# Patient Record
Sex: FEMALE | Race: OTHER | HISPANIC OR LATINO | URBAN - METROPOLITAN AREA
[De-identification: names, ages, dates, MRNs, and addresses within clinical notes are randomized per-mention and may not be internally consistent; named-entity substitution may affect disease eponyms.]

---

## 2021-03-16 ENCOUNTER — INPATIENT (INPATIENT)
Facility: HOSPITAL | Age: 66
LOS: 8 days | Discharge: NURSING, REHAB OR LONG-TERM CARE | End: 2021-03-25
Attending: HOSPITALIST | Admitting: HOSPITALIST
Payer: MEDICAID

## 2021-03-16 VITALS
OXYGEN SATURATION: 99 % | SYSTOLIC BLOOD PRESSURE: 165 MMHG | DIASTOLIC BLOOD PRESSURE: 72 MMHG | RESPIRATION RATE: 18 BRPM | HEART RATE: 94 BPM | TEMPERATURE: 98 F

## 2021-03-16 PROCEDURE — 99285 EMERGENCY DEPT VISIT HI MDM: CPT

## 2021-03-16 PROCEDURE — 71045 X-RAY EXAM CHEST 1 VIEW: CPT | Mod: 26

## 2021-03-16 NOTE — ED ADULT TRIAGE NOTE - CHIEF COMPLAINT QUOTE
Pt brought in by Son for second opinion for a rash and weakness. Pt was seen at [facility] and discharged. Pt also complaining of difficulty walking Pt denies chest pain, sob, n/v/d, fever or chills.

## 2021-03-17 ENCOUNTER — RESULT REVIEW (OUTPATIENT)
Age: 66
End: 2021-03-17

## 2021-03-17 DIAGNOSIS — Z00.00 ENCOUNTER FOR GENERAL ADULT MEDICAL EXAMINATION WITHOUT ABNORMAL FINDINGS: ICD-10-CM

## 2021-03-17 DIAGNOSIS — Z98.890 OTHER SPECIFIED POSTPROCEDURAL STATES: Chronic | ICD-10-CM

## 2021-03-17 DIAGNOSIS — D47.3 ESSENTIAL (HEMORRHAGIC) THROMBOCYTHEMIA: ICD-10-CM

## 2021-03-17 DIAGNOSIS — R21 RASH AND OTHER NONSPECIFIC SKIN ERUPTION: ICD-10-CM

## 2021-03-17 DIAGNOSIS — E87.1 HYPO-OSMOLALITY AND HYPONATREMIA: ICD-10-CM

## 2021-03-17 DIAGNOSIS — M25.50 PAIN IN UNSPECIFIED JOINT: ICD-10-CM

## 2021-03-17 DIAGNOSIS — R74.01 ELEVATION OF LEVELS OF LIVER TRANSAMINASE LEVELS: ICD-10-CM

## 2021-03-17 DIAGNOSIS — Z98.891 HISTORY OF UTERINE SCAR FROM PREVIOUS SURGERY: Chronic | ICD-10-CM

## 2021-03-17 DIAGNOSIS — R53.1 WEAKNESS: ICD-10-CM

## 2021-03-17 DIAGNOSIS — D64.9 ANEMIA, UNSPECIFIED: ICD-10-CM

## 2021-03-17 DIAGNOSIS — D72.829 ELEVATED WHITE BLOOD CELL COUNT, UNSPECIFIED: ICD-10-CM

## 2021-03-17 DIAGNOSIS — E11.9 TYPE 2 DIABETES MELLITUS WITHOUT COMPLICATIONS: ICD-10-CM

## 2021-03-17 DIAGNOSIS — B37.0 CANDIDAL STOMATITIS: ICD-10-CM

## 2021-03-17 DIAGNOSIS — Z29.9 ENCOUNTER FOR PROPHYLACTIC MEASURES, UNSPECIFIED: ICD-10-CM

## 2021-03-17 LAB
24R-OH-CALCIDIOL SERPL-MCNC: 19.1 NG/ML — LOW (ref 30–80)
A1C WITH ESTIMATED AVERAGE GLUCOSE RESULT: 10.6 % — HIGH (ref 4–5.6)
ALBUMIN SERPL ELPH-MCNC: 2.6 G/DL — LOW (ref 3.3–5)
ALBUMIN SERPL ELPH-MCNC: 2.7 G/DL — LOW (ref 3.3–5)
ALP SERPL-CCNC: 238 U/L — HIGH (ref 40–120)
ALP SERPL-CCNC: 245 U/L — HIGH (ref 40–120)
ALT FLD-CCNC: 47 U/L — HIGH (ref 4–33)
ALT FLD-CCNC: 51 U/L — HIGH (ref 4–33)
ANION GAP SERPL CALC-SCNC: 10 MMOL/L — SIGNIFICANT CHANGE UP (ref 7–14)
ANION GAP SERPL CALC-SCNC: 11 MMOL/L — SIGNIFICANT CHANGE UP (ref 7–14)
APPEARANCE UR: CLEAR — SIGNIFICANT CHANGE UP
APTT BLD: 29.3 SEC — SIGNIFICANT CHANGE UP (ref 27–36.3)
AST SERPL-CCNC: 40 U/L — HIGH (ref 4–32)
AST SERPL-CCNC: 41 U/L — HIGH (ref 4–32)
B BURGDOR C6 AB SER-ACNC: NEGATIVE — SIGNIFICANT CHANGE UP
B BURGDOR IGG+IGM SER-ACNC: 0.13 INDEX — SIGNIFICANT CHANGE UP (ref 0.01–0.89)
BASOPHILS # BLD AUTO: 0.05 K/UL — SIGNIFICANT CHANGE UP (ref 0–0.2)
BASOPHILS NFR BLD AUTO: 0.2 % — SIGNIFICANT CHANGE UP (ref 0–2)
BILIRUB SERPL-MCNC: 0.5 MG/DL — SIGNIFICANT CHANGE UP (ref 0.2–1.2)
BILIRUB SERPL-MCNC: 0.5 MG/DL — SIGNIFICANT CHANGE UP (ref 0.2–1.2)
BILIRUB UR-MCNC: NEGATIVE — SIGNIFICANT CHANGE UP
BLD GP AB SCN SERPL QL: NEGATIVE — SIGNIFICANT CHANGE UP
BUN SERPL-MCNC: 7 MG/DL — SIGNIFICANT CHANGE UP (ref 7–23)
BUN SERPL-MCNC: 8 MG/DL — SIGNIFICANT CHANGE UP (ref 7–23)
C3 SERPL-MCNC: 182 MG/DL — HIGH (ref 90–180)
C4 SERPL-MCNC: 32 MG/DL — SIGNIFICANT CHANGE UP (ref 10–40)
CALCIUM SERPL-MCNC: 8.5 MG/DL — SIGNIFICANT CHANGE UP (ref 8.4–10.5)
CALCIUM SERPL-MCNC: 8.6 MG/DL — SIGNIFICANT CHANGE UP (ref 8.4–10.5)
CHLORIDE SERPL-SCNC: 92 MMOL/L — LOW (ref 98–107)
CHLORIDE SERPL-SCNC: 96 MMOL/L — LOW (ref 98–107)
CHOLEST SERPL-MCNC: 88 MG/DL — SIGNIFICANT CHANGE UP
CK SERPL-CCNC: 19 U/L — LOW (ref 25–170)
CLOSURE TME COLL+EPINEP BLD: 569 K/UL — HIGH (ref 150–400)
CMV IGM FLD-ACNC: <8 AU/ML — SIGNIFICANT CHANGE UP
CMV IGM SERPL QL: NEGATIVE — SIGNIFICANT CHANGE UP
CO2 SERPL-SCNC: 25 MMOL/L — SIGNIFICANT CHANGE UP (ref 22–31)
CO2 SERPL-SCNC: 26 MMOL/L — SIGNIFICANT CHANGE UP (ref 22–31)
COLOR SPEC: YELLOW — SIGNIFICANT CHANGE UP
CREAT ?TM UR-MCNC: 120 MG/DL — SIGNIFICANT CHANGE UP
CREAT SERPL-MCNC: 0.44 MG/DL — LOW (ref 0.5–1.3)
CREAT SERPL-MCNC: 0.5 MG/DL — SIGNIFICANT CHANGE UP (ref 0.5–1.3)
CRP SERPL-MCNC: 197.4 MG/L — HIGH
DIFF PNL FLD: NEGATIVE — SIGNIFICANT CHANGE UP
EBV EA AB SER IA-ACNC: <5 U/ML — SIGNIFICANT CHANGE UP
EBV EA AB TITR SER IF: POSITIVE
EBV EA IGG SER-ACNC: NEGATIVE — SIGNIFICANT CHANGE UP
EBV NA IGG SER IA-ACNC: 45.3 U/ML — HIGH
EBV PATRN SPEC IB-IMP: SIGNIFICANT CHANGE UP
EBV VCA IGG AVIDITY SER QL IA: POSITIVE
EBV VCA IGM SER IA-ACNC: 30.9 U/ML — SIGNIFICANT CHANGE UP
EBV VCA IGM SER IA-ACNC: >750 U/ML — HIGH
EBV VCA IGM TITR FLD: NEGATIVE — SIGNIFICANT CHANGE UP
EOSINOPHIL # BLD AUTO: 0.55 K/UL — HIGH (ref 0–0.5)
EOSINOPHIL NFR BLD AUTO: 2.5 % — SIGNIFICANT CHANGE UP (ref 0–6)
ERYTHROCYTE [SEDIMENTATION RATE] IN BLOOD: 91 MM/HR — HIGH (ref 4–25)
ESTIMATED AVERAGE GLUCOSE: 258 MG/DL — HIGH (ref 68–114)
FERRITIN SERPL-MCNC: 3220 NG/ML — HIGH (ref 15–150)
FOLATE SERPL-MCNC: 13 NG/ML — SIGNIFICANT CHANGE UP (ref 3.1–17.5)
GLUCOSE BLDC GLUCOMTR-MCNC: 164 MG/DL — HIGH (ref 70–99)
GLUCOSE BLDC GLUCOMTR-MCNC: 174 MG/DL — HIGH (ref 70–99)
GLUCOSE BLDC GLUCOMTR-MCNC: 188 MG/DL — HIGH (ref 70–99)
GLUCOSE BLDC GLUCOMTR-MCNC: 237 MG/DL — HIGH (ref 70–99)
GLUCOSE BLDC GLUCOMTR-MCNC: 238 MG/DL — HIGH (ref 70–99)
GLUCOSE BLDC GLUCOMTR-MCNC: 268 MG/DL — HIGH (ref 70–99)
GLUCOSE SERPL-MCNC: 168 MG/DL — HIGH (ref 70–99)
GLUCOSE SERPL-MCNC: 213 MG/DL — HIGH (ref 70–99)
GLUCOSE UR QL: ABNORMAL
HAPTOGLOB SERPL-MCNC: 565 MG/DL — HIGH (ref 34–200)
HAV IGM SER-ACNC: SIGNIFICANT CHANGE UP
HBV CORE IGM SER-ACNC: SIGNIFICANT CHANGE UP
HBV SURFACE AG SER-ACNC: SIGNIFICANT CHANGE UP
HCT VFR BLD CALC: 31.5 % — LOW (ref 34.5–45)
HCT VFR BLD CALC: 32.7 % — LOW (ref 34.5–45)
HCV AB S/CO SERPL IA: 0.26 S/CO — SIGNIFICANT CHANGE UP (ref 0–0.99)
HCV AB SERPL-IMP: SIGNIFICANT CHANGE UP
HDLC SERPL-MCNC: 29 MG/DL — LOW
HGB BLD-MCNC: 10.6 G/DL — LOW (ref 11.5–15.5)
HGB BLD-MCNC: 10.9 G/DL — LOW (ref 11.5–15.5)
HIV 1+2 AB+HIV1 P24 AG SERPL QL IA: SIGNIFICANT CHANGE UP
IANC: 18.72 K/UL — HIGH (ref 1.5–8.5)
IMM GRANULOCYTES NFR BLD AUTO: 1.1 % — SIGNIFICANT CHANGE UP (ref 0–1.5)
INR BLD: 1.29 RATIO — HIGH (ref 0.88–1.16)
IRON SATN MFR SERPL: 12 % — LOW (ref 14–50)
IRON SATN MFR SERPL: 19 UG/DL — LOW (ref 30–160)
KETONES UR-MCNC: ABNORMAL
LDH SERPL L TO P-CCNC: 494 U/L — HIGH (ref 135–225)
LEUKOCYTE ESTERASE UR-ACNC: ABNORMAL
LIPID PNL WITH DIRECT LDL SERPL: 33 MG/DL — SIGNIFICANT CHANGE UP
LYMPHOCYTES # BLD AUTO: 1.85 K/UL — SIGNIFICANT CHANGE UP (ref 1–3.3)
LYMPHOCYTES # BLD AUTO: 8.4 % — LOW (ref 13–44)
MAGNESIUM SERPL-MCNC: 2.2 MG/DL — SIGNIFICANT CHANGE UP (ref 1.6–2.6)
MCHC RBC-ENTMCNC: 27.6 PG — SIGNIFICANT CHANGE UP (ref 27–34)
MCHC RBC-ENTMCNC: 27.7 PG — SIGNIFICANT CHANGE UP (ref 27–34)
MCHC RBC-ENTMCNC: 33.3 GM/DL — SIGNIFICANT CHANGE UP (ref 32–36)
MCHC RBC-ENTMCNC: 33.7 GM/DL — SIGNIFICANT CHANGE UP (ref 32–36)
MCV RBC AUTO: 82.2 FL — SIGNIFICANT CHANGE UP (ref 80–100)
MCV RBC AUTO: 82.8 FL — SIGNIFICANT CHANGE UP (ref 80–100)
MONOCYTES # BLD AUTO: 0.73 K/UL — SIGNIFICANT CHANGE UP (ref 0–0.9)
MONOCYTES NFR BLD AUTO: 3.3 % — SIGNIFICANT CHANGE UP (ref 2–14)
NEUTROPHILS # BLD AUTO: 18.72 K/UL — HIGH (ref 1.8–7.4)
NEUTROPHILS NFR BLD AUTO: 84.5 % — HIGH (ref 43–77)
NITRITE UR-MCNC: NEGATIVE — SIGNIFICANT CHANGE UP
NON HDL CHOLESTEROL: 59 MG/DL — SIGNIFICANT CHANGE UP
NRBC # BLD: 0 /100 WBCS — SIGNIFICANT CHANGE UP
NRBC # BLD: 0 /100 WBCS — SIGNIFICANT CHANGE UP
NRBC # FLD: 0 K/UL — SIGNIFICANT CHANGE UP
NRBC # FLD: 0 K/UL — SIGNIFICANT CHANGE UP
OSMOLALITY SERPL: 280 MOSM/KG — SIGNIFICANT CHANGE UP (ref 275–295)
OSMOLALITY UR: 623 MOSM/KG — SIGNIFICANT CHANGE UP (ref 50–1200)
PH UR: 7.5 — SIGNIFICANT CHANGE UP (ref 5–8)
PHOSPHATE SERPL-MCNC: 3.2 MG/DL — SIGNIFICANT CHANGE UP (ref 2.5–4.5)
PLATELET # BLD AUTO: 745 K/UL — HIGH (ref 150–400)
PLATELET # BLD AUTO: 751 K/UL — HIGH (ref 150–400)
POTASSIUM SERPL-MCNC: 3.5 MMOL/L — SIGNIFICANT CHANGE UP (ref 3.5–5.3)
POTASSIUM SERPL-MCNC: 4.2 MMOL/L — SIGNIFICANT CHANGE UP (ref 3.5–5.3)
POTASSIUM SERPL-SCNC: 3.5 MMOL/L — SIGNIFICANT CHANGE UP (ref 3.5–5.3)
POTASSIUM SERPL-SCNC: 4.2 MMOL/L — SIGNIFICANT CHANGE UP (ref 3.5–5.3)
PROT ?TM UR-MCNC: 70 MG/DL — SIGNIFICANT CHANGE UP
PROT SERPL-MCNC: 6.6 G/DL — SIGNIFICANT CHANGE UP (ref 6–8.3)
PROT SERPL-MCNC: 6.8 G/DL — SIGNIFICANT CHANGE UP (ref 6–8.3)
PROT UR-MCNC: ABNORMAL
PROT/CREAT UR-RTO: 0.6 RATIO — HIGH (ref 0–0.2)
PROTHROM AB SERPL-ACNC: 14.7 SEC — HIGH (ref 10.6–13.6)
RBC # BLD: 3.83 M/UL — SIGNIFICANT CHANGE UP (ref 3.8–5.2)
RBC # BLD: 3.95 M/UL — SIGNIFICANT CHANGE UP (ref 3.8–5.2)
RBC # BLD: 3.95 M/UL — SIGNIFICANT CHANGE UP (ref 3.8–5.2)
RBC # FLD: 14.7 % — HIGH (ref 10.3–14.5)
RBC # FLD: 15 % — HIGH (ref 10.3–14.5)
RETICS #: 77 K/UL — SIGNIFICANT CHANGE UP (ref 25–125)
RETICS/RBC NFR: 2 % — SIGNIFICANT CHANGE UP (ref 0.5–2.5)
RH IG SCN BLD-IMP: POSITIVE — SIGNIFICANT CHANGE UP
RHEUMATOID FACT SERPL-ACNC: <10 IU/ML — SIGNIFICANT CHANGE UP (ref 0–13)
SARS-COV-2 RNA SPEC QL NAA+PROBE: SIGNIFICANT CHANGE UP
SODIUM SERPL-SCNC: 129 MMOL/L — LOW (ref 135–145)
SODIUM SERPL-SCNC: 131 MMOL/L — LOW (ref 135–145)
SODIUM UR-SCNC: 143 MMOL/L — SIGNIFICANT CHANGE UP
SP GR SPEC: 1.02 — SIGNIFICANT CHANGE UP (ref 1.01–1.02)
T PALLIDUM AB TITR SER: NEGATIVE — SIGNIFICANT CHANGE UP
TIBC SERPL-MCNC: 161 UG/DL — LOW (ref 220–430)
TRIGL SERPL-MCNC: 129 MG/DL — SIGNIFICANT CHANGE UP
TSH SERPL-MCNC: 1.5 UIU/ML — SIGNIFICANT CHANGE UP (ref 0.27–4.2)
UIBC SERPL-MCNC: 142 UG/DL — SIGNIFICANT CHANGE UP (ref 110–370)
URATE SERPL-MCNC: 1.7 MG/DL — LOW (ref 2.5–7)
UROBILINOGEN FLD QL: ABNORMAL
VIT B12 SERPL-MCNC: 1573 PG/ML — HIGH (ref 200–900)
WBC # BLD: 22.14 K/UL — HIGH (ref 3.8–10.5)
WBC # BLD: 23.38 K/UL — HIGH (ref 3.8–10.5)
WBC # FLD AUTO: 22.14 K/UL — HIGH (ref 3.8–10.5)
WBC # FLD AUTO: 23.38 K/UL — HIGH (ref 3.8–10.5)

## 2021-03-17 PROCEDURE — 99221 1ST HOSP IP/OBS SF/LOW 40: CPT

## 2021-03-17 PROCEDURE — 73030 X-RAY EXAM OF SHOULDER: CPT | Mod: 26,LT

## 2021-03-17 PROCEDURE — 73070 X-RAY EXAM OF ELBOW: CPT | Mod: 26,RT

## 2021-03-17 PROCEDURE — 74178 CT ABD&PLV WO CNTR FLWD CNTR: CPT | Mod: 26

## 2021-03-17 PROCEDURE — 88305 TISSUE EXAM BY PATHOLOGIST: CPT | Mod: 26

## 2021-03-17 PROCEDURE — 12345: CPT | Mod: NC

## 2021-03-17 PROCEDURE — 99223 1ST HOSP IP/OBS HIGH 75: CPT | Mod: GC

## 2021-03-17 PROCEDURE — 93010 ELECTROCARDIOGRAM REPORT: CPT

## 2021-03-17 PROCEDURE — 71260 CT THORAX DX C+: CPT | Mod: 26

## 2021-03-17 RX ORDER — DEXTROSE 50 % IN WATER 50 %
12.5 SYRINGE (ML) INTRAVENOUS ONCE
Refills: 0 | Status: DISCONTINUED | OUTPATIENT
Start: 2021-03-17 | End: 2021-03-25

## 2021-03-17 RX ORDER — KETOROLAC TROMETHAMINE 30 MG/ML
15 SYRINGE (ML) INJECTION ONCE
Refills: 0 | Status: DISCONTINUED | OUTPATIENT
Start: 2021-03-17 | End: 2021-03-17

## 2021-03-17 RX ORDER — CHOLECALCIFEROL (VITAMIN D3) 125 MCG
2000 CAPSULE ORAL DAILY
Refills: 0 | Status: DISCONTINUED | OUTPATIENT
Start: 2021-03-17 | End: 2021-03-25

## 2021-03-17 RX ORDER — DEXTROSE 50 % IN WATER 50 %
25 SYRINGE (ML) INTRAVENOUS ONCE
Refills: 0 | Status: DISCONTINUED | OUTPATIENT
Start: 2021-03-17 | End: 2021-03-25

## 2021-03-17 RX ORDER — SODIUM CHLORIDE 9 MG/ML
1 INJECTION INTRAMUSCULAR; INTRAVENOUS; SUBCUTANEOUS
Refills: 0 | Status: DISCONTINUED | OUTPATIENT
Start: 2021-03-17 | End: 2021-03-19

## 2021-03-17 RX ORDER — KETOROLAC TROMETHAMINE 30 MG/ML
15 SYRINGE (ML) INJECTION EVERY 6 HOURS
Refills: 0 | Status: DISCONTINUED | OUTPATIENT
Start: 2021-03-17 | End: 2021-03-17

## 2021-03-17 RX ORDER — ACETAMINOPHEN 500 MG
650 TABLET ORAL EVERY 6 HOURS
Refills: 0 | Status: DISCONTINUED | OUTPATIENT
Start: 2021-03-17 | End: 2021-03-25

## 2021-03-17 RX ORDER — LATANOPROST 0.05 MG/ML
1 SOLUTION/ DROPS OPHTHALMIC; TOPICAL AT BEDTIME
Refills: 0 | Status: DISCONTINUED | OUTPATIENT
Start: 2021-03-17 | End: 2021-03-25

## 2021-03-17 RX ORDER — DEXTROSE 50 % IN WATER 50 %
15 SYRINGE (ML) INTRAVENOUS ONCE
Refills: 0 | Status: DISCONTINUED | OUTPATIENT
Start: 2021-03-17 | End: 2021-03-25

## 2021-03-17 RX ORDER — SODIUM CHLORIDE 9 MG/ML
1000 INJECTION, SOLUTION INTRAVENOUS
Refills: 0 | Status: DISCONTINUED | OUTPATIENT
Start: 2021-03-17 | End: 2021-03-17

## 2021-03-17 RX ORDER — KETOROLAC TROMETHAMINE 30 MG/ML
15 SYRINGE (ML) INJECTION EVERY 6 HOURS
Refills: 0 | Status: DISCONTINUED | OUTPATIENT
Start: 2021-03-17 | End: 2021-03-19

## 2021-03-17 RX ORDER — SODIUM CHLORIDE 9 MG/ML
1000 INJECTION INTRAMUSCULAR; INTRAVENOUS; SUBCUTANEOUS
Refills: 0 | Status: DISCONTINUED | OUTPATIENT
Start: 2021-03-17 | End: 2021-03-18

## 2021-03-17 RX ORDER — CHOLECALCIFEROL (VITAMIN D3) 125 MCG
1 CAPSULE ORAL
Qty: 0 | Refills: 0 | DISCHARGE

## 2021-03-17 RX ORDER — NYSTATIN 500MM UNIT
500000 POWDER (EA) MISCELLANEOUS EVERY 6 HOURS
Refills: 0 | Status: DISCONTINUED | OUTPATIENT
Start: 2021-03-17 | End: 2021-03-25

## 2021-03-17 RX ORDER — KETOTIFEN FUMARATE 0.34 MG/ML
1 SOLUTION OPHTHALMIC
Refills: 0 | Status: DISCONTINUED | OUTPATIENT
Start: 2021-03-17 | End: 2021-03-25

## 2021-03-17 RX ORDER — SODIUM CHLORIDE 9 MG/ML
1000 INJECTION INTRAMUSCULAR; INTRAVENOUS; SUBCUTANEOUS ONCE
Refills: 0 | Status: COMPLETED | OUTPATIENT
Start: 2021-03-17 | End: 2021-03-17

## 2021-03-17 RX ORDER — ATORVASTATIN CALCIUM 80 MG/1
40 TABLET, FILM COATED ORAL AT BEDTIME
Refills: 0 | Status: DISCONTINUED | OUTPATIENT
Start: 2021-03-17 | End: 2021-03-19

## 2021-03-17 RX ORDER — HYDROXYZINE HCL 10 MG
25 TABLET ORAL EVERY 6 HOURS
Refills: 0 | Status: DISCONTINUED | OUTPATIENT
Start: 2021-03-17 | End: 2021-03-25

## 2021-03-17 RX ORDER — INSULIN LISPRO 100/ML
8 VIAL (ML) SUBCUTANEOUS
Refills: 0 | Status: DISCONTINUED | OUTPATIENT
Start: 2021-03-17 | End: 2021-03-18

## 2021-03-17 RX ORDER — ENOXAPARIN SODIUM 100 MG/ML
40 INJECTION SUBCUTANEOUS DAILY
Refills: 0 | Status: DISCONTINUED | OUTPATIENT
Start: 2021-03-17 | End: 2021-03-25

## 2021-03-17 RX ORDER — GLUCAGON INJECTION, SOLUTION 0.5 MG/.1ML
1 INJECTION, SOLUTION SUBCUTANEOUS ONCE
Refills: 0 | Status: DISCONTINUED | OUTPATIENT
Start: 2021-03-17 | End: 2021-03-17

## 2021-03-17 RX ORDER — INSULIN GLARGINE 100 [IU]/ML
20 INJECTION, SOLUTION SUBCUTANEOUS EVERY MORNING
Refills: 0 | Status: DISCONTINUED | OUTPATIENT
Start: 2021-03-17 | End: 2021-03-17

## 2021-03-17 RX ORDER — PETROLATUM,WHITE
1 JELLY (GRAM) TOPICAL DAILY
Refills: 0 | Status: DISCONTINUED | OUTPATIENT
Start: 2021-03-17 | End: 2021-03-25

## 2021-03-17 RX ORDER — CHOLECALCIFEROL (VITAMIN D3) 125 MCG
1000 CAPSULE ORAL DAILY
Refills: 0 | Status: DISCONTINUED | OUTPATIENT
Start: 2021-03-17 | End: 2021-03-17

## 2021-03-17 RX ORDER — INSULIN LISPRO 100/ML
VIAL (ML) SUBCUTANEOUS
Refills: 0 | Status: DISCONTINUED | OUTPATIENT
Start: 2021-03-17 | End: 2021-03-18

## 2021-03-17 RX ORDER — INSULIN GLARGINE 100 [IU]/ML
30 INJECTION, SOLUTION SUBCUTANEOUS EVERY MORNING
Refills: 0 | Status: DISCONTINUED | OUTPATIENT
Start: 2021-03-18 | End: 2021-03-18

## 2021-03-17 RX ORDER — DILTIAZEM HCL 120 MG
300 CAPSULE, EXT RELEASE 24 HR ORAL DAILY
Refills: 0 | Status: DISCONTINUED | OUTPATIENT
Start: 2021-03-17 | End: 2021-03-25

## 2021-03-17 RX ORDER — ACETAMINOPHEN 500 MG
650 TABLET ORAL EVERY 6 HOURS
Refills: 0 | Status: DISCONTINUED | OUTPATIENT
Start: 2021-03-17 | End: 2021-03-17

## 2021-03-17 RX ORDER — CHOLECALCIFEROL (VITAMIN D3) 125 MCG
2 CAPSULE ORAL
Qty: 0 | Refills: 0 | DISCHARGE

## 2021-03-17 RX ORDER — INSULIN LISPRO 100/ML
5 VIAL (ML) SUBCUTANEOUS
Refills: 0 | Status: DISCONTINUED | OUTPATIENT
Start: 2021-03-17 | End: 2021-03-17

## 2021-03-17 RX ORDER — INSULIN LISPRO 100/ML
VIAL (ML) SUBCUTANEOUS AT BEDTIME
Refills: 0 | Status: DISCONTINUED | OUTPATIENT
Start: 2021-03-17 | End: 2021-03-18

## 2021-03-17 RX ADMIN — Medication 300 MILLIGRAM(S): at 10:04

## 2021-03-17 RX ADMIN — Medication 5 UNIT(S): at 09:44

## 2021-03-17 RX ADMIN — Medication 500000 UNIT(S): at 13:54

## 2021-03-17 RX ADMIN — Medication 1: at 21:47

## 2021-03-17 RX ADMIN — Medication 5 UNIT(S): at 13:55

## 2021-03-17 RX ADMIN — Medication 25 MILLIGRAM(S): at 18:41

## 2021-03-17 RX ADMIN — Medication 15 MILLIGRAM(S): at 18:42

## 2021-03-17 RX ADMIN — SODIUM CHLORIDE 1 GRAM(S): 9 INJECTION INTRAMUSCULAR; INTRAVENOUS; SUBCUTANEOUS at 18:50

## 2021-03-17 RX ADMIN — Medication 50 MILLIGRAM(S): at 18:41

## 2021-03-17 RX ADMIN — ATORVASTATIN CALCIUM 40 MILLIGRAM(S): 80 TABLET, FILM COATED ORAL at 21:46

## 2021-03-17 RX ADMIN — Medication 1: at 09:45

## 2021-03-17 RX ADMIN — LATANOPROST 1 DROP(S): 0.05 SOLUTION/ DROPS OPHTHALMIC; TOPICAL at 21:46

## 2021-03-17 RX ADMIN — KETOTIFEN FUMARATE 1 DROP(S): 0.34 SOLUTION OPHTHALMIC at 18:47

## 2021-03-17 RX ADMIN — Medication 2000 UNIT(S): at 13:54

## 2021-03-17 RX ADMIN — SODIUM CHLORIDE 1000 MILLILITER(S): 9 INJECTION INTRAMUSCULAR; INTRAVENOUS; SUBCUTANEOUS at 01:53

## 2021-03-17 RX ADMIN — Medication 500000 UNIT(S): at 18:50

## 2021-03-17 RX ADMIN — ENOXAPARIN SODIUM 40 MILLIGRAM(S): 100 INJECTION SUBCUTANEOUS at 18:42

## 2021-03-17 RX ADMIN — Medication 15 MILLIGRAM(S): at 01:53

## 2021-03-17 RX ADMIN — INSULIN GLARGINE 20 UNIT(S): 100 INJECTION, SOLUTION SUBCUTANEOUS at 08:03

## 2021-03-17 RX ADMIN — SODIUM CHLORIDE 100 MILLILITER(S): 9 INJECTION INTRAMUSCULAR; INTRAVENOUS; SUBCUTANEOUS at 21:50

## 2021-03-17 RX ADMIN — Medication 15 MILLIGRAM(S): at 18:57

## 2021-03-17 RX ADMIN — SODIUM CHLORIDE 100 MILLILITER(S): 9 INJECTION INTRAMUSCULAR; INTRAVENOUS; SUBCUTANEOUS at 18:37

## 2021-03-17 RX ADMIN — Medication 15 MILLIGRAM(S): at 00:11

## 2021-03-17 RX ADMIN — Medication 500000 UNIT(S): at 23:34

## 2021-03-17 RX ADMIN — Medication 5 UNIT(S): at 18:45

## 2021-03-17 RX ADMIN — Medication 15 MILLIGRAM(S): at 10:16

## 2021-03-17 RX ADMIN — Medication 2: at 18:46

## 2021-03-17 RX ADMIN — Medication 15 MILLIGRAM(S): at 10:01

## 2021-03-17 NOTE — H&P ADULT - PROBLEM SELECTOR PROBLEM 8
Type 2 diabetes mellitus without complication, unspecified whether long term insulin use Transaminitis

## 2021-03-17 NOTE — H&P ADULT - NSHPLABSRESULTS_GEN_ALL_CORE
10.6   23.38 )-----------( 751      ( 17 Mar 2021 00:22 )             31.5       -    129<L>  |  92<L>  |  7   ----------------------------<  213<H>  4.2   |  26  |  0.44<L>    Ca    8.6      17 Mar 2021 00:22    TPro  6.8  /  Alb  2.7<L>  /  TBili  0.5  /  DBili  x   /  AST  40<H>  /  ALT  51<H>  /  AlkPhos  238<H>                Urinalysis Basic - ( 17 Mar 2021 03:32 )    Color: Yellow / Appearance: Clear / S.022 / pH: x  Gluc: x / Ketone: Small  / Bili: Negative / Urobili: 3 mg/dL   Blood: x / Protein: 30 mg/dL / Nitrite: Negative   Leuk Esterase: Small / RBC: 5 /HPF / WBC 25-50 /HPF   Sq Epi: x / Non Sq Epi: few /HPF / Bacteria: Few            Lactate Trend      CARDIAC MARKERS ( 17 Mar 2021 02:12 )  x     / x     / 19 U/L / x     / x            CAPILLARY BLOOD GLUCOSE      POCT Blood Glucose.: 285 mg/dL (16 Mar 2021 19:25)

## 2021-03-17 NOTE — PROGRESS NOTE ADULT - PROBLEM SELECTOR PLAN 3
- Na 129, Cl 92, possibly in the setting inflammation and SIADH   - F/u urine lytes, serum osmolality  - F/u TSH, cortisol  - S/p 1L NS in the ED  - Monitor BMP q12 Suspect anemia of chronic disease mixed with iron def.  Hb 10.6 MCV 82, unknown baseline  - iron studies showing concomitant iron def anemia with inflammation, B12, folate, TSH - all wnl smear  - Maintain active T&S

## 2021-03-17 NOTE — H&P ADULT - PROBLEM SELECTOR PLAN 1
- Generalized weakness, fevers, joint pain x1 week, along with rash x1 month, also with dry mouth; no autoimmune hx  - S/p OSH discharge for ?Lyme disease started on doxycyline  - Leukocytosis to 23.4, anemia to 10.6 with MCV 82, platelets 751, ESR 91, , elevated AST/ALT 40/51, alk phos 238, CK low 19  - U/A 30 protein  - Likely inflammatory etiology. Will send workup including blue top platelets, anemia workup, hepatitis panel, HIV, VIPIN, syphilis, blood smear, EBV, CMV, RF, CCP, Sjogren's antibodies, lyme panel, blood cultures   - X-rays L shoulder and R knee   - Rheum consult in AM   - Derm consult in AM   - Pain control with Tylenol for mild pain, Toradol for moderate-severe pain - Generalized weakness, fevers, joint pain x1 week, along with rash x1 month, also with dry mouth; no autoimmune hx  - S/p OSH discharge for ?Lyme disease started on doxycyline  - Leukocytosis to 23.4, anemia to 10.6 with MCV 82, platelets 751, ESR 91, , elevated AST/ALT 40/51, alk phos 238, CK low 19  - U/A 30 protein  - Likely inflammatory etiology. Will send workup including blue top platelets, anemia workup, hepatitis panel, HIV, VIPIN, syphilis, blood smear, EBV, CMV, RF, CCP, Sjogren's antibodies, lyme panel, blood cultures   - X-rays L shoulder and R knee   - Rheum consult in AM   - Derm consult in AM   - Consider pan scan CT to r/o malignancy   - Pain control with Tylenol for mild pain, Toradol for moderate-severe pain  - Obtain outpatient medical records from recent hospitalization - Generalized weakness, fevers, joint pain x1 week, along with rash x1 month, also with dry mouth; no autoimmune hx  - S/p OSH discharge for ?Lyme disease started on doxycyline  - Leukocytosis to 23.4, anemia to 10.6 with MCV 82, platelets 751, ESR 91, , elevated AST/ALT 40/51, alk phos 238, CK low 19  - U/A 30 protein  - Likely inflammatory etiology. Will send workup including blue top platelets, anemia workup, hepatitis panel, HIV, VIPIN, syphilis, blood smear, EBV, CMV, RF, CCP, Sjogren's antibodies, lyme panel, blood cultures   - X-rays L shoulder and R elbow   - Rheum consult in AM   - Derm consult in AM   - Consider pan scan CT to r/o malignancy   - Pain control with Tylenol for mild pain, Toradol for moderate-severe pain  - Obtain outpatient medical records from recent hospitalization - Generalized weakness, fevers, joint pain x1 week, along with rash x1 month, also with dry mouth; no autoimmune hx  - S/p OSH discharge for ?Lyme disease started on doxycyline  - Leukocytosis to 23.4, anemia to 10.6 with MCV 82, platelets 751, ESR 91, , elevated AST/ALT 40/51, alk phos 238, CK low 19  - U/A 30 protein  - Likely inflammatory etiology. Will send workup including blue top platelets, anemia workup, hepatitis panel, HIV, VIPIN, syphilis, blood smear, EBV, CMV, RF, CCP, Sjogren's antibodies, lyme panel, blood cultures, double stranded DNA   - X-rays L shoulder and R elbow   - Rheum consult in AM   - Derm consult in AM   - Consider pan scan CT to r/o malignancy   - Pain control with Tylenol for mild pain, Toradol for moderate-severe pain  - Obtain outpatient medical records from recent hospitalization (was at [facility]) - Generalized weakness, fevers, joint pain x1 week, along with rash x1 month, also with dry mouth; no autoimmune hx  - S/p OSH discharge for ?Lyme disease started on Doxycyline  - Leukocytosis to 23.4K, anemia to 10.6 with MCV 82, platelets 751, ESR 91, , elevated AST/ALT 40/51, alk phos 238, CK low 19  - U/A 30 protein  - Likely inflammatory etiology. Will send workup including blue top platelets, anemia workup, hepatitis panel, HIV, VIPIN, syphilis, blood smear, EBV, CMV, RF, CCP, Sjogren's antibodies, lyme panel, blood cultures, double stranded DNA   - X-rays L shoulder and R elbow   - Rheum consult in AM   - Derm consult in AM   - Consider pan scan CT to r/o malignancy   - Pain control with Tylenol for mild pain, Toradol for moderate-severe pain  - Obtain outpatient medical records from recent hospitalization (was at [facility])  - c/w Doxycycline for now pending verification of records from [facility]; - Generalized weakness, fevers, joint pain x1 week, along with rash x1 month, also with dry mouth; no autoimmune hx  - S/p OSH discharge for ?Lyme disease started on Doxycyline 21-day regimen  - Leukocytosis to 23.4K, anemia to 10.6 with MCV 82, platelets 751, ESR 91, , elevated AST/ALT 40/51, alk phos 238, CK low 19  - U/A 30 protein  - Likely inflammatory etiology. Will send workup including blue top platelets, anemia workup, hepatitis panel, HIV, VIPIN, syphilis, blood smear, EBV, CMV, RF, CCP, Sjogren's antibodies, lyme panel, blood cultures, double stranded DNA   - X-rays L shoulder and R elbow   - Rheum consult in AM   - Derm consult in AM   - Consider pan scan CT to r/o malignancy   - Pain control with Tylenol for mild pain, Toradol for moderate-severe pain  - Obtain outpatient medical records from recent hospitalization (was at [facility])  - c/w Doxycycline for now pending verification of records from [facility];

## 2021-03-17 NOTE — PROGRESS NOTE ADULT - PROBLEM SELECTOR PLAN 6
Suspect anemia of chronic disease;   Hb 10.6 MCV 82, unknown baseline  - F/u workup - iron studies, B12, folate, TSH, smear  - Maintain active T&S - WBC 23.4, differential ordered (pending)  - Monitor CBC  - F/u blood cultures

## 2021-03-17 NOTE — ED PROVIDER NOTE - CLINICAL SUMMARY MEDICAL DECISION MAKING FREE TEXT BOX
DO Meagan PGY-2: 64 y/o F presenting with polyarthralgia, rash concern for possible rheum disorder. Per DC papers, treated for Lyme. Patient will require admission for PT/OT as unable to perform ADLs as well as further evaluation for etiology of patient's symptoms. Will pan culture, check labs, CK, EKG, CXR, COVID swab. TBA

## 2021-03-17 NOTE — H&P ADULT - PROBLEM SELECTOR PLAN 4
- Platelets 751, f/u blue top platelets  - Likely in the setting of inflammatory process with workup above   - Monitor CBC, maintain active T&S - ?in the setting of doxycyline regimen  - Start nystatin  -f/u HIV 1/2 screening

## 2021-03-17 NOTE — ED PROVIDER NOTE - OBJECTIVE STATEMENT
[place]  ID# [id number]  64 y/o F with PMh of DM presenting with generalized weakness, joint pains, rash. Patient states she was DC from OSH on 3/12 after being evaluated for same symptoms. Per papers, was eval for Lyme and rheumatologic disorders. Patient complaining of left shoulder and b/l knee pain. Occasionally gets pain of r shoulder as well. Patient has rash in neck area and b/l arms. Complaining of whole body itching sensation  Denies any fevers, chills, n/v/d, abd pain, LE swelling. No weakness in arms or legs but feels she is severely debilitated due to pain. Is unable to bathe or clothe herself. Abdominal Pain, N/V/D

## 2021-03-17 NOTE — PROGRESS NOTE ADULT - PROBLEM SELECTOR PLAN 1
- Generalized weakness, fevers, joint pain x1 week, along with rash x1 month, also with dry mouth; no autoimmune hx  - S/p OSH discharge for ?Lyme disease started on Doxycyline 21-day regimen  - Leukocytosis to 23.4K, anemia to 10.6 with MCV 82, platelets 751, ESR 91, , elevated AST/ALT 40/51, alk phos 238, CK low 19  - U/A 30 protein  - Likely inflammatory etiology. Will send workup including blue top platelets, anemia workup, hepatitis panel, HIV, VIPIN, syphilis, blood smear, EBV, CMV, RF, CCP, Sjogren's antibodies, lyme panel, blood cultures, double stranded DNA   - X-rays L shoulder and R elbow   - Rheum consult in AM   - Derm consult in AM   - Consider pan scan CT to r/o malignancy   - Pain control with Tylenol for mild pain, Toradol for moderate-severe pain  - Obtain outpatient medical records from recent hospitalization (was at [facility])  - c/w Doxycycline for now pending verification of records from [facility]; - Generalized weakness, fevers, polyarthralgia/arthritis x1 week of large joints, along with rash x1 month, +dry mouth/eyes?; no autoimmune Hx or FHx.  - S/p OSH discharge 3/12 for Lyme disease started on Doxycyline 21-day regimen  - Was treated there with Levaquin and 3x doses of Vanco.  - Leukocytosis to 23.4K, anemia to 10.6 with MCV 82, platelets 751, ESR 91, , elevated AST/ALT 40/51, alk phos 238, CK low 19  - U/A 30 protein  - Likely inflammatory etiology. Will send workup including blue top platelets, anemia workup, hepatitis panel, HIV, VIPIN, syphilis, blood smear, EBV, CMV, RF, CCP, Sjogren's antibodies, lyme panel, blood cultures, double stranded DNA   - X-rays L shoulder and R elbow   - Rheum consult in AM   - Derm consult in AM   - Consider pan scan CT to r/o malignancy   - Pain control with Tylenol for mild pain, Toradol for moderate-severe pain  - Obtain outpatient medical records from recent hospitalization (was at [facility])  - c/w Doxycycline for now pending verification of records from [facility]; - Generalized weakness, fevers, polyarthralgia/arthritis x1 week of large joints, along with rash x1 month, +dry mouth/eyes?; no autoimmune Hx or FHx.  - S/p OSH discharge 3/12 for Lyme disease started on Doxycyline 21-day regimen  - Was treated there with Levaquin and 3x doses of Vanco.  - Leukocytosis to 23.4K, anemia to 10.6 with MCV 82, platelets 751, ESR 91, , elevated AST/ALT 40/51, alk phos 238, CK low 19  - U/A 30 protein  - Likely inflammatory etiology. Will send workup including blue top platelets, anemia workup, hepatitis panel, HIV, VIPIN, syphilis, blood smear, EBV, CMV, RF, CCP, Sjogren's antibodies, lyme panel, blood cultures, double stranded DNA, C3/4  - X-rays L shoulder and R elbow - f/u  - Rheum consulted, appreciate recs.   - Derm consulted, appreciate recs.   - Will consider pan scan CT to r/o malignancy (will try to get CT chest docs from OSH)  - Pain control with Tylenol for mild pain, Toradol for moderate-severe pain  - Obtain outpatient medical records from recent hospitalization (was at [facility])  - holding Doxycycline, pending verification of records from [facility] or lyme serology/PCR

## 2021-03-17 NOTE — H&P ADULT - NSHPPHYSICALEXAM_GEN_ALL_CORE
Vital Signs Last 24 Hrs  T(C): 36.7 (17 Mar 2021 00:16), Max: 36.8 (16 Mar 2021 16:55)  T(F): 98 (17 Mar 2021 00:16), Max: 98.3 (16 Mar 2021 16:55)  HR: 76 (17 Mar 2021 00:16) (76 - 94)  BP: 156/72 (17 Mar 2021 00:16) (156/72 - 165/72)  BP(mean): --  RR: 18 (17 Mar 2021 00:16) (18 - 18)  SpO2: 100% (17 Mar 2021 00:16) (99% - 100%)    PHYSICAL EXAM:  GENERAL: NAD, lying in bed comfortably  HEAD:  Atraumatic, Normocephalic  EYES: EOMI, PERRLA, conjunctiva and sclera clear  ENT: Moist mucous membranes  NECK: Supple, No JVD  CHEST/LUNG: Clear to auscultation bilaterally; No rales, rhonchi, wheezing, or rubs. Unlabored respirations  HEART: Regular rate and rhythm; No murmurs, rubs, or gallops  ABDOMEN: Bowel sounds present; Soft, Nontender, Nondistended. No hepatomegally  EXTREMITIES:  2+ Peripheral Pulses, brisk capillary refill. No clubbing, cyanosis, or edema  NERVOUS SYSTEM:  Alert & Oriented X3, speech clear. No deficits   MSK: FROM all 4 extremities, full and equal strength  SKIN: macular rash over b/l lateral upper arms and upper chest Vital Signs Last 24 Hrs  T(C): 36.7 (17 Mar 2021 00:16), Max: 36.8 (16 Mar 2021 16:55)  T(F): 98 (17 Mar 2021 00:16), Max: 98.3 (16 Mar 2021 16:55)  HR: 76 (17 Mar 2021 00:16) (76 - 94)  BP: 156/72 (17 Mar 2021 00:16) (156/72 - 165/72)  BP(mean): --  RR: 18 (17 Mar 2021 00:16) (18 - 18)  SpO2: 100% (17 Mar 2021 00:16) (99% - 100%)    PHYSICAL EXAM:  GENERAL: NAD, lying in bed comfortably  HEAD:  Atraumatic, Normocephalic  EYES: EOMI, PERRLA, conjunctiva and sclera clear  ENT: Moist mucous membranes  NECK: Supple  CHEST/LUNG: Clear to auscultation bilaterally; No rales  HEART: Regular rate and rhythm; No murmurs  ABDOMEN: Bowel sounds present; Soft, Nontender, Nondistended  EXTREMITIES:  2+ Peripheral Pulses, brisk capillary refill. No clubbing, cyanosis, or edema  NERVOUS SYSTEM:  Alert & Oriented X3, speech clear. No deficits   MSK: +L shoulder and R elbow tenderness, no swelling appreciated. ROM of L shoulder and R elbow limited 2/2 pain. Other joints nontender   SKIN: macular rash over b/l lateral upper arms and upper chest Vital Signs Last 24 Hrs  T(C): 36.7 (17 Mar 2021 00:16), Max: 36.8 (16 Mar 2021 16:55)  T(F): 98 (17 Mar 2021 00:16), Max: 98.3 (16 Mar 2021 16:55)  HR: 76 (17 Mar 2021 00:16) (76 - 94)  BP: 156/72 (17 Mar 2021 00:16) (156/72 - 165/72)  BP(mean): --  RR: 18 (17 Mar 2021 00:16) (18 - 18)  SpO2: 100% (17 Mar 2021 00:16) (99% - 100%)    PHYSICAL EXAM:  GENERAL: NAD, lying in bed comfortably  HEAD:  Atraumatic, Normocephalic  EYES: EOMI, PERRLA, conjunctiva and sclera clear  ENT: Moist mucous membranes. +thrush   NECK: Supple  CHEST/LUNG: Clear to auscultation bilaterally; No rales  HEART: Regular rate and rhythm; No murmurs  ABDOMEN: Bowel sounds present; Soft, Nontender, Nondistended  EXTREMITIES:  2+ Peripheral Pulses, brisk capillary refill. No clubbing, cyanosis, or edema  NERVOUS SYSTEM:  Alert & Oriented X3, speech clear. No deficits   MSK: +L shoulder and R elbow tenderness, no swelling appreciated. ROM of L shoulder and R elbow limited 2/2 pain. Other joints nontender   SKIN: macular rash over b/l lateral upper arms and upper chest

## 2021-03-17 NOTE — CONSULT NOTE ADULT - SUBJECTIVE AND OBJECTIVE BOX
HPI:  64 y/o Czech speaking Female with PMHx of DM Type 2 (on insulin), HTN, HLD, COVID () and glaucoma presenting with generalized weakness, joint pain, and rash. Patient states that she was discharged from OSH on 3/12 after being evaluated for similar symptoms. They suspected that she may have Lyme disease and started her on doxycycline and sent her home. They also suspected that this might be post COVID related.   Patient has multiple complaints. Reports rash in the neck area and b/l arms, along with whole body itching. States that she has had a rash since 2021 and has seen various doctors and tried different things (steroid creams) without relief. The rash initially started on her face. Also complains of joint pain for 1 week. States she has joint pain and stiffness in the b/l shoulders, elbows, hands, and knees. States that the pain is currently worst in the L shoulder and R elbow. States that the pain is so significant that she is unable to walk currently. Tries Tylenol with minimal relief. Also reports that she has been unable to do her ADLs - clean herself, walk. Never has had joint pain like this before. Also reports subjective fever and chills, along with dry mouth.   Denies any N/V/D, abdominal pain, leg swelling, chest pain, dyspnea. Denies oral ulcers, hair loss, weight loss, dry eyes.     ED course: pt's vitals were T 98.3 HR 76-94 -165/72 RR 18-19 POX % RA. Pt treated with NS 1L bolus and Toradol 15 mg IV.  (17 Mar 2021 04:39)    Dermatology consulted for rash. States she has had a rash since  and has tried different steroid creams without improvement. Initially started on her face and now involves chest, arms, legs. +Itchy. Endorsed onset of join pains x 1 week in bilateral shoulders, elbows, hands, knees. Also states scalp is very itchy. Joint pains not worse in AM, worse with movement. +Weakness. Tried Tylenol with minimal relief. Reported subjective fever and chills but afebrile on measurement at home. Denies worsening of rash in sunlight. Denies oral ulcers, hair loss, weight loss, dry eyes. Denies any new medications prior to onset of rash. Denies personal hx of autoimmune diseases. Son with psoriasis.     PAST MEDICAL & SURGICAL HISTORY:  COVID-19      Vitamin D deficiency    Glaucoma    Hyperlipidemia    Hypertension    Diabetes mellitus    History of throat surgery    History of elbow surgery    S/P       REVIEW OF SYSTEMS:  General: +subjective fevers;   Skin/Breast: see HPI  Ophthalmologic: no eye pain or changes in vision  ENT: no dysphagia or changes in hearing  Respiratory: no SOB or cough  Cardiovascular: no palpitations or chest pain  Gastrointestinal: no abdominal pain or blood in stool  Genitourinary: no dysuria or frequency  Musculoskeletal: + joint pains    MEDICATIONS  (STANDING):  atorvastatin 40 milliGRAM(s) Oral at bedtime  cholecalciferol 2000 Unit(s) Oral daily  dextrose 40% Gel 15 Gram(s) Oral once  dextrose 50% Injectable 25 Gram(s) IV Push once  dextrose 50% Injectable 12.5 Gram(s) IV Push once  dextrose 50% Injectable 25 Gram(s) IV Push once  diltiazem    milliGRAM(s) Oral daily  enoxaparin Injectable 40 milliGRAM(s) SubCutaneous daily  insulin glargine Injectable (LANTUS) 20 Unit(s) SubCutaneous every morning  insulin lispro (ADMELOG) corrective regimen sliding scale   SubCutaneous three times a day before meals  insulin lispro (ADMELOG) corrective regimen sliding scale   SubCutaneous at bedtime  insulin lispro Injectable (ADMELOG) 5 Unit(s) SubCutaneous three times a day before meals  ketotifen 0.025% Ophthalmic Solution 1 Drop(s) Both EYES two times a day  latanoprost 0.005% Ophthalmic Solution 1 Drop(s) Both EYES at bedtime  nystatin    Suspension 600470 Unit(s) Swish and Swallow every 6 hours  predniSONE   Tablet 50 milliGRAM(s) Oral daily  sodium chloride 0.9%. 1000 milliLiter(s) (100 mL/Hr) IV Continuous <Continuous>    MEDICATIONS  (PRN):  acetaminophen   Tablet .. 650 milliGRAM(s) Oral every 6 hours PRN Temp greater or equal to 38C (100.4F), Mild Pain (1 - 3), Moderate Pain (4 - 6)  hydrOXYzine hydrochloride 25 milliGRAM(s) Oral every 6 hours PRN Itching  ketorolac   Injectable 15 milliGRAM(s) IV Push every 6 hours PRN Severe Pain (7 - 10)  triamcinolone 0.1% Cream 1 Application(s) Topical every 8 hours PRN Itching      Allergies    azithromycin (Hives; Swelling)  enalapril (Other (Mild to Mod))  penicillin (Swelling; Rash)    Intolerances        SOCIAL HISTORY:    FAMILY HISTORY:  No pertinent family history in first degree relatives        Vital Signs Last 24 Hrs  T(C): 36.8 (17 Mar 2021 15:07), Max: 37.2 (17 Mar 2021 06:58)  T(F): 98.3 (17 Mar 2021 15:07), Max: 98.9 (17 Mar 2021 06:58)  HR: 100 (17 Mar 2021 15:07) (76 - 106)  BP: 145/74 (17 Mar 2021 15:07) (145/74 - 159/79)  BP(mean): --  RR: 18 (17 Mar 2021 15:07) (18 - 19)  SpO2: 97% (17 Mar 2021 15:07) (97% - 100%)    PHYSICAL EXAM:  The patient was AOx3, well nourished, and in NAD.  OP showed no ulcerations.  There was no visible LAD.  Conjunctiva were non-injected.  There was no clubbing or edema of extremities.   The scalp, hair, face, eyebrows, lips, OP, neck, chest, back, extremities x4, and nails were examined.  There was no hyperhidrosis or bromhidrosis.     Of note on skin exam:     - red-violet plaques with minimal scale on upper central chest, upper back, forehead, upper inner right thigh, bilateral upper arms  - swelling of proximal phalanges of bilateral hands     LABS:                        10.9   22.14 )-----------( 745      ( 17 Mar 2021 07:23 )             32.7     -    131<L>  |  96<L>  |  8   ----------------------------<  168<H>  3.5   |  25  |  0.50    Ca    8.5      17 Mar 2021 07:23  Phos  3.2       Mg     2.2         TPro  6.6  /  Alb  2.6<L>  /  TBili  0.5  /  DBili  x   /  AST  41<H>  /  ALT  47<H>  /  AlkPhos  245<H>      PT/INR - ( 17 Mar 2021 07:23 )   PT: 14.7 sec;   INR: 1.29 ratio         PTT - ( 17 Mar 2021 07:23 )  PTT:29.3 sec  Urinalysis Basic - ( 17 Mar 2021 03:32 )    Color: Yellow / Appearance: Clear / S.022 / pH: x  Gluc: x / Ketone: Small  / Bili: Negative / Urobili: 3 mg/dL   Blood: x / Protein: 30 mg/dL / Nitrite: Negative   Leuk Esterase: Small / RBC: 5 /HPF / WBC 25-50 /HPF   Sq Epi: x / Non Sq Epi: few /HPF / Bacteria: Few

## 2021-03-17 NOTE — PHYSICAL THERAPY INITIAL EVALUATION ADULT - THERAPY FREQUENCY, PT EVAL
Spoke with patient regarding her constipation. Patient states she continues to take pain medication after surgery for pain control. Patient advised constipation can be a side effect of pain medication. Encourage to decrease pain medication intake if possible. Patient advised to drink prune juice, increase fibrous food intake and eat fruits and dark colored vegetables. If this does not help patient can also increase fiber intake with Metamucil. Advised movement and ambulating is also beneficial. Will check back patient tomorrow when Dr. Vijay Brand is in clinic. 3-5x/week

## 2021-03-17 NOTE — PROGRESS NOTE ADULT - PROBLEM SELECTOR PLAN 4
- ?in the setting of doxycyline regimen  - Start nystatin  -f/u HIV 1/2 screening - Na 129, Cl 92, possibly in the setting inflammation and SIADH vs. low salute intake  - F/u urine lytes, serum osmolality  - TSH wnl, cortisol - pending  - S/p 1L NS in the ED - improved to 131  - Monitor BMP qd

## 2021-03-17 NOTE — H&P ADULT - PROBLEM SELECTOR PLAN 3
- Weakness in the setting of joint pain  - PT eval  - Fall precautions - Na 129, Cl 92, possibly in the setting inflammation and SIADH   - F/u urine lytes, serum osmolality  - F/u TSH, cortisol  - S/p 1L NS in the ED  - Monitor BMP q12

## 2021-03-17 NOTE — H&P ADULT - PROBLEM SELECTOR PLAN 9
- Start nystatin  - ?in the setting of doxycyline - ?in the setting of doxycyline regimen  - Start nystatin  -f/u HIV 1/2 screening Blood glucose 213, 285  - f/u A1c  - On basaglar 17 U BID at home   - C/w basaglar 20 U in the AM and 5 U AC  - Monitor fingersticks  - Diabetic diet

## 2021-03-17 NOTE — ED ADULT NURSE NOTE - CHIEF COMPLAINT QUOTE
Pt brought in by Son for second opinion for a rash and weakness. Pt was seen at [facility] and discharged. Pt also complaining of difficulty walking Pt denies chest pain, sob, n/v/d, fever or chills.

## 2021-03-17 NOTE — H&P ADULT - PROBLEM SELECTOR PLAN 5
- WBC 23.4, differential pending  - Monitor CBC  - F/u blood cultures - WBC 23.4, differential ordered (pending)  - Monitor CBC  - F/u blood cultures - Platelets 751K, f/u blue top platelets  - Likely in the setting of inflammatory process with workup above   - Monitor CBC, maintain active T&S

## 2021-03-17 NOTE — ED PROVIDER NOTE - PHYSICAL EXAMINATION
gen: appears uncomfortable   Mentation: AAO x 3  psych: mood appropriate  ENT: airway patent  Eyes: conjunctivae clear bilaterally  Cardio: RRR, no m/r/g  Resp: normal BS b/l  GI: s/nt/nd  : no CVA tenderness  Neuro: sensation and motor function intact, CN 2-12 intact  Skin: macular rash over b/l lateral upper arms and upper chest  MSK: normal movement of all extremities  Lymph/Vasc: no LE edema

## 2021-03-17 NOTE — H&P ADULT - NSICDXPASTMEDICALHX_GEN_ALL_CORE_FT
PAST MEDICAL HISTORY:  COVID-19 2020    Diabetes mellitus     Glaucoma     Hyperlipidemia     Hypertension     Vitamin D deficiency

## 2021-03-17 NOTE — PROGRESS NOTE ADULT - PROBLEM SELECTOR PLAN 9
Blood glucose 213, 285  - f/u A1c  - On basaglar 17 U BID at home   - C/w basaglar 20 U in the AM and 5 U AC  - Monitor fingersticks  - Diabetic diet

## 2021-03-17 NOTE — CONSULT NOTE ADULT - ASSESSMENT
65F with DM2 on insulin (poorly controlled), HTN, HLD, hx of COVID-19 infection, glaucoma, admitted for erythematous rash since 2/2021 as well as joint pains for past 9 days, s/p admission to outside hospital. Rheumatology consulted to evaluate for autoimmune etiology of these symptoms.     #Erythematous rash and joint pains- rash on chest, b/l arms and legs, started on face- ddx includes infectious (post-viral) vs. drug induced (pt denies new medication use in past couple of months) vs. from autoimmune process such as SLE or vasculitis.   L. shoulder with tenderness on palpation superiorly- would suspect AC joint involvement- but Xray not demonstrating arthritis.   - Thus far, concern for SLE is low given normal/elevated C3 and C4, other serologies pending. Urine protein/creatinine ratio is mildly elevated to 0.6, however pt has poorly controlled DM2 (A1c >10% this admission) so that can be an explanation.     Plan:  - f/u dermatology reccs and potential biopsy  - will follow up SLE serologies- dsDNA. Also please send TESSA and Sjogrens antibodies.     *** INCOMPLETE NOTE ***      Geetha Mena MD PGY4  Rheumatology Fellow  Pager [phone] with DM2 on insulin (poorly controlled), HTN, HLD, hx of COVID-19 infection, glaucoma, admitted for erythematous rash since 2/2021 as well as joint pains for past 9 days, s/p admission to outside hospital. Rheumatology consulted to evaluate for autoimmune etiology of these symptoms.     #Erythematous rash and joint pains- rash on chest, b/l arms and legs, started on face- ddx includes infectious (post-viral such as from Parvovirus which can cause both rash and joint pains, Lyme arthritis, or ?COVID-19 sequelae) vs. drug induced (pt denies new medication use in past couple of months) vs. from autoimmune process such as SLE or vasculitis.   L. shoulder with tenderness on palpation superiorly- would suspect AC joint involvement- but Xray not demonstrating arthritis.   - Thus far, concern for SLE is low given normal/elevated C3 and C4, other serologies pending. Urine protein/creatinine ratio is mildly elevated to 0.6, however pt has poorly controlled DM2 (A1c >10% this admission) so that can be an explanation.     Plan:  - f/u dermatology reccs and potential biopsy  - will follow up SLE serologies- VIPIN, dsDNA. Also please send TESSA and Sjogrens antibodies.   - check RF and anti-CCP antibodies (RA labs- although would not explain concurrent rash)  - f/u infectious workup including parvovirus antibodies, EBV, CMV, and Lyme studies.  - check COVID-19 IgG antibody      *** INCOMPLETE NOTE ***      Geetha Mena MD PGY4  Rheumatology Fellow  Pager [phone] with DM2 on insulin (poorly controlled), HTN, HLD, hx of COVID-19 infection, glaucoma, admitted for erythematous rash since 2/2021 as well as joint pains for past 9 days, s/p admission to outside hospital. Rheumatology consulted to evaluate for autoimmune etiology of these symptoms.     #Erythematous rash and acute polyarthritis- rash on chest, b/l arms and legs, started on face- ddx includes infectious (post-viral such as from Parvovirus which can cause both rash and joint pains, Lyme arthritis, or ?COVID-19 sequelae (less likely)) vs. drug induced (pt denies new medication use in past couple of months) vs. from autoimmune process such Adult Onset Stills Disease (given transaminitis, hyperferritinemia, high ESR, joint pains, and rash) as SLE or vasculitis.   L. shoulder with tenderness on palpation superiorly- would suspect AC joint involvement- but Xray not demonstrating arthritis.   - Thus far, concern for SLE is low given normal/elevated C3 and C4, other serologies pending. Urine protein/creatinine ratio is mildly elevated to 0.6, however pt has poorly controlled DM2 (A1c >10% this admission) so that can be an explanation.     Plan:  - f/u dermatology reccs and potential biopsy  - will follow up SLE serologies- VIPIN, dsDNA. Also please send TESSA and Sjogrens antibodies.   - check RF and anti-CCP antibodies (RA labs- although would not explain concurrent rash and acute joint pains)  - f/u infectious workup including parvovirus antibodies, EBV, CMV, and Lyme studies.  - check COVID-19 IgG antibody    - send fibrinogen, fibrin split products, coagulation studies.    Discussed with Dr. Spencer Mena MD PGY4  Rheumatology Fellow  Pager [phone] with DM2 on insulin (poorly controlled), HTN, HLD, hx of COVID-19 infection, glaucoma, admitted for erythematous rash since 2/2021 as well as joint pains for past 9 days, s/p admission to outside hospital. Rheumatology consulted to evaluate for autoimmune etiology of these symptoms.     #Erythematous rash and acute polyarthritis- rash on chest, b/l arms and legs, started on face- ddx includes infectious (post-viral such as from Parvovirus which can cause both rash and joint pains, Lyme arthritis, or ?COVID-19 sequelae (less likely)) vs. drug induced (pt denies new medication use in past couple of months) vs. from autoimmune process such as dermatomyositis (although CK is low- may be amyopathic) vs.  Adult Onset Stills Disease (given transaminitis, hyperferritinemia, high ESR, joint pains, and rash) as SLE or vasculitis.   L. shoulder with tenderness on palpation superiorly- would suspect AC joint involvement- but Xray not demonstrating arthritis.   - Thus far, concern for SLE is low given normal/elevated C3 and C4, other serologies pending. Urine protein/creatinine ratio is mildly elevated to 0.6, however pt has poorly controlled DM2 (A1c >10% this admission) so that can be an explanation.     Plan:  - f/u dermatology skin biopsy results  - check myomarker panel, Mallory-1 ab (ordered for AM) as well as myoglobin, aldolase for dermatomyositis evaluation.   - will follow up SLE serologies- VIPIN, dsDNA. Also please send TESSA and Sjogrens antibodies.   - check RF and anti-CCP antibodies (RA labs- although would not explain concurrent rash and acute joint pains)  - f/u infectious workup including parvovirus antibodies, EBV, CMV, and Lyme studies.  - check COVID-19 IgG antibody    - send fibrinogen, fibrin split products, coagulation studies.    Discussed with Dr. Spencer Mena MD PGY4  Rheumatology Fellow  Pager [phone] with DM2 on insulin (poorly controlled), HTN, HLD, hx of COVID-19 infection, glaucoma, admitted for erythematous rash since 2/2021 as well as joint pains for past 9 days, s/p admission to outside hospital. Rheumatology consulted to evaluate for autoimmune etiology of these symptoms.     #Erythematous rash and acute polyarthritis- rash on chest, b/l arms and legs, started on face- ddx includes infectious (post-viral such as from Parvovirus which can cause both rash and joint pains, Lyme arthritis, or ?COVID-19 sequelae (less likely)) vs. drug induced (pt denies new medication use in past couple of months) vs. from autoimmune process such as dermatomyositis (although CK is low- may be amyopathic) vs.  Adult Onset Stills Disease (given transaminitis, hyperferritinemia, high ESR, joint pains, and rash) as SLE or vasculitis.   L. shoulder with tenderness on palpation superiorly- would suspect AC joint involvement- but Xray not demonstrating arthritis.   - Thus far, concern for SLE is low given normal/elevated C3 and C4, other serologies pending. Urine protein/creatinine ratio is mildly elevated to 0.6, however pt has poorly controlled DM2 (A1c >10% this admission) so that can be an explanation.     Plan:  - due to concern for dermatomyositis, will start pt on steroids with prednisone 50 mg QD (can start this evening but Quantiferon needs to be sent first)  - f/u dermatology skin biopsy results  - check myomarker panel, Mallory-1 ab (ordered for AM) as well as myoglobin, aldolase for dermatomyositis evaluation.   - in anticipation for high dose steroid use, please send Quantiferon (PRIOR to steroid initiation) and hepatitis panel, immunoglobulins panel.   - check TSH in AM  - will follow up SLE serologies- VIPIN, dsDNA. Also please send TESSA and Sjogrens antibodies.   - check RF and anti-CCP antibodies (RA labs- although would not explain concurrent rash and acute joint pains)  - f/u infectious workup including parvovirus antibodies, EBV, CMV, and Lyme studies.  - check COVID-19 IgG antibody    - send fibrinogen, fibrin split products, coagulation studies in AM.    Discussed with Dr. Spencer Mena MD PGY4  Rheumatology Fellow  Pager [phone] with DM2 on insulin (poorly controlled), HTN, HLD, hx of COVID-19 infection, glaucoma, admitted for erythematous rash since 2/2021 as well as joint pains for past 9 days, s/p admission to outside hospital. Rheumatology consulted to evaluate for autoimmune etiology of these symptoms.     #Erythematous rash and acute polyarthritis- rash on chest, b/l arms and legs, started on face- ddx includes infectious (post-viral such as from Parvovirus which can cause both rash and joint pains, Lyme arthritis, or ?COVID-19 sequelae (less likely)) vs. drug induced (pt denies new medication use in past couple of months) vs. from autoimmune process such as dermatomyositis (although CK is low- may be amyopathic) vs.  Adult Onset Stills Disease (given transaminitis, hyperferritinemia, high ESR, joint pains, and rash) as SLE or vasculitis.   L. shoulder with tenderness on palpation superiorly- would suspect AC joint involvement- but Xray not demonstrating arthritis.   - Thus far, concern for SLE is low given normal/elevated C3 and C4, other serologies pending. Urine protein/creatinine ratio is mildly elevated to 0.6, however pt has poorly controlled DM2 (A1c >10% this admission) so that can be an explanation.     Plan:  - due to concern for dermatomyositis, will start pt on steroids with prednisone 50 mg QD (can start this evening but Quantiferon needs to be sent first)  - f/u dermatology skin biopsy results  - check myomarker panel, Mallory-1 ab (ordered for AM) as well as myoglobin, aldolase for dermatomyositis evaluation.   - in anticipation for high dose steroid use, please send Quantiferon (PRIOR to steroid initiation) and hepatitis panel, immunoglobulins panel.   - check TSH in AM  - will follow up SLE serologies- VIPIN, dsDNA. Also please send TESSA and Sjogrens antibodies.   - check RF and anti-CCP antibodies (RA labs- although would not explain concurrent rash and acute joint pains)  - f/u infectious workup including parvovirus antibodies, EBV, CMV, and Lyme studies.  - check COVID-19 IgG antibody    - send fibrinogen, fibrin split products, coagulation studies in AM.  - given concern for dermatomyositis which is associated with malignancies (lung/ovarian common in females), would recommend CT chest/abdomen/pelvis during this admission.     Discussed with Dr. Spencer Mena MD PGY4  Rheumatology Fellow  Pager [phone]

## 2021-03-17 NOTE — CONSULT NOTE ADULT - ASSESSMENT
Patient with violaceous photodistributed rash, muscle weakness, joint pains. 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 Dermatomyositis is a multisystem autoimmune connective tissue disease that most often is characterized by a violaceous cutaneous eruption, symmetric proximal extensor inflammatory myopathy, and autoantibodies. Pruritis is commonly associated. Additional features include fatigue, malaise, myalgias. Dysphagia, respiratory, and cardiac involvement can be associated. Up to 40% of patients with dermatomyositis may have an occult malignancy, including colon, ovarian, breast, pancreatic, lung, gastric cancers, and lymphoma. Some cases of dermatomyositis can occur without myositis at its onset (with muscle disease absent or detectable only on muscle biopsy, MRI, or laboratory testing) and many of these patients will eventually develop muscle weakness as the disease progresses.   - punch bx of skin of R upper arm performed at bedside today. Please see procedure note below  - Biopsy site should remain covered with pressure bandage for 24-48 hours. Then apply Vaseline to biopsy site daily and cover with bandage until healed.   - agree with rheumatology consult     The patient's chart was reviewed in addition to being seen and examined at bedside with the dermatology attending Dr. Rupa Mittal.  Recommendations were communicated with the primary team.  Please page [phone] for further related questions (please leave 10 digit call back number because we cover several facilities).    Amber Soriano MD  Resident Physician, PGY2  [facility]       ----     Dermatology Punch Biopsy Procedure Note    After risks and benefits of procedure including bleeding, infection and scar were reviewed (consents including photo consent reviewed, signed and in chart), allergies were reviewed and time out performed. Written consent given by the patient.    Area cleaned with rubbing alcohol and anesthetized with lidocaine and epinephrine.  A 4mm punch biopsy was performed to R upper arm, hemostasis achieved with a single dissolvable chromic gut suture with pressure dressing placed.  Patient with violaceous photodistributed rash, muscle weakness, joint pains. 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 Dermatomyositis is a multisystem autoimmune connective tissue disease that most often is characterized by a violaceous cutaneous eruption, symmetric proximal extensor inflammatory myopathy, and autoantibodies. Pruritis is commonly associated. Additional features include fatigue, malaise, myalgias. Dysphagia, respiratory, and cardiac involvement can be associated. Up to 40% of patients with dermatomyositis may have an occult malignancy, including colon, ovarian, breast, pancreatic, lung, gastric cancers, and lymphoma. Some cases of dermatomyositis can occur without myositis at its onset (with muscle disease absent or detectable only on muscle biopsy, MRI, or laboratory testing) and many of these patients will eventually develop muscle weakness as the disease progresses.   - punch bx of skin of R upper arm performed at bedside today. Please see procedure note below  - Biopsy site should remain covered with pressure bandage for 24-48 hours. Then apply Vaseline to biopsy site daily and cover with bandage until healed.   - agree with rheumatology consult   - agree with CT chest/abdomen/pelvis for screening of occult malignancy given concern for dermatomyositis    The patient's chart was reviewed in addition to being seen and examined at bedside with the dermatology attending Dr. Rupa Mittal.  Recommendations were communicated with the primary team.  Please page [phone] for further related questions (please leave 10 digit call back number because we cover several facilities).    Amber Soriano MD  Resident Physician, PGY2  [facility]       ----     Dermatology Punch Biopsy Procedure Note    After risks and benefits of procedure including bleeding, infection and scar were reviewed (consents including photo consent reviewed, signed and in chart), allergies were reviewed and time out performed. Written consent given by the patient.    Area cleaned with rubbing alcohol and anesthetized with lidocaine and epinephrine.  A 4mm punch biopsy was performed to R upper arm, hemostasis achieved with a single dissolvable chromic gut suture with pressure dressing placed.  Patient with violaceous photodistributed rash, muscle weakness, joint pains. 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 Dermatomyositis is a multisystem autoimmune connective tissue disease that most often is characterized by a violaceous cutaneous eruption, symmetric proximal extensor inflammatory myopathy, and autoantibodies. Pruritis is commonly associated. Additional features include fatigue, malaise, myalgias. Dysphagia, respiratory, and cardiac involvement can be associated. Up to 40% of patients with dermatomyositis may have an occult malignancy, including colon, ovarian, breast, pancreatic, lung, gastric cancers, and lymphoma. Some cases of dermatomyositis can occur without myositis at its onset (with muscle disease absent or detectable only on muscle biopsy, MRI, or laboratory testing) and many of these patients will eventually develop muscle weakness as the disease progresses.   - punch bx of skin of R upper arm performed at bedside today. Please see procedure note below  - Biopsy site should remain covered with pressure bandage for 24-48 hours. Then apply Vaseline to biopsy site daily and cover with bandage until healed.  - start topical triamcinolone 0.1% ointment BID to affected areas on trunk/extremities for up to 2 weeks continuously. Then take 1 week break before re-using if needed.   - agree with rheumatology consult   - agree with CT chest/abdomen/pelvis for screening of occult malignancy given concern for dermatomyositis    The patient's chart was reviewed in addition to being seen and examined at bedside with the dermatology attending Dr. Rupa Mittal.  Recommendations were communicated with the primary team.  Please page [phone] for further related questions (please leave 10 digit call back number because we cover several facilities).    Amber Soriano MD  Resident Physician, PGY2  [facility]       ----     Dermatology Punch Biopsy Procedure Note    After risks and benefits of procedure including bleeding, infection and scar were reviewed (consents including photo consent reviewed, signed and in chart), allergies were reviewed and time out performed. Written consent given by the patient.    Area cleaned with rubbing alcohol and anesthetized with lidocaine and epinephrine.  A 4mm punch biopsy was performed to R upper arm, hemostasis achieved with a single dissolvable chromic gut suture with pressure dressing placed.

## 2021-03-17 NOTE — PROGRESS NOTE ADULT - PROBLEM SELECTOR PLAN 5
- Platelets 751K, f/u blue top platelets  - Likely in the setting of inflammatory process with workup above   - Monitor CBC, maintain active T&S - ?in the setting of doxycyline regimen  - Start nystatin  - HIV 1/2 negative

## 2021-03-17 NOTE — H&P ADULT - HISTORY OF PRESENT ILLNESS
Statement Selected This is a 66 y/o Romansh speaking F with PMh of DM presenting with generalized weakness, joint pain, and rash. Patient states that she was discharged from OSH on 3/12 after being evaluated for the same symptoms. Reports that she was evaluated for Lyme and rheumatologic disorders. Per discharge paperwork, pt treated for Lyme disease.     Patient complaining of left shoulder and b/l knee pain. Occasionally gets pain of the R shoulder as well. Patient has a rash in the neck area and b/l arms. Complaining of whole body itching sensation.     Denies any fevers, chills, n/v/d, abd pain, LE swelling. No weakness in arms or legs but feels she is severely debilitated due to pain. Is unable to bathe or clothe herself.    In the ED, pt's vitals were T 98.3 HR 76-94 -165/72 RR 18-19 POX % RA. Pt treated with NS 1L bolus and toradol 15 mg IV.  This is a 66 y/o Faroese speaking F with PMh of DM (on insulin), HTN, HLD, COVID (2020) and glaucoma presenting with generalized weakness, joint pain, and rash. Patient states that she was discharged from OSH on 3/12 after being evaluated for similar symptoms. They suspected that she may have Lyme disease and started her on doxycycline and sent her home. They also suspected that this might be post COVID related.     Patient has multiple complaints. Reports rash in the neck area and b/l arms, along with whole body itching. States that she has had a rash since 2/2021 and has seen various doctors and tried different things (steroid creams) without relief. The rash initially started on her face. Also complains of joint pain for 1 week. States she has joint pain and stiffness in the b/l shoulders, elbows, hands, and knees. States that the pain is currently worst in the L shoulder and R elbow. States that the pain is so significant that she is unable to walk currently. Tries Tylenol with minimal relief. Also reports that she has been unable to do her ADLs - clean herself, walk. Never has had joint pain like this before. Also reports subjective fever and chills, along with dry mouth.     Denies any N/V/D, abdominal pain, leg swelling, chest pain, dyspnea. Denies oral ulcers, hair loss, weight loss, dry eyes.     In the ED, pt's vitals were T 98.3 HR 76-94 -165/72 RR 18-19 POX % RA. Pt treated with NS 1L bolus and toradol 15 mg IV.  64 y/o Turkish speaking Female with PMHx of DM Type 2 (on insulin), HTN, HLD, COVID (2020) and glaucoma presenting with generalized weakness, joint pain, and rash. Patient states that she was discharged from OSH on 3/12 after being evaluated for similar symptoms. They suspected that she may have Lyme disease and started her on doxycycline and sent her home. They also suspected that this might be post COVID related.   Patient has multiple complaints. Reports rash in the neck area and b/l arms, along with whole body itching. States that she has had a rash since 2/2021 and has seen various doctors and tried different things (steroid creams) without relief. The rash initially started on her face. Also complains of joint pain for 1 week. States she has joint pain and stiffness in the b/l shoulders, elbows, hands, and knees. States that the pain is currently worst in the L shoulder and R elbow. States that the pain is so significant that she is unable to walk currently. Tries Tylenol with minimal relief. Also reports that she has been unable to do her ADLs - clean herself, walk. Never has had joint pain like this before. Also reports subjective fever and chills, along with dry mouth.   Denies any N/V/D, abdominal pain, leg swelling, chest pain, dyspnea. Denies oral ulcers, hair loss, weight loss, dry eyes.     ED course: pt's vitals were T 98.3 HR 76-94 -165/72 RR 18-19 POX % RA. Pt treated with NS 1L bolus and Toradol 15 mg IV.

## 2021-03-17 NOTE — H&P ADULT - PROBLEM SELECTOR PLAN 6
- Hb 10.6 MCV 82, unknown baseline  - F/u workup - iron studies, B12, folate, TSH, smear  - Maintain active T&S Suspect anemia of chronic disease;   Hb 10.6 MCV 82, unknown baseline  - F/u workup - iron studies, B12, folate, TSH, smear  - Maintain active T&S

## 2021-03-17 NOTE — PROGRESS NOTE ADULT - ATTENDING COMMENTS
Patient seeen and examined, care d/w HS4 team.    In summary 64 yo born in [place], obese (BMI 33), DM2, glaucoma, HTN, HLD, COVID (3/2020) who presents with joint pains and rash.  Reports started having a rash 2/7/20 started on face was itchy then progressed down body.  Reports 8 days ago developed migrating joint pains involving the shoulders, elbows, knees and fingers.  Reports moves from joint to joint.  No fevers noted at home.  No weight loss.  Reports went to OSH and is not sure if seen by derm or rheum.  Was started on doxycycline for lyme (unclear what dx was based on).      # Rash/Arthralgias:  appears inflamed with elevated ESR/CRP and ferritin concern for rheumatologic d/o; mild anemia, mild proteinuria (but in DM2 pt).  HIV neg.  Reportedly neg TB at OSH.  LFTs mildly elevated CK normal. TSH wnl.  ? cutaneous dermatomyositis, normal CK   - f/u VIPIN, RF   - rheum consult   - derm eval may benfit from bx  - attempting to get records from recent hospital stay     # Anemia: normocytic, mixed TREY and AOCD, no hemolysis (elevated LDH, normal hapto)  - trend, tx underlyiing issue     # DM2: may be on 70/30 as OP: HbA1c 10.6  - basal/bolus for now, will titrate prn    PT rec EFFIE

## 2021-03-17 NOTE — PROGRESS NOTE ADULT - SUBJECTIVE AND OBJECTIVE BOX
Humble Wayne, PGY-1  Pager: [phone] / [phone]    CHIEF COMPLAINT: Patient is a 65y old  Female who presents with a chief complaint of joint pain (17 Mar 2021 04:39)    INTERVAL HPI/OVERNIGHT EVENTS:  66 y/o Georgian speaking Female with PMHx of DM Type 2 (on insulin), HTN, HLD, COVID () and glaucoma presenting with generalized weakness, joint pain, and rash. Patient states that she was discharged from OSH on 3/12 after being evaluated for similar symptoms. They suspected that she may have Lyme disease and started her on doxycycline and sent her home. They also suspected that this might be post COVID related.   Patient has multiple complaints. Reports rash in the neck area and b/l arms, along with whole body itching. States that she has had a rash since 2021 and has seen various doctors and tried different things (steroid creams) without relief. The rash initially started on her face. Also complains of joint pain for 1 week. States she has joint pain and stiffness in the b/l shoulders, elbows, hands, and knees. States that the pain is currently worst in the L shoulder and R elbow. States that the pain is so significant that she is unable to walk currently. Tries Tylenol with minimal relief. Also reports that she has been unable to do her ADLs - clean herself, walk. Never has had joint pain like this before. Also reports subjective fever and chills, along with dry mouth.   Denies any N/V/D, abdominal pain, leg swelling, chest pain, dyspnea. Denies oral ulcers, hair loss, weight loss, dry eyes.     ED course: pt's vitals were T 98.3 HR 76-94 -165/72 RR 18-19 POX % RA. Pt treated with NS 1L bolus and Toradol 15 mg IV.     Additional information - The rash did not get worse in the sun. Joint pain is not worse in the AM, and present at rest but worse on movement. No BM changes recently, had mild rectal bleeding not in feces but on wiping which she attributes 2/2 itching and rash, denies vaginal bleeding, weight loss. Had subjective fever but not over 99F. Endorse dry mouth and itching in her eyes. No FHx of autoimmune/rheumatologist/joint diseases.   Underwent mammography yearly except last year, never had a colonoscopy.     MEDICATIONS (STANDING):  atorvastatin 40 milliGRAM(s) Oral at bedtime  cholecalciferol 2000 Unit(s) Oral daily  dextrose 40% Gel 15 Gram(s) Oral once  dextrose 5%. 1000 milliLiter(s) IV Continuous <Continuous>  dextrose 5%. 1000 milliLiter(s) IV Continuous <Continuous>  dextrose 50% Injectable 25 Gram(s) IV Push once  dextrose 50% Injectable 12.5 Gram(s) IV Push once  dextrose 50% Injectable 25 Gram(s) IV Push once  diltiazem    milliGRAM(s) Oral daily  doxycycline hyclate Capsule 100 milliGRAM(s) Oral every 12 hours  enoxaparin Injectable 40 milliGRAM(s) SubCutaneous daily  glucagon  Injectable 1 milliGRAM(s) IntraMuscular once  insulin glargine Injectable (LANTUS) 20 Unit(s) SubCutaneous every morning  insulin lispro (ADMELOG) corrective regimen sliding scale   SubCutaneous three times a day before meals  insulin lispro (ADMELOG) corrective regimen sliding scale   SubCutaneous at bedtime  insulin lispro Injectable (ADMELOG) 5 Unit(s) SubCutaneous three times a day before meals  ketotifen 0.025% Ophthalmic Solution 1 Drop(s) Both EYES two times a day  latanoprost 0.005% Ophthalmic Solution 1 Drop(s) Both EYES at bedtime  nystatin    Suspension 863809 Unit(s) Swish and Swallow every 6 hours    MEDICATIONS  (PRN):  acetaminophen   Tablet .. 650 milliGRAM(s) Oral every 6 hours PRN  ketorolac   Injectable 15 milliGRAM(s) IV Push every 6 hours PRN      REVIEW OF SYSTEMS:  CONSTITUTIONAL: No fever, weight loss, or fatigue  EYES: No eye pain, visual disturbances, or discharge  ENMT:  No difficulty hearing, tinnitus, vertigo; No sinus or throat pain  NECK: No pain or stiffness  RESPIRATORY: No cough, wheezing, chills or hemoptysis; No shortness of breath  CARDIOVASCULAR: No chest pain, palpitations, dizziness, or leg swelling  GASTROINTESTINAL: No abdominal or epigastric pain. No nausea, vomiting, or hematemesis; No diarrhea or constipation. No melena or hematochezia.  GENITOURINARY: No dysuria, frequency, hematuria, or incontinence  NEUROLOGICAL: No headaches, memory loss, loss of strength, numbness, or tremors  SKIN: No itching, burning, rashes, or lesions   MUSCULOSKELETAL: No joint pain or swelling; No muscle, back, or extremity pain    VITAL SIGNS:  T(F): 97.8 (21 @ 07:28), Max: 98.9 (21 @ 06:58)  HR: 89 (21 @ 07:28) (76 - 94)  BP: 159/79 (21 @ 07:28) (152/60 - 165/72)  RR: 18 (21 @ 07:28) (18 - 18)  SpO2: 99% (21 @ 07:28) (99% - 100%)  CAPILLARY BLOOD GLUCOSE      POCT Blood Glucose.: 164 mg/dL (17 Mar 2021 07:58)  POCT Blood Glucose.: 285 mg/dL (16 Mar 2021 19:25)    I&O's Summary    16 Mar 2021 07:01  -  17 Mar 2021 07:00  --------------------------------------------------------  IN: 300 mL / OUT: 250 mL / NET: 50 mL        PHYSICAL EXAM:  GENERAL: NAD, well-groomed, well-developed  HEAD:  Atraumatic, Normocephalic  EYES: EOMI, PERRLA, conjunctiva and sclera clear  ENMT: No tonsillar erythema, exudates, or enlargement; Moist mucous membranes  NECK: Supple, No JVD, Normal thyroid  NERVOUS SYSTEM:  Alert & Oriented X3, Good concentration; Motor Strength 5/5 B/L upper and lower extremities  CHEST/LUNG: Clear to auscultation bilaterally; No rales, rhonchi, wheezing, or rubs  HEART: Regular rate and rhythm; No murmurs, rubs, or gallops  ABDOMEN: Soft, Nontender, Nondistended; Bowel sounds present  EXTREMITIES:  2+ Peripheral Pulses, No clubbing, cyanosis, or edema  LYMPH: No lymphadenopathy noted  SKIN: No rashes or lesions    LABS:                        10.9   22.14 )-----------( 745      ( 17 Mar 2021 07:23 )             32.7     03-17    131<L>  |  96<L>  |  8   ----------------------------<  168<H>  3.5   |  25  |  0.50    Ca    8.5      17 Mar 2021 07:23  Phos  3.2       Mg     2.2         TPro  6.6  /  Alb  2.6<L>  /  TBili  0.5  /  DBili  x   /  AST  41<H>  /  ALT  47<H>  /  AlkPhos  245<H>      PT/INR - ( 17 Mar 2021 07:23 )   PT: 14.7 sec;   INR: 1.29 ratio         PTT - ( 17 Mar 2021 07:23 )  PTT:29.3 sec  Urinalysis Basic - ( 17 Mar 2021 03:32 )    Color: Yellow / Appearance: Clear / S.022 / pH: x  Gluc: x / Ketone: Small  / Bili: Negative / Urobili: 3 mg/dL   Blood: x / Protein: 30 mg/dL / Nitrite: Negative   Leuk Esterase: Small / RBC: 5 /HPF / WBC 25-50 /HPF   Sq Epi: x / Non Sq Epi: few /HPF / Bacteria: Few          RADIOLOGY & ADDITIONAL TESTS:   Humble Wayne, PGY-1  Pager: [phone] / [phone]    CHIEF COMPLAINT: Patient is a 65y old  Female who presents with a chief complaint of joint pain (17 Mar 2021 04:39)    INTERVAL HPI/OVERNIGHT EVENTS:  66 y/o Turkish speaking Female with PMHx of DM Type 2 (on insulin), HTN, HLD, COVID () and glaucoma presenting with generalized weakness, joint pain, and rash. Patient states that she was discharged from OSH on 3/12 after being evaluated for similar symptoms. They suspected that she may have Lyme disease and started her on doxycycline and sent her home. They also suspected that this might be post COVID related.   Patient has multiple complaints. Reports rash in the neck area and b/l arms, along with whole body itching. States that she has had a rash since 2021 and has seen various doctors and tried different things (steroid creams) without relief. The rash initially started on her face. Also complains of joint pain for 1 week. States she has joint pain and stiffness in the b/l shoulders, elbows, hands, and knees. States that the pain is currently worst in the L shoulder and R elbow. States that the pain is so significant that she is unable to walk currently. Tries Tylenol with minimal relief. Also reports that she has been unable to do her ADLs - clean herself, walk. Never has had joint pain like this before. Also reports subjective fever and chills, along with dry mouth.   Denies any N/V/D, abdominal pain, leg swelling, chest pain, dyspnea. Denies oral ulcers, hair loss, weight loss, dry eyes.     ED course: pt's vitals were T 98.3 HR 76-94 -165/72 RR 18-19 POX % RA. Pt treated with NS 1L bolus and Toradol 15 mg IV.     Additional information - The rash did not get worse in the sun. Joint pain is not worse in the AM, and present at rest but worse on movement. No BM changes recently, had mild rectal bleeding not in feces but on wiping which she attributes 2/2 itching and rash, denies vaginal bleeding, weight loss. Had subjective fever but not over 99F. Endorse dry mouth and itching in her eyes. No FHx of autoimmune/rheumatologist/joint diseases.   Underwent mammography yearly except last year, never had a colonoscopy.     MEDICATIONS (STANDING):  atorvastatin 40 milliGRAM(s) Oral at bedtime  cholecalciferol 2000 Unit(s) Oral daily  dextrose 40% Gel 15 Gram(s) Oral once  dextrose 5%. 1000 milliLiter(s) IV Continuous <Continuous>  dextrose 5%. 1000 milliLiter(s) IV Continuous <Continuous>  dextrose 50% Injectable 25 Gram(s) IV Push once  dextrose 50% Injectable 12.5 Gram(s) IV Push once  dextrose 50% Injectable 25 Gram(s) IV Push once  diltiazem    milliGRAM(s) Oral daily  doxycycline hyclate Capsule 100 milliGRAM(s) Oral every 12 hours  enoxaparin Injectable 40 milliGRAM(s) SubCutaneous daily  glucagon  Injectable 1 milliGRAM(s) IntraMuscular once  insulin glargine Injectable (LANTUS) 20 Unit(s) SubCutaneous every morning  insulin lispro (ADMELOG) corrective regimen sliding scale   SubCutaneous three times a day before meals  insulin lispro (ADMELOG) corrective regimen sliding scale   SubCutaneous at bedtime  insulin lispro Injectable (ADMELOG) 5 Unit(s) SubCutaneous three times a day before meals  ketotifen 0.025% Ophthalmic Solution 1 Drop(s) Both EYES two times a day  latanoprost 0.005% Ophthalmic Solution 1 Drop(s) Both EYES at bedtime  nystatin    Suspension 262921 Unit(s) Swish and Swallow every 6 hours    MEDICATIONS  (PRN):  acetaminophen   Tablet .. 650 milliGRAM(s) Oral every 6 hours PRN  ketorolac   Injectable 15 milliGRAM(s) IV Push every 6 hours PRN    REVIEW OF SYSTEMS:  CONSTITUTIONAL: No fever, weight loss, +fatigue  EYES: No eye pain, visual disturbances, or discharge, +eye itch/dryness  ENMT:  No difficulty hearing, tinnitus, vertigo; No sinus or throat pain, +dry mouth  RESPIRATORY: No cough, wheezing, chills or hemoptysis; No shortness of breath  CARDIOVASCULAR: No chest pain, palpitations, dizziness, or leg swelling  GASTROINTESTINAL: No abdominal or epigastric pain. No nausea, vomiting, or hematemesis; No diarrhea or constipation. No melena or hematochezia.  GENITOURINARY: No dysuria, frequency, hematuria, or incontinence  NEUROLOGICAL: No headaches, memory loss, loss of strength, numbness, or tremors  SKIN: + itching,+ rashes,  MUSCULOSKELETAL: + joint pain and swelling; No muscle, back, or extremity pain    VITAL SIGNS:  T(F): 97.8 (21 @ 07:28), Max: 98.9 (21 @ 06:58)  HR: 89 (21 @ 07:28) (76 - 94)  BP: 159/79 (21 @ 07:28) (152/60 - 165/72)  RR: 18 (21 @ 07:28) (18 - 18)  SpO2: 99% (21 @ 07:28) (99% - 100%)    CAPILLARY BLOOD GLUCOSE  POCT Blood Glucose.: 164 mg/dL (17 Mar 2021 07:58)  POCT Blood Glucose.: 285 mg/dL (16 Mar 2021 19:25)    I&O's Summary  16 Mar 2021 07:01  -  17 Mar 2021 07:00  --------------------------------------------------------  IN: 300 mL / OUT: 250 mL / NET: 50 mL    PHYSICAL EXAM:  GENERAL: NAD, well-groomed, well-developed  HEAD:  Atraumatic, Normocephalic  EYES: EOMI, PERRLA, conjunctiva and sclera clear  ENMT: No tonsillar erythema, exudates, or enlargement; Moist mucous membranes  NECK: Supple, No JVD, Normal thyroid, no LAD  NERVOUS SYSTEM:  Alert & Oriented X3, Good concentration; Motor Strength 5/5 B/L upper and lower extremities  CHEST/LUNG: Clear to auscultation bilaterally on anterior auscultation; No rales, rhonchi, wheezing, or rubs  HEART: Regular rate and rhythm; No murmurs, rubs, or gallops  ABDOMEN: Soft, Nontender, Nondistended; Bowel sounds present, no HSM  EXTREMITIES:  2+ Peripheral Pulses, No clubbing, cyanosis, or edema, TTP, swelling and warmth B/L elbow joints, L shoulder with reduced ROM d/t pain, mild pain in L 3 PIP and L knee. Rest of joints wnl.   LYMPH: No lymphadenopathy noted  SKIN: conflunet MP rash on chest, blanches. Similar rash on both arms around elbows with scratch marks and dry skin. No inguinal rash.     LABS:                        10.9   22.14 )-----------( 745      ( 17 Mar 2021 07:23 )             32.7         131<L>  |  96<L>  |  8   ----------------------------<  168<H>  3.5   |  25  |  0.50    Ca    8.5      17 Mar 2021 07:23  Phos  3.2       Mg     2.2         TPro  6.6  /  Alb  2.6<L>  /  TBili  0.5  /  DBili  x   /  AST  41<H>  /  ALT  47<H>  /  AlkPhos  245<H>      PT/INR - ( 17 Mar 2021 07:23 )   PT: 14.7 sec;   INR: 1.29 ratio      C-Reactive Protein, Serum (21 @ 00:22)    C-Reactive Protein, Serum: 197.4 mg/L         PTT - ( 17 Mar 2021 07:23 )  PTT:29.3 sec  Urinalysis Basic - ( 17 Mar 2021 03:32 )    Color: Yellow / Appearance: Clear / S.022 / pH: x  Gluc: x / Ketone: Small  / Bili: Negative / Urobili: 3 mg/dL   Blood: x / Protein: 30 mg/dL / Nitrite: Negative   Leuk Esterase: Small / RBC: 5 /HPF / WBC 25-50 /HPF   Sq Epi: x / Non Sq Epi: few /HPF / Bacteria: Few          RADIOLOGY & ADDITIONAL TESTS:   Humble Wayne, PGY-1  Pager: [phone] / [phone]    CHIEF COMPLAINT: Patient is a 65y old  Female who presents with a chief complaint of joint pain (17 Mar 2021 04:39)    INTERVAL HPI/OVERNIGHT EVENTS:  66 y/o Belarusian speaking Female with PMHx of DM Type 2 (on insulin), HTN, HLD, COVID () and glaucoma presenting with generalized weakness, joint pain, and rash. Patient states that she was discharged from OSH on 3/12 after being evaluated for similar symptoms. They suspected that she may have Lyme disease and started her on doxycycline and sent her home. They also suspected that this might be post COVID related.   Patient has multiple complaints. Reports rash in the neck area and b/l arms, along with whole body itching. States that she has had a rash since 2021 and has seen various doctors and tried different things (steroid creams) without relief. The rash initially started on her face. Also complains of joint pain for 1 week. States she has joint pain and stiffness in the b/l shoulders, elbows, hands, and knees. States that the pain is currently worst in the L shoulder and R elbow. States that the pain is so significant that she is unable to walk currently. Tries Tylenol with minimal relief. Also reports that she has been unable to do her ADLs - clean herself, walk. Never has had joint pain like this before. Also reports subjective fever and chills, along with dry mouth.   Denies any N/V/D, abdominal pain, leg swelling, chest pain, dyspnea. Denies oral ulcers, hair loss, weight loss, dry eyes.     ED course: pt's vitals were T 98.3 HR 76-94 -165/72 RR 18-19 POX % RA. Pt treated with NS 1L bolus and Toradol 15 mg IV.     Additional information - The rash did not get worse in the sun. Joint pain is not worse in the AM, and present at rest but worse on movement. No BM changes recently, had mild rectal bleeding not in feces but on wiping which she attributes 2/2 itching and rash, denies vaginal bleeding, weight loss. Had subjective fever but not over 99F. Endorse dry mouth and itching in her eyes. No FHx of autoimmune/rheumatologist/joint diseases.   Was admitted at [place] 3/8-3/12 with fevers, joint pain and rash, treated with Levaquin for   Underwent mammography yearly except last year, never had a colonoscopy.     MEDICATIONS (STANDING):  atorvastatin 40 milliGRAM(s) Oral at bedtime  cholecalciferol 2000 Unit(s) Oral daily  dextrose 40% Gel 15 Gram(s) Oral once  dextrose 5%. 1000 milliLiter(s) IV Continuous <Continuous>  dextrose 5%. 1000 milliLiter(s) IV Continuous <Continuous>  dextrose 50% Injectable 25 Gram(s) IV Push once  dextrose 50% Injectable 12.5 Gram(s) IV Push once  dextrose 50% Injectable 25 Gram(s) IV Push once  diltiazem    milliGRAM(s) Oral daily  doxycycline hyclate Capsule 100 milliGRAM(s) Oral every 12 hours  enoxaparin Injectable 40 milliGRAM(s) SubCutaneous daily  glucagon  Injectable 1 milliGRAM(s) IntraMuscular once  insulin glargine Injectable (LANTUS) 20 Unit(s) SubCutaneous every morning  insulin lispro (ADMELOG) corrective regimen sliding scale   SubCutaneous three times a day before meals  insulin lispro (ADMELOG) corrective regimen sliding scale   SubCutaneous at bedtime  insulin lispro Injectable (ADMELOG) 5 Unit(s) SubCutaneous three times a day before meals  ketotifen 0.025% Ophthalmic Solution 1 Drop(s) Both EYES two times a day  latanoprost 0.005% Ophthalmic Solution 1 Drop(s) Both EYES at bedtime  nystatin    Suspension 068033 Unit(s) Swish and Swallow every 6 hours    MEDICATIONS  (PRN):  acetaminophen   Tablet .. 650 milliGRAM(s) Oral every 6 hours PRN  ketorolac   Injectable 15 milliGRAM(s) IV Push every 6 hours PRN    REVIEW OF SYSTEMS:  CONSTITUTIONAL: No fever, weight loss, +fatigue  EYES: No eye pain, visual disturbances, or discharge, +eye itch/dryness  ENMT:  No difficulty hearing, tinnitus, vertigo; No sinus or throat pain, +dry mouth  RESPIRATORY: No cough, wheezing, chills or hemoptysis; No shortness of breath  CARDIOVASCULAR: No chest pain, palpitations, dizziness, or leg swelling  GASTROINTESTINAL: No abdominal or epigastric pain. No nausea, vomiting, or hematemesis; No diarrhea or constipation. No melena or hematochezia.  GENITOURINARY: No dysuria, frequency, hematuria, or incontinence  NEUROLOGICAL: No headaches, memory loss, loss of strength, numbness, or tremors  SKIN: + itching,+ rashes,  MUSCULOSKELETAL: + joint pain and swelling; No muscle, back, or extremity pain    VITAL SIGNS:  T(F): 97.8 (21 @ 07:28), Max: 98.9 (21 @ 06:58)  HR: 89 (21 @ 07:28) (76 - 94)  BP: 159/79 (21 @ 07:28) (152/60 - 165/72)  RR: 18 (21 @ 07:28) (18 - 18)  SpO2: 99% (21 @ 07:28) (99% - 100%)    CAPILLARY BLOOD GLUCOSE  POCT Blood Glucose.: 164 mg/dL (17 Mar 2021 07:58)  POCT Blood Glucose.: 285 mg/dL (16 Mar 2021 19:25)    I&O's Summary  16 Mar 2021 07:01  -  17 Mar 2021 07:00  --------------------------------------------------------  IN: 300 mL / OUT: 250 mL / NET: 50 mL    PHYSICAL EXAM:  GENERAL: NAD, well-groomed, well-developed  HEAD:  Atraumatic, Normocephalic  EYES: EOMI, PERRLA, conjunctiva and sclera clear  ENMT: No tonsillar erythema, exudates, or enlargement; Moist mucous membranes  NECK: Supple, No JVD, Normal thyroid, no LAD  NERVOUS SYSTEM:  Alert & Oriented X3, Good concentration; Motor Strength 5/5 B/L upper and lower extremities  CHEST/LUNG: Clear to auscultation bilaterally on anterior auscultation; No rales, rhonchi, wheezing, or rubs  HEART: Regular rate and rhythm; No murmurs, rubs, or gallops  ABDOMEN: Soft, Nontender, Nondistended; Bowel sounds present, no HSM  EXTREMITIES:  2+ Peripheral Pulses, No clubbing, cyanosis, or edema, TTP, swelling and warmth B/L elbow joints, L shoulder with reduced ROM d/t pain, mild pain in L 3 PIP and L knee. Rest of joints wnl.   LYMPH: No lymphadenopathy noted  SKIN: conflunet MP rash on chest, blanches. Similar rash on both arms around elbows with scratch marks and dry skin. No inguinal rash.     LABS:                        10.9   22.14 )-----------( 745      ( 17 Mar 2021 07:23 )             32.7         131<L>  |  96<L>  |  8   ----------------------------<  168<H>  3.5   |  25  |  0.50    Ca    8.5      17 Mar 2021 07:23  Phos  3.2       Mg     2.2         TPro  6.6  /  Alb  2.6<L>  /  TBili  0.5  /  DBili  x   /  AST  41<H>  /  ALT  47<H>  /  AlkPhos  245<H>      PT/INR - ( 17 Mar 2021 07:23 )   PT: 14.7 sec;   INR: 1.29 ratio      C-Reactive Protein, Serum (21 @ 00:22)    C-Reactive Protein, Serum: 197.4 mg/L    Sedimentation Rate, Erythrocyte (21 @ 02:12)    Sedimentation Rate, Erythrocyte: 91 mm/hr    Iron with Total Binding Capacity (21 @ 07:23)    Iron - Total Binding Capacity.: 161 ug/dL    % Saturation, Iron: 12 %    Iron Total, Serum: 19 ug/dL    Unsaturated Iron Binding Capacity: 142 ug/dL    Haptoglobin, Serum (21 @ 07:23)    Haptoglobin, Serum: 565 mg/dL    Reticulocyte Count (21 @ 07:23)    RBC Count: 3.95 M/uL    Reticulocyte Percent: 2.0 %    Absolute Reticulocytes: 77.0 K/uL    Thyroid Stimulating Hormone, Serum (21 @ 07:23)    Thyroid Stimulating Hormone, Serum: 1.50 uIU/mL    HIV-1/2 Antigen/Antibody Screen by [facility] (21 @ 07:23)    HIV-1/2 Combo Result: Nonreact:     COVID-19 PCR . (21 @ 00:25)    COVID-19 PCR: NotDetec:     PTT - ( 17 Mar 2021 07:23 )  PTT:29.3 sec  Urinalysis Basic - ( 17 Mar 2021 03:32 )    Color: Yellow / Appearance: Clear / S.022 / pH: x  Gluc: x / Ketone: Small  / Bili: Negative / Urobili: 3 mg/dL   Blood: x / Protein: 30 mg/dL / Nitrite: Negative   Leuk Esterase: Small / RBC: 5 /HPF / WBC 25-50 /HPF   Sq Epi: x / Non Sq Epi: few /HPF / Bacteria: Few      RADIOLOGY & ADDITIONAL TESTS:   Humble Wayne, PGY-1  Pager: [phone] / [phone]    [place]  [phone]  CHIEF COMPLAINT: Patient is a 65y old  Female who presents with a chief complaint of joint pain (17 Mar 2021 04:39)    INTERVAL HPI/OVERNIGHT EVENTS:  66 y/o Martiniquais speaking Female with PMHx of DM Type 2 (on insulin), HTN, HLD, COVID () and glaucoma presenting with generalized weakness, joint pain, and rash. Patient states that she was discharged from OSH on 3/12 after being evaluated for similar symptoms. They suspected that she may have Lyme disease and started her on doxycycline and sent her home. They also suspected that this might be post COVID related.   Patient has multiple complaints. Reports rash in the neck area and b/l arms, along with whole body itching. States that she has had a rash since 2021 and has seen various doctors and tried different things (steroid creams) without relief. The rash initially started on her face. Also complains of joint pain for 1 week. States she has joint pain and stiffness in the b/l shoulders, elbows, hands, and knees. States that the pain is currently worst in the L shoulder and R elbow. States that the pain is so significant that she is unable to walk currently. Tries Tylenol with minimal relief. Also reports that she has been unable to do her ADLs - clean herself, walk. Never has had joint pain like this before. Also reports subjective fever and chills, along with dry mouth.   Denies any N/V/D, abdominal pain, leg swelling, chest pain, dyspnea. Denies oral ulcers, hair loss, weight loss, dry eyes.     ED course: pt's vitals were T 98.3 HR 76-94 -165/72 RR 18-19 POX % RA. Pt treated with NS 1L bolus and Toradol 15 mg IV.     Additional information - The rash did not get worse in the sun. Joint pain is not worse in the AM, and present at rest but worse on movement. No BM changes recently, had mild rectal bleeding not in feces but on wiping which she attributes 2/2 itching and rash, denies vaginal bleeding or ulcers, weight loss. Had subjective fever but not over 99F. Endorse dry mouth, and itching in her eyes, no mouth ulcers . No FHx of autoimmune/rheumatologist/joint diseases.   Was admitted at [place] 3/8-3/12 with fevers, joint pain and rash, treated with Levaquin for 5 days and 3 doses of Vanco, 2x covid negative,   Underwent mammography yearly except last year, never had a colonoscopy.     MEDICATIONS (STANDING):  atorvastatin 40 milliGRAM(s) Oral at bedtime  cholecalciferol 2000 Unit(s) Oral daily  dextrose 40% Gel 15 Gram(s) Oral once  dextrose 5%. 1000 milliLiter(s) IV Continuous <Continuous>  dextrose 5%. 1000 milliLiter(s) IV Continuous <Continuous>  dextrose 50% Injectable 25 Gram(s) IV Push once  dextrose 50% Injectable 12.5 Gram(s) IV Push once  dextrose 50% Injectable 25 Gram(s) IV Push once  diltiazem    milliGRAM(s) Oral daily  doxycycline hyclate Capsule 100 milliGRAM(s) Oral every 12 hours  enoxaparin Injectable 40 milliGRAM(s) SubCutaneous daily  glucagon  Injectable 1 milliGRAM(s) IntraMuscular once  insulin glargine Injectable (LANTUS) 20 Unit(s) SubCutaneous every morning  insulin lispro (ADMELOG) corrective regimen sliding scale   SubCutaneous three times a day before meals  insulin lispro (ADMELOG) corrective regimen sliding scale   SubCutaneous at bedtime  insulin lispro Injectable (ADMELOG) 5 Unit(s) SubCutaneous three times a day before meals  ketotifen 0.025% Ophthalmic Solution 1 Drop(s) Both EYES two times a day  latanoprost 0.005% Ophthalmic Solution 1 Drop(s) Both EYES at bedtime  nystatin    Suspension 397596 Unit(s) Swish and Swallow every 6 hours    MEDICATIONS  (PRN):  acetaminophen   Tablet .. 650 milliGRAM(s) Oral every 6 hours PRN  ketorolac   Injectable 15 milliGRAM(s) IV Push every 6 hours PRN    REVIEW OF SYSTEMS:  CONSTITUTIONAL: No fever, weight loss, +fatigue  EYES: No eye pain, visual disturbances, or discharge, +eye itch/dryness  ENMT:  No difficulty hearing, tinnitus, vertigo; No sinus or throat pain, +dry mouth  RESPIRATORY: No cough, wheezing, chills or hemoptysis; No shortness of breath  CARDIOVASCULAR: No chest pain, palpitations, dizziness, or leg swelling  GASTROINTESTINAL: No abdominal or epigastric pain. No nausea, vomiting, or hematemesis; No diarrhea or constipation. No melena or hematochezia.  GENITOURINARY: No dysuria, frequency, hematuria, or incontinence  NEUROLOGICAL: No headaches, memory loss, loss of strength, numbness, or tremors  SKIN: + itching,+ rashes,  MUSCULOSKELETAL: + joint pain and swelling; No muscle, back, or extremity pain    VITAL SIGNS:  T(F): 97.8 (21 @ 07:28), Max: 98.9 (21 @ 06:58)  HR: 89 (21 @ 07:28) (76 - 94)  BP: 159/79 (21 @ 07:28) (152/60 - 165/72)  RR: 18 (21 @ 07:28) (18 - 18)  SpO2: 99% (21 @ 07:28) (99% - 100%)    CAPILLARY BLOOD GLUCOSE  POCT Blood Glucose.: 164 mg/dL (17 Mar 2021 07:58)  POCT Blood Glucose.: 285 mg/dL (16 Mar 2021 19:25)    I&O's Summary  16 Mar 2021 07:01  -  17 Mar 2021 07:00  --------------------------------------------------------  IN: 300 mL / OUT: 250 mL / NET: 50 mL    PHYSICAL EXAM:  GENERAL: NAD, well-groomed, well-developed  HEAD:  Atraumatic, Normocephalic  EYES: EOMI, PERRLA, conjunctiva and sclera clear  ENMT: No tonsillar erythema, exudates, or enlargement; Moist mucous membranes  NECK: Supple, No JVD, Normal thyroid, no LAD  NERVOUS SYSTEM:  Alert & Oriented X3, Good concentration; Motor Strength 5/5 B/L upper and lower extremities  CHEST/LUNG: Clear to auscultation bilaterally on anterior auscultation; No rales, rhonchi, wheezing, or rubs  HEART: Regular rate and rhythm; No murmurs, rubs, or gallops  ABDOMEN: Soft, Nontender, Nondistended; Bowel sounds present, no HSM  EXTREMITIES:  2+ Peripheral Pulses, No clubbing, cyanosis, or edema, TTP, swelling and warmth B/L elbow joints, L shoulder with reduced ROM d/t pain, mild pain in L 3 PIP and L knee. Rest of joints wnl.   LYMPH: No lymphadenopathy noted  SKIN: conflunet MP rash on chest, blanches. Similar rash on both arms around elbows with scratch marks and dry skin. No inguinal rash.     LABS:                        10.9   22.14 )-----------( 745      ( 17 Mar 2021 07:23 )             32.7     -    131<L>  |  96<L>  |  8   ----------------------------<  168<H>  3.5   |  25  |  0.50    Ca    8.5      17 Mar 2021 07:23  Phos  3.2       Mg     2.2         TPro  6.6  /  Alb  2.6<L>  /  TBili  0.5  /  DBili  x   /  AST  41<H>  /  ALT  47<H>  /  AlkPhos  245<H>      PT/INR - ( 17 Mar 2021 07:23 )   PT: 14.7 sec;   INR: 1.29 ratio      C-Reactive Protein, Serum (21 @ 00:22)    C-Reactive Protein, Serum: 197.4 mg/L    Sedimentation Rate, Erythrocyte (21 @ 02:12)    Sedimentation Rate, Erythrocyte: 91 mm/hr    Iron with Total Binding Capacity (21 @ 07:23)    Iron - Total Binding Capacity.: 161 ug/dL    % Saturation, Iron: 12 %    Iron Total, Serum: 19 ug/dL    Unsaturated Iron Binding Capacity: 142 ug/dL    Haptoglobin, Serum (21 @ 07:23)    Haptoglobin, Serum: 565 mg/dL    Reticulocyte Count (21 @ 07:23)    RBC Count: 3.95 M/uL    Reticulocyte Percent: 2.0 %    Absolute Reticulocytes: 77.0 K/uL    Thyroid Stimulating Hormone, Serum (21 @ 07:23)    Thyroid Stimulating Hormone, Serum: 1.50 uIU/mL    HIV-1/2 Antigen/Antibody Screen by [facility] (21 @ 07:23)    HIV-1/2 Combo Result: Nonreact:     COVID-19 PCR . (21 @ 00:25)    COVID-19 PCR: NotDetec:     PTT - ( 17 Mar 2021 07:23 )  PTT:29.3 sec  Urinalysis Basic - ( 17 Mar 2021 03:32 )    Color: Yellow / Appearance: Clear / S.022 / pH: x  Gluc: x / Ketone: Small  / Bili: Negative / Urobili: 3 mg/dL   Blood: x / Protein: 30 mg/dL / Nitrite: Negative   Leuk Esterase: Small / RBC: 5 /HPF / WBC 25-50 /HPF   Sq Epi: x / Non Sq Epi: few /HPF / Bacteria: Few      RADIOLOGY & ADDITIONAL TESTS:   [facility] [place], PGY-1  Pager: [phone] / [phone]    [place]  [phone]  CHIEF COMPLAINT: Patient is a 65y old  Female who presents with a chief complaint of joint pain (17 Mar 2021 04:39)    INTERVAL HPI/OVERNIGHT EVENTS:  64 y/o Turkish speaking Female with PMHx of DM Type 2 (on insulin), HTN, HLD, COVID () and glaucoma presenting with generalized weakness, joint pain, and rash. Patient states that she was discharged from OSH on 3/12 after being evaluated for similar symptoms. They suspected that she may have Lyme disease and started her on doxycycline and sent her home. They also suspected that this might be post COVID related.   Patient has multiple complaints. Reports rash in the neck area and b/l arms, along with whole body itching. States that she has had a rash since 2021 and has seen various doctors and tried different things (steroid creams) without relief. The rash initially started on her face. Also complains of joint pain for 1 week. States she has joint pain and stiffness in the b/l shoulders, elbows, hands, and knees. States that the pain is currently worst in the L shoulder and R elbow. States that the pain is so significant that she is unable to walk currently. Tries Tylenol with minimal relief. Also reports that she has been unable to do her ADLs - clean herself, walk. Never has had joint pain like this before. Also reports subjective fever and chills, along with dry mouth.   Denies any N/V/D, abdominal pain, leg swelling, chest pain, dyspnea. Denies oral ulcers, hair loss, weight loss, dry eyes.     ED course: pt's vitals were T 98.3 HR 76-94 -165/72 RR 18-19 POX % RA. Pt treated with NS 1L bolus and Toradol 15 mg IV.     Additional information - The rash did not get worse in the sun. Joint pain is not worse in the AM, and present at rest but worse on movement. No BM changes recently, had mild rectal bleeding not in feces but on wiping which she attributes 2/2 itching and rash, denies vaginal bleeding or ulcers, weight loss. Had subjective fever but not over 99F. Endorse dry mouth, and itching in her eyes, no mouth ulcers . No FHx of autoimmune/rheumatologist/joint diseases.   Was admitted at [facility] 3/8-3/12 with fevers, joint pain and rash, treated with Levaquin for 5 days and 3 doses of Vanco, 2x covid negative,   Underwent mammography yearly except last year, never had a colonoscopy.     MEDICATIONS (STANDING):  atorvastatin 40 milliGRAM(s) Oral at bedtime  cholecalciferol 2000 Unit(s) Oral daily  dextrose 40% Gel 15 Gram(s) Oral once  dextrose 5%. 1000 milliLiter(s) IV Continuous <Continuous>  dextrose 5%. 1000 milliLiter(s) IV Continuous <Continuous>  dextrose 50% Injectable 25 Gram(s) IV Push once  dextrose 50% Injectable 12.5 Gram(s) IV Push once  dextrose 50% Injectable 25 Gram(s) IV Push once  diltiazem    milliGRAM(s) Oral daily  doxycycline hyclate Capsule 100 milliGRAM(s) Oral every 12 hours  enoxaparin Injectable 40 milliGRAM(s) SubCutaneous daily  glucagon  Injectable 1 milliGRAM(s) IntraMuscular once  insulin glargine Injectable (LANTUS) 20 Unit(s) SubCutaneous every morning  insulin lispro (ADMELOG) corrective regimen sliding scale   SubCutaneous three times a day before meals  insulin lispro (ADMELOG) corrective regimen sliding scale   SubCutaneous at bedtime  insulin lispro Injectable (ADMELOG) 5 Unit(s) SubCutaneous three times a day before meals  ketotifen 0.025% Ophthalmic Solution 1 Drop(s) Both EYES two times a day  latanoprost 0.005% Ophthalmic Solution 1 Drop(s) Both EYES at bedtime  nystatin    Suspension 725214 Unit(s) Swish and Swallow every 6 hours    MEDICATIONS  (PRN):  acetaminophen   Tablet .. 650 milliGRAM(s) Oral every 6 hours PRN  ketorolac   Injectable 15 milliGRAM(s) IV Push every 6 hours PRN    REVIEW OF SYSTEMS:  CONSTITUTIONAL: No fever, weight loss, +fatigue  EYES: No eye pain, visual disturbances, or discharge, +eye itch/dryness  ENMT:  No difficulty hearing, tinnitus, vertigo; No sinus or throat pain, +dry mouth  RESPIRATORY: No cough, wheezing, chills or hemoptysis; No shortness of breath  CARDIOVASCULAR: No chest pain, palpitations, dizziness, or leg swelling  GASTROINTESTINAL: No abdominal or epigastric pain. No nausea, vomiting, or hematemesis; No diarrhea or constipation. No melena or hematochezia.  GENITOURINARY: No dysuria, frequency, hematuria, or incontinence  NEUROLOGICAL: No headaches, memory loss, loss of strength, numbness, or tremors  SKIN: + itching,+ rashes,  MUSCULOSKELETAL: + joint pain and swelling; No muscle, back, or extremity pain    VITAL SIGNS:  T(F): 97.8 (21 @ 07:28), Max: 98.9 (21 @ 06:58)  HR: 89 (21 @ 07:28) (76 - 94)  BP: 159/79 (21 @ 07:28) (152/60 - 165/72)  RR: 18 (21 @ 07:28) (18 - 18)  SpO2: 99% (21 @ 07:28) (99% - 100%)    CAPILLARY BLOOD GLUCOSE  POCT Blood Glucose.: 164 mg/dL (17 Mar 2021 07:58)  POCT Blood Glucose.: 285 mg/dL (16 Mar 2021 19:25)    I&O's Summary  16 Mar 2021 07:01  -  17 Mar 2021 07:00  --------------------------------------------------------  IN: 300 mL / OUT: 250 mL / NET: 50 mL    PHYSICAL EXAM:  GENERAL: NAD, well-groomed, well-developed  HEAD:  Atraumatic, Normocephalic  EYES: EOMI, PERRLA, conjunctiva and sclera clear  ENMT: No tonsillar erythema, exudates, or enlargement; Moist mucous membranes  NECK: Supple, No JVD, Normal thyroid, no LAD  NERVOUS SYSTEM:  Alert & Oriented X3, Good concentration; Motor Strength 5/5 B/L upper and lower extremities  CHEST/LUNG: Clear to auscultation bilaterally on anterior auscultation; No rales, rhonchi, wheezing, or rubs  HEART: Regular rate and rhythm; No murmurs, rubs, or gallops  ABDOMEN: Soft, Nontender, Nondistended; Bowel sounds present, no HSM  EXTREMITIES:  2+ Peripheral Pulses, No clubbing, cyanosis, or edema, TTP, swelling and warmth B/L elbow joints, L shoulder with reduced ROM d/t pain, mild pain in L 3 PIP and L knee. Rest of joints wnl.   LYMPH: No lymphadenopathy noted  SKIN: conflunet MP rash on chest, blanches. Similar rash on both arms around elbows with scratch marks and dry skin. No inguinal rash.     LABS:                        10.9   22.14 )-----------( 745      ( 17 Mar 2021 07:23 )             32.7     -    131<L>  |  96<L>  |  8   ----------------------------<  168<H>  3.5   |  25  |  0.50    Ca    8.5      17 Mar 2021 07:23  Phos  3.2       Mg     2.2         TPro  6.6  /  Alb  2.6<L>  /  TBili  0.5  /  DBili  x   /  AST  41<H>  /  ALT  47<H>  /  AlkPhos  245<H>      PT/INR - ( 17 Mar 2021 07:23 )   PT: 14.7 sec;   INR: 1.29 ratio      C-Reactive Protein, Serum (21 @ 00:22)    C-Reactive Protein, Serum: 197.4 mg/L    Sedimentation Rate, Erythrocyte (21 @ 02:12)    Sedimentation Rate, Erythrocyte: 91 mm/hr    Iron with Total Binding Capacity (21 @ 07:23)    Iron - Total Binding Capacity.: 161 ug/dL    % Saturation, Iron: 12 %    Iron Total, Serum: 19 ug/dL    Unsaturated Iron Binding Capacity: 142 ug/dL    Haptoglobin, Serum (21 @ 07:23)    Haptoglobin, Serum: 565 mg/dL    Reticulocyte Count (21 @ 07:23)    RBC Count: 3.95 M/uL    Reticulocyte Percent: 2.0 %    Absolute Reticulocytes: 77.0 K/uL    Thyroid Stimulating Hormone, Serum (21 @ 07:23)    Thyroid Stimulating Hormone, Serum: 1.50 uIU/mL    Ferritin, Serum (21 @ 07:23)    Ferritin, Serum: 3220 ng/mL    HIV-1/2 Antigen/Antibody Screen by [facility] (21 @ 07:23)    HIV-1/2 Combo Result: Nonreact:     COVID-19 PCR . (21 @ 00:25)    COVID-19 PCR: NotDetec:     PTT - ( 17 Mar 2021 07:23 )  PTT:29.3 sec  Urinalysis Basic - ( 17 Mar 2021 03:32 )    Color: Yellow / Appearance: Clear / S.022 / pH: x  Gluc: x / Ketone: Small  / Bili: Negative / Urobili: 3 mg/dL   Blood: x / Protein: 30 mg/dL / Nitrite: Negative   Leuk Esterase: Small / RBC: 5 /HPF / WBC 25-50 /HPF   Sq Epi: x / Non Sq Epi: few /HPF / Bacteria: Few      RADIOLOGY & ADDITIONAL TESTS:

## 2021-03-17 NOTE — H&P ADULT - NSHPSOCIALHISTORY_GEN_ALL_CORE
lives at home lives at home with  and 2 sons. no smoking, alcohol, drug use. ambulates without assistive devices   lives at home with  and 2 sons  reports no h/o smoking, alcohol, drug use  ambulates without assistive devices

## 2021-03-17 NOTE — PROGRESS NOTE ADULT - PROBLEM SELECTOR PLAN 2
b/l lateral upper arms and upper chest  -Dermatology consult in AM b/l lateral upper arms and upper chest  -Holding any gtt until consult.  -Dermatology consulted.

## 2021-03-17 NOTE — H&P ADULT - PROBLEM SELECTOR PROBLEM 9
Preventive measure Thrush Type 2 diabetes mellitus without complication, unspecified whether long term insulin use

## 2021-03-17 NOTE — H&P ADULT - ASSESSMENT
This is a 64 y/o Wolof speaking F with PMh of DM (on insulin), HTN, HLD, COVID (2020) and glaucoma presenting with generalized weakness, fevers, joint pain, and rash. Admitted for further management of arthralgias likely 2/2 inflammatory etiology, along with hyponatremia.       Patient states that she was discharged from OSH on 3/12 after being evaluated for similar symptoms. They suspected that she may have Lyme disease and started her on doxycycline and sent her home. They also suspected that this might be post COVID related.     Patient has multiple complaints. Reports rash in the neck area and b/l arms, along with whole body itching. States that she has had a rash since 2/2021 and has seen various doctors and tried different things (steroid creams) without relief. The rash initially started on her face. Also complains of joint pain for 1 week. States she has joint pain and stiffness in the b/l shoulders, elbows, hands, and knees. States that the pain is currently worst in the L shoulder and R elbow. States that the pain is so significant that she is unable to walk currently. Tries Tylenol with minimal relief. Also reports that she has been unable to do her ADLs - clean herself, walk. Never has had joint pain like this before. Also reports subjective fever and chills, along with dry mouth.     Denies any N/V/D, abdominal pain, leg swelling, chest pain, dyspnea. Denies oral ulcers, hair loss, weight loss, dry eyes.     In the ED, pt's vitals were T 98.3 HR 76-94 -165/72 RR 18-19 POX % RA. Pt treated with NS 1L bolus and toradol 15 mg IV.  This is a 64 y/o Serbian speaking F with PMh of DM (on insulin), HTN, HLD, COVID (2020) and glaucoma presenting with generalized weakness, fevers, joint pain, and rash. Admitted for further management of arthralgias likely 2/2 inflammatory etiology, along with hyponatremia.           Na 129, Cl 92   SCr 0.44   glucose 213, 285      CXR clear lungs   pending: urine culture, blood cultures    This is a 66 y/o Telugu speaking F with PMh of DM (on insulin), HTN, HLD, COVID (2020) and glaucoma presenting with generalized weakness, fevers, joint pain, and rash. Admitted for further management of arthralgias likely 2/2 inflammatory etiology, along with hyponatremia.  66 y/o French speaking Female with PMHx of DM (on insulin), HTN, HLD, COVID (2020) and glaucoma presenting with generalized weakness, fevers, joint pain, and rash. Admitted for further w/up. Suspect inflammatory etiology vs malignancy.

## 2021-03-17 NOTE — PROGRESS NOTE ADULT - ASSESSMENT
66 y/o Luxembourger speaking Female with PMHx of DM (on insulin), HTN, HLD, COVID (2020) and glaucoma presenting with generalized weakness, fevers, joint pain, and rash. Admitted for further w/up. Suspect inflammatory etiology vs malignancy.  64 y/o Yemeni speaking Female with PMHx of DM (on insulin), HTN, HLD, COVID (2020) and glaucoma presenting with generalized weakness, fever, polyarthritis and rash, admitted for further workup.

## 2021-03-17 NOTE — PATIENT PROFILE ADULT - NSPROHMDIABETHBA1C_GEN_A_NUR
How Severe Is Your Rash?: moderate Is This A New Presentation, Or A Follow-Up?: Rash Additional History: The patient is here for a rash located on bilateral inner thighs and arms that itch and patient admits to scratching. No treatment used. unknown

## 2021-03-17 NOTE — PROGRESS NOTE ADULT - PROBLEM SELECTOR PLAN 7
- WBC 23.4, differential ordered (pending)  - Monitor CBC  - F/u blood cultures - Platelets 751K, f/u blue top platelets  - Likely in the setting of inflammatory process with workup above   - Monitor CBC, maintain active T&S

## 2021-03-17 NOTE — CONSULT NOTE ADULT - ATTENDING COMMENTS
pt seen and examined by me personally   agree with above  pt is aware of our impression and plan  Will follow with you   please call with acute status changes or questions  [phone]    encountered DERM resident as well at the bedside  given age and presentation / rash characteristics and that she has muscle weakness agree DM is highest on DDX   Cannot r/o anti-synthetase syndrome as well with other ongoing sx.

## 2021-03-17 NOTE — H&P ADULT - PROBLEM SELECTOR PLAN 8
- Glucose 213, 285, f/u A1c  - On basaglar 17 U BID   - C/w basaglar 20 U in the AM and 5 U AC - Glucose 213, 285, f/u A1c  - On basaglar 17 U BID at home   - C/w basaglar 20 U in the AM and 5 U AC  - Monitor fingersticks  - Diabetic diet - AST/ALT 40/51, likely in the setting of inflammation  - Trend CMP  - F/u hepatitis panel

## 2021-03-17 NOTE — H&P ADULT - PROBLEM SELECTOR PLAN 7
- AST/ALT 40/51, likely in the setting of inflammation  - Trend CMP  - F/u hepatitis panel - WBC 23.4, differential ordered (pending)  - Monitor CBC  - F/u blood cultures

## 2021-03-17 NOTE — ED ADULT NURSE NOTE - OBJECTIVE STATEMENT
patient received to room 3 A&Ox4 Romanian speaking. not ambulatory C/O rash and weakness recently seen at [facility]  and told rash is caused by bug bites or covid and discharged home. C/O pain in shoulder and legs. respirations even and unlabored. 20G IV US in  RAC. labs and covid sent. will continue to monitor.

## 2021-03-17 NOTE — H&P ADULT - NSHPREVIEWOFSYSTEMS_GEN_ALL_CORE
CONSTITUTIONAL: No fevers, no chills, no lightheadedness, no dizziness  Eyes: no visual changes  Ears: no ear drainage, no ear pain  Nose: no nasal congestion  Mouth/Throat: no sore throat  CV: No chest pain, no palpitations  PULM: No SOB, no cough  GI: No n/v/d, no abd pain  : no dysuria, no hematuria  SKIN: +rashes  NEURO: no headache, no focal weakness or numbness  LYMPH/VASC: no LE swelling CONSTITUTIONAL: +fevers, chills   Eyes: no visual changes  Ears: no ear drainage, no ear pain  Nose: no nasal congestion  Mouth/Throat: no sore throat  CV: No chest pain, no palpitations  PULM: No SOB, no cough  GI: No n/v/d, no abd pain  : no dysuria, no hematuria  SKIN: +rashes, +pruritus   MSK: +joint pain   NEURO: no headache, no focal weakness or numbness  LYMPH/VASC: no LE swelling CONSTITUTIONAL: +fevers, chills   Eyes: no visual changes  Ears: no ear drainage, no ear pain  Nose: no nasal congestion  Mouth/Throat: no sore throat  CV: No chest pain, no palpitations  PULM: No SOB, no cough  GI: No n/v/d, no abd pain  : no dysuria, no hematuria  SKIN: +rashes, +pruritus   MSK: +joints pain   NEURO: no headache, no focal weakness or numbness  LYMPH/VASC: no LE swelling

## 2021-03-17 NOTE — CONSULT NOTE ADULT - SUBJECTIVE AND OBJECTIVE BOX
LIONEL ERAZO  [id number]    HISTORY OF PRESENT ILLNESS:    PAST MEDICAL & SURGICAL HISTORY:  COVID-19      Vitamin D deficiency    Glaucoma    Hyperlipidemia    Hypertension    Diabetes mellitus    History of throat surgery    History of elbow surgery    S/P         Review of Systems:  Gen:  No fevers/chills, weight loss  HEENT: No blurry vision, no difficulty swallowing  CVS: No chest pain/palpitations  Resp: No SOB/wheezing  GI: No N/V/C/D/abdominal pain  MSK:  Skin: No new rashes  Neuro: No headaches    MEDICATIONS  (STANDING):  atorvastatin 40 milliGRAM(s) Oral at bedtime  cholecalciferol 2000 Unit(s) Oral daily  dextrose 40% Gel 15 Gram(s) Oral once  dextrose 5%. 1000 milliLiter(s) (50 mL/Hr) IV Continuous <Continuous>  dextrose 5%. 1000 milliLiter(s) (100 mL/Hr) IV Continuous <Continuous>  dextrose 50% Injectable 25 Gram(s) IV Push once  dextrose 50% Injectable 12.5 Gram(s) IV Push once  dextrose 50% Injectable 25 Gram(s) IV Push once  diltiazem    milliGRAM(s) Oral daily  doxycycline hyclate Capsule 100 milliGRAM(s) Oral every 12 hours  enoxaparin Injectable 40 milliGRAM(s) SubCutaneous daily  glucagon  Injectable 1 milliGRAM(s) IntraMuscular once  insulin glargine Injectable (LANTUS) 20 Unit(s) SubCutaneous every morning  insulin lispro (ADMELOG) corrective regimen sliding scale   SubCutaneous three times a day before meals  insulin lispro (ADMELOG) corrective regimen sliding scale   SubCutaneous at bedtime  insulin lispro Injectable (ADMELOG) 5 Unit(s) SubCutaneous three times a day before meals  ketotifen 0.025% Ophthalmic Solution 1 Drop(s) Both EYES two times a day  latanoprost 0.005% Ophthalmic Solution 1 Drop(s) Both EYES at bedtime  nystatin    Suspension 815381 Unit(s) Swish and Swallow every 6 hours    MEDICATIONS  (PRN):  acetaminophen   Tablet .. 650 milliGRAM(s) Oral every 6 hours PRN Temp greater or equal to 38C (100.4F), Mild Pain (1 - 3)  ketorolac   Injectable 15 milliGRAM(s) IV Push every 6 hours PRN moderate-severe pain      Allergies    azithromycin (Hives; Swelling)  enalapril (Other (Mild to Mod))  penicillin (Swelling; Rash)    Intolerances        PERTINENT MEDICATION HISTORY:    SOCIAL HISTORY:  OCCUPATION:  TRAVEL HISTORY:    FAMILY HISTORY:  No pertinent family history in first degree relatives        Vital Signs Last 24 Hrs  T(C): 36.6 (17 Mar 2021 07:28), Max: 37.2 (17 Mar 2021 06:58)  T(F): 97.8 (17 Mar 2021 07:28), Max: 98.9 (17 Mar 2021 06:58)  HR: 106 (17 Mar 2021 10:00) (76 - 106)  BP: 148/71 (17 Mar 2021 10:00) (148/71 - 165/72)  BP(mean): --  RR: 19 (17 Mar 2021 10:00) (18 - 19)  SpO2: 98% (17 Mar 2021 10:00) (98% - 100%)    Physical Exam:  General: No apparent distress  HEENT: EOMI, MMM  CVS: +S1/S2, RRR, no murmurs/rubs/gallops  Resp: CTA b/l. No crackles/wheezing  GI: Soft, NT/ND +BS  MSK:  Shoulders: wnl  Elbows: wnl  Wrists: wnl  MCPs: wnl  PIPs: wnl  DIPs: wnl   Hips: wnl  Knees: wnl   Ankle: wnl  Neuro: AAOx3  Skin: no visible rashes    LABS:                        10.9   22.14 )-----------( 745      ( 17 Mar 2021 07:23 )             32.7     03-17    131<L>  |  96<L>  |  8   ----------------------------<  168<H>  3.5   |  25  |  0.50    Ca    8.5      17 Mar 2021 07:23  Phos  3.2     03-  Mg     2.2     03-17    TPro  6.6  /  Alb  2.6<L>  /  TBili  0.5  /  DBili  x   /  AST  41<H>  /  ALT  47<H>  /  AlkPhos  245<H>  03-17    PT/INR - ( 17 Mar 2021 07:23 )   PT: 14.7 sec;   INR: 1.29 ratio         PTT - ( 17 Mar 2021 07:23 )  PTT:29.3 sec  Urinalysis Basic - ( 17 Mar 2021 03:32 )    Color: Yellow / Appearance: Clear / S.022 / pH: x  Gluc: x / Ketone: Small  / Bili: Negative / Urobili: 3 mg/dL   Blood: x / Protein: 30 mg/dL / Nitrite: Negative   Leuk Esterase: Small / RBC: 5 /HPF / WBC 25-50 /HPF   Sq Epi: x / Non Sq Epi: few /HPF / Bacteria: Few        RADIOLOGY & ADDITIONAL STUDIES: LIONEL COLORADOCARLOSHAZEL  [id number]    HISTORY OF PRESENT ILLNESS:  \  64 y/o Slovenian speaking Female with PMHx of DM Type 2 (on insulin), HTN, HLD, past COVID-19 infection () and glaucoma presenting with generalized weakness, joint pain, and rash. Patient states that she was discharged from OSH on 3/12 after being evaluated for similar symptoms. Joint pains began approx 9 days ago. Rash started on 21 and began on face with whole body itching. At [facility], they suspected that she may have Lyme disease and started her on doxycycline and sent her home. Pt unsure how many days of the doxycycline she took. They also suspected that this might be post COVID related.     Patient has multiple complaints. Reports rash in the neck area and b/l arms, along with whole body itching. States that she has had a rash since 2021 and has seen various doctors and tried different things (steroid creams) without relief. The rash initially started on her face. Also complains of joint pain for 1 week. States she has joint pain and stiffness in the b/l shoulders, elbows, hands, and knees. States that the pain is currently worst in the L shoulder and R elbow. States that the pain is so significant that she is unable to walk currently. Tries Tylenol with minimal relief. Also reports that she has been unable to do her ADLs - clean herself, walk. Never has had joint pain like this before. Also reports subjective fever and chills, along with dry mouth.     Rheumatology consulted to evaluate pt's joint pains and rash. Pt has never been diagnosed with autoimmune disease. Her son has psoriasis.   Pt has never had this type of rash in the past prior to last month. Has never had joint pains in the past.   She denies oral ulcers, alopecia, hematuria/foamy urine. Denies Raynauds symptoms. She states she gets +dry mouth. No history of DVT or PE. She had one spontaneous , unable to specify exactly when but states it was shortly after her missed period. Pt denies eye pain or eye redness. Denies hemoptysis or hematemesis. Denies known weight loss.     PAST MEDICAL & SURGICAL HISTORY:  COVID-19      Vitamin D deficiency    Glaucoma    Hyperlipidemia    Hypertension    Diabetes mellitus    History of throat surgery    History of elbow surgery    S/P         Review of Systems:  Gen:  No fevers/chills, no weight loss  HEENT: No blurry vision, no difficulty swallowing  CVS: No chest pain/palpitations  Resp: No SOB/wheezing  GI: No N/V/C/D/abdominal pain  MSK: +joint pains particularly in L. shoulder and R. elbow/hand as well as b/l knees.   Skin: +Erythematous rash on chest, b/l upper extremities, and left leg.   Neuro: No headaches    MEDICATIONS  (STANDING):  atorvastatin 40 milliGRAM(s) Oral at bedtime  cholecalciferol 2000 Unit(s) Oral daily  dextrose 40% Gel 15 Gram(s) Oral once  dextrose 5%. 1000 milliLiter(s) (50 mL/Hr) IV Continuous <Continuous>  dextrose 5%. 1000 milliLiter(s) (100 mL/Hr) IV Continuous <Continuous>  dextrose 50% Injectable 25 Gram(s) IV Push once  dextrose 50% Injectable 12.5 Gram(s) IV Push once  dextrose 50% Injectable 25 Gram(s) IV Push once  diltiazem    milliGRAM(s) Oral daily  doxycycline hyclate Capsule 100 milliGRAM(s) Oral every 12 hours  enoxaparin Injectable 40 milliGRAM(s) SubCutaneous daily  glucagon  Injectable 1 milliGRAM(s) IntraMuscular once  insulin glargine Injectable (LANTUS) 20 Unit(s) SubCutaneous every morning  insulin lispro (ADMELOG) corrective regimen sliding scale   SubCutaneous three times a day before meals  insulin lispro (ADMELOG) corrective regimen sliding scale   SubCutaneous at bedtime  insulin lispro Injectable (ADMELOG) 5 Unit(s) SubCutaneous three times a day before meals  ketotifen 0.025% Ophthalmic Solution 1 Drop(s) Both EYES two times a day  latanoprost 0.005% Ophthalmic Solution 1 Drop(s) Both EYES at bedtime  nystatin    Suspension 442256 Unit(s) Swish and Swallow every 6 hours    MEDICATIONS  (PRN):  acetaminophen   Tablet .. 650 milliGRAM(s) Oral every 6 hours PRN Temp greater or equal to 38C (100.4F), Mild Pain (1 - 3)  ketorolac   Injectable 15 milliGRAM(s) IV Push every 6 hours PRN moderate-severe pain      Allergies    azithromycin (Hives; Swelling)  enalapril (Other (Mild to Mod))  penicillin (Swelling; Rash)    Intolerances        PERTINENT MEDICATION HISTORY:    SOCIAL HISTORY: No tobacco, alcohol or drug use. Lives with  and two sons.   OCCUPATION:  TRAVEL HISTORY:    FAMILY HISTORY:  No pertinent family history in first degree relatives        Vital Signs Last 24 Hrs  T(C): 36.6 (17 Mar 2021 07:28), Max: 37.2 (17 Mar 2021 06:58)  T(F): 97.8 (17 Mar 2021 07:28), Max: 98.9 (17 Mar 2021 06:58)  HR: 106 (17 Mar 2021 10:00) (76 - 106)  BP: 148/71 (17 Mar 2021 10:00) (148/71 - 165/72)  BP(mean): --  RR: 19 (17 Mar 2021 10:00) (18 - 19)  SpO2: 98% (17 Mar 2021 10:00) (98% - 100%)    Physical Exam:  General: No apparent distress, obese   HEENT: EOMI, MMM  CVS: +S1/S2, RRR, no murmurs/rubs/gallops  Resp: CTA b/l. No crackles/wheezing  GI: Soft, NT/ND +BS  MSK:  Shoulders: L. shoulder with tenderness superiorly; tenderness on active/passive ROM.   Elbows: no synovitis  Wrists: no synovitis  MCPs: no synovitis  PIPs: no synovitis  DIPs: no synovitis  Hips: wnl  Knees: no knee effusions b/l, normal flexion/extension   Ankle: no effusion sb/l, normal ROM  Neuro: AAOx3  Skin: Erythematous patches on central chest, b/l upper extremities, and proximal left lower extremity. Pt itching these areas.     LABS:                        10.9   22.14 )-----------( 745      ( 17 Mar 2021 07:23 )             32.7     03-17    131<L>  |  96<L>  |  8   ----------------------------<  168<H>  3.5   |  25  |  0.50    Ca    8.5      17 Mar 2021 07:23  Phos  3.2     03-17  Mg     2.2     03-17    TPro  6.6  /  Alb  2.6<L>  /  TBili  0.5  /  DBili  x   /  AST  41<H>  /  ALT  47<H>  /  AlkPhos  245<H>  03-17    PT/INR - ( 17 Mar 2021 07:23 )   PT: 14.7 sec;   INR: 1.29 ratio         PTT - ( 17 Mar 2021 07:23 )  PTT:29.3 sec  Urinalysis Basic - ( 17 Mar 2021 03:32 )    Color: Yellow / Appearance: Clear / S.022 / pH: x  Gluc: x / Ketone: Small  / Bili: Negative / Urobili: 3 mg/dL   Blood: x / Protein: 30 mg/dL / Nitrite: Negative   Leuk Esterase: Small / RBC: 5 /HPF / WBC 25-50 /HPF   Sq Epi: x / Non Sq Epi: few /HPF / Bacteria: Few        RADIOLOGY & ADDITIONAL STUDIES: LIONEL ERAZO  [id number]    [place]  ID: [id number] (Adleine)    HISTORY OF PRESENT ILLNESS:    64 y/o [place]ican speaking Female with PMHx of DM Type 2 (on insulin), HTN, HLD, past COVID-19 infection () and glaucoma presenting with generalized weakness, joint pain, and rash. Patient states that she was discharged from OSH on 3/12 after being evaluated for similar symptoms. Joint pains began approx 9 days ago. Rash started on 21 and began on face with whole body itching. At [facility], they suspected that she may have Lyme disease and started her on doxycycline and sent her home. Pt unsure how many days of the doxycycline she took. They also suspected that this might be post COVID related.     Patient has multiple complaints. Reports rash in the neck area and b/l arms, along with whole body itching. States that she has had a rash since 2021 and has seen various doctors and tried different things (steroid creams) without relief. The rash initially started on her face. Also complains of joint pain for 1 week. States she has joint pain and stiffness in the b/l shoulders, elbows, hands, and knees. States that the pain is currently worst in the L shoulder and R elbow. States that the pain is so significant that she is unable to walk currently. Tries Tylenol with minimal relief. Also reports that she has been unable to do her ADLs - clean herself, walk. Never has had joint pain like this before. Also reports subjective fever and chills, along with dry mouth.     Rheumatology consulted to evaluate pt's joint pains and rash. Pt has never been diagnosed with autoimmune disease. Her son has psoriasis.   Pt has never had this type of rash in the past prior to last month. Has never had joint pains in the past.   She denies oral ulcers, alopecia, hematuria/foamy urine. Denies Raynauds symptoms. She states she gets +dry mouth. No history of DVT or PE. She had one spontaneous , unable to specify exactly when but states it was shortly after her missed period. Pt denies eye pain or eye redness. Denies hemoptysis or hematemesis. Denies known weight loss.     PAST MEDICAL & SURGICAL HISTORY:  COVID-19      Vitamin D deficiency    Glaucoma    Hyperlipidemia    Hypertension    Diabetes mellitus    History of throat surgery    History of elbow surgery    S/P         Review of Systems:  Gen:  No fevers/chills, no weight loss  HEENT: No blurry vision, no difficulty swallowing  CVS: No chest pain/palpitations  Resp: No SOB/wheezing  GI: No N/V/C/D/abdominal pain  MSK: +joint pains particularly in L. shoulder and R. elbow/hand as well as b/l knees.   Skin: +Erythematous rash on chest, b/l upper extremities, and left leg.   Neuro: No headaches    MEDICATIONS  (STANDING):  atorvastatin 40 milliGRAM(s) Oral at bedtime  cholecalciferol 2000 Unit(s) Oral daily  dextrose 40% Gel 15 Gram(s) Oral once  dextrose 5%. 1000 milliLiter(s) (50 mL/Hr) IV Continuous <Continuous>  dextrose 5%. 1000 milliLiter(s) (100 mL/Hr) IV Continuous <Continuous>  dextrose 50% Injectable 25 Gram(s) IV Push once  dextrose 50% Injectable 12.5 Gram(s) IV Push once  dextrose 50% Injectable 25 Gram(s) IV Push once  diltiazem    milliGRAM(s) Oral daily  doxycycline hyclate Capsule 100 milliGRAM(s) Oral every 12 hours  enoxaparin Injectable 40 milliGRAM(s) SubCutaneous daily  glucagon  Injectable 1 milliGRAM(s) IntraMuscular once  insulin glargine Injectable (LANTUS) 20 Unit(s) SubCutaneous every morning  insulin lispro (ADMELOG) corrective regimen sliding scale   SubCutaneous three times a day before meals  insulin lispro (ADMELOG) corrective regimen sliding scale   SubCutaneous at bedtime  insulin lispro Injectable (ADMELOG) 5 Unit(s) SubCutaneous three times a day before meals  ketotifen 0.025% Ophthalmic Solution 1 Drop(s) Both EYES two times a day  latanoprost 0.005% Ophthalmic Solution 1 Drop(s) Both EYES at bedtime  nystatin    Suspension 185332 Unit(s) Swish and Swallow every 6 hours    MEDICATIONS  (PRN):  acetaminophen   Tablet .. 650 milliGRAM(s) Oral every 6 hours PRN Temp greater or equal to 38C (100.4F), Mild Pain (1 - 3)  ketorolac   Injectable 15 milliGRAM(s) IV Push every 6 hours PRN moderate-severe pain      Allergies    azithromycin (Hives; Swelling)  enalapril (Other (Mild to Mod))  penicillin (Swelling; Rash)    Intolerances        PERTINENT MEDICATION HISTORY:    SOCIAL HISTORY: No tobacco, alcohol or drug use. Lives with  and two sons.   OCCUPATION:  TRAVEL HISTORY:    FAMILY HISTORY:  No pertinent family history in first degree relatives        Vital Signs Last 24 Hrs  T(C): 36.6 (17 Mar 2021 07:28), Max: 37.2 (17 Mar 2021 06:58)  T(F): 97.8 (17 Mar 2021 07:28), Max: 98.9 (17 Mar 2021 06:58)  HR: 106 (17 Mar 2021 10:00) (76 - 106)  BP: 148/71 (17 Mar 2021 10:00) (148/71 - 165/72)  BP(mean): --  RR: 19 (17 Mar 2021 10:00) (18 - 19)  SpO2: 98% (17 Mar 2021 10:00) (98% - 100%)    Physical Exam:  General: No apparent distress, obese   HEENT: EOMI, MMM  CVS: +S1/S2, RRR, no murmurs/rubs/gallops  Resp: CTA b/l. No crackles/wheezing  GI: Soft, NT/ND +BS  MSK:  Shoulders: L. shoulder with tenderness superiorly; tenderness on active/passive ROM.   Elbows: no synovitis  Wrists: no synovitis  MCPs: no synovitis  PIPs: no synovitis  DIPs: no synovitis  Hips: wnl  Knees: no knee effusions b/l, normal flexion/extension   Ankle: no effusion sb/l, normal ROM  Neuro: AAOx3  Skin: Erythematous patches on central chest, b/l upper extremities, and proximal left lower extremity. Pt itching these areas.     LABS:                        10.9   22.14 )-----------( 745      ( 17 Mar 2021 07:23 )             32.7     03-17    131<L>  |  96<L>  |  8   ----------------------------<  168<H>  3.5   |  25  |  0.50    Ca    8.5      17 Mar 2021 07:23  Phos  3.2     03-17  Mg     2.2     03-17    TPro  6.6  /  Alb  2.6<L>  /  TBili  0.5  /  DBili  x   /  AST  41<H>  /  ALT  47<H>  /  AlkPhos  245<H>  03-17    PT/INR - ( 17 Mar 2021 07:23 )   PT: 14.7 sec;   INR: 1.29 ratio         PTT - ( 17 Mar 2021 07:23 )  PTT:29.3 sec  Urinalysis Basic - ( 17 Mar 2021 03:32 )    Color: Yellow / Appearance: Clear / S.022 / pH: x  Gluc: x / Ketone: Small  / Bili: Negative / Urobili: 3 mg/dL   Blood: x / Protein: 30 mg/dL / Nitrite: Negative   Leuk Esterase: Small / RBC: 5 /HPF / WBC 25-50 /HPF   Sq Epi: x / Non Sq Epi: few /HPF / Bacteria: Few        RADIOLOGY & ADDITIONAL STUDIES: LIONEL ERAZO  [id number]    [place]  ID: [id number] (Adeline)    HISTORY OF PRESENT ILLNESS:    64 y/o [place] Islander speaking Female with PMHx of DM Type 2 (on insulin), HTN, HLD, past COVID-19 infection () and glaucoma presenting with generalized weakness, joint pain, and rash. Patient states that she was discharged from OSH on 3/12 after being evaluated for similar symptoms. Joint pains began approx 9 days ago. Rash started on 21 and began on face with whole body itching. At [facility], they suspected that she may have Lyme disease and started her on doxycycline and sent her home. Pt unsure how many days of the doxycycline she took. They also suspected that this might be post COVID related.     Patient has multiple complaints. Reports rash in the neck area and b/l arms, along with whole body itching. States that she has had a rash since 2021 and has seen various doctors and tried different things (steroid creams) without relief. The rash initially started on her face. Also complains of joint pain for 1 week. States she has joint pain and stiffness in the b/l shoulders, elbows, hands, and knees. States that the pain is currently worst in the L shoulder and R elbow. States that the pain is so significant that she is unable to walk currently. Tries Tylenol with minimal relief. Also reports that she has been unable to do her ADLs - clean herself, walk. Never has had joint pain like this before. Also reports subjective fever and chills, along with dry mouth.     Rheumatology consulted to evaluate pt's joint pains and rash. Pt has never been diagnosed with autoimmune disease. Her son has psoriasis.   Pt has never had this type of rash in the past prior to last month. Has never had joint pains in the past.   She denies oral ulcers, alopecia, hematuria/foamy urine. Denies Raynauds symptoms. She states she gets +dry mouth. No history of DVT or PE. She had one spontaneous , unable to specify exactly when but states it was shortly after her missed period. Pt denies eye pain or eye redness. Denies hemoptysis or hematemesis. Denies known weight loss.     PAST MEDICAL & SURGICAL HISTORY:  COVID-19      Vitamin D deficiency    Glaucoma    Hyperlipidemia    Hypertension    Diabetes mellitus    History of throat surgery    History of elbow surgery    S/P         Review of Systems:  Gen:  No fevers/chills, no weight loss  HEENT: No blurry vision, no difficulty swallowing  CVS: No chest pain/palpitations  Resp: No SOB/wheezing  GI: No N/V/C/D/abdominal pain  MSK: +joint pains particularly in L. shoulder and R. elbow/hand as well as b/l knees.   Skin: +Erythematous rash on chest, b/l upper extremities, and left leg.   Neuro: No headaches    MEDICATIONS  (STANDING):  atorvastatin 40 milliGRAM(s) Oral at bedtime  cholecalciferol 2000 Unit(s) Oral daily  dextrose 40% Gel 15 Gram(s) Oral once  dextrose 5%. 1000 milliLiter(s) (50 mL/Hr) IV Continuous <Continuous>  dextrose 5%. 1000 milliLiter(s) (100 mL/Hr) IV Continuous <Continuous>  dextrose 50% Injectable 25 Gram(s) IV Push once  dextrose 50% Injectable 12.5 Gram(s) IV Push once  dextrose 50% Injectable 25 Gram(s) IV Push once  diltiazem    milliGRAM(s) Oral daily  doxycycline hyclate Capsule 100 milliGRAM(s) Oral every 12 hours  enoxaparin Injectable 40 milliGRAM(s) SubCutaneous daily  glucagon  Injectable 1 milliGRAM(s) IntraMuscular once  insulin glargine Injectable (LANTUS) 20 Unit(s) SubCutaneous every morning  insulin lispro (ADMELOG) corrective regimen sliding scale   SubCutaneous three times a day before meals  insulin lispro (ADMELOG) corrective regimen sliding scale   SubCutaneous at bedtime  insulin lispro Injectable (ADMELOG) 5 Unit(s) SubCutaneous three times a day before meals  ketotifen 0.025% Ophthalmic Solution 1 Drop(s) Both EYES two times a day  latanoprost 0.005% Ophthalmic Solution 1 Drop(s) Both EYES at bedtime  nystatin    Suspension 114619 Unit(s) Swish and Swallow every 6 hours    MEDICATIONS  (PRN):  acetaminophen   Tablet .. 650 milliGRAM(s) Oral every 6 hours PRN Temp greater or equal to 38C (100.4F), Mild Pain (1 - 3)  ketorolac   Injectable 15 milliGRAM(s) IV Push every 6 hours PRN moderate-severe pain      Allergies    azithromycin (Hives; Swelling)  enalapril (Other (Mild to Mod))  penicillin (Swelling; Rash)    Intolerances        PERTINENT MEDICATION HISTORY:    SOCIAL HISTORY: No tobacco, alcohol or drug use. Lives with  and two sons.   OCCUPATION:  TRAVEL HISTORY:    FAMILY HISTORY:  No pertinent family history in first degree relatives        Vital Signs Last 24 Hrs  T(C): 36.6 (17 Mar 2021 07:28), Max: 37.2 (17 Mar 2021 06:58)  T(F): 97.8 (17 Mar 2021 07:28), Max: 98.9 (17 Mar 2021 06:58)  HR: 106 (17 Mar 2021 10:00) (76 - 106)  BP: 148/71 (17 Mar 2021 10:00) (148/71 - 165/72)  BP(mean): --  RR: 19 (17 Mar 2021 10:00) (18 - 19)  SpO2: 98% (17 Mar 2021 10:00) (98% - 100%)    Physical Exam:  General: No apparent distress, obese   HEENT: EOMI, MMM  CVS: +S1/S2, RRR, no murmurs/rubs/gallops  Resp: CTA b/l. No crackles/wheezing  GI: Soft, NT/ND +BS  MSK:  Shoulders: L. shoulder with tenderness superiorly; tenderness on active/passive ROM.   Elbows: no synovitis  Wrists: no synovitis  MCPs: no synovitis  PIPs: no synovitis  DIPs: no synovitis  Hips: wnl  Knees: no knee effusions b/l, normal flexion/extension   Ankle: no effusion sb/l, normal ROM  Neuro: AAOx3  Skin: Erythematous patches on central chest, b/l upper extremities, and proximal left lower extremity. Pt itching these areas.     LABS:                        10.9   22.14 )-----------( 745      ( 17 Mar 2021 07:23 )             32.7     03-17    131<L>  |  96<L>  |  8   ----------------------------<  168<H>  3.5   |  25  |  0.50    Ca    8.5      17 Mar 2021 07:23  Phos  3.2     03-17  Mg     2.2     03-17    TPro  6.6  /  Alb  2.6<L>  /  TBili  0.5  /  DBili  x   /  AST  41<H>  /  ALT  47<H>  /  AlkPhos  245<H>  03-    PT/INR - ( 17 Mar 2021 07:23 )   PT: 14.7 sec;   INR: 1.29 ratio         PTT - ( 17 Mar 2021 07:23 )  PTT:29.3 sec  Urinalysis Basic - ( 17 Mar 2021 03:32 )    Color: Yellow / Appearance: Clear / S.022 / pH: x  Gluc: x / Ketone: Small  / Bili: Negative / Urobili: 3 mg/dL   Blood: x / Protein: 30 mg/dL / Nitrite: Negative   Leuk Esterase: Small / RBC: 5 /HPF / WBC 25-50 /HPF   Sq Epi: x / Non Sq Epi: few /HPF / Bacteria: Few        RADIOLOGY & ADDITIONAL STUDIES:    Xray L. Shoulder 3/17/21  INTERPRETATION:  HISTORY: Rule out inflammatory arthritis of the left shoulder. Concern for rheumatoid disease.    TECHNIQUE: 3 views of the left shoulder.    FINDINGS: No fracture or dislocation is seen.  The joint spaces are intact. No osseous lesion is seen. The overlying soft tissues appear unremarkable.    IMPRESSION: Unremarkable radiographs of the left shoulder. No significant arthrosis is seen.    Xray R. Elbow 3/17/21  EXAM:  RAD ELBOW RIGHT    PROCEDURE DATE:  Mar 17 2021         INTERPRETATION:  INDICATION: Rule out inflammatory arthritis. Right elbow pain. Markers concern for rheumatoid disease.    TECHNIQUE: 4 views of the right elbow.    FINDINGS: No fracture or dislocation is seen. The joint spaces are intact. There is no significant joint effusion or soft tissue swelling.    IMPRESSION: Unremarkable radiographs of the right elbow.           LIONEL ERAZO  [id number]    [place]  ID: [id number] (Adeline)    HISTORY OF PRESENT ILLNESS:    64 y/o Surinamese speaking Female with PMHx of DM Type 2 (on insulin), HTN, HLD, past COVID-19 infection () and glaucoma presenting with generalized weakness, joint pain, and rash. Patient states that she was discharged from OSH on 3/12 after being evaluated for similar symptoms. Joint pains began approx 9 days ago. Rash started on 21 and began on face with whole body itching. At [facility], they suspected that she may have Lyme disease and started her on doxycycline and sent her home. Pt unsure how many days of the doxycycline she took. They also suspected that this might be post COVID related.     Patient has multiple complaints. Reports rash in the neck area and b/l arms, along with whole body itching. States that she has had a rash since 2021 and has seen various doctors and tried different things (steroid creams) without relief. The rash initially started on her face. Also complains of joint pain for 1 week. States she has joint pain and stiffness in the b/l shoulders, elbows, hands, and knees. States that the pain is currently worst in the L shoulder and R elbow. States that the pain is so significant that she is unable to walk currently. Tries Tylenol with minimal relief. Also reports that she has been unable to do her ADLs - clean herself, walk. Never has had joint pain like this before. Also reports subjective fever and chills, along with dry mouth.     Rheumatology consulted to evaluate pt's joint pains and rash. Pt has never been diagnosed with autoimmune disease. Her son has psoriasis.   Pt has never had this type of rash in the past prior to last month. Has never had joint pains in the past.   She denies oral ulcers, alopecia, hematuria/foamy urine. Denies Raynauds symptoms. She states she gets +dry mouth. No history of DVT or PE. She had one spontaneous , unable to specify exactly when but states it was shortly after her missed period. Pt denies eye pain or eye redness. Denies hemoptysis or hematemesis. Denies known weight loss.     Pt had been on PO steroids on OSH admission for unknown diagnosis but was discharged off steroids, with subsequent worsening of symptoms. Pt's last mammogram was 2 years ago.     PAST MEDICAL & SURGICAL HISTORY:  COVID-19      Vitamin D deficiency    Glaucoma    Hyperlipidemia    Hypertension    Diabetes mellitus    History of throat surgery    History of elbow surgery    S/P         Review of Systems:  Gen:  No fevers/chills, no weight loss  HEENT: No blurry vision, no difficulty swallowing  CVS: No chest pain/palpitations  Resp: No SOB/wheezing  GI: No N/V/C/D/abdominal pain  MSK: +joint pains particularly in L. shoulder and R. elbow/hand as well as b/l knees.   Skin: +Erythematous rash on chest, b/l upper extremities, and left leg.   Neuro: No headaches    MEDICATIONS  (STANDING):  atorvastatin 40 milliGRAM(s) Oral at bedtime  cholecalciferol 2000 Unit(s) Oral daily  dextrose 40% Gel 15 Gram(s) Oral once  dextrose 5%. 1000 milliLiter(s) (50 mL/Hr) IV Continuous <Continuous>  dextrose 5%. 1000 milliLiter(s) (100 mL/Hr) IV Continuous <Continuous>  dextrose 50% Injectable 25 Gram(s) IV Push once  dextrose 50% Injectable 12.5 Gram(s) IV Push once  dextrose 50% Injectable 25 Gram(s) IV Push once  diltiazem    milliGRAM(s) Oral daily  doxycycline hyclate Capsule 100 milliGRAM(s) Oral every 12 hours  enoxaparin Injectable 40 milliGRAM(s) SubCutaneous daily  glucagon  Injectable 1 milliGRAM(s) IntraMuscular once  insulin glargine Injectable (LANTUS) 20 Unit(s) SubCutaneous every morning  insulin lispro (ADMELOG) corrective regimen sliding scale   SubCutaneous three times a day before meals  insulin lispro (ADMELOG) corrective regimen sliding scale   SubCutaneous at bedtime  insulin lispro Injectable (ADMELOG) 5 Unit(s) SubCutaneous three times a day before meals  ketotifen 0.025% Ophthalmic Solution 1 Drop(s) Both EYES two times a day  latanoprost 0.005% Ophthalmic Solution 1 Drop(s) Both EYES at bedtime  nystatin    Suspension 076406 Unit(s) Swish and Swallow every 6 hours    MEDICATIONS  (PRN):  acetaminophen   Tablet .. 650 milliGRAM(s) Oral every 6 hours PRN Temp greater or equal to 38C (100.4F), Mild Pain (1 - 3)  ketorolac   Injectable 15 milliGRAM(s) IV Push every 6 hours PRN moderate-severe pain      Allergies    azithromycin (Hives; Swelling)  enalapril (Other (Mild to Mod))  penicillin (Swelling; Rash)    Intolerances        PERTINENT MEDICATION HISTORY:    SOCIAL HISTORY: No tobacco, alcohol or drug use. Lives with  and two sons.   OCCUPATION:  TRAVEL HISTORY:    FAMILY HISTORY:  No pertinent family history in first degree relatives        Vital Signs Last 24 Hrs  T(C): 36.6 (17 Mar 2021 07:28), Max: 37.2 (17 Mar 2021 06:58)  T(F): 97.8 (17 Mar 2021 07:28), Max: 98.9 (17 Mar 2021 06:58)  HR: 106 (17 Mar 2021 10:00) (76 - 106)  BP: 148/71 (17 Mar 2021 10:00) (148/71 - 165/72)  BP(mean): --  RR: 19 (17 Mar 2021 10:00) (18 - 19)  SpO2: 98% (17 Mar 2021 10:00) (98% - 100%)    Physical Exam:  General: No apparent distress, obese   HEENT: EOMI, MMM  CVS: +S1/S2, RRR, no murmurs/rubs/gallops  Resp: CTA b/l. No crackles/wheezing  GI: Soft, NT/ND +BS  MSK:  Shoulders: L. shoulder with tenderness superiorly; tenderness on active/passive ROM.   Elbows: no synovitis  Wrists: no synovitis  MCPs: no synovitis  PIPs: no synovitis  DIPs: no synovitis  Hips: wnl  Knees: no knee effusions b/l, normal flexion/extension   Ankle: no effusion sb/l, normal ROM  Neuro: AAOx3  Skin: Erythematous patches on central chest, b/l upper extremities, and proximal left lower extremity. Pt itching these areas.     LABS:                        10.9   22.14 )-----------( 745      ( 17 Mar 2021 07:23 )             32.7     03-17    131<L>  |  96<L>  |  8   ----------------------------<  168<H>  3.5   |  25  |  0.50    Ca    8.5      17 Mar 2021 07:23  Phos  3.2       Mg     2.2     -    TPro  6.6  /  Alb  2.6<L>  /  TBili  0.5  /  DBili  x   /  AST  41<H>  /  ALT  47<H>  /  AlkPhos  245<H>      PT/INR - ( 17 Mar 2021 07:23 )   PT: 14.7 sec;   INR: 1.29 ratio         PTT - ( 17 Mar 2021 07:23 )  PTT:29.3 sec  Urinalysis Basic - ( 17 Mar 2021 03:32 )    Color: Yellow / Appearance: Clear / S.022 / pH: x  Gluc: x / Ketone: Small  / Bili: Negative / Urobili: 3 mg/dL   Blood: x / Protein: 30 mg/dL / Nitrite: Negative   Leuk Esterase: Small / RBC: 5 /HPF / WBC 25-50 /HPF   Sq Epi: x / Non Sq Epi: few /HPF / Bacteria: Few        RADIOLOGY & ADDITIONAL STUDIES:    Xray L. Shoulder 3/17/21  INTERPRETATION:  HISTORY: Rule out inflammatory arthritis of the left shoulder. Concern for rheumatoid disease.    TECHNIQUE: 3 views of the left shoulder.    FINDINGS: No fracture or dislocation is seen.  The joint spaces are intact. No osseous lesion is seen. The overlying soft tissues appear unremarkable.    IMPRESSION: Unremarkable radiographs of the left shoulder. No significant arthrosis is seen.    Xray R. Elbow 3/17/21  EXAM:  RAD ELBOW RIGHT    PROCEDURE DATE:  Mar 17 2021         INTERPRETATION:  INDICATION: Rule out inflammatory arthritis. Right elbow pain. Markers concern for rheumatoid disease.    TECHNIQUE: 4 views of the right elbow.    FINDINGS: No fracture or dislocation is seen. The joint spaces are intact. There is no significant joint effusion or soft tissue swelling.    IMPRESSION: Unremarkable radiographs of the right elbow.

## 2021-03-17 NOTE — H&P ADULT - PROBLEM SELECTOR PLAN 2
- Na 129, Cl 92, possibly in the setting of poor PO intake  - F/u urine lytes, serum osmolality  - F/u TSH, cortisol  - S/p 1L NS in the ED  - F/u repeat - Na 129, Cl 92, possibly in the setting of poor PO intake  - F/u urine lytes, serum osmolality  - F/u TSH, cortisol  - S/p 1L NS in the ED  - Monitor BMP q12 b/l lateral upper arms and upper chest  -Dermatology consult in AM

## 2021-03-17 NOTE — ED PROVIDER NOTE - NS ED ROS FT
CONSTITUTIONAL: No fevers, no chills, no lightheadedness, no dizziness  Eyes: no visual changes  Ears: no ear drainage, no ear pain  Nose: no nasal congestion  Mouth/Throat: no sore throat  CV: No chest pain, no palpitations  PULM: No SOB, no cough  GI: No n/v/d, no abd pain  : no dysuria, no hematuria  SKIN: +rashes  NEURO: no headache, no focal weakness or numbness  LYMPH/VASC: no LE swelling

## 2021-03-18 LAB
ALBUMIN SERPL ELPH-MCNC: 2.9 G/DL — LOW (ref 3.3–5)
ALP SERPL-CCNC: 248 U/L — HIGH (ref 40–120)
ALT FLD-CCNC: 60 U/L — HIGH (ref 4–33)
ANA TITR SER: NEGATIVE — SIGNIFICANT CHANGE UP
ANION GAP SERPL CALC-SCNC: 12 MMOL/L — SIGNIFICANT CHANGE UP (ref 7–14)
ANTI-RIBONUCLEAR PROTEIN: <0.2 AI — SIGNIFICANT CHANGE UP
APTT BLD: 29.4 SEC — SIGNIFICANT CHANGE UP (ref 27–36.3)
AST SERPL-CCNC: 60 U/L — HIGH (ref 4–32)
B19V IGG SER-ACNC: 9.87 INDEX — HIGH (ref 0–0.9)
B19V IGG+IGM SER-IMP: POSITIVE
B19V IGG+IGM SER-IMP: SIGNIFICANT CHANGE UP
B19V IGM FLD-ACNC: 0.19 INDEX — SIGNIFICANT CHANGE UP (ref 0–0.9)
B19V IGM SER-ACNC: NEGATIVE — SIGNIFICANT CHANGE UP
BILIRUB SERPL-MCNC: 0.5 MG/DL — SIGNIFICANT CHANGE UP (ref 0.2–1.2)
BUN SERPL-MCNC: 10 MG/DL — SIGNIFICANT CHANGE UP (ref 7–23)
CALCIUM SERPL-MCNC: 8.7 MG/DL — SIGNIFICANT CHANGE UP (ref 8.4–10.5)
CHLORIDE SERPL-SCNC: 96 MMOL/L — LOW (ref 98–107)
CMV DNA CSF QL NAA+PROBE: SIGNIFICANT CHANGE UP
CO2 SERPL-SCNC: 24 MMOL/L — SIGNIFICANT CHANGE UP (ref 22–31)
COVID-19 SPIKE DOMAIN AB INTERP: POSITIVE
COVID-19 SPIKE DOMAIN ANTIBODY RESULT: >250 U/ML — HIGH
CREAT SERPL-MCNC: 0.49 MG/DL — LOW (ref 0.5–1.3)
CULTURE RESULTS: SIGNIFICANT CHANGE UP
DSDNA AB FLD-ACNC: <0.2 AI — SIGNIFICANT CHANGE UP
ENA JO1 AB SER-ACNC: <0.2 AI — SIGNIFICANT CHANGE UP
ENA SM AB FLD QL: <0.2 AI — SIGNIFICANT CHANGE UP
ENA SS-A AB FLD IA-ACNC: <0.2 AI — SIGNIFICANT CHANGE UP
FIBRINOGEN PPP-MCNC: 830 MG/DL — HIGH (ref 290–520)
GLUCOSE BLDC GLUCOMTR-MCNC: 255 MG/DL — HIGH (ref 70–99)
GLUCOSE BLDC GLUCOMTR-MCNC: 268 MG/DL — HIGH (ref 70–99)
GLUCOSE BLDC GLUCOMTR-MCNC: 305 MG/DL — HIGH (ref 70–99)
GLUCOSE BLDC GLUCOMTR-MCNC: 311 MG/DL — HIGH (ref 70–99)
GLUCOSE BLDC GLUCOMTR-MCNC: 390 MG/DL — HIGH (ref 70–99)
GLUCOSE SERPL-MCNC: 266 MG/DL — HIGH (ref 70–99)
HAV IGM SER-ACNC: SIGNIFICANT CHANGE UP
HBV CORE IGM SER-ACNC: SIGNIFICANT CHANGE UP
HBV SURFACE AG SER-ACNC: SIGNIFICANT CHANGE UP
HCT VFR BLD CALC: 32 % — LOW (ref 34.5–45)
HCV AB S/CO SERPL IA: 0.29 S/CO — SIGNIFICANT CHANGE UP (ref 0–0.99)
HCV AB SERPL-IMP: SIGNIFICANT CHANGE UP
HGB BLD-MCNC: 10.5 G/DL — LOW (ref 11.5–15.5)
INR BLD: 1.22 RATIO — HIGH (ref 0.88–1.16)
MCHC RBC-ENTMCNC: 27.1 PG — SIGNIFICANT CHANGE UP (ref 27–34)
MCHC RBC-ENTMCNC: 32.8 GM/DL — SIGNIFICANT CHANGE UP (ref 32–36)
MCV RBC AUTO: 82.7 FL — SIGNIFICANT CHANGE UP (ref 80–100)
MYOGLOBIN SERPL-MCNC: <21 NG/ML — LOW (ref 25–58)
NRBC # BLD: 0 /100 WBCS — SIGNIFICANT CHANGE UP
NRBC # FLD: 0 K/UL — SIGNIFICANT CHANGE UP
PLATELET # BLD AUTO: 854 K/UL — HIGH (ref 150–400)
POTASSIUM SERPL-MCNC: 4.1 MMOL/L — SIGNIFICANT CHANGE UP (ref 3.5–5.3)
POTASSIUM SERPL-SCNC: 4.1 MMOL/L — SIGNIFICANT CHANGE UP (ref 3.5–5.3)
PROT SERPL-MCNC: 6.7 G/DL — SIGNIFICANT CHANGE UP (ref 6–8.3)
PROTHROM AB SERPL-ACNC: 13.8 SEC — HIGH (ref 10.6–13.6)
RBC # BLD: 3.87 M/UL — SIGNIFICANT CHANGE UP (ref 3.8–5.2)
RBC # FLD: 15.1 % — HIGH (ref 10.3–14.5)
SARS-COV-2 IGG+IGM SERPL QL IA: >250 U/ML — HIGH
SARS-COV-2 IGG+IGM SERPL QL IA: POSITIVE
SODIUM SERPL-SCNC: 132 MMOL/L — LOW (ref 135–145)
SPECIMEN SOURCE: SIGNIFICANT CHANGE UP
WBC # BLD: 22.51 K/UL — HIGH (ref 3.8–10.5)
WBC # FLD AUTO: 22.51 K/UL — HIGH (ref 3.8–10.5)

## 2021-03-18 PROCEDURE — 99233 SBSQ HOSP IP/OBS HIGH 50: CPT | Mod: GC

## 2021-03-18 RX ORDER — INSULIN LISPRO 100/ML
VIAL (ML) SUBCUTANEOUS
Refills: 0 | Status: DISCONTINUED | OUTPATIENT
Start: 2021-03-18 | End: 2021-03-25

## 2021-03-18 RX ORDER — SODIUM CHLORIDE 9 MG/ML
1000 INJECTION, SOLUTION INTRAVENOUS
Refills: 0 | Status: DISCONTINUED | OUTPATIENT
Start: 2021-03-18 | End: 2021-03-18

## 2021-03-18 RX ORDER — INSULIN LISPRO 100/ML
10 VIAL (ML) SUBCUTANEOUS
Refills: 0 | Status: DISCONTINUED | OUTPATIENT
Start: 2021-03-18 | End: 2021-03-18

## 2021-03-18 RX ORDER — INSULIN LISPRO 100/ML
15 VIAL (ML) SUBCUTANEOUS
Refills: 0 | Status: DISCONTINUED | OUTPATIENT
Start: 2021-03-18 | End: 2021-03-19

## 2021-03-18 RX ORDER — INSULIN GLARGINE 100 [IU]/ML
40 INJECTION, SOLUTION SUBCUTANEOUS EVERY MORNING
Refills: 0 | Status: DISCONTINUED | OUTPATIENT
Start: 2021-03-19 | End: 2021-03-19

## 2021-03-18 RX ORDER — PANTOPRAZOLE SODIUM 20 MG/1
40 TABLET, DELAYED RELEASE ORAL
Refills: 0 | Status: DISCONTINUED | OUTPATIENT
Start: 2021-03-18 | End: 2021-03-25

## 2021-03-18 RX ORDER — INSULIN LISPRO 100/ML
VIAL (ML) SUBCUTANEOUS AT BEDTIME
Refills: 0 | Status: DISCONTINUED | OUTPATIENT
Start: 2021-03-18 | End: 2021-03-25

## 2021-03-18 RX ADMIN — LATANOPROST 1 DROP(S): 0.05 SOLUTION/ DROPS OPHTHALMIC; TOPICAL at 22:08

## 2021-03-18 RX ADMIN — INSULIN GLARGINE 30 UNIT(S): 100 INJECTION, SOLUTION SUBCUTANEOUS at 07:13

## 2021-03-18 RX ADMIN — Medication 50 MILLIGRAM(S): at 05:13

## 2021-03-18 RX ADMIN — Medication 1 APPLICATION(S): at 18:35

## 2021-03-18 RX ADMIN — KETOTIFEN FUMARATE 1 DROP(S): 0.34 SOLUTION OPHTHALMIC at 05:13

## 2021-03-18 RX ADMIN — SODIUM CHLORIDE 1 GRAM(S): 9 INJECTION INTRAMUSCULAR; INTRAVENOUS; SUBCUTANEOUS at 05:13

## 2021-03-18 RX ADMIN — Medication 10 UNIT(S): at 13:35

## 2021-03-18 RX ADMIN — Medication 1 APPLICATION(S): at 13:42

## 2021-03-18 RX ADMIN — Medication 650 MILLIGRAM(S): at 22:11

## 2021-03-18 RX ADMIN — Medication 500000 UNIT(S): at 05:13

## 2021-03-18 RX ADMIN — Medication 1 APPLICATION(S): at 05:13

## 2021-03-18 RX ADMIN — Medication 3: at 09:12

## 2021-03-18 RX ADMIN — ENOXAPARIN SODIUM 40 MILLIGRAM(S): 100 INJECTION SUBCUTANEOUS at 13:41

## 2021-03-18 RX ADMIN — Medication 8: at 18:34

## 2021-03-18 RX ADMIN — Medication 8 UNIT(S): at 09:12

## 2021-03-18 RX ADMIN — Medication 5: at 13:33

## 2021-03-18 RX ADMIN — ATORVASTATIN CALCIUM 40 MILLIGRAM(S): 80 TABLET, FILM COATED ORAL at 22:07

## 2021-03-18 RX ADMIN — Medication 25 MILLIGRAM(S): at 02:50

## 2021-03-18 RX ADMIN — Medication 25 MILLIGRAM(S): at 22:11

## 2021-03-18 RX ADMIN — Medication 2000 UNIT(S): at 13:36

## 2021-03-18 RX ADMIN — Medication 4: at 22:44

## 2021-03-18 RX ADMIN — Medication 500000 UNIT(S): at 18:35

## 2021-03-18 RX ADMIN — Medication 15 UNIT(S): at 18:34

## 2021-03-18 RX ADMIN — Medication 300 MILLIGRAM(S): at 05:13

## 2021-03-18 RX ADMIN — Medication 500000 UNIT(S): at 13:36

## 2021-03-18 RX ADMIN — SODIUM CHLORIDE 1 GRAM(S): 9 INJECTION INTRAMUSCULAR; INTRAVENOUS; SUBCUTANEOUS at 18:35

## 2021-03-18 RX ADMIN — KETOTIFEN FUMARATE 1 DROP(S): 0.34 SOLUTION OPHTHALMIC at 18:34

## 2021-03-18 NOTE — PROGRESS NOTE ADULT - PROBLEM SELECTOR PLAN 8
Blood glucose 213, 285  - f/u A1c  - On basaglar 17 U BID at home   - C/w basaglar 20 U in the AM and 5 U AC  - Monitor fingersticks  - Diabetic diet Blood glucose 213, 285  - f/u A1c  - On basaglar 17 U BID at home   - C/w basaglar 20 U in the AM and 5 U AC --> inc premeals to 10U  - Monitor fingersticks as started steroids  - Diabetic diet

## 2021-03-18 NOTE — PROGRESS NOTE ADULT - PROBLEM SELECTOR PLAN 4
- Na 129, Cl 92, possibly in the setting inflammation and SIADH vs. low salute intake  - F/u urine lytes, serum osmolality  - TSH wnl, cortisol - pending  - S/p 1L NS in the ED - improved to 132  - Monitor BMP qd

## 2021-03-18 NOTE — PROGRESS NOTE ADULT - ASSESSMENT
66 y/o Japanese speaking Female with PMHx of DM (on insulin), HTN, HLD, COVID (2020) and glaucoma presenting with generalized weakness, fever, polyarthritis and rash, admitted for further workup.

## 2021-03-18 NOTE — PROGRESS NOTE ADULT - PROBLEM SELECTOR PLAN 3
Suspect anemia of chronic disease mixed with iron def.  Hb 10.6 MCV 82, unknown baseline  - iron studies showing concomitant iron def anemia with inflammation, B12, folate, TSH - all wnl  - LDH elevated but hapto elevated and uric acid low.   - Maintain active T&S

## 2021-03-18 NOTE — PROGRESS NOTE ADULT - ATTENDING COMMENTS
Patient seeen and examined, care d/w HS4 team.    In summary 66 yo born in [place], obese (BMI 33), DM2, glaucoma, HTN, HLD, COVID (3/2020) who presents with joint pains and rash.  Reports started having a rash 2/7/20 started on face was itchy then progressed down body.  Reports 8 days ago developed migrating joint pains involving the shoulders, elbows, knees and fingers.  Reports moves from joint to joint.  No fevers noted at home.  No weight loss.  Reports went to OSH and is not sure if seen by derm or rheum.  Was started on doxycycline for lyme (unclear what dx was based on).      # r/o dermatomyositis, rash/arthralgias:  appears inflamed with elevated ESR/CRP and ferritin concern for rheumatologic d/o; mild anemia, mild proteinuria (but in DM2 pt).  HIV neg.  Reportedly neg TB at OSH.  LFTs mildly elevated CK normal. TSH wnl.  ? cutaneous dermatomyositis, normal CK which is not uncommon.  Lyme neg here.   - rheum consult appreciated   - derm consult appreciated, s/p bx on 3/17  - started on steroids 50 mg on 3/17  - CK and myoglobin wnl  - f/u rest of serologies (VIPIN, ds DNA, anti-Mallory etc)  - CT CAP neg for malignancy, uptodate on mammo, will need OP c-scope    # Anemia: normocytic, mixed TREY and AOCD, no hemolysis (elevated LDH, normal hapto)  - trend, tx underlyiing issue     # DM2 poorly controlled, HbA1c 10.6%, now exacerbated by steroids  - increase to 40 units and 15 units TIC ac, moderate dose AUTUMN    PT rec EFFIE, f/u progress

## 2021-03-18 NOTE — PROGRESS NOTE ADULT - PROBLEM SELECTOR PLAN 2
b/l lateral upper arms and upper chest  -Atrax and triamcinolone cream prn   -Dermatology consulted: plan for biopsy.

## 2021-03-18 NOTE — PROGRESS NOTE ADULT - PROBLEM SELECTOR PLAN 1
- Pt presented with eneralized weakness, subjective? fevers, polyarthralgia/arthritis x1 week of large joints, along with rash x1 month, +dry mouth/eyes?; no autoimmune Hx or FHx.  - S/p OSH discharge 3/12 for Lyme disease started on Doxycyline 21-day regimen  - Was treated there with Levaquin and 3x doses of Vanco.  - Leukocytosis to 23.4K (pmn), anemia to 10.6 with MCV 82, platelets 751, ESR 91, , elevated AST/ALT 40/51, alk phos 238, CK low 19  - U/A 30 protein  - Likely inflammatory etiology. Will send workup including blue top platelets, anemia workup, hepatitis panel - neg, HIV - neg, syphilis - neg, EBV - past infection, CMV - neg, lyme panel-neg , blood cultures - NGTD, C3 slightly elevated, C4 - wnl.   PENDING VIPIN, blood smear, RF, CCP, Sjogren's antibodies,  , double stranded DNA,   - X-rays L shoulder and R elbow - wnl  -CT chest abd pelvis - wnl, no signs of maliganancy  - Rheum consulted, appreciate recs: suspects DERMATOMYOSITIS, started on prednisone 50mg qd yesterday with some response.   - Derm consulted, appreciate recs: plan for punch biopsy  - Pain control with Tylenol for mild pain, Toradol for moderate-severe pain  - Obtain outpatient medical records from recent hospitalization (was at [facility]) - papers sent.

## 2021-03-18 NOTE — PROGRESS NOTE ADULT - PROBLEM SELECTOR PLAN 6
- Platelets 751K, f/u blue top platelets  - Likely in the setting of inflammatory process with workup above   - Monitor CBC, maintain active T&S

## 2021-03-18 NOTE — PROGRESS NOTE ADULT - SUBJECTIVE AND OBJECTIVE BOX
Humble Wayne, PGY-1  Pager: [phone] / [phone]    CHIEF COMPLAINT: Patient is a 65y old  Female who presents with a chief complaint of joint pain (17 Mar 2021 18:12)    INTERVAL HPI/OVERNIGHT EVENTS: MARY, pt reports still being itchy, atarax helps but does not last long. Joint pain improved in elbows and L shoulder but increased in L wrist. Denies any CP, SOB, N/V, abd pain.     MEDICATIONS (STANDING):  atorvastatin 40 milliGRAM(s) Oral at bedtime  cholecalciferol 2000 Unit(s) Oral daily  dextrose 40% Gel 15 Gram(s) Oral once  dextrose 50% Injectable 25 Gram(s) IV Push once  dextrose 50% Injectable 12.5 Gram(s) IV Push once  dextrose 50% Injectable 25 Gram(s) IV Push once  diltiazem    milliGRAM(s) Oral daily  enoxaparin Injectable 40 milliGRAM(s) SubCutaneous daily  insulin glargine Injectable (LANTUS) 30 Unit(s) SubCutaneous every morning  insulin lispro (ADMELOG) corrective regimen sliding scale   SubCutaneous three times a day before meals  insulin lispro (ADMELOG) corrective regimen sliding scale   SubCutaneous at bedtime  insulin lispro Injectable (ADMELOG) 8 Unit(s) SubCutaneous three times a day before meals  ketotifen 0.025% Ophthalmic Solution 1 Drop(s) Both EYES two times a day  latanoprost 0.005% Ophthalmic Solution 1 Drop(s) Both EYES at bedtime  nystatin    Suspension 686160 Unit(s) Swish and Swallow every 6 hours  petrolatum white Ointment 1 Application(s) Topical daily  predniSONE   Tablet 50 milliGRAM(s) Oral daily  sodium chloride 1 Gram(s) Oral two times a day  sodium chloride 0.9%. 1000 milliLiter(s) IV Continuous <Continuous>  triamcinolone 0.1% Ointment 1 Application(s) Topical two times a day    MEDICATIONS  (PRN):  acetaminophen   Tablet .. 650 milliGRAM(s) Oral every 6 hours PRN  hydrOXYzine hydrochloride 25 milliGRAM(s) Oral every 6 hours PRN  ketorolac   Injectable 15 milliGRAM(s) IV Push every 6 hours PRN    REVIEW OF SYSTEMS:  CONSTITUTIONAL: No fever, weight loss, or fatigue  EYES: No eye pain, visual disturbances, or discharge  ENMT:  No difficulty hearing, tinnitus, vertigo; No sinus or throat pain  NECK: No pain or stiffness  RESPIRATORY: No cough, wheezing, chills or hemoptysis; No shortness of breath  CARDIOVASCULAR: No chest pain, palpitations, dizziness, or leg swelling  GASTROINTESTINAL: No abdominal or epigastric pain. No nausea, vomiting, or hematemesis; No diarrhea or constipation. No melena or hematochezia.  GENITOURINARY: No dysuria, frequency, hematuria, or incontinence  NEUROLOGICAL: No headaches, memory loss, loss of strength, numbness, or tremors  SKIN: ++ itching, ++ rashes  MUSCULOSKELETAL: ++joint pain or swelling; No muscle, back, or extremity pain    VITAL SIGNS:  T(F): 97.9 (21 @ 05:12), Max: 98.3 (21 @ 15:07)  HR: 78 (21 @ 05:12) (78 - 106)  BP: 136/63 (21 @ 05:12) (128/60 - 148/71)  RR: 17 (21 @ 05:12) (17 - 19)  SpO2: 100% (21 @ 05:12) (97% - 100%)    CAPILLARY BLOOD GLUCOSE  POCT Blood Glucose.: 268 mg/dL (18 Mar 2021 08:51)  POCT Blood Glucose.: 255 mg/dL (18 Mar 2021 07:04)  POCT Blood Glucose.: 268 mg/dL (17 Mar 2021 21:43)  POCT Blood Glucose.: 237 mg/dL (17 Mar 2021 18:39)  POCT Blood Glucose.: 238 mg/dL (17 Mar 2021 17:34)  POCT Blood Glucose.: 188 mg/dL (17 Mar 2021 13:51)    I&O's Summary  17 Mar 2021 07:01  -  18 Mar 2021 07:00  --------------------------------------------------------  IN: 1240 mL / OUT: 900 mL / NET: 340 mL    GENERAL: NAD, well-groomed, well-developed  HEAD:  Atraumatic, Normocephalic  EYES: EOMI, PERRLA, conjunctiva and sclera clear  ENMT: No tonsillar erythema, exudates, or enlargement; Moist mucous membranes  NECK: Supple, No JVD, Normal thyroid, no LAD  NERVOUS SYSTEM:  Alert & Oriented X3, Good concentration; Motor Strength 5/5 B/L upper and lower extremities  CHEST/LUNG: Clear to auscultation bilaterally on anterior auscultation; No rales, rhonchi, wheezing, or rubs  HEART: Regular rate and rhythm; No murmurs, rubs, or gallops  ABDOMEN: Soft, Nontender, Nondistended; Bowel sounds present, no HSM  EXTREMITIES:  2+ Peripheral Pulses, No clubbing, cyanosis, or edema,,  B/L elbow joints no TTP, L shoulder with reduced ROM d/t pain - improved, minimal pain in L wrist. Rest of joints wnl.   LYMPH: No lymphadenopathy noted  SKIN: confluent erythematous rash on chest, blanches. Similar rash on both arms around elbows with scratch marks and dry skin. No inguinal rash.     LABS:                        10.5   22.51 )-----------( 854      ( 18 Mar 2021 08:17 )             32.0     03-18    132<L>  |  96<L>  |  10  ----------------------------<  266<H>  4.1   |  24  |  0.49<L>    Ca    8.7      18 Mar 2021 08:17  Phos  3.2     03-17  Mg     2.2     03-17    TPro  6.7  /  Alb  2.9<L>  /  TBili  0.5  /  DBili  x   /  AST  60<H>  /  ALT  60<H>  /  AlkPhos  248<H>  03-18    PT/INR - ( 18 Mar 2021 08:17 )   PT: 13.8 sec;   INR: 1.22 ratio    PTT - ( 18 Mar 2021 08:17 )  PTT:29.4 sec    Urinalysis Basic - ( 17 Mar 2021 03:32 )  Color: Yellow / Appearance: Clear / S.022 / pH: x  Gluc: x / Ketone: Small  / Bili: Negative / Urobili: 3 mg/dL   Blood: x / Protein: 30 mg/dL / Nitrite: Negative   Leuk Esterase: Small / RBC: 5 /HPF / WBC 25-50 /HPF   Sq Epi: x / Non Sq Epi: few /HPF / Bacteria: Few    Katharine-Barr Virus Serologic Test (21 @ 13:12)    EBNA IgG EIA: 45.3 U/mL    EBV EA Ab EIA: <5.0 U/mL    EBV VCA IgG EIA: >750.0 U/mL    EBV VCA IgM EIA: 30.9 U/mL    Katharine Barr Virus IgM Antibody: Negative: Katharine-Barr Virus VCA IgM Antibody  Method: Liaison Chemiluminescent Immunoassay  Reference Range: (Expressed in U/mL)       Negative     < 36.0 U/mL       Equivocal    36.0 - 43.9 U/mL       Positive       >= 44.0 U/mL    Katharine-Barr Virus Capsid Antigen IgG: Positive: Katharine-Barr Virus VCA IgG Antibody  Method: Liaison Chemiluminescent Immunoassay  Reference Range: (Expressed in U/mL)       Negative        < 18.0 U/mL       Equivocal      18.0 - 21.9 U/mL       Positive         >= 22.0 U/mL    Katharine-Barr Virus Early Antigen: Negative: Katharine-Barr Virus EA IgG Antibody  Method: Liaison Chemiluminescent Immunoassay  Reference Range: (Expressed in U/mL)       Negative        < 9.0 U/mL       Equivocal       9.0 - 10.9 U/mL       Positive          >= 11.0 U/mL    Katharine-Barr Nuclear Antigen: Positive: Katharine-Barr Virus NA IgG Antibody  Method: Liaison Chemiluminescent Immunoassay  Reference Range: (Expressed in U/mL)       Negative        < 18.0 U/mL       Equivocal      18.0 - 21.9 U/mL       Positive         >= 22.0 U/mL    EBV Interpretation: See Note: INTERPRETATION OF KATHARINE BARR VIRUS (EBV) ANTIBODY RESULTS  EBV VCA IGG AB EBV NA IGG AB EBV VCA IGM AB EBV EA IGG AB Diagnosis  NEG NEG NEG NEG EBV Sero-negative  NEG NEG POS NEG Suspected primary infection (Early Phase)  POS NEG POS POS/NEG Past EBV infection ( Convalescence)  POS POS NEG POS/NEG Past EBV infection  POS POS POS/NEG POS Reactivated Infection    Cytomegalovirus IgM Antibody, Serum (21 @ 13:12)    CMV IgM Antibody: <8.0 AU/mL    CMV IgM Interpretation: Negative: Method: Liaison Chemiluminescent Immunoassay  Reference ranges: (values expressed as AU/mL)              Negative     < 30.0 AU/mL              Equivocal     30.0 - 34.9 AU/mL              Positive      > 35.0 AU/mL    HIV-1/2 Antigen/Antibody Screen by [facility] (21 @ 07:23)    HIV-1/2 Combo Result: Nonreact:     Syphilis Screen (21 @ 13:12)    Treponema Pallidum Antibody Interpretation: Negative:       Borrelia burgdorferi IgG/IgM Antibodies (21 @ 13:12)    LYME IgG/IgM Antibodies Result: 0.13 Index    Lyme C6 Interpretation: Negative: METHOD: Liaison Chemiluminescent Immunoassay      Reference Range: (values expressed as Lyme Index )                                < 0.90        Negative                                0.90 - 1.09   Equivocal                                >= 1.10     Positive  CDC/ASTPHLD Guidelines recommend that all samples judged equivocal or  positive be re-tested by immunoblot for confirmation of results.    Parvovirus IgG/IgM Antibodies (21 @ 13:12)    Parvovirus IgG Antibody: 9.87 Index    Parvovirus IgM Antibody: 0.19 Index    Parvovirus IgM Interpretation: Negative    Parvovirus Interpretation: Implies past exposure. Minimal  risk of B19V infection.    Culture - Blood (21 @ 02:49)    Specimen Source: .Blood Blood    Culture Results:   No growth to date.    Culture - Blood (21 @ 02:49)    Specimen Source: .Blood Blood    Culture Results:   No growth to date.    C3 Complement, Serum (21 @ 07:43)    C3 Complement, Serum: 182 mg/dL    C4 Complement, Serum (21 @ 07:43)    C4 Complement, Serum: 32 mg/dL    < from: CT Chest w/ IV Cont (21 @ 22:21) >    INTERPRETATION:  CLINICAL INFORMATION: Dermatomyositis. Evaluate for internal malignancy  COMPARISON: None.    CONTRAST/COMPLICATIONS:  IV Contrast: Omnipaque 350 / 90 cc administered / 10 cc discarded.  Oral Contrast: NONE  Complications: None reported at time of study completion    PROCEDURE:  CT of the Chest, Abdomen and Pelvis was performed.  Sagittal and coronal reformats were performed.    FINDINGS:  CHEST:  LUNGS AND LARGE AIRWAYS: Patent central airways. Mild mosaic attenuation of the lungs, likely secondary to air trapping on this expiratory phase. No pulmonary nodules.  PLEURA: No pleural effusion.  VESSELS: Atherosclerotic changes of the aorta.  HEART: Heart size is normal. No pericardial effusion.  MEDIASTINUM AND CATRACHITO: No lymphadenopathy.  CHEST WALL AND LOWER NECK: Within normal limits.    ABDOMEN AND PELVIS:  LIVER: Hypodense region near the falciform ligament likely represents focal fat..  BILE DUCTS: Normal caliber.  GALLBLADDER: Within normal limits.  SPLEEN: Within normal limits.  PANCREAS: Within normal limits.  ADRENALS: Within normal limits.  KIDNEYS/URETERS: Within normal limits.    BLADDER: Within normal limits.  REPRODUCTIVE ORGANS: Calcified uterine fibroid. No adnexal masses.    BOWEL: No bowel obstruction. Appendix is normal.  PERITONEUM: No ascites.  VESSELS: Within normal limits.  RETROPERITONEUM/LYMPH NODES: No lymphadenopathy.  ABDOMINAL WALL:Diastasis of the rectus abdominis.  BONES: Degenerative changes.    IMPRESSION:  No evidence of malignancy in the chest, abdomen, or pelvis.    < end of copied text >

## 2021-03-19 LAB
ALBUMIN SERPL ELPH-MCNC: 2.8 G/DL — LOW (ref 3.3–5)
ALDOLASE SERPL-CCNC: 10.8 U/L — HIGH (ref 3.3–10.3)
ALP SERPL-CCNC: 217 U/L — HIGH (ref 40–120)
ALT FLD-CCNC: 82 U/L — HIGH (ref 4–33)
ANION GAP SERPL CALC-SCNC: 12 MMOL/L — SIGNIFICANT CHANGE UP (ref 7–14)
AST SERPL-CCNC: 67 U/L — HIGH (ref 4–32)
BASOPHILS # BLD AUTO: 0.07 K/UL — SIGNIFICANT CHANGE UP (ref 0–0.2)
BASOPHILS NFR BLD AUTO: 0.3 % — SIGNIFICANT CHANGE UP (ref 0–2)
BILIRUB SERPL-MCNC: 0.4 MG/DL — SIGNIFICANT CHANGE UP (ref 0.2–1.2)
BUN SERPL-MCNC: 13 MG/DL — SIGNIFICANT CHANGE UP (ref 7–23)
CALCIUM SERPL-MCNC: 8.7 MG/DL — SIGNIFICANT CHANGE UP (ref 8.4–10.5)
CCP IGG SERPL-ACNC: <8 UNITS — SIGNIFICANT CHANGE UP
CHLORIDE SERPL-SCNC: 97 MMOL/L — LOW (ref 98–107)
CO2 SERPL-SCNC: 24 MMOL/L — SIGNIFICANT CHANGE UP (ref 22–31)
CREAT SERPL-MCNC: 0.55 MG/DL — SIGNIFICANT CHANGE UP (ref 0.5–1.3)
DSDNA AB SER-ACNC: 16 IU/ML — SIGNIFICANT CHANGE UP
EOSINOPHIL # BLD AUTO: 0.73 K/UL — HIGH (ref 0–0.5)
EOSINOPHIL NFR BLD AUTO: 2.8 % — SIGNIFICANT CHANGE UP (ref 0–6)
FSP PPP-MCNC: >=5 <20 UG/ML
GGT SERPL-CCNC: 146 U/L — HIGH (ref 5–36)
GLUCOSE BLDC GLUCOMTR-MCNC: 263 MG/DL — HIGH (ref 70–99)
GLUCOSE BLDC GLUCOMTR-MCNC: 290 MG/DL — HIGH (ref 70–99)
GLUCOSE BLDC GLUCOMTR-MCNC: 298 MG/DL — HIGH (ref 70–99)
GLUCOSE BLDC GLUCOMTR-MCNC: 359 MG/DL — HIGH (ref 70–99)
GLUCOSE BLDC GLUCOMTR-MCNC: 403 MG/DL — HIGH (ref 70–99)
GLUCOSE SERPL-MCNC: 299 MG/DL — HIGH (ref 70–99)
HCT VFR BLD CALC: 31.1 % — LOW (ref 34.5–45)
HGB BLD-MCNC: 10.3 G/DL — LOW (ref 11.5–15.5)
IANC: SIGNIFICANT CHANGE UP K/UL (ref 1.5–8.5)
IGA FLD-MCNC: 337 MG/DL — SIGNIFICANT CHANGE UP (ref 84–499)
IGG FLD-MCNC: 1312 MG/DL — SIGNIFICANT CHANGE UP (ref 610–1660)
IGM SERPL-MCNC: 52 MG/DL — SIGNIFICANT CHANGE UP (ref 35–242)
IMM GRANULOCYTES NFR BLD AUTO: 1.1 % — SIGNIFICANT CHANGE UP (ref 0–1.5)
KAPPA LC SER QL IFE: 3.61 MG/DL — HIGH (ref 0.33–1.94)
KAPPA/LAMBDA FREE LIGHT CHAIN RATIO, SERUM: 0.91 RATIO — SIGNIFICANT CHANGE UP (ref 0.26–1.65)
LAMBDA LC SER QL IFE: 3.97 MG/DL — HIGH (ref 0.57–2.63)
LYMPHOCYTES # BLD AUTO: 1.6 K/UL — SIGNIFICANT CHANGE UP (ref 1–3.3)
LYMPHOCYTES # BLD AUTO: 6.1 % — LOW (ref 13–44)
MAGNESIUM SERPL-MCNC: 2.2 MG/DL — SIGNIFICANT CHANGE UP (ref 1.6–2.6)
MCHC RBC-ENTMCNC: 27.4 PG — SIGNIFICANT CHANGE UP (ref 27–34)
MCHC RBC-ENTMCNC: 33.1 GM/DL — SIGNIFICANT CHANGE UP (ref 32–36)
MCV RBC AUTO: 82.7 FL — SIGNIFICANT CHANGE UP (ref 80–100)
MONOCYTES # BLD AUTO: 0.75 K/UL — SIGNIFICANT CHANGE UP (ref 0–0.9)
MONOCYTES NFR BLD AUTO: 2.8 % — SIGNIFICANT CHANGE UP (ref 2–14)
NEUTROPHILS # BLD AUTO: 22.99 K/UL — HIGH (ref 1.8–7.4)
NEUTROPHILS NFR BLD AUTO: 86.9 % — HIGH (ref 43–77)
NRBC # BLD: 0 /100 WBCS — SIGNIFICANT CHANGE UP
NRBC # FLD: 0 K/UL — SIGNIFICANT CHANGE UP
PHOSPHATE SERPL-MCNC: 3.4 MG/DL — SIGNIFICANT CHANGE UP (ref 2.5–4.5)
PLAT MORPH BLD: SIGNIFICANT CHANGE UP
PLATELET # BLD AUTO: 760 K/UL — HIGH (ref 150–400)
POTASSIUM SERPL-MCNC: 4 MMOL/L — SIGNIFICANT CHANGE UP (ref 3.5–5.3)
POTASSIUM SERPL-SCNC: 4 MMOL/L — SIGNIFICANT CHANGE UP (ref 3.5–5.3)
PROT SERPL-MCNC: 6.3 G/DL — SIGNIFICANT CHANGE UP (ref 6–8.3)
RBC # BLD: 3.76 M/UL — LOW (ref 3.8–5.2)
RBC # FLD: 15 % — HIGH (ref 10.3–14.5)
RBC BLD AUTO: SIGNIFICANT CHANGE UP
RF+CCP IGG SER-IMP: NEGATIVE — SIGNIFICANT CHANGE UP
SODIUM SERPL-SCNC: 133 MMOL/L — LOW (ref 135–145)
WBC # BLD: 26.43 K/UL — HIGH (ref 3.8–10.5)
WBC # FLD AUTO: 26.43 K/UL — HIGH (ref 3.8–10.5)

## 2021-03-19 PROCEDURE — 99232 SBSQ HOSP IP/OBS MODERATE 35: CPT | Mod: GC

## 2021-03-19 PROCEDURE — 88189 FLOWCYTOMETRY/READ 16 & >: CPT

## 2021-03-19 PROCEDURE — 99233 SBSQ HOSP IP/OBS HIGH 50: CPT | Mod: GC

## 2021-03-19 RX ORDER — GABAPENTIN 400 MG/1
100 CAPSULE ORAL
Refills: 0 | Status: DISCONTINUED | OUTPATIENT
Start: 2021-03-19 | End: 2021-03-23

## 2021-03-19 RX ORDER — INSULIN LISPRO 100/ML
30 VIAL (ML) SUBCUTANEOUS
Refills: 0 | Status: DISCONTINUED | OUTPATIENT
Start: 2021-03-19 | End: 2021-03-20

## 2021-03-19 RX ORDER — INSULIN GLARGINE 100 [IU]/ML
45 INJECTION, SOLUTION SUBCUTANEOUS EVERY MORNING
Refills: 0 | Status: DISCONTINUED | OUTPATIENT
Start: 2021-03-19 | End: 2021-03-19

## 2021-03-19 RX ORDER — FLUOCINONIDE/EMOLLIENT BASE 0.05 %
1 CREAM (GRAM) TOPICAL
Refills: 0 | Status: DISCONTINUED | OUTPATIENT
Start: 2021-03-19 | End: 2021-03-25

## 2021-03-19 RX ORDER — INSULIN GLARGINE 100 [IU]/ML
60 INJECTION, SOLUTION SUBCUTANEOUS EVERY MORNING
Refills: 0 | Status: DISCONTINUED | OUTPATIENT
Start: 2021-03-19 | End: 2021-03-22

## 2021-03-19 RX ORDER — ATORVASTATIN CALCIUM 80 MG/1
20 TABLET, FILM COATED ORAL AT BEDTIME
Refills: 0 | Status: DISCONTINUED | OUTPATIENT
Start: 2021-03-19 | End: 2021-03-25

## 2021-03-19 RX ORDER — FLUOCINONIDE/EMOLLIENT BASE 0.05 %
1 CREAM (GRAM) TOPICAL
Refills: 0 | Status: DISCONTINUED | OUTPATIENT
Start: 2021-03-19 | End: 2021-03-19

## 2021-03-19 RX ORDER — INSULIN LISPRO 100/ML
20 VIAL (ML) SUBCUTANEOUS
Refills: 0 | Status: DISCONTINUED | OUTPATIENT
Start: 2021-03-19 | End: 2021-03-19

## 2021-03-19 RX ORDER — GABAPENTIN 400 MG/1
300 CAPSULE ORAL AT BEDTIME
Refills: 0 | Status: DISCONTINUED | OUTPATIENT
Start: 2021-03-19 | End: 2021-03-23

## 2021-03-19 RX ORDER — HYDROXYZINE HCL 10 MG
10 TABLET ORAL ONCE
Refills: 0 | Status: COMPLETED | OUTPATIENT
Start: 2021-03-19 | End: 2021-03-19

## 2021-03-19 RX ORDER — MEN-PHOR .5; .5 G/G; G/G
1 LOTION TOPICAL
Refills: 0 | Status: DISCONTINUED | OUTPATIENT
Start: 2021-03-19 | End: 2021-03-19

## 2021-03-19 RX ADMIN — SODIUM CHLORIDE 1 GRAM(S): 9 INJECTION INTRAMUSCULAR; INTRAVENOUS; SUBCUTANEOUS at 06:06

## 2021-03-19 RX ADMIN — Medication 20 UNIT(S): at 12:38

## 2021-03-19 RX ADMIN — Medication 1 TABLET(S): at 21:31

## 2021-03-19 RX ADMIN — Medication 500000 UNIT(S): at 23:29

## 2021-03-19 RX ADMIN — Medication 2: at 21:54

## 2021-03-19 RX ADMIN — Medication 6: at 09:03

## 2021-03-19 RX ADMIN — Medication 300 MILLIGRAM(S): at 06:06

## 2021-03-19 RX ADMIN — INSULIN GLARGINE 45 UNIT(S): 100 INJECTION, SOLUTION SUBCUTANEOUS at 09:13

## 2021-03-19 RX ADMIN — Medication 1 APPLICATION(S): at 17:35

## 2021-03-19 RX ADMIN — Medication 1 APPLICATION(S): at 21:27

## 2021-03-19 RX ADMIN — Medication 1 APPLICATION(S): at 15:00

## 2021-03-19 RX ADMIN — LATANOPROST 1 DROP(S): 0.05 SOLUTION/ DROPS OPHTHALMIC; TOPICAL at 21:23

## 2021-03-19 RX ADMIN — Medication 500000 UNIT(S): at 06:06

## 2021-03-19 RX ADMIN — ATORVASTATIN CALCIUM 20 MILLIGRAM(S): 80 TABLET, FILM COATED ORAL at 21:26

## 2021-03-19 RX ADMIN — Medication 6: at 17:34

## 2021-03-19 RX ADMIN — Medication 500000 UNIT(S): at 11:20

## 2021-03-19 RX ADMIN — KETOTIFEN FUMARATE 1 DROP(S): 0.34 SOLUTION OPHTHALMIC at 17:37

## 2021-03-19 RX ADMIN — Medication 500000 UNIT(S): at 17:36

## 2021-03-19 RX ADMIN — INSULIN GLARGINE 60 UNIT(S): 100 INJECTION, SOLUTION SUBCUTANEOUS at 21:55

## 2021-03-19 RX ADMIN — Medication 20 UNIT(S): at 09:06

## 2021-03-19 RX ADMIN — Medication 10 MILLIGRAM(S): at 02:05

## 2021-03-19 RX ADMIN — PANTOPRAZOLE SODIUM 40 MILLIGRAM(S): 20 TABLET, DELAYED RELEASE ORAL at 06:06

## 2021-03-19 RX ADMIN — ENOXAPARIN SODIUM 40 MILLIGRAM(S): 100 INJECTION SUBCUTANEOUS at 11:20

## 2021-03-19 RX ADMIN — Medication 30 UNIT(S): at 17:35

## 2021-03-19 RX ADMIN — KETOTIFEN FUMARATE 1 DROP(S): 0.34 SOLUTION OPHTHALMIC at 06:07

## 2021-03-19 RX ADMIN — Medication 10: at 12:37

## 2021-03-19 RX ADMIN — GABAPENTIN 100 MILLIGRAM(S): 400 CAPSULE ORAL at 13:31

## 2021-03-19 RX ADMIN — Medication 15 MILLIGRAM(S): at 09:15

## 2021-03-19 RX ADMIN — Medication 25 MILLIGRAM(S): at 09:14

## 2021-03-19 RX ADMIN — Medication 25 MILLIGRAM(S): at 20:17

## 2021-03-19 RX ADMIN — Medication 2000 UNIT(S): at 11:20

## 2021-03-19 RX ADMIN — Medication 1 APPLICATION(S): at 06:07

## 2021-03-19 RX ADMIN — GABAPENTIN 300 MILLIGRAM(S): 400 CAPSULE ORAL at 21:23

## 2021-03-19 RX ADMIN — Medication 50 MILLIGRAM(S): at 06:07

## 2021-03-19 NOTE — PROGRESS NOTE ADULT - PROBLEM SELECTOR PLAN 8
Blood glucose 213, 285  - f/u A1c  - On basaglar 17 U BID at home   - C/w basaglar 20 U in the AM and 5 U AC --> inc premeals to 10U  - Monitor fingersticks as started steroids  - Diabetic diet Blood glucose 213, 285  - f/u A1c  - On basaglar 17 U BID at home   - C/w basaglar, increase to 25u, inc premeals to 15u   - Monitor fingersticks as started steroids  - Diabetic diet - AST/ALT 40/51, likely in the setting of inflammation  - Trend CMP  - F/u hepatitis panel - AST/ALT 40/51, likely in the setting of inflammation  - hepatitis panel negative  - will lower Lipitor  - Trend CMP

## 2021-03-19 NOTE — PROGRESS NOTE ADULT - PROBLEM SELECTOR PLAN 6
- Platelets 751K, f/u blue top platelets  - Likely in the setting of inflammatory process with workup above   - Monitor CBC, maintain active T&S - WBC 23.4, differential: PMN predom, no bands  - Monitor CBC  - F/u blood cultures - NGTD

## 2021-03-19 NOTE — DIETITIAN INITIAL EVALUATION ADULT. - PERTINENT MEDS FT
MEDICATIONS  (STANDING):  atorvastatin 40 milliGRAM(s) Oral at bedtime  cholecalciferol 2000 Unit(s) Oral daily  dextrose 40% Gel 15 Gram(s) Oral once  dextrose 50% Injectable 25 Gram(s) IV Push once  dextrose 50% Injectable 12.5 Gram(s) IV Push once  dextrose 50% Injectable 25 Gram(s) IV Push once  diltiazem    milliGRAM(s) Oral daily  enoxaparin Injectable 40 milliGRAM(s) SubCutaneous daily  insulin glargine Injectable (LANTUS) 45 Unit(s) SubCutaneous every morning  insulin lispro (ADMELOG) corrective regimen sliding scale   SubCutaneous three times a day before meals  insulin lispro (ADMELOG) corrective regimen sliding scale   SubCutaneous at bedtime  insulin lispro Injectable (ADMELOG) 20 Unit(s) SubCutaneous three times a day before meals  ketotifen 0.025% Ophthalmic Solution 1 Drop(s) Both EYES two times a day  latanoprost 0.005% Ophthalmic Solution 1 Drop(s) Both EYES at bedtime  nystatin    Suspension 036080 Unit(s) Swish and Swallow every 6 hours  pantoprazole    Tablet 40 milliGRAM(s) Oral before breakfast  petrolatum white Ointment 1 Application(s) Topical daily  predniSONE   Tablet 50 milliGRAM(s) Oral daily  sodium chloride 1 Gram(s) Oral two times a day  triamcinolone 0.1% Ointment 1 Application(s) Topical two times a day

## 2021-03-19 NOTE — PROGRESS NOTE ADULT - PROBLEM SELECTOR PLAN 7
- AST/ALT 40/51, likely in the setting of inflammation  - Trend CMP  - F/u hepatitis panel - Platelets 751K, f/u blue top platelets  - Likely in the setting of inflammatory process with workup above   - Monitor CBC, maintain active T&S Resolved  - Na 129, Cl 92, possibly in the setting inflammation and SIADH vs. low salute intake  - F/u urine lytes, serum osmolality - suggesting SIADH  - TSH wnl, cortisol - pending  - S/p 1L NS in the ED - improving  - Monitor BMP qd  - d/c salt tabs

## 2021-03-19 NOTE — PROGRESS NOTE ADULT - PROBLEM SELECTOR PLAN 1
- Pt presented with generalized weakness, subjective? fevers, polyarthralgia/arthritis x1 week of large joints, along with rash x1 month, +dry mouth/eyes?; no autoimmune Hx or FHx.  - S/p OSH discharge 3/12 for Lyme disease started on Doxycyline 21-day regimen  - Was treated there with Levaquin and 3x doses of Vanco.  - Leukocytosis to 23.4K (pmn), anemia to 10.6 with MCV 82, platelets 751, ESR 91, , elevated AST/ALT 40/51, alk phos 238, CK low 19  - U/A 30 protein  - Likely inflammatory etiology. Will send workup including blue top platelets, anemia workup, hepatitis panel - neg, HIV - neg, syphilis - neg, EBV - past infection, CMV - neg, lyme panel-neg , blood cultures - NGTD, C3 slightly elevated, C4 - wnl. VIPIN-neg, dsDNA - neg, CCP -neg, anti SSA/B - neg, Mallory-1 - neg. Myoglobin - low, RF - neg, anti Smith - neg  PENDING blood smear  - X-rays L shoulder and R elbow - wnl  -CT chest abd pelvis - wnl, no signs of maliganancy  - Rheum consulted, appreciate recs: suspects DERMATOMYOSITIS, started on prednisone 50mg qd yesterday with some response.   - Derm consulted, appreciate recs: plan for punch biopsy  - Pain control with Tylenol for mild pain, Toradol for moderate-severe pain  - Obtain outpatient medical records from recent hospitalization (was at [facility]) - papers sent. - Pt presented with generalized weakness, subjective? fevers, polyarthralgia/arthritis x1 week of large joints, along with rash x1 month, +dry mouth/eyes?; no autoimmune Hx or FHx.  - S/p OSH discharge 3/12 for Lyme disease started on Doxycyline 21-day regimen  - Was treated there with Levaquin and 3x doses of Vanco.  - Leukocytosis to 23.4K (pmn), anemia to 10.6 with MCV 82, platelets 751, ESR 91, , elevated AST/ALT 40/51, alk phos 238, CK low 19  - U/A 30 protein  - Likely inflammatory etiology. Will send workup including blue top platelets, anemia workup, hepatitis panel - neg, HIV - neg, syphilis - neg, EBV - past infection, CMV - neg, lyme panel-neg , blood cultures - NGTD, C3 slightly elevated, C4 - wnl. VIPIN-neg, dsDNA - neg, CCP -neg, anti SSA/B - neg, Mallory-1 - neg. Myoglobin - low, RF - neg, anti Smith - neg  PENDING blood smear  - X-rays L shoulder and R elbow - wnl  -CT chest abd pelvis - wnl, no signs of malignancy  - Rheum consulted, appreciate recs: suspects DERMATOMYOSITIS, started on prednisone 50mg qd yesterday with some response.   -start protonix 40mg qd for PUD ppx  - Derm consulted, appreciate recs: f/u punch biopsy  - Pain control with Tylenol for mild pain, Toradol for moderate-severe pain  - Obtain outpatient medical records from recent hospitalization (was at [facility]) - papers sent. - Pt presented with generalized weakness, subjective? fevers, polyarthralgia/arthritis x1 week of large joints, along with rash x1 month, +dry mouth/eyes?; no autoimmune Hx or FHx.  - S/p OSH discharge 3/12 for Lyme disease started on Doxycyline 21-day regimen  - Was treated there with Levaquin and 3x doses of Vanco.  - Leukocytosis to 23.4K (pmn), anemia to 10.6 with MCV 82, platelets 751, ESR 91, , elevated AST/ALT 40/51, alk phos 238, CK low 19  - U/A 30 protein  - Likely inflammatory etiology. Will send workup including blue top platelets, anemia workup, hepatitis panel - neg, HIV - neg, syphilis - neg, EBV - past infection, CMV - neg, lyme panel-neg , blood cultures - NGTD, C3 slightly elevated, C4 - wnl. VIPIN-neg, dsDNA - neg, CCP -neg, anti SSA/B - neg, Mallory-1 - neg. Myoglobin - low, RF - neg, anti Smith - neg  PENDING blood smear  - X-rays L shoulder and R elbow - wnl  -CT chest abd pelvis - wnl, no signs of malignancy  - Rheum consulted, appreciate recs: suspects DERMATOMYOSITIS, started on prednisone 50mg qd yesterday with some response.   -start protonix 40mg qd for PUD ppx  -will add gabapentin for itchiness/pain  - Derm consulted, appreciate recs: f/u punch biopsy  - Pain control with Tylenol for mild pain, Toradol for moderate-severe pain  - Obtain outpatient medical records from recent hospitalization (was at [facility]) - papers sent.

## 2021-03-19 NOTE — DIETITIAN INITIAL EVALUATION ADULT. - PROBLEM SELECTOR PLAN 3
- Na 129, Cl 92, possibly in the setting inflammation and SIADH   - F/u urine lytes, serum osmolality  - F/u TSH, cortisol  - S/p 1L NS in the ED  - Monitor BMP q12

## 2021-03-19 NOTE — PROGRESS NOTE ADULT - PROBLEM SELECTOR PLAN 3
Suspect anemia of chronic disease mixed with iron def.  Hb 10.6 MCV 82, unknown baseline  - iron studies showing concomitant iron def anemia with inflammation, B12, folate, TSH - all wnl  - LDH elevated but hapto elevated and uric acid low.   - Maintain active T&S Blood glucose 213, 285  - f/u A1c  - On basaglar 17 U BID at home   - C/w basaglar, increase to 45u, inc premeals to 20u   - Monitor fingersticks as started steroids  - Diabetic diet

## 2021-03-19 NOTE — DIETITIAN INITIAL EVALUATION ADULT. - PROBLEM SELECTOR PLAN 6
Suspect anemia of chronic disease;   Hb 10.6 MCV 82, unknown baseline  - F/u workup - iron studies, B12, folate, TSH, smear  - Maintain active T&S

## 2021-03-19 NOTE — PROGRESS NOTE ADULT - SUBJECTIVE AND OBJECTIVE BOX
Humble Wayne, PGY-1  Pager: [phone] / [phone]    CHIEF COMPLAINT: Patient is a 65y old  Female who presents with a chief complaint of joint pain (19 Mar 2021 07:22)    [place]  [id number]   INTERVAL HPI/OVERNIGHT EVENTS: Pt was very itchy overnight, received x1 Atrax 10mg from [facility] in addition to her prn (but used only 2/4 in 24h). Her L knee is more painful, L shoulders improved, L wrist improved. Rash the same but more generalized itchiness. Denies any CP, sob, cough.     MEDICATIONS (STANDING):  atorvastatin 40 milliGRAM(s) Oral at bedtime  cholecalciferol 2000 Unit(s) Oral daily  dextrose 40% Gel 15 Gram(s) Oral once  dextrose 50% Injectable 25 Gram(s) IV Push once  dextrose 50% Injectable 12.5 Gram(s) IV Push once  dextrose 50% Injectable 25 Gram(s) IV Push once  diltiazem    milliGRAM(s) Oral daily  enoxaparin Injectable 40 milliGRAM(s) SubCutaneous daily  insulin glargine Injectable (LANTUS) 45 Unit(s) SubCutaneous every morning  insulin lispro (ADMELOG) corrective regimen sliding scale   SubCutaneous three times a day before meals  insulin lispro (ADMELOG) corrective regimen sliding scale   SubCutaneous at bedtime  insulin lispro Injectable (ADMELOG) 20 Unit(s) SubCutaneous three times a day before meals  ketotifen 0.025% Ophthalmic Solution 1 Drop(s) Both EYES two times a day  latanoprost 0.005% Ophthalmic Solution 1 Drop(s) Both EYES at bedtime  nystatin    Suspension [id number] Unit(s) Swish and Swallow every 6 hours  pantoprazole    Tablet 40 milliGRAM(s) Oral before breakfast  petrolatum white Ointment 1 Application(s) Topical daily  predniSONE   Tablet 50 milliGRAM(s) Oral daily  sodium chloride 1 Gram(s) Oral two times a day  triamcinolone 0.1% Ointment 1 Application(s) Topical two times a day    MEDICATIONS  (PRN):  acetaminophen   Tablet .. 650 milliGRAM(s) Oral every 6 hours PRN  hydrOXYzine hydrochloride 25 milliGRAM(s) Oral every 6 hours PRN  ketorolac   Injectable 15 milliGRAM(s) IV Push every 6 hours PRN    REVIEW OF SYSTEMS:  CONSTITUTIONAL: No fever, weight loss, or fatigue  EYES: No eye pain, visual disturbances, or discharge  ENMT:  No difficulty hearing, tinnitus, vertigo; No sinus or throat pain  NECK: No pain or stiffness  RESPIRATORY: No cough, wheezing, chills or hemoptysis; No shortness of breath  CARDIOVASCULAR: No chest pain, palpitations, dizziness, or leg swelling  GASTROINTESTINAL: No abdominal or epigastric pain. No nausea, vomiting, or hematemesis; No diarrhea or constipation. No melena or hematochezia.  GENITOURINARY: No dysuria, frequency, hematuria, or incontinence  NEUROLOGICAL: No headaches, memory loss, loss of strength, numbness, or tremors  SKIN: ++ itching, ++ rashes  MUSCULOSKELETAL: ++joint pain or swelling; No muscle, back, or extremity pain    VITAL SIGNS:  T(F): 97.9 (03-19-21 @ 05:59), Max: 98 (03-18-21 @ 22:02)  HR: 79 (03-19-21 @ 05:59) (79 - 94)  BP: 160/66 (03-19-21 @ 05:59) (138/60 - 160/66)  RR: 18 (03-19-21 @ 05:59) (18 - 19)  SpO2: 98% (03-19-21 @ 05:59) (98% - 100%)    CAPILLARY BLOOD GLUCOSE  POCT Blood Glucose.: 298 mg/dL (19 Mar 2021 08:49)  POCT Blood Glucose.: 305 mg/dL (18 Mar 2021 22:31)  POCT Blood Glucose.: 311 mg/dL (18 Mar 2021 17:55)  POCT Blood Glucose.: 390 mg/dL (18 Mar 2021 12:10)    I&O's Summary  18 Mar 2021 07:01  -  19 Mar 2021 07:00  --------------------------------------------------------  IN: 1575 mL / OUT: 700 mL / NET: 875 mL    GENERAL: NAD, well-groomed, well-developed  EYES: EOMI, PERRLA, conjunctiva and sclera clear  NECK: Supple, No JVD, Normal thyroid, no LAD  NERVOUS SYSTEM:  Alert & Oriented X3, Good concentration; Motor Strength 5/5 B/L upper and lower extremities  CHEST/LUNG: Clear to auscultation bilaterally on anterior auscultation; No rales, rhonchi, wheezing, or rubs  HEART: Regular rate and rhythm; No murmurs, rubs, or gallops  ABDOMEN: Soft, Nontender, Nondistended; Bowel sounds present, no HSM  EXTREMITIES:  2+ Peripheral Pulses, No clubbing, cyanosis, or edema,, L knee, TTP joint, reduced ROM d/t pain, no swelling, mild warmth. B/L elbow joints no TTP, L shoulder with reduced ROM d/t pain - improved, no tenderness in L wrist. Rest of joints wnl.   SKIN: confluent erythematous rash on chest, blanches. Similar rash on both arms around elbows with scratch marks and dry skin. No inguinal rash.     LABS:                        10.5   22.51 )-----------( 854      ( 18 Mar 2021 08:17 )             32.0     03-19    133<L>  |  97<L>  |  13  ----------------------------<  299<H>  4.0   |  24  |  0.55    Ca    8.7      19 Mar 2021 08:15  Phos  3.4     03-19  Mg     2.2     03-19    TPro  6.3  /  Alb  2.8<L>  /  TBili  0.4  /  DBili  x   /  AST  67<H>  /  ALT  82<H>  /  AlkPhos  217<H>  03-19    PT/INR - ( 18 Mar 2021 08:17 )   PT: 13.8 sec;   INR: 1.22 ratio    PTT - ( 18 Mar 2021 08:17 )  PTT:29.4 sec    RADIOLOGY & ADDITIONAL TESTS:    Culture - Blood (03.17.21 @ 02:49)    Specimen Source: .Blood Blood    Culture Results:   No growth to date.      Culture - Blood (03.17.21 @ 02:49)    Specimen Source: .Blood Blood    Culture Results:   No growth to date.

## 2021-03-19 NOTE — PROGRESS NOTE ADULT - PROBLEM SELECTOR PLAN 4
- Na 129, Cl 92, possibly in the setting inflammation and SIADH vs. low salute intake  - F/u urine lytes, serum osmolality  - TSH wnl, cortisol - pending  - S/p 1L NS in the ED - improved to 132  - Monitor BMP qd - Na 129, Cl 92, possibly in the setting inflammation and SIADH vs. low salute intake  - F/u urine lytes, serum osmolality  - TSH wnl, cortisol - pending  - S/p 1L NS in the ED - improving  - Monitor BMP qd Suspect anemia of chronic disease mixed with iron def.  Hb 10.6 MCV 82, unknown baseline  - iron studies showing concomitant iron def anemia with inflammation, B12, folate, TSH - all wnl  - LDH elevated but hapto elevated and uric acid low.   - Maintain active T&S

## 2021-03-19 NOTE — DIETITIAN INITIAL EVALUATION ADULT. - PERTINENT LABORATORY DATA
HIGH:    CAPILLARY BLOOD GLUCOSE  POCT Blood Glucose.: 298 mg/dL (19 Mar 2021 08:49)  POCT Blood Glucose.: 305 mg/dL (18 Mar 2021 22:31)  POCT Blood Glucose.: 311 mg/dL (18 Mar 2021 17:55)  POCT Blood Glucose.: 390 mg/dL (18 Mar 2021 12:10)    HgA1C 10.6%    LOW:  Na 132, Creat 0.49

## 2021-03-19 NOTE — PROGRESS NOTE ADULT - ASSESSMENT
ongoing evaluation for rash, myalgias, joint pain  Labs and bx pending   DERM saw patient today and recommending topicals in addition to PO steroids especially for scalp.  Please start PCP prophylaxis   Please get rehab consult - will need to prevent muscle atrophy on top of disuse from underlying illness and hospital stay.  Pt aware of work up in progress.  Please call service over the weekend with questions.    [phone]

## 2021-03-19 NOTE — DIETITIAN INITIAL EVALUATION ADULT. - PROBLEM SELECTOR PLAN 1
- Generalized weakness, fevers, joint pain x1 week, along with rash x1 month, also with dry mouth; no autoimmune hx  - S/p OSH discharge for ?Lyme disease started on Doxycyline 21-day regimen  - Leukocytosis to 23.4K, anemia to 10.6 with MCV 82, platelets 751, ESR 91, , elevated AST/ALT 40/51, alk phos 238, CK low 19  - U/A 30 protein  - Likely inflammatory etiology. Will send workup including blue top platelets, anemia workup, hepatitis panel, HIV, VIPIN, syphilis, blood smear, EBV, CMV, RF, CCP, Sjogren's antibodies, lyme panel, blood cultures, double stranded DNA   - X-rays L shoulder and R elbow   - Rheum consult in AM   - Derm consult in AM   - Consider pan scan CT to r/o malignancy   - Pain control with Tylenol for mild pain, Toradol for moderate-severe pain  - Obtain outpatient medical records from recent hospitalization (was at [facility])  - c/w Doxycycline for now pending verification of records from [facility];

## 2021-03-19 NOTE — PROGRESS NOTE ADULT - ASSESSMENT
66 y/o Kenyan speaking Female with PMHx of DM (on insulin), HTN, HLD, COVID (2020) and glaucoma presenting with generalized weakness, fever, polyarthritis and rash, admitted for further workup. 64 y/o Ecuadorean speaking Female with PMHx of DM (on insulin), HTN, HLD, COVID (2020) and glaucoma presenting with generalized weakness, fever, polyarthritis and rash, admitted for further workup, current working diagnosis: amyopathic dermatomyositis.

## 2021-03-19 NOTE — CHART NOTE - NSCHARTNOTEFT_GEN_A_CORE
Dermatology Brief Chart Note    64 y/o French speaking Female with PMHx of DM Type 2 (on insulin), HTN, HLD, COVID (2020) and glaucoma presenting with generalized weakness, joint pain, and rash, undergoing diagnostic work-up for dermatomyositis.     Interval history:   Patient continues to report itchiness of rash and scalp.   Feels muscle weakness/pain about the same as previous.     Physical Exam:  - red-violet plaques with minimal scale on upper central chest, upper back, forehead, upper inner right thigh, bilateral upper arms  - swelling of proximal phalanges of bilateral hands   - erythema of scalp     A/P:  Patient with violaceous photodistributed rash, muscle weakness, joint pains. 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 Dermatomyositis is a multisystem autoimmune connective tissue disease that most often is characterized by a violaceous cutaneous eruption, symmetric proximal extensor inflammatory myopathy, and autoantibodies. Pruritis is commonly associated. Additional features include fatigue, malaise, myalgias. Dysphagia, respiratory, and cardiac involvement can be associated. Up to 40% of patients with dermatomyositis may have an occult malignancy, including colon, ovarian, breast, pancreatic, lung, gastric cancers, and lymphoma. Some cases of dermatomyositis can occur without myositis at its onset (with muscle disease absent or detectable only on muscle biopsy, MRI, or laboratory testing) and many of these patients will eventually develop muscle weakness as the disease progresses. CT chest/abdomen/pelvis performed without findings concerning for malignancy.   - fu punch bx of skin of R upper arm   - please continue wound care for bx site on R upper arm: apply Vaseline to biopsy site daily and cover with bandage until healed.   - continue with topical triamcinolone 0.1% ointment BID to affected areas on trunk and extremities for up to 2 weeks continuously. Then take 1 week before re-using if needed.   - ***START topical lidex (fluocinonide) 0.05% solution BID PRN itch to affected areas on scalp for up to 2 weeks continuously. Then take 1 week before re-using if needed.***   - agree with rheumatology consult; fu antibodies sent by rheumatology       The patient's chart was reviewed in addition to photos of the rash taken by the primary team with the permission of the patient.  Patient was seen at bedside and discussed remotely with the dermatology attending Dr. Dwayne Campuzano.  Recommendations were communicated with the primary team.  Please page [phone] for further related questions (please leave 10 digit call back number because we cover several facilities).    Amber Soriano MD  Resident Physician, PGY2  [facility]
pt seen ghanshyam  workup ongoing for rash/joint pain/muscle weakness  subtle improvement following 1st dose prednisone     awaiting DERM bx and serologies.  results of CT negative for masses - this was done given that Dermatomyositis often is a paraneoplastic syndrome.    We will continue to follow with you     Please call with questions/acute changes   [phone]

## 2021-03-19 NOTE — DIETITIAN INITIAL EVALUATION ADULT. - OTHER INFO
66 y/o Yi speaking Female with PMHx of DM (on insulin), HTN, HLD, COVID (2020) and glaucoma presenting with generalized weakness, fever, polyarthritis and rash, admitted for further workup, current working diagnosis: amyopathic dermatomyositis.      ID# [id number]. Pt remains with fair/good po intakes and denies chewing, swallowing difficulties or any nausea, vomiting, diarrhea, constipation. Pt stated that due to weakness and shoulder pain it is difficult to cut up certain high textured foods and would like a Soft diet. Reports weight is stable at 200#.   Pt's admit HgA1C 10.6%, informed of this and also current PCOT elevated, continues on steroid therapy. Pt was educated on portion controlled meals and consuming unsweetened beverages. Provided with written education materials in [place] for future reference.

## 2021-03-19 NOTE — PROGRESS NOTE ADULT - PROBLEM SELECTOR PLAN 5
- WBC 23.4, differential ordered (pending)  - Monitor CBC  - F/u blood cultures - WBC 23.4, differential: PMN predom, no bands  - Monitor CBC  - F/u blood cultures - NGTD - Na 129, Cl 92, possibly in the setting inflammation and SIADH vs. low salute intake  - F/u urine lytes, serum osmolality  - TSH wnl, cortisol - pending  - S/p 1L NS in the ED - improving  - Monitor BMP qd - WBC 23.4, differential: PMN predom, no bands  - Monitor CBC  - F/u blood cultures - NGTD  -Will send flow cytometry

## 2021-03-19 NOTE — PROGRESS NOTE ADULT - PROBLEM SELECTOR PLAN 9
- DVT PPX: Lovenox  - Diet: CC DASH diet  - Dispo: PT inocente pending - DVT PPX: Lovenox  - Diet: CC DASH diet  - Dispo: PT rec rehab.

## 2021-03-19 NOTE — PROGRESS NOTE ADULT - ATTENDING COMMENTS
Patient seeen and examined, care d/w HS4 team.    In summary 64 yo born in [place], obese (BMI 33), DM2, glaucoma, HTN, HLD, COVID (3/2020) who presents with joint pains and rash.  Reports started having a rash 2/7/20 started on face was itchy then progressed down body.  Reports 8 days ago developed migrating joint pains involving the shoulders, elbows, knees and fingers.  Reports moves from joint to joint.  No fevers noted at home.  No weight loss.  Reports went to OSH and is not sure if seen by derm or rheum.  Was started on doxycycline for lyme (unclear what dx was based on).      # r/o dermatomyositis, rash/arthralgias:  appears inflamed with elevated ESR/CRP and ferritin concern for rheumatologic d/o; mild anemia, mild proteinuria (but in DM2 pt).  HIV neg.  Reportedly neg TB at OSH.  LFTs mildly elevated CK normal. TSH wnl.  ? cutaneous dermatomyositis, normal CK which is not uncommon.  Lyme neg here.   - rheum consult appreciated   - derm consult appreciated, s/p bx on 3/17 pending   - started on prednisone 50 mg on 3/17  - CK and myoglobin wnl  - serologies; VIPIN, ds-DNA, RF, CCP all neg   - CT CAP neg for malignancy, uptodate on mammo, will need OP c-scope (never had)    # Anemia: normocytic, mixed TREY and AOCD, no hemolysis (elevated LDH, normal hapto)  - trend, tx underlying issue   - OP c-scope    # DM2 poorly controlled, HbA1c 10.6%, now exacerbated by steroids  - increase to 60 units and 20 units TIC ac, moderate dose AUTUMN    PT rec EFFIE, f/u progress  pending improvement and derm/rheum

## 2021-03-19 NOTE — PROGRESS NOTE ADULT - PROBLEM SELECTOR PROBLEM 3
Normocytic anemia Type 2 diabetes mellitus without complication, unspecified whether long term insulin use

## 2021-03-19 NOTE — DIETITIAN INITIAL EVALUATION ADULT. - PROBLEM SELECTOR PROBLEM 7
Patient notified and verbalized understanding.
Pt called asking if an antibiotic to be sent to [facility] in [address] [place] [address]. Pt states that he has had a headache with sinus pressure, chest congestion, mild production when he coughs, and ear fullness for 3 days. Denies having a fever. He is taking an allergy pill with minimal symptom relief. Please advise.
z pack sent to pharmacy. He should do a kiana pot or nasal saline rinse. flonase daily. He can take blood pressure friendly medications from the coricidin brand. F/u if no improvement.      Beba Vasques
Leukocytosis, unspecified type

## 2021-03-19 NOTE — PROGRESS NOTE ADULT - SUBJECTIVE AND OBJECTIVE BOX
INTERVAL HPI/OVERNIGHT EVENTS:  improved joint pain and strength but ongoing rash/scalp itchiness despite prednisone     MEDICATIONS  (STANDING):  atorvastatin 20 milliGRAM(s) Oral at bedtime  cholecalciferol 2000 Unit(s) Oral daily  dextrose 40% Gel 15 Gram(s) Oral once  dextrose 50% Injectable 25 Gram(s) IV Push once  dextrose 50% Injectable 12.5 Gram(s) IV Push once  dextrose 50% Injectable 25 Gram(s) IV Push once  diltiazem    milliGRAM(s) Oral daily  enoxaparin Injectable 40 milliGRAM(s) SubCutaneous daily  fluocinonide 0.05% Solution 1 Application(s) Topical two times a day  gabapentin 100 milliGRAM(s) Oral <User Schedule>  gabapentin 300 milliGRAM(s) Oral at bedtime  insulin glargine Injectable (LANTUS) 60 Unit(s) SubCutaneous every morning  insulin lispro (ADMELOG) corrective regimen sliding scale   SubCutaneous three times a day before meals  insulin lispro (ADMELOG) corrective regimen sliding scale   SubCutaneous at bedtime  insulin lispro Injectable (ADMELOG) 30 Unit(s) SubCutaneous three times a day before meals  ketotifen 0.025% Ophthalmic Solution 1 Drop(s) Both EYES two times a day  latanoprost 0.005% Ophthalmic Solution 1 Drop(s) Both EYES at bedtime  nystatin    Suspension 717559 Unit(s) Swish and Swallow every 6 hours  pantoprazole    Tablet 40 milliGRAM(s) Oral before breakfast  petrolatum white Ointment 1 Application(s) Topical daily  predniSONE   Tablet 50 milliGRAM(s) Oral daily  triamcinolone 0.1% Ointment 1 Application(s) Topical two times a day    MEDICATIONS  (PRN):  acetaminophen   Tablet .. 650 milliGRAM(s) Oral every 6 hours PRN Temp greater or equal to 38C (100.4F), Mild Pain (1 - 3), Moderate Pain (4 - 6)  hydrOXYzine hydrochloride 25 milliGRAM(s) Oral every 6 hours PRN Itching      Allergies    azithromycin (Hives; Swelling)  enalapril (Other (Mild to Mod))  penicillin (Swelling; Rash)    Intolerances        REVIEW OF SYSTEMS      General:	    Skin/Breast:  	  Ophthalmologic:  	  ENMT:	    Respiratory and Thorax:  	  Cardiovascular:	    Gastrointestinal:	    Genitourinary:	    Musculoskeletal:	    Neurological:	    Psychiatric:	    Hematology/Lymphatics:	    Endocrine:	    Allergic/Immunologic:	    Vital Signs Last 24 Hrs  T(C): 36.6 (19 Mar 2021 11:05), Max: 36.7 (18 Mar 2021 22:02)  T(F): 97.8 (19 Mar 2021 11:05), Max: 98 (18 Mar 2021 22:02)  HR: 85 (19 Mar 2021 11:05) (79 - 85)  BP: 146/61 (19 Mar 2021 11:05) (127/49 - 160/66)  BP(mean): --  RR: 15 (19 Mar 2021 11:05) (15 - 18)  SpO2: 99% (19 Mar 2021 11:05) (97% - 99%)    PHYSICAL EXAM:      Constitutional:    Eyes:    ENMT:    Neck:    Breasts:    Back:    Respiratory:    Cardiovascular:    Gastrointestinal:    Genitourinary:    Rectal:    Extremities:    Vascular:    Neurological:    Skin:    Lymph Nodes:    Musculoskeletal:    Psychiatric:        LABS:                        10.3   26.43 )-----------( 760      ( 19 Mar 2021 09:21 )             31.1     03-19    133<L>  |  97<L>  |  13  ----------------------------<  299<H>  4.0   |  24  |  0.55    Ca    8.7      19 Mar 2021 08:15  Phos  3.4     03-19  Mg     2.2     03-19    TPro  6.3  /  Alb  2.8<L>  /  TBili  0.4  /  DBili  x   /  AST  67<H>  /  ALT  82<H>  /  AlkPhos  217<H>  03-19    PT/INR - ( 18 Mar 2021 08:17 )   PT: 13.8 sec;   INR: 1.22 ratio         PTT - ( 18 Mar 2021 08:17 )  PTT:29.4 sec        RADIOLOGY & ADDITIONAL TESTS:

## 2021-03-20 LAB
B BURGDOR DNA SPEC QL NAA+PROBE: NEGATIVE — SIGNIFICANT CHANGE UP
GLUCOSE BLDC GLUCOMTR-MCNC: 138 MG/DL — HIGH (ref 70–99)
GLUCOSE BLDC GLUCOMTR-MCNC: 224 MG/DL — HIGH (ref 70–99)
GLUCOSE BLDC GLUCOMTR-MCNC: 236 MG/DL — HIGH (ref 70–99)
GLUCOSE BLDC GLUCOMTR-MCNC: 278 MG/DL — HIGH (ref 70–99)

## 2021-03-20 PROCEDURE — 99233 SBSQ HOSP IP/OBS HIGH 50: CPT | Mod: GC

## 2021-03-20 RX ORDER — INSULIN LISPRO 100/ML
34 VIAL (ML) SUBCUTANEOUS
Refills: 0 | Status: DISCONTINUED | OUTPATIENT
Start: 2021-03-20 | End: 2021-03-22

## 2021-03-20 RX ORDER — HYDROCORTISONE 1 %
1 OINTMENT (GRAM) TOPICAL
Refills: 0 | Status: DISCONTINUED | OUTPATIENT
Start: 2021-03-20 | End: 2021-03-25

## 2021-03-20 RX ADMIN — GABAPENTIN 100 MILLIGRAM(S): 400 CAPSULE ORAL at 08:57

## 2021-03-20 RX ADMIN — Medication 1 APPLICATION(S): at 18:25

## 2021-03-20 RX ADMIN — Medication 500000 UNIT(S): at 18:26

## 2021-03-20 RX ADMIN — Medication 1 APPLICATION(S): at 12:41

## 2021-03-20 RX ADMIN — Medication 2000 UNIT(S): at 12:37

## 2021-03-20 RX ADMIN — Medication 6: at 12:38

## 2021-03-20 RX ADMIN — Medication 300 MILLIGRAM(S): at 05:22

## 2021-03-20 RX ADMIN — GABAPENTIN 300 MILLIGRAM(S): 400 CAPSULE ORAL at 22:41

## 2021-03-20 RX ADMIN — Medication 34 UNIT(S): at 12:38

## 2021-03-20 RX ADMIN — Medication 500000 UNIT(S): at 12:37

## 2021-03-20 RX ADMIN — INSULIN GLARGINE 60 UNIT(S): 100 INJECTION, SOLUTION SUBCUTANEOUS at 12:37

## 2021-03-20 RX ADMIN — LATANOPROST 1 DROP(S): 0.05 SOLUTION/ DROPS OPHTHALMIC; TOPICAL at 22:40

## 2021-03-20 RX ADMIN — Medication 500000 UNIT(S): at 05:22

## 2021-03-20 RX ADMIN — PANTOPRAZOLE SODIUM 40 MILLIGRAM(S): 20 TABLET, DELAYED RELEASE ORAL at 07:15

## 2021-03-20 RX ADMIN — Medication 1 APPLICATION(S): at 19:39

## 2021-03-20 RX ADMIN — Medication 30 UNIT(S): at 08:56

## 2021-03-20 RX ADMIN — ENOXAPARIN SODIUM 40 MILLIGRAM(S): 100 INJECTION SUBCUTANEOUS at 12:37

## 2021-03-20 RX ADMIN — KETOTIFEN FUMARATE 1 DROP(S): 0.34 SOLUTION OPHTHALMIC at 05:24

## 2021-03-20 RX ADMIN — Medication 1 APPLICATION(S): at 09:02

## 2021-03-20 RX ADMIN — Medication 1 APPLICATION(S): at 05:23

## 2021-03-20 RX ADMIN — Medication 1 APPLICATION(S): at 18:26

## 2021-03-20 RX ADMIN — Medication 4: at 08:56

## 2021-03-20 RX ADMIN — KETOTIFEN FUMARATE 1 DROP(S): 0.34 SOLUTION OPHTHALMIC at 18:26

## 2021-03-20 RX ADMIN — Medication 25 MILLIGRAM(S): at 05:22

## 2021-03-20 RX ADMIN — Medication 25 MILLIGRAM(S): at 22:41

## 2021-03-20 RX ADMIN — Medication 50 MILLIGRAM(S): at 05:22

## 2021-03-20 RX ADMIN — GABAPENTIN 100 MILLIGRAM(S): 400 CAPSULE ORAL at 14:30

## 2021-03-20 RX ADMIN — ATORVASTATIN CALCIUM 20 MILLIGRAM(S): 80 TABLET, FILM COATED ORAL at 22:39

## 2021-03-20 RX ADMIN — Medication 34 UNIT(S): at 18:00

## 2021-03-20 NOTE — PROGRESS NOTE ADULT - PROBLEM SELECTOR PLAN 3
Blood glucose 213, 285  - f/u A1c  - On basaglar 17 U BID at home   - C/w basaglar, increase to 45u, inc premeals to 20u   - Monitor fingersticks as started steroids  - Diabetic diet Blood glucose 213, 285  - f/u A1c  - On basaglar 17 U BID at home   - Currently on 60u basaglar and premeals to 30u, glucose persistently in the 200-300s, increase per ISS needs  - Monitor fingersticks as started steroids  - Diabetic diet

## 2021-03-20 NOTE — PROGRESS NOTE ADULT - ASSESSMENT
64 y/o [place] speaking Female with PMHx of DM (on insulin), HTN, HLD, COVID (2020) and glaucoma presenting with generalized weakness, fever, polyarthritis and rash, admitted for further workup, current working diagnosis: amyopathic dermatomyositis.

## 2021-03-20 NOTE — PROGRESS NOTE ADULT - PROBLEM SELECTOR PLAN 1
- Pt presented with generalized weakness, subjective? fevers, polyarthralgia/arthritis x1 week of large joints, along with rash x1 month, +dry mouth/eyes?; no autoimmune Hx or FHx.  - S/p OSH discharge 3/12 for Lyme disease started on Doxycyline 21-day regimen  - Was treated there with Levaquin and 3x doses of Vanco.  - Leukocytosis to 23.4K (pmn), anemia to 10.6 with MCV 82, platelets 751, ESR 91, , elevated AST/ALT 40/51, alk phos 238, CK low 19  - U/A 30 protein  - Likely inflammatory etiology. Will send workup including blue top platelets, anemia workup, hepatitis panel - neg, HIV - neg, syphilis - neg, EBV - past infection, CMV - neg, lyme panel-neg , blood cultures - NGTD, C3 slightly elevated, C4 - wnl. VIPIN-neg, dsDNA - neg, CCP -neg, anti SSA/B - neg, Mallory-1 - neg. Myoglobin - low, RF - neg, anti Smith - neg  PENDING blood smear  - X-rays L shoulder and R elbow - wnl  -CT chest abd pelvis - wnl, no signs of malignancy  - Rheum consulted, appreciate recs: suspects DERMATOMYOSITIS, started on prednisone 50mg qd yesterday with some response.   -start protonix 40mg qd for PUD ppx  -will add gabapentin for itchiness/pain  - Derm consulted, appreciate recs: f/u punch biopsy  - Pain control with Tylenol for mild pain, Toradol for moderate-severe pain  - Obtain outpatient medical records from recent hospitalization (was at [facility]) - papers sent. - Pt presented with generalized weakness, subjective? fevers, polyarthralgia/arthritis x1 week of large joints, along with rash x1 month, +dry mouth/eyes?; no autoimmune Hx or FHx.  - S/p OSH discharge 3/12 for Lyme disease started on Doxycyline 21-day regimen  - Was treated there with Levaquin and 3x doses of Vanco.  - Leukocytosis to 23.4K (pmn), anemia to 10.6 with MCV 82, platelets 751, ESR 91, , elevated AST/ALT 40/51, alk phos 238, CK low 19  - U/A 30 protein  - Likely inflammatory etiology. Will send workup including blue top platelets, anemia workup, hepatitis panel - neg, HIV - neg, syphilis - neg, EBV - past infection, CMV - neg, lyme panel-neg , blood cultures - NGTD, C3 slightly elevated, C4 - wnl. VIPIN-neg, dsDNA - neg, CCP -neg, anti SSA/B - neg, Mallory-1 - neg. Myoglobin - low, RF - neg, anti Smith - neg  PENDING blood smear  - X-rays L shoulder and R elbow - wnl  - CT chest abd pelvis - wnl, no signs of malignancy  - Rheum consulted, appreciate recs: suspects DERMATOMYOSITIS, started on prednisone 50mg qd yesterday with mild response.   -start protonix 40mg qd for PUD ppx  -will add gabapentin for itchiness/pain  - Derm consulted, appreciate recs: f/u punch biopsy  - Pain control with Tylenol for mild pain, Toradol for moderate-severe pain  - Obtain outpatient medical records from recent hospitalization (was at [facility]) - papers sent.

## 2021-03-20 NOTE — PROGRESS NOTE ADULT - PROBLEM SELECTOR PLAN 5
- WBC 23.4, differential: PMN predom, no bands  - Monitor CBC  - F/u blood cultures - NGTD  -Will send flow cytometry

## 2021-03-20 NOTE — PROGRESS NOTE ADULT - SUBJECTIVE AND OBJECTIVE BOX
PROGRESS NOTE:     CONTACT INFO:  Roopa Montes  PGY3  [phone]    Patient is a 65y old  Female who presents with a chief complaint of joint pain (19 Mar 2021 15:32)      SUBJECTIVE / OVERNIGHT EVENTS:    ADDITIONAL REVIEW OF SYSTEMS:    MEDICATIONS  (STANDING):  atorvastatin 20 milliGRAM(s) Oral at bedtime  cholecalciferol 2000 Unit(s) Oral daily  dextrose 40% Gel 15 Gram(s) Oral once  dextrose 50% Injectable 12.5 Gram(s) IV Push once  dextrose 50% Injectable 25 Gram(s) IV Push once  dextrose 50% Injectable 25 Gram(s) IV Push once  diltiazem    milliGRAM(s) Oral daily  enoxaparin Injectable 40 milliGRAM(s) SubCutaneous daily  fluocinonide 0.05% Cream 1 Application(s) Topical two times a day  gabapentin 100 milliGRAM(s) Oral <User Schedule>  gabapentin 300 milliGRAM(s) Oral at bedtime  insulin glargine Injectable (LANTUS) 60 Unit(s) SubCutaneous every morning  insulin lispro (ADMELOG) corrective regimen sliding scale   SubCutaneous three times a day before meals  insulin lispro (ADMELOG) corrective regimen sliding scale   SubCutaneous at bedtime  insulin lispro Injectable (ADMELOG) 30 Unit(s) SubCutaneous three times a day before meals  ketotifen 0.025% Ophthalmic Solution 1 Drop(s) Both EYES two times a day  latanoprost 0.005% Ophthalmic Solution 1 Drop(s) Both EYES at bedtime  nystatin    Suspension 396372 Unit(s) Swish and Swallow every 6 hours  pantoprazole    Tablet 40 milliGRAM(s) Oral before breakfast  petrolatum white Ointment 1 Application(s) Topical daily  predniSONE   Tablet 50 milliGRAM(s) Oral daily  triamcinolone 0.1% Ointment 1 Application(s) Topical two times a day  trimethoprim  160 mG/sulfamethoxazole 800 mG 1 Tablet(s) Oral <User Schedule>    MEDICATIONS  (PRN):  acetaminophen   Tablet .. 650 milliGRAM(s) Oral every 6 hours PRN Temp greater or equal to 38C (100.4F), Mild Pain (1 - 3), Moderate Pain (4 - 6)  hydrOXYzine hydrochloride 25 milliGRAM(s) Oral every 6 hours PRN Itching      CAPILLARY BLOOD GLUCOSE      POCT Blood Glucose.: 263 mg/dL (19 Mar 2021 21:47)  POCT Blood Glucose.: 290 mg/dL (19 Mar 2021 17:20)  POCT Blood Glucose.: 359 mg/dL (19 Mar 2021 12:29)  POCT Blood Glucose.: 403 mg/dL (19 Mar 2021 12:28)  POCT Blood Glucose.: 298 mg/dL (19 Mar 2021 08:49)    I&O's Summary    18 Mar 2021 07:01  -  19 Mar 2021 07:00  --------------------------------------------------------  IN: 1575 mL / OUT: 700 mL / NET: 875 mL    19 Mar 2021 07:01  -  20 Mar 2021 06:45  --------------------------------------------------------  IN: 510 mL / OUT: 1300 mL / NET: -790 mL        PHYSICAL EXAM:  Vital Signs Last 24 Hrs  T(C): 36.4 (20 Mar 2021 05:19), Max: 36.7 (19 Mar 2021 10:46)  T(F): 97.5 (20 Mar 2021 05:19), Max: 98 (19 Mar 2021 10:46)  HR: 70 (20 Mar 2021 05:19) (70 - 85)  BP: 148/71 (20 Mar 2021 05:19) (127/49 - 148/71)  BP(mean): --  RR: 17 (20 Mar 2021 05:19) (15 - 18)  SpO2: 97% (20 Mar 2021 05:19) (97% - 100%)    GENERAL: No acute distress, well-developed  HEAD:  Atraumatic, Normocephalic  EYES: EOMI, PERRLA, conjunctiva and sclera clear  NECK: Supple, no lymphadenopathy, no JVD  CHEST/LUNG: CTAB; No wheezes, rales, or rhonchi  HEART: Regular rate and rhythm; No murmurs, rubs, or gallops  ABDOMEN: Soft, non-tender, non-distended; normal bowel sounds, no organomegaly  EXTREMITIES:  2+ peripheral pulses b/l, No clubbing, cyanosis, or edema  NEUROLOGY: A&O x 3, no focal deficits  SKIN: No rashes or lesions    LABS:                        10.3   26.43 )-----------( 760      ( 19 Mar 2021 09:21 )             31.1     03-19    133<L>  |  97<L>  |  13  ----------------------------<  299<H>  4.0   |  24  |  0.55    Ca    8.7      19 Mar 2021 08:15  Phos  3.4     03-19  Mg     2.2     03-19    TPro  6.3  /  Alb  2.8<L>  /  TBili  0.4  /  DBili  x   /  AST  67<H>  /  ALT  82<H>  /  AlkPhos  217<H>  03-19    PT/INR - ( 18 Mar 2021 08:17 )   PT: 13.8 sec;   INR: 1.22 ratio         PTT - ( 18 Mar 2021 08:17 )  PTT:29.4 sec      CONSTITUTIONAL: No fever, weight loss, or fatigue  EYES: No eye pain, visual disturbances, or discharge  ENMT:  No difficulty hearing, tinnitus, vertigo; No sinus or throat pain  NECK: No pain or stiffness  RESPIRATORY: No cough, wheezing, chills or hemoptysis; No shortness of breath  CARDIOVASCULAR: No chest pain, palpitations, dizziness, or leg swelling  GASTROINTESTINAL: No abdominal or epigastric pain. No nausea, vomiting, or hematemesis; No diarrhea or constipation. No melena or hematochezia.  GENITOURINARY: No dysuria, frequency, hematuria, or incontinence  NEUROLOGICAL: No headaches, memory loss, loss of strength, numbness, or tremors  SKIN: ++ itching, ++ rashes  MUSCULOSKELETAL: ++joint pain or swelling; No muscle, back, or extremity pain      RADIOLOGY & ADDITIONAL TESTS:  Results Reviewed:   Imaging Personally Reviewed:  Electrocardiogram Personally Reviewed:    COORDINATION OF CARE:  Care Discussed with Consultants/Other Providers [Y/N]:  Prior or Outpatient Records Reviewed [Y/N]:   PROGRESS NOTE:     CONTACT INFO:  Roopa Montes  PGY3  [phone]    Patient is a 65y old  Female who presents with a chief complaint of joint pain (19 Mar 2021 15:32)      SUBJECTIVE / OVERNIGHT EVENTS: no event overnight. Nurse at bedside and translated. Report continues to have left shoulder and knee pain, unchanged from prior. Also has vaginal itchiness and buttock itchiness. No discharge. Denies CP, SOB, n/v/c/d..    ADDITIONAL REVIEW OF SYSTEMS:    MEDICATIONS  (STANDING):  atorvastatin 20 milliGRAM(s) Oral at bedtime  cholecalciferol 2000 Unit(s) Oral daily  dextrose 40% Gel 15 Gram(s) Oral once  dextrose 50% Injectable 12.5 Gram(s) IV Push once  dextrose 50% Injectable 25 Gram(s) IV Push once  dextrose 50% Injectable 25 Gram(s) IV Push once  diltiazem    milliGRAM(s) Oral daily  enoxaparin Injectable 40 milliGRAM(s) SubCutaneous daily  fluocinonide 0.05% Cream 1 Application(s) Topical two times a day  gabapentin 100 milliGRAM(s) Oral <User Schedule>  gabapentin 300 milliGRAM(s) Oral at bedtime  insulin glargine Injectable (LANTUS) 60 Unit(s) SubCutaneous every morning  insulin lispro (ADMELOG) corrective regimen sliding scale   SubCutaneous three times a day before meals  insulin lispro (ADMELOG) corrective regimen sliding scale   SubCutaneous at bedtime  insulin lispro Injectable (ADMELOG) 30 Unit(s) SubCutaneous three times a day before meals  ketotifen 0.025% Ophthalmic Solution 1 Drop(s) Both EYES two times a day  latanoprost 0.005% Ophthalmic Solution 1 Drop(s) Both EYES at bedtime  nystatin    Suspension 841107 Unit(s) Swish and Swallow every 6 hours  pantoprazole    Tablet 40 milliGRAM(s) Oral before breakfast  petrolatum white Ointment 1 Application(s) Topical daily  predniSONE   Tablet 50 milliGRAM(s) Oral daily  triamcinolone 0.1% Ointment 1 Application(s) Topical two times a day  trimethoprim  160 mG/sulfamethoxazole 800 mG 1 Tablet(s) Oral <User Schedule>    MEDICATIONS  (PRN):  acetaminophen   Tablet .. 650 milliGRAM(s) Oral every 6 hours PRN Temp greater or equal to 38C (100.4F), Mild Pain (1 - 3), Moderate Pain (4 - 6)  hydrOXYzine hydrochloride 25 milliGRAM(s) Oral every 6 hours PRN Itching      CAPILLARY BLOOD GLUCOSE      POCT Blood Glucose.: 263 mg/dL (19 Mar 2021 21:47)  POCT Blood Glucose.: 290 mg/dL (19 Mar 2021 17:20)  POCT Blood Glucose.: 359 mg/dL (19 Mar 2021 12:29)  POCT Blood Glucose.: 403 mg/dL (19 Mar 2021 12:28)  POCT Blood Glucose.: 298 mg/dL (19 Mar 2021 08:49)    I&O's Summary    18 Mar 2021 07:01  -  19 Mar 2021 07:00  --------------------------------------------------------  IN: 1575 mL / OUT: 700 mL / NET: 875 mL    19 Mar 2021 07:01  -  20 Mar 2021 06:45  --------------------------------------------------------  IN: 510 mL / OUT: 1300 mL / NET: -790 mL        PHYSICAL EXAM:  Vital Signs Last 24 Hrs  T(C): 36.4 (20 Mar 2021 05:19), Max: 36.7 (19 Mar 2021 10:46)  T(F): 97.5 (20 Mar 2021 05:19), Max: 98 (19 Mar 2021 10:46)  HR: 70 (20 Mar 2021 05:19) (70 - 85)  BP: 148/71 (20 Mar 2021 05:19) (127/49 - 148/71)  BP(mean): --  RR: 17 (20 Mar 2021 05:19) (15 - 18)  SpO2: 97% (20 Mar 2021 05:19) (97% - 100%)    GENERAL: No acute distress, well-developed  HEAD:  Atraumatic, Normocephalic  EYES: EOMI, PERRLA, conjunctiva and sclera clear  NECK: Supple, no lymphadenopathy, no JVD  CHEST/LUNG: CTAB; No wheezes, rales, or rhonchi  HEART: Regular rate and rhythm; No murmurs, rubs, or gallops  ABDOMEN: Soft, non-tender, non-distended; normal bowel sounds, no organomegaly  EXTREMITIES:  2+ peripheral pulses b/l, No clubbing, cyanosis, or edema  NEUROLOGY: A&O x 3, no focal deficits  SKIN: No rashes or lesions. Vaginal skin appears intact, no discharge    LABS:                        10.3   26.43 )-----------( 760      ( 19 Mar 2021 09:21 )             31.1     03-19    133<L>  |  97<L>  |  13  ----------------------------<  299<H>  4.0   |  24  |  0.55    Ca    8.7      19 Mar 2021 08:15  Phos  3.4     03-19  Mg     2.2     03-19    TPro  6.3  /  Alb  2.8<L>  /  TBili  0.4  /  DBili  x   /  AST  67<H>  /  ALT  82<H>  /  AlkPhos  217<H>  03-19    PT/INR - ( 18 Mar 2021 08:17 )   PT: 13.8 sec;   INR: 1.22 ratio         PTT - ( 18 Mar 2021 08:17 )  PTT:29.4 sec    RADIOLOGY & ADDITIONAL TESTS:  Results Reviewed:   Imaging Personally Reviewed:  Electrocardiogram Personally Reviewed:    COORDINATION OF CARE:  Care Discussed with Consultants/Other Providers [Y/N]:  Prior or Outpatient Records Reviewed [Y/N]:

## 2021-03-20 NOTE — PROGRESS NOTE ADULT - PROBLEM SELECTOR PLAN 7
Resolved  - Na 129, Cl 92, possibly in the setting inflammation and SIADH vs. low salute intake  - F/u urine lytes, serum osmolality - suggesting SIADH  - TSH wnl, cortisol - pending  - S/p 1L NS in the ED - improving  - Monitor BMP qd  - d/c salt tabs

## 2021-03-20 NOTE — PROGRESS NOTE ADULT - ATTENDING COMMENTS
Patient seen and examined, care d/w HS4 team.  In summary 64 yo born in [place], obese (BMI 33), DM2, glaucoma, HTN, HLD, COVID (3/2020) who presents with joint pains and rash.  Reports started having a rash 2/7/20 started on face was itchy then progressed down body.  Reports 8 days ago developed migrating joint pains involving the shoulders, elbows, knees and fingers.  Reports moves from joint to joint.  No fevers noted at home.  No weight loss.  Reports went to OSH and is not sure if seen by derm or rheum.  Was started on doxycycline for lyme (unclear what dx was based on).      # Rash/ Joint pain- concern for Dermatomyositis –Labs with  elevated ESR/CRP and ferritin  mild anemia, mild proteinuria (but in DM2 pt).  HIV neg.  Reportedly neg TB at OSH  LFTs mildly elevated CK normal.   serologies; VIPIN, ds-DNA, RF, CCP all neg   CT CAP neg for malignancy, uptodate on mammo, will need OP c-scope (never had)  TSH wnl. CK and myoglobin wnl -? cutaneous dermatomyositis, normal CK which is not uncommon.  Lyme neg here.  -Rheum consult appreciated - started on prednisone 50 mg on 3/17. on PCP prox   - Derm consult appreciated, s/p bx on 3/17 pending, Advise topical steroids    # Anemia: normocytic, mixed TREY and AOCD, no hemolysis (elevated LDH, normal hapto)  - trend, tx underlying issue   - FU outpt     # Thrombocytosis- Likely reactive    # DM2 poorly controlled, HbA1c 10.6%, now exacerbated by steroids  -On Lantus increased to 60 units. Admelog  30 units TIC ac, moderate dose AUTUMN  - BS improving  but still elevated , will increase  Admelog to 33 units TID     # DVT Prox- Lovenox    Dispo - PT rec EFFIE, f/u progress  pending improvement and derm/rheum .

## 2021-03-20 NOTE — PROGRESS NOTE ADULT - PROBLEM SELECTOR PLAN 2
b/l lateral upper arms and upper chest  -Atrax and triamcinolone cream prn   -Dermatology consulted: plan for biopsy. b/l lateral upper arms and upper chest  -Atrax and triamcinolone cream prn   -Dermatology consulted s/p biopsy, f/u surg path

## 2021-03-20 NOTE — PROGRESS NOTE ADULT - PROBLEM SELECTOR PLAN 8
- AST/ALT 40/51, likely in the setting of inflammation  - hepatitis panel negative  - will lower Lipitor  - Trend CMP

## 2021-03-21 LAB
ALBUMIN SERPL ELPH-MCNC: 2.9 G/DL — LOW (ref 3.3–5)
ALP SERPL-CCNC: 169 U/L — HIGH (ref 40–120)
ALT FLD-CCNC: 126 U/L — HIGH (ref 4–33)
ANION GAP SERPL CALC-SCNC: 10 MMOL/L — SIGNIFICANT CHANGE UP (ref 7–14)
AST SERPL-CCNC: 94 U/L — HIGH (ref 4–32)
BILIRUB SERPL-MCNC: 0.3 MG/DL — SIGNIFICANT CHANGE UP (ref 0.2–1.2)
BUN SERPL-MCNC: 15 MG/DL — SIGNIFICANT CHANGE UP (ref 7–23)
CALCIUM SERPL-MCNC: 8.8 MG/DL — SIGNIFICANT CHANGE UP (ref 8.4–10.5)
CHLORIDE SERPL-SCNC: 100 MMOL/L — SIGNIFICANT CHANGE UP (ref 98–107)
CO2 SERPL-SCNC: 26 MMOL/L — SIGNIFICANT CHANGE UP (ref 22–31)
CREAT SERPL-MCNC: 0.68 MG/DL — SIGNIFICANT CHANGE UP (ref 0.5–1.3)
GLUCOSE BLDC GLUCOMTR-MCNC: 110 MG/DL — HIGH (ref 70–99)
GLUCOSE BLDC GLUCOMTR-MCNC: 134 MG/DL — HIGH (ref 70–99)
GLUCOSE BLDC GLUCOMTR-MCNC: 155 MG/DL — HIGH (ref 70–99)
GLUCOSE BLDC GLUCOMTR-MCNC: 183 MG/DL — HIGH (ref 70–99)
GLUCOSE SERPL-MCNC: 97 MG/DL — SIGNIFICANT CHANGE UP (ref 70–99)
HCT VFR BLD CALC: 31 % — LOW (ref 34.5–45)
HGB BLD-MCNC: 10 G/DL — LOW (ref 11.5–15.5)
MAGNESIUM SERPL-MCNC: 2.4 MG/DL — SIGNIFICANT CHANGE UP (ref 1.6–2.6)
MCHC RBC-ENTMCNC: 27.1 PG — SIGNIFICANT CHANGE UP (ref 27–34)
MCHC RBC-ENTMCNC: 32.3 GM/DL — SIGNIFICANT CHANGE UP (ref 32–36)
MCV RBC AUTO: 84 FL — SIGNIFICANT CHANGE UP (ref 80–100)
NRBC # BLD: 0 /100 WBCS — SIGNIFICANT CHANGE UP
NRBC # FLD: 0 K/UL — SIGNIFICANT CHANGE UP
PHOSPHATE SERPL-MCNC: 4.4 MG/DL — SIGNIFICANT CHANGE UP (ref 2.5–4.5)
PLATELET # BLD AUTO: 803 K/UL — HIGH (ref 150–400)
POTASSIUM SERPL-MCNC: 3.9 MMOL/L — SIGNIFICANT CHANGE UP (ref 3.5–5.3)
POTASSIUM SERPL-SCNC: 3.9 MMOL/L — SIGNIFICANT CHANGE UP (ref 3.5–5.3)
PROT SERPL-MCNC: 6.5 G/DL — SIGNIFICANT CHANGE UP (ref 6–8.3)
RBC # BLD: 3.69 M/UL — LOW (ref 3.8–5.2)
RBC # FLD: 15.5 % — HIGH (ref 10.3–14.5)
SODIUM SERPL-SCNC: 136 MMOL/L — SIGNIFICANT CHANGE UP (ref 135–145)
WBC # BLD: 18.1 K/UL — HIGH (ref 3.8–10.5)
WBC # FLD AUTO: 18.1 K/UL — HIGH (ref 3.8–10.5)

## 2021-03-21 PROCEDURE — 99233 SBSQ HOSP IP/OBS HIGH 50: CPT | Mod: GC

## 2021-03-21 RX ADMIN — Medication 25 MILLIGRAM(S): at 21:36

## 2021-03-21 RX ADMIN — Medication 1 APPLICATION(S): at 17:50

## 2021-03-21 RX ADMIN — Medication 34 UNIT(S): at 17:49

## 2021-03-21 RX ADMIN — Medication 1 APPLICATION(S): at 12:47

## 2021-03-21 RX ADMIN — GABAPENTIN 100 MILLIGRAM(S): 400 CAPSULE ORAL at 15:32

## 2021-03-21 RX ADMIN — KETOTIFEN FUMARATE 1 DROP(S): 0.34 SOLUTION OPHTHALMIC at 17:50

## 2021-03-21 RX ADMIN — Medication 2000 UNIT(S): at 11:41

## 2021-03-21 RX ADMIN — GABAPENTIN 300 MILLIGRAM(S): 400 CAPSULE ORAL at 21:33

## 2021-03-21 RX ADMIN — Medication 1 APPLICATION(S): at 05:23

## 2021-03-21 RX ADMIN — Medication 2: at 17:50

## 2021-03-21 RX ADMIN — Medication 300 MILLIGRAM(S): at 05:23

## 2021-03-21 RX ADMIN — Medication 650 MILLIGRAM(S): at 12:45

## 2021-03-21 RX ADMIN — KETOTIFEN FUMARATE 1 DROP(S): 0.34 SOLUTION OPHTHALMIC at 06:29

## 2021-03-21 RX ADMIN — Medication 50 MILLIGRAM(S): at 05:25

## 2021-03-21 RX ADMIN — Medication 500000 UNIT(S): at 06:23

## 2021-03-21 RX ADMIN — Medication 650 MILLIGRAM(S): at 12:06

## 2021-03-21 RX ADMIN — Medication 500000 UNIT(S): at 05:24

## 2021-03-21 RX ADMIN — Medication 500000 UNIT(S): at 11:41

## 2021-03-21 RX ADMIN — Medication 1 APPLICATION(S): at 06:29

## 2021-03-21 RX ADMIN — GABAPENTIN 100 MILLIGRAM(S): 400 CAPSULE ORAL at 08:50

## 2021-03-21 RX ADMIN — Medication 500000 UNIT(S): at 17:50

## 2021-03-21 RX ADMIN — ATORVASTATIN CALCIUM 20 MILLIGRAM(S): 80 TABLET, FILM COATED ORAL at 21:33

## 2021-03-21 RX ADMIN — INSULIN GLARGINE 60 UNIT(S): 100 INJECTION, SOLUTION SUBCUTANEOUS at 08:49

## 2021-03-21 RX ADMIN — ENOXAPARIN SODIUM 40 MILLIGRAM(S): 100 INJECTION SUBCUTANEOUS at 11:40

## 2021-03-21 RX ADMIN — PANTOPRAZOLE SODIUM 40 MILLIGRAM(S): 20 TABLET, DELAYED RELEASE ORAL at 06:30

## 2021-03-21 RX ADMIN — Medication 34 UNIT(S): at 12:46

## 2021-03-21 RX ADMIN — Medication 34 UNIT(S): at 08:49

## 2021-03-21 RX ADMIN — Medication 2: at 12:46

## 2021-03-21 RX ADMIN — LATANOPROST 1 DROP(S): 0.05 SOLUTION/ DROPS OPHTHALMIC; TOPICAL at 21:33

## 2021-03-21 RX ADMIN — Medication 1 APPLICATION(S): at 05:24

## 2021-03-21 RX ADMIN — Medication 500000 UNIT(S): at 23:41

## 2021-03-21 RX ADMIN — Medication 25 MILLIGRAM(S): at 12:06

## 2021-03-21 RX ADMIN — Medication 25 MILLIGRAM(S): at 05:25

## 2021-03-21 NOTE — PROGRESS NOTE ADULT - ATTENDING COMMENTS
Patient seen and examined, care d/w HS4 team.  In summary 66 yo born in [place], obese (BMI 33), DM2, glaucoma, HTN, HLD, COVID (3/2020) who presents with joint pains and rash.  Reports started having a rash 2/7/20 started on face was itchy then progressed down body.  Reports 8 days ago developed migrating joint pains involving the shoulders, elbows, knees and fingers.  Reports moves from joint to joint.  No fevers noted at home.  No weight loss.  Reports went to OSH and is not sure if seen by derm or rheum.  Was started on doxycycline for lyme (unclear what dx was based on).      # Rash/ Joint pain- concern for Dermatomyositis –Labs with  elevated ESR/CRP and ferritin  mild anemia, mild proteinuria (but in DM2 pt).  HIV neg.  Reportedly neg TB at OSH  LFTs mildly elevated CK normal.   serologies; VIPIN, ds-DNA, RF, CCP all neg   CT CAP neg for malignancy, uptodate on mammo, will need OP c-scope (never had)  TSH wnl. CK and myoglobin wnl -? cutaneous dermatomyositis, normal CK which is not uncommon.  Lyme neg here.  -Rheum consult appreciated - started on prednisone 50 mg on 3/17. on PCP prox   - Derm consult appreciated, s/p bx on 3/17 pending, Advise topical steroids    # Anemia: normocytic, mixed TREY and AOCD, no hemolysis (elevated LDH, normal hapto)  - trend, tx underlying issue   - FU outpt     # Thrombocytosis- Likely reactive    # DM2 poorly controlled, HbA1c 10.6%, now exacerbated by steroids  -On Lantus increased to 60 units. Admelog  increased to 34 units  TIC ac, moderate dose AUTUMN  - BS improving     # DVT Prox- Lovenox    Dispo - PT rec EFFIE, f/u progress  pending improvement and derm/rheum .

## 2021-03-21 NOTE — PROGRESS NOTE ADULT - SUBJECTIVE AND OBJECTIVE BOX
Humble Wayne, PGY-1  Pager: [phone] / [phone]    CHIEF COMPLAINT: Patient is a 65y old  Female who presents with a chief complaint of joint pain (21 Mar 2021 07:11)    [place]  [id number]  INTERVAL HPI/OVERNIGHT EVENTS: MARY. Pt was seen comfortable at bedside. She complains of L shoulder pain and generalized pruritus. The Atarax helps but she is using only 2-3/4 prns. Denies any chest pain, sob, abd pain. Had 1 BM yesterday.     MEDICATIONS (STANDING):  atorvastatin 20 milliGRAM(s) Oral at bedtime  cholecalciferol 2000 Unit(s) Oral daily  dextrose 40% Gel 15 Gram(s) Oral once  dextrose 50% Injectable 25 Gram(s) IV Push once  dextrose 50% Injectable 12.5 Gram(s) IV Push once  dextrose 50% Injectable 25 Gram(s) IV Push once  diltiazem    milliGRAM(s) Oral daily  enoxaparin Injectable 40 milliGRAM(s) SubCutaneous daily  fluocinonide 0.05% Cream 1 Application(s) Topical two times a day  gabapentin 100 milliGRAM(s) Oral <User Schedule>  gabapentin 300 milliGRAM(s) Oral at bedtime  hydrocortisone 1% Ointment 1 Application(s) Topical two times a day  insulin glargine Injectable (LANTUS) 60 Unit(s) SubCutaneous every morning  insulin lispro (ADMELOG) corrective regimen sliding scale   SubCutaneous three times a day before meals  insulin lispro (ADMELOG) corrective regimen sliding scale   SubCutaneous at bedtime  insulin lispro Injectable (ADMELOG) 34 Unit(s) SubCutaneous three times a day before meals  ketotifen 0.025% Ophthalmic Solution 1 Drop(s) Both EYES two times a day  latanoprost 0.005% Ophthalmic Solution 1 Drop(s) Both EYES at bedtime  nystatin    Suspension 873943 Unit(s) Swish and Swallow every 6 hours  pantoprazole    Tablet 40 milliGRAM(s) Oral before breakfast  petrolatum white Ointment 1 Application(s) Topical daily  predniSONE   Tablet 50 milliGRAM(s) Oral daily  triamcinolone 0.1% Ointment 1 Application(s) Topical two times a day  trimethoprim  160 mG/sulfamethoxazole 800 mG 1 Tablet(s) Oral <User Schedule>    MEDICATIONS  (PRN):  acetaminophen   Tablet .. 650 milliGRAM(s) Oral every 6 hours PRN  hydrOXYzine hydrochloride 25 milliGRAM(s) Oral every 6 hours PRN      REVIEW OF SYSTEMS:  CONSTITUTIONAL: No fever, weight loss, or fatigue  EYES: No eye pain, visual disturbances, or discharge  ENMT:  No difficulty hearing, tinnitus, vertigo; No sinus or throat pain  NECK: No pain or stiffness  RESPIRATORY: No cough, wheezing, chills or hemoptysis; No shortness of breath  CARDIOVASCULAR: No chest pain, palpitations, dizziness, or leg swelling  GASTROINTESTINAL: No abdominal or epigastric pain. No nausea, vomiting, or hematemesis; No diarrhea or constipation. No melena or hematochezia.  GENITOURINARY: No dysuria, frequency, hematuria, or incontinence  NEUROLOGICAL: No headaches, memory loss, loss of strength, numbness, or tremors  SKIN: ++ itching, ++ rashes  MUSCULOSKELETAL: ++joint pain or swelling; No muscle, back, or extremity pain    VITAL SIGNS:  T(F): 97.5 (03-21-21 @ 05:21), Max: 99.1 (03-20-21 @ 13:33)  HR: 73 (03-21-21 @ 05:21) (68 - 80)  BP: 134/61 (03-21-21 @ 05:21) (129/61 - 146/70)  RR: 16 (03-21-21 @ 05:21) (16 - 18)  SpO2: 99% (03-21-21 @ 05:21) (96% - 99%)    CAPILLARY BLOOD GLUCOSE  POCT Blood Glucose.: 110 mg/dL (21 Mar 2021 08:32)  POCT Blood Glucose.: 224 mg/dL (20 Mar 2021 22:06)  POCT Blood Glucose.: 138 mg/dL (20 Mar 2021 17:22)  POCT Blood Glucose.: 278 mg/dL (20 Mar 2021 12:16)    I&O's Summary  20 Mar 2021 07:01  -  21 Mar 2021 07:00  --------------------------------------------------------  IN: 555 mL / OUT: 1230 mL / NET: -675 mL    PHYSICAL EXAM:  GENERAL: NAD, well-groomed, well-developed  EYES: EOMI, PERRLA, conjunctiva and sclera clear  NECK: Supple, No JVD, Normal thyroid, no LAD  NERVOUS SYSTEM:  Alert & Oriented X3, Good concentration; Motor Strength 5/5 B/L upper and lower extremities  CHEST/LUNG: Clear to auscultation bilaterally on anterior auscultation; No rales, rhonchi, wheezing, or rubs  HEART: Regular rate and rhythm; No murmurs, rubs, or gallops  ABDOMEN: Soft, Nontender, Nondistended; Bowel sounds present, no HSM  EXTREMITIES:  2+ Peripheral Pulses, No clubbing, cyanosis, or edema, L shoulder with reduced ROM d/t pain - improved, Rest of joints wnl.   SKIN: confluent erythematous rash on chest, blanches - improving. Similar rash on both arms around elbows with scratch marks and dry skin - improving. No inguinal rash.     LABS:                        10.0   18.10 )-----------( 803      ( 21 Mar 2021 06:56 )             31.0     03-21    136  |  100  |  15  ----------------------------<  97  3.9   |  26  |  0.68    Ca    8.8      21 Mar 2021 06:56  Phos  4.4     03-21  Mg     2.4     03-21    TPro  6.5  /  Alb  2.9<L>  /  TBili  0.3  /  DBili  x   /  AST  94<H>  /  ALT  126<H>  /  AlkPhos  169<H>  03-21      RADIOLOGY & ADDITIONAL TESTS:   Humble Wayne, PGY-1  Pager: [phone] / [phone]    CHIEF COMPLAINT: Patient is a 65y old  Female who presents with a chief complaint of joint pain (21 Mar 2021 07:11)    [place]  [id number]  INTERVAL HPI/OVERNIGHT EVENTS: MARY. Pt was seen comfortable at bedside. She complains of L shoulder pain and generalized pruritus. The Atarax helps but she is using only 2-3/4 prns. Denies any chest pain, sob, abd pain. Had 1 BM yesterday.     MEDICATIONS (STANDING):  atorvastatin 20 milliGRAM(s) Oral at bedtime  cholecalciferol 2000 Unit(s) Oral daily  dextrose 40% Gel 15 Gram(s) Oral once  dextrose 50% Injectable 25 Gram(s) IV Push once  dextrose 50% Injectable 12.5 Gram(s) IV Push once  dextrose 50% Injectable 25 Gram(s) IV Push once  diltiazem    milliGRAM(s) Oral daily  enoxaparin Injectable 40 milliGRAM(s) SubCutaneous daily  fluocinonide 0.05% Cream 1 Application(s) Topical two times a day  gabapentin 100 milliGRAM(s) Oral <User Schedule>  gabapentin 300 milliGRAM(s) Oral at bedtime  hydrocortisone 1% Ointment 1 Application(s) Topical two times a day  insulin glargine Injectable (LANTUS) 60 Unit(s) SubCutaneous every morning  insulin lispro (ADMELOG) corrective regimen sliding scale   SubCutaneous three times a day before meals  insulin lispro (ADMELOG) corrective regimen sliding scale   SubCutaneous at bedtime  insulin lispro Injectable (ADMELOG) 34 Unit(s) SubCutaneous three times a day before meals  ketotifen 0.025% Ophthalmic Solution 1 Drop(s) Both EYES two times a day  latanoprost 0.005% Ophthalmic Solution 1 Drop(s) Both EYES at bedtime  nystatin    Suspension [id number] Unit(s) Swish and Swallow every 6 hours  pantoprazole    Tablet 40 milliGRAM(s) Oral before breakfast  petrolatum white Ointment 1 Application(s) Topical daily  predniSONE   Tablet 50 milliGRAM(s) Oral daily  triamcinolone 0.1% Ointment 1 Application(s) Topical two times a day  trimethoprim  160 mG/sulfamethoxazole 800 mG 1 Tablet(s) Oral <User Schedule>    MEDICATIONS  (PRN):  acetaminophen   Tablet .. 650 milliGRAM(s) Oral every 6 hours PRN  hydrOXYzine hydrochloride 25 milliGRAM(s) Oral every 6 hours PRN    REVIEW OF SYSTEMS:  CONSTITUTIONAL: No fever, weight loss, or fatigue  EYES: No eye pain, visual disturbances, or discharge  ENMT:  No difficulty hearing, tinnitus, vertigo; No sinus or throat pain  NECK: No pain or stiffness  RESPIRATORY: No cough, wheezing, chills or hemoptysis; No shortness of breath  CARDIOVASCULAR: No chest pain, palpitations, dizziness, or leg swelling  GASTROINTESTINAL: No abdominal or epigastric pain. No nausea, vomiting, or hematemesis; No diarrhea or constipation. No melena or hematochezia.  GENITOURINARY: No dysuria, frequency, hematuria, or incontinence  NEUROLOGICAL: No headaches, memory loss, loss of strength, numbness, or tremors  SKIN: ++ itching, ++ rashes  MUSCULOSKELETAL: ++joint pain or swelling; No muscle, back, or extremity pain    VITAL SIGNS:  T(F): 97.5 (03-21-21 @ 05:21), Max: 99.1 (03-20-21 @ 13:33)  HR: 73 (03-21-21 @ 05:21) (68 - 80)  BP: 134/61 (03-21-21 @ 05:21) (129/61 - 146/70)  RR: 16 (03-21-21 @ 05:21) (16 - 18)  SpO2: 99% (03-21-21 @ 05:21) (96% - 99%)    CAPILLARY BLOOD GLUCOSE  POCT Blood Glucose.: 110 mg/dL (21 Mar 2021 08:32)  POCT Blood Glucose.: 224 mg/dL (20 Mar 2021 22:06)  POCT Blood Glucose.: 138 mg/dL (20 Mar 2021 17:22)  POCT Blood Glucose.: 278 mg/dL (20 Mar 2021 12:16)    I&O's Summary  20 Mar 2021 07:01  -  21 Mar 2021 07:00  --------------------------------------------------------  IN: 555 mL / OUT: 1230 mL / NET: -675 mL    PHYSICAL EXAM:  GENERAL: NAD, well-groomed, well-developed  EYES: EOMI, PERRLA, conjunctiva and sclera clear  NECK: Supple, No JVD, Normal thyroid, no LAD  NERVOUS SYSTEM:  Alert & Oriented X3, Good concentration; Motor Strength 5/5 B/L upper and lower extremities  CHEST/LUNG: Clear to auscultation bilaterally on anterior auscultation; No rales, rhonchi, wheezing, or rubs  HEART: Regular rate and rhythm; No murmurs, rubs, or gallops  ABDOMEN: Soft, Nontender, Nondistended; Bowel sounds present, no HSM  EXTREMITIES:  2+ Peripheral Pulses, No clubbing, cyanosis, or edema, L shoulder with reduced ROM d/t pain - improved, Rest of joints wnl.   SKIN: confluent erythematous rash on chest, blanches - improving. Similar rash on both arms around elbows with scratch marks and dry skin - improving. No inguinal rash.     LABS:                        10.0   18.10 )-----------( 803      ( 21 Mar 2021 06:56 )             31.0     03-21    136  |  100  |  15  ----------------------------<  97  3.9   |  26  |  0.68    Ca    8.8      21 Mar 2021 06:56  Phos  4.4     03-21  Mg     2.4     03-21    TPro  6.5  /  Alb  2.9<L>  /  TBili  0.3  /  DBili  x   /  AST  94<H>  /  ALT  126<H>  /  AlkPhos  169<H>  03-21

## 2021-03-21 NOTE — PROGRESS NOTE ADULT - PROBLEM SELECTOR PLAN 1
- Pt presented with generalized weakness, subjective? fevers, polyarthralgia/arthritis x1 week of large joints, along with rash x1 month, +dry mouth/eyes?; no autoimmune Hx or FHx.  - S/p OSH discharge 3/12 for Lyme disease started on Doxycyline 21-day regimen  - Was treated there with Levaquin and 3x doses of Vanco.  - Leukocytosis to 23.4K (pmn), anemia to 10.6 with MCV 82, platelets 751, ESR 91, , elevated AST/ALT 40/51, alk phos 238, CK low 19  - U/A 30 protein  - Likely inflammatory etiology. Will send workup including blue top platelets, anemia workup, hepatitis panel - neg, HIV - neg, syphilis - neg, EBV - past infection, CMV - neg, lyme panel-neg , blood cultures - NGTD, C3 slightly elevated, C4 - wnl. VIPIN-neg, dsDNA - neg, CCP -neg, anti SSA/B - neg, Mallory-1 - neg. Myoglobin - low, RF - neg, anti Smith - neg  PENDING blood smear  - X-rays L shoulder and R elbow - wnl  - CT chest abd pelvis - wnl, no signs of malignancy  - Rheum consulted, appreciate recs: suspects DERMATOMYOSITIS, started on prednisone 50mg qd yesterday with mild response.   -start protonix 40mg qd for PUD ppx  -will add gabapentin for itchiness/pain  - Derm consulted, appreciate recs: f/u punch biopsy  - Pain control with Tylenol for mild pain, Toradol for moderate-severe pain  - Obtain outpatient medical records from recent hospitalization (was at [facility]) - papers sent. - Pt presented with generalized weakness, subjective? fevers, polyarthralgia/arthritis x1 week of large joints, along with rash x1 month, +dry mouth/eyes?; no autoimmune Hx or FHx.  - S/p OSH discharge 3/12 for Lyme disease started on Doxycyline 21-day regimen  - Was treated there with Levaquin and 3x doses of Vanco.  - Leukocytosis to 23.4K (pmn), anemia to 10.6 with MCV 82, platelets 751, ESR 91, , elevated AST/ALT 40/51, alk phos 238, CK low 19  - U/A 30 protein  - Likely inflammatory etiology. Will send workup including blue top platelets, anemia workup, hepatitis panel - neg, HIV - neg, syphilis - neg, EBV - past infection, CMV - neg, lyme panel-neg , blood cultures - NGTD, C3 slightly elevated, C4 - wnl. VIPIN-neg, dsDNA - neg, CCP -neg, anti SSA/B - neg, Mallory-1 - neg. Myoglobin - low, RF - neg, anti Smith - neg  PENDING blood smear  - X-rays L shoulder and R elbow - wnl  - CT chest abd pelvis - wnl, no signs of malignancy  - Rheum consulted, appreciate recs: suspects DERMATOMYOSITIS, started on prednisone 50mg qd yesterday with mild response.   -start protonix 40mg qd for PUD ppx and bacrim for PCP ppx.  -will add gabapentin for itchiness/pain  - Derm consulted, appreciate recs: f/u punch biopsy  - Pain control with Tylenol for mild pain, Toradol for moderate-severe pain  - Obtain outpatient medical records from recent hospitalization (was at [facility]) - papers sent. - Pt presented with generalized weakness, subjective? fevers, polyarthralgia/arthritis x1 week of large joints, along with rash x1 month, +dry mouth/eyes?; no autoimmune Hx or FHx.  - S/p OSH discharge 3/12 for Lyme disease started on Doxycyline 21-day regimen and was treated there with Levaquin and 3x doses of Vanco.  - Leukocytosis to 23.4K (pmn), anemia to 10.6 with MCV 82, platelets 751, ESR 91, , elevated AST/ALT 40/51, alk phos 238, CK low 19  - UA 30 protein  - Likely inflammatory etiology. Workup: hepatitis panel - neg, HIV - neg, syphilis - neg, EBV - past infection, CMV - neg, lyme panel-neg , blood cultures - NGTD, C3 slightly elevated, C4 - wnl. VIPIN-neg, dsDNA - neg, CCP -neg, anti SSA/B - neg, Mallory-1 - neg. Myoglobin - low, RF - neg, anti Smith - neg  PENDING blood smear  - X-rays L shoulder and R elbow - wnl  - CT chest abd pelvis - wnl, no signs of malignancy  - Rheum consulted, appreciate recs: suspects DERMATOMYOSITIS, started on prednisone 50mg qd yesterday with mild response.   -start protonix 40mg qd for PUD ppx and Bactrim for PCP ppx.  - will add gabapentin for itchiness/pain  - Derm consulted, appreciate recs: f/u punch biopsy  - Pain control with Tylenol for mild pain, Toradol for moderate-severe pain  - Obtain outpatient medical records from recent hospitalization (was at [facility]) - papers sent.

## 2021-03-21 NOTE — PROGRESS NOTE ADULT - PROBLEM SELECTOR PLAN 3
Blood glucose 213, 285  - f/u A1c  - On basaglar 17 U BID at home   - Currently on 60u basaglar and premeals to 30u, glucose persistently in the 200-300s, increase per ISS needs  - Monitor fingersticks as started steroids  - Diabetic diet Blood glucose 213, 285  - f/u A1c  - On basaglar 17 U BID at home   - Currently on 60u basaglar and premeals to 34u, FSG starting to normalize  - Diabetic diet

## 2021-03-21 NOTE — PROGRESS NOTE ADULT - ASSESSMENT
64 y/o [place]ian speaking Female with PMHx of DM (on insulin), HTN, HLD, COVID (2020) and glaucoma presenting with generalized weakness, fever, polyarthritis and rash, admitted for further workup, current working diagnosis: amyopathic dermatomyositis.

## 2021-03-21 NOTE — CONSULT NOTE ADULT - SUBJECTIVE AND OBJECTIVE BOX
65yF was admitted on     Patient is a 65y old  Female who presents with a chief complaint of joint pain (21 Mar 2021 07:11)    HPI:  66 y/o Brazilian speaking Female with PMHx of DM Type 2 (on insulin), HTN, HLD, COVID (3/2020) and glaucoma presenting with generalized weakness, joint pain, and rash. Patient states that she was discharged from OSH on 3/12 after being evaluated for similar symptoms. They suspected that she may have Lyme disease and started her on doxycycline and sent her home. They also suspected that this might be post COVID related.   Patient has multiple complaints. Reports rash in the neck area and b/l arms, along with whole body itching. States that she has had a rash since 2021 and has seen various doctors and tried different things (steroid creams) without relief. The rash initially started on her face. Also complains of joint pain for 1 week. States she has joint pain and stiffness in the b/l shoulders, elbows, hands, and knees. States that the pain is currently worst in the L shoulder and R elbow. States that the pain is so significant that she is unable to walk currently. Tries Tylenol with minimal relief. Also reports that she has been unable to do her ADLs - clean herself, walk. Never has had joint pain like this before. Also reports subjective fever and chills, along with dry mouth.   Denies any N/V/D, abdominal pain, leg swelling, chest pain, dyspnea. Denies oral ulcers, hair loss, weight loss, dry eyes.     ED course: pt's vitals were T 98.3 HR 76-94 -165/72 RR 18-19 POX % RA. Pt treated with NS 1L bolus and Toradol 15 mg IV.  (17 Mar 2021 04:39)    Concern for Dermatomyositis –Labs with elevated ESR/CRP and ferritin mild anemia, mild proteinuria (but in DM2 pt).  HIV neg.  Reportedly neg TB at OSH. LFTs mildly elevated CK normal. Serologies; VIPIN, ds-DNA, RF, CCP all neg. CT CAP neg for malignancy. As per Rheum started on prednisone 50 mg on 3/17. on PCP prox. As per Derm consult, s/p bx on 3/17 pending, advise topical steroids.        REVIEW OF SYSTEMS  Constitutional - No fever, No weight loss, No fatigue  HEENT - No eye pain, No visual disturbances, No difficulty hearing, No tinnitus, No vertigo, No neck pain  Respiratory - No cough, No wheezing, No shortness of breath  Cardiovascular - No chest pain, No palpitations  Gastrointestinal - No abdominal pain, No nausea, No vomiting, No diarrhea, No constipation  Genitourinary - No dysuria, No frequency, No hematuria, No incontinence  Neurological - No headaches, No memory loss, No loss of strength, No numbness, No tremors  Skin - No itching, No rashes, No lesions   Endocrine - No temperature intolerance  Musculoskeletal - No joint pain, No joint swelling, No muscle pain  Psychiatric - No depression, No anxiety    VITALS  T(C): 37.4 (21 @ 21:31), Max: 37.4 (21 @ 21:31)  HR: 78 (21 @ 21:31) (68 - 78)  BP: 139/64 (21 @ 21:31) (129/61 - 139/64)  RR: 16 (21 @ 21:31) (16 - 17)  SpO2: 99% (21 @ 21:31) (98% - 99%)  Wt(kg): --    PAST MEDICAL & SURGICAL HISTORY  COVID-19    Vitamin D deficiency    Glaucoma    Hyperlipidemia    Hypertension    Diabetes mellitus    History of throat surgery    History of elbow surgery    S/P     No significant past surgical history      SOCIAL HISTORY  Smoking - Denied  EtOH - Denied   Drugs - Denied    FUNCTIONAL HISTORY  Lives with spouse in an apartment with no TAMMY. Elevator inside building.   Independent with ADL and ambulation without AD PTA.     CURRENT FUNCTIONAL STATUS  3/19:      Sit-Stand Transfer Training  Transfer Training Sit-to-Stand Transfer: minimum assist (75% patient effort);  1 person assist;  rolling walker  Transfer Training Stand-to-Sit Transfer: minimum assist (75% patient effort);  contact guard;  1 person assist;  verbal cues  Sit-to-Stand Transfer Training Transfer Safety Analysis: impaired balance;  decreased strength;  pain    Gait Training  Gait Training: minimum assist (75% patient effort);  1 person assist;  verbal cues;  rolling walker;  25 feet  Gait Analysis: decreased calin;  decreased step length;  decreased strength;  impaired balance;  pain        FAMILY HISTORY   No pertinent family history in first degree relatives        RECENT LABS/IMAGING  CBC Full  -  ( 21 Mar 2021 06:56 )  WBC Count : 18.10 K/uL  RBC Count : 3.69 M/uL  Hemoglobin : 10.0 g/dL  Hematocrit : 31.0 %  Platelet Count - Automated : 803 K/uL  Mean Cell Volume : 84.0 fL  Mean Cell Hemoglobin : 27.1 pg  Mean Cell Hemoglobin Concentration : 32.3 gm/dL  Auto Neutrophil # : x  Auto Lymphocyte # : x  Auto Monocyte # : x  Auto Eosinophil # : x  Auto Basophil # : x  Auto Neutrophil % : x  Auto Lymphocyte % : x  Auto Monocyte % : x  Auto Eosinophil % : x  Auto Basophil % : x        136  |  100  |  15  ----------------------------<  97  3.9   |  26  |  0.68    Ca    8.8      21 Mar 2021 06:56  Phos  4.4       Mg     2.4         TPro  6.5  /  Alb  2.9<L>  /  TBili  0.3  /  DBili  x   /  AST  94<H>  /  ALT  126<H>  /  AlkPhos  169<H>      < from: CT Chest w/ IV Cont (21 @ 22:21) >  IMPRESSION:  No evidence of malignancy in the chest, abdomen, or pelvis.    < end of copied text >      COVID-19 PCR: NotDetec: You can help in the fight against COVID-19. [facility] may contact  you to see if you are interested in voluntarily participating in one of  our clinical trials.  Testing is performed using polymerase chain reaction (PCR) or  transcription mediated amplification (TMA). This COVID-19 (SARS-CoV-2)  nucleic acid amplification test was validated by [employer] and is  in use under the FDA Emergency Use Authorization (EUA) for clinical labs  CLIA-certified to perform high complexity testing. Test results should be  correlated with clinical presentation, patient history, and epidemiology. (21 @ 00:25)        ALLERGIES  azithromycin (Hives; Swelling)  enalapril (Other (Mild to Mod))  penicillin (Swelling; Rash)      MEDICATIONS   acetaminophen   Tablet .. 650 milliGRAM(s) Oral every 6 hours PRN  atorvastatin 20 milliGRAM(s) Oral at bedtime  cholecalciferol 2000 Unit(s) Oral daily  dextrose 40% Gel 15 Gram(s) Oral once  dextrose 50% Injectable 25 Gram(s) IV Push once  dextrose 50% Injectable 12.5 Gram(s) IV Push once  dextrose 50% Injectable 25 Gram(s) IV Push once  diltiazem    milliGRAM(s) Oral daily  enoxaparin Injectable 40 milliGRAM(s) SubCutaneous daily  fluocinonide 0.05% Cream 1 Application(s) Topical two times a day  gabapentin 100 milliGRAM(s) Oral <User Schedule>  gabapentin 300 milliGRAM(s) Oral at bedtime  hydrocortisone 1% Ointment 1 Application(s) Topical two times a day  hydrOXYzine hydrochloride 25 milliGRAM(s) Oral every 6 hours PRN  insulin glargine Injectable (LANTUS) 60 Unit(s) SubCutaneous every morning  insulin lispro (ADMELOG) corrective regimen sliding scale   SubCutaneous three times a day before meals  insulin lispro (ADMELOG) corrective regimen sliding scale   SubCutaneous at bedtime  insulin lispro Injectable (ADMELOG) 34 Unit(s) SubCutaneous three times a day before meals  ketotifen 0.025% Ophthalmic Solution 1 Drop(s) Both EYES two times a day  latanoprost 0.005% Ophthalmic Solution 1 Drop(s) Both EYES at bedtime  nystatin    Suspension 005254 Unit(s) Swish and Swallow every 6 hours  pantoprazole    Tablet 40 milliGRAM(s) Oral before breakfast  petrolatum white Ointment 1 Application(s) Topical daily  predniSONE   Tablet 50 milliGRAM(s) Oral daily  triamcinolone 0.1% Ointment 1 Application(s) Topical two times a day  trimethoprim  160 mG/sulfamethoxazole 800 mG 1 Tablet(s) Oral <User Schedule>      ----------------------------------------------------------------------------------------  PHYSICAL EXAM  Constitutional - NAD, Comfortable  HEENT - NCAT, EOMI  Neck - Supple, No limited ROM  Chest - Breathing comfortably, No wheezing  Cardiovascular - S1S2   Abdomen - Soft   Extremities - No C/C/E, No calf tenderness   Neurologic Exam -                    Cognitive - Awake, Alert, AAO to self, place, date, year, situation     Communication - Fluent, No dysarthria     Cranial Nerves - CN 2-12 intact     Motor - No focal deficits                    LEFT    UE - ShAB 5/5, EF 5/5, EE 5/5, WE 5/5,  5/5                    RIGHT UE - ShAB 5/5, EF 5/5, EE 5/5, WE 5/5,  5/5                    LEFT    LE - HF 5/5, KE 5/5, DF 5/5, PF 5/5                    RIGHT LE - HF 5/5, KE 5/5, DF 5/5, PF 5/5        Sensory - Intact to LT     Reflexes - DTR Intact, No primitive reflexive     Coordination - FTN intact     OculoVestibular - No saccades, No nystagmus, VOR         Balance - WNL Static  Psychiatric - Mood stable, Affect WNL  ----------------------------------------------------------------------------------------  ASSESSMENT/PLAN  65yFemale with joint pain and rash suspected to be 2/2 to dermatomyositis.    Dermatomyositis- Prednisone, PCP ppx, topical steroids, punch biopsy pending, gabapentin for rash   Thrombocytosis- likely reaactive   Anemia- Trend, outpatient FU   DM- Lantus, lispro, ISS   Pain - Tylenol  DVT PPX - Loveox   Rehab -     ID# [id number]   65yF was admitted on     Patient is a 65y old  Female who presents with a chief complaint of joint pain (21 Mar 2021 07:11)    HPI:  66 y/o Tristanian speaking Female with PMHx of DM Type 2 (on insulin), HTN, HLD, COVID (3/2020) and glaucoma presenting with generalized weakness, joint pain, and rash. Patient states that she was discharged from OSH on 3/12 after being evaluated for similar symptoms. They suspected that she may have Lyme disease and started her on doxycycline and sent her home. They also suspected that this might be post COVID related.   Patient has multiple complaints. Reports rash in the neck area and b/l arms, along with whole body itching. States that she has had a rash since 2021 and has seen various doctors and tried different things (steroid creams) without relief. The rash initially started on her face. Also complains of joint pain for 1 week. States she has joint pain and stiffness in the b/l shoulders, elbows, hands, and knees. States that the pain is currently worst in the L shoulder and R elbow. States that the pain is so significant that she is unable to walk currently. Tries Tylenol with minimal relief. Also reports that she has been unable to do her ADLs - clean herself, walk. Never has had joint pain like this before. Also reports subjective fever and chills, along with dry mouth.   Denies any N/V/D, abdominal pain, leg swelling, chest pain, dyspnea. Denies oral ulcers, hair loss, weight loss, dry eyes.     ED course: pt's vitals were T 98.3 HR 76-94 -165/72 RR 18-19 POX % RA. Pt treated with NS 1L bolus and Toradol 15 mg IV.  (17 Mar 2021 04:39)    Concern for Dermatomyositis –Labs with elevated ESR/CRP and ferritin mild anemia, mild proteinuria (but in DM2 pt).  HIV neg.  Reportedly neg TB at OSH. LFTs mildly elevated CK normal. Serologies; VIPIN, ds-DNA, RF, CCP all neg. CT CAP neg for malignancy. As per Rheum started on prednisone 50 mg on 3/17. on PCP prox. As per Derm consult, s/p bx on 3/17 pending, advise topical steroids.        REVIEW OF SYSTEMS  Constitutional - No fever, No weight loss, +fatigue   HEENT - No eye pain, No visual disturbances, No difficulty hearing, No tinnitus, No vertigo, No neck pain  Respiratory - No cough, No wheezing, No shortness of breath  Cardiovascular - No chest pain, No palpitations  Gastrointestinal - No abdominal pain, No nausea, No vomiting, No diarrhea, No constipation  Genitourinary - No dysuria, No frequency, No hematuria, No incontinence  Neurological - +Weakness if her knees and shoulder, +generalized weakness, No headaches, No memory loss, No numbness, No tremors  Skin - +rash and itching on her chest, scalp, neck  Endocrine - No temperature intolerance  Musculoskeletal - +muscle pain, knee and shoulder pain No joint swelling,   Psychiatric - No depression, No anxiety    Vital Signs Last 24 Hrs  T(C): 37.5 (22 Mar 2021 05:15), Max: 37.5 (22 Mar 2021 05:15)  T(F): 99.5 (22 Mar 2021 05:15), Max: 99.5 (22 Mar 2021 05:15)  HR: 65 (22 Mar 2021 05:15) (65 - 78)  BP: 128/63 (22 Mar 2021 05:15) (128/63 - 139/64)  BP(mean): --  ABP: --  ABP(mean): --  RR: 17 (22 Mar 2021 05:15) (16 - 17)  SpO2: 97% (22 Mar 2021 05:15) (97% - 99%)      PAST MEDICAL & SURGICAL HISTORY  COVID-19    Vitamin D deficiency    Glaucoma    Hyperlipidemia    Hypertension    Diabetes mellitus    History of throat surgery    History of elbow surgery    S/P     No significant past surgical history      SOCIAL HISTORY  Smoking - Denied  EtOH - Denied   Drugs - Denied    FUNCTIONAL HISTORY  Lives with spouse in an apartment with no TAMMY. Elevator inside building.   Independent with ADL and ambulation without AD PTA.     CURRENT FUNCTIONAL STATUS  3/19:      Sit-Stand Transfer Training  Transfer Training Sit-to-Stand Transfer: minimum assist (75% patient effort);  1 person assist;  rolling walker  Transfer Training Stand-to-Sit Transfer: minimum assist (75% patient effort);  contact guard;  1 person assist;  verbal cues  Sit-to-Stand Transfer Training Transfer Safety Analysis: impaired balance;  decreased strength;  pain    Gait Training  Gait Training: minimum assist (75% patient effort);  1 person assist;  verbal cues;  rolling walker;  25 feet  Gait Analysis: decreased calin;  decreased step length;  decreased strength;  impaired balance;  pain        FAMILY HISTORY   No pertinent family history in first degree relatives        RECENT LABS/IMAGING  CBC Full  -  ( 21 Mar 2021 06:56 )  WBC Count : 18.10 K/uL  RBC Count : 3.69 M/uL  Hemoglobin : 10.0 g/dL  Hematocrit : 31.0 %  Platelet Count - Automated : 803 K/uL  Mean Cell Volume : 84.0 fL  Mean Cell Hemoglobin : 27.1 pg  Mean Cell Hemoglobin Concentration : 32.3 gm/dL  Auto Neutrophil # : x  Auto Lymphocyte # : x  Auto Monocyte # : x  Auto Eosinophil # : x  Auto Basophil # : x  Auto Neutrophil % : x  Auto Lymphocyte % : x  Auto Monocyte % : x  Auto Eosinophil % : x  Auto Basophil % : x        136  |  100  |  15  ----------------------------<  97  3.9   |  26  |  0.68    Ca    8.8      21 Mar 2021 06:56  Phos  4.4       Mg     2.4         TPro  6.5  /  Alb  2.9<L>  /  TBili  0.3  /  DBili  x   /  AST  94<H>  /  ALT  126<H>  /  AlkPhos  169<H>      < from: CT Chest w/ IV Cont (21 @ 22:21) >  IMPRESSION:  No evidence of malignancy in the chest, abdomen, or pelvis.    < end of copied text >      COVID-19 PCR: NotDetec: You can help in the fight against COVID-19. [email or web address] may contact  you to see if you are interested in voluntarily participating in one of  our clinical trials.  Testing is performed using polymerase chain reaction (PCR) or  transcription mediated amplification (TMA). This COVID-19 (SARS-CoV-2)  nucleic acid amplification test was validated by [employer] and is  in use under the FDA Emergency Use Authorization (EUA) for clinical labs  CLIA-certified to perform high complexity testing. Test results should be  correlated with clinical presentation, patient history, and epidemiology. (21 @ 00:25)        ALLERGIES  azithromycin (Hives; Swelling)  enalapril (Other (Mild to Mod))  penicillin (Swelling; Rash)      MEDICATIONS   acetaminophen   Tablet .. 650 milliGRAM(s) Oral every 6 hours PRN  atorvastatin 20 milliGRAM(s) Oral at bedtime  cholecalciferol 2000 Unit(s) Oral daily  dextrose 40% Gel 15 Gram(s) Oral once  dextrose 50% Injectable 25 Gram(s) IV Push once  dextrose 50% Injectable 12.5 Gram(s) IV Push once  dextrose 50% Injectable 25 Gram(s) IV Push once  diltiazem    milliGRAM(s) Oral daily  enoxaparin Injectable 40 milliGRAM(s) SubCutaneous daily  fluocinonide 0.05% Cream 1 Application(s) Topical two times a day  gabapentin 100 milliGRAM(s) Oral <User Schedule>  gabapentin 300 milliGRAM(s) Oral at bedtime  hydrocortisone 1% Ointment 1 Application(s) Topical two times a day  hydrOXYzine hydrochloride 25 milliGRAM(s) Oral every 6 hours PRN  insulin glargine Injectable (LANTUS) 60 Unit(s) SubCutaneous every morning  insulin lispro (ADMELOG) corrective regimen sliding scale   SubCutaneous three times a day before meals  insulin lispro (ADMELOG) corrective regimen sliding scale   SubCutaneous at bedtime  insulin lispro Injectable (ADMELOG) 34 Unit(s) SubCutaneous three times a day before meals  ketotifen 0.025% Ophthalmic Solution 1 Drop(s) Both EYES two times a day  latanoprost 0.005% Ophthalmic Solution 1 Drop(s) Both EYES at bedtime  nystatin    Suspension 792151 Unit(s) Swish and Swallow every 6 hours  pantoprazole    Tablet 40 milliGRAM(s) Oral before breakfast  petrolatum white Ointment 1 Application(s) Topical daily  predniSONE   Tablet 50 milliGRAM(s) Oral daily  triamcinolone 0.1% Ointment 1 Application(s) Topical two times a day  trimethoprim  160 mG/sulfamethoxazole 800 mG 1 Tablet(s) Oral <User Schedule>      ----------------------------------------------------------------------------------------  PHYSICAL EXAM  Constitutional - NAD, Comfortable  HEENT - NCAT, EOMI  Neck - Supple, No limited ROM, +rash   Chest - Breathing comfortably, No wheezing  Cardiovascular - S1S2   Abdomen - Soft   Extremities - +pitting edema bilaterally, +tenderness on the left shoulder and knees. No calf tenderness   Neurologic Exam -                    Cognitive - Awake, Alert, AAO to self, place, date, year, situation     Communication - Fluent, No dysarthria     Motor -                     LEFT    UE - ShAB 4/5, EF 4/5, EE 4/5, WE 4/5,  4/5                    RIGHT UE - ShAB 4+/5, EF 4+/5, EE 4+/5, WE 4+/5,  4+/5                    LEFT    LE - HF 4+/5, KE 4+/5, DF 4+/5, PF 4+/5                    RIGHT LE - HF 4+/5, KE 4+/5, DF 4+/5, PF 4+/5        Sensory - Intact to LT     Reflexes - DTR Intact, No primitive reflexive  Psychiatric - Mood stable, Affect WNL  ----------------------------------------------------------------------------------------  ASSESSMENT/PLAN  65yFemale with joint pain and rash suspected to be 2/2 to dermatomyositis.    Dermatomyositis- Prednisone, PCP ppx, topical steroids, punch biopsy pending, gabapentin for rash   Thrombocytosis- likely reaactive   Anemia- Trend, outpatient FU   DM- Lantus, lispro, ISS   Pain - Tylenol  DVT PPX - Loveox   Rehab -     ID# [id number]   65yF was admitted on     Patient is a 65y old  Female who presents with a chief complaint of joint pain (21 Mar 2021 07:11)    HPI:  64 y/o Tunisian speaking Female with PMHx of DM Type 2 (on insulin), HTN, HLD, COVID (3/2020) and glaucoma presenting with generalized weakness, joint pain, and rash. Patient states that she was discharged from OSH on 3/12 after being evaluated for similar symptoms. They suspected that she may have Lyme disease and started her on doxycycline and sent her home. They also suspected that this might be post COVID related.   Patient has multiple complaints. Reports rash in the neck area and b/l arms, along with whole body itching. States that she has had a rash since 2021 and has seen various doctors and tried different things (steroid creams) without relief. The rash initially started on her face. Also complains of joint pain for 1 week. States she has joint pain and stiffness in the b/l shoulders, elbows, hands, and knees. States that the pain is currently worst in the L shoulder and R elbow. States that the pain is so significant that she is unable to walk currently. Tries Tylenol with minimal relief. Also reports that she has been unable to do her ADLs - clean herself, walk. Never has had joint pain like this before. Also reports subjective fever and chills, along with dry mouth.   Denies any N/V/D, abdominal pain, leg swelling, chest pain, dyspnea. Denies oral ulcers, hair loss, weight loss, dry eyes.     ED course: pt's vitals were T 98.3 HR 76-94 -165/72 RR 18-19 POX % RA. Pt treated with NS 1L bolus and Toradol 15 mg IV.  (17 Mar 2021 04:39)    Concern for Dermatomyositis –Labs with elevated ESR/CRP and ferritin mild anemia, mild proteinuria (but in DM2 pt).  HIV neg.  Reportedly neg TB at OSH. LFTs mildly elevated CK normal. Serologies; VIPIN, ds-DNA, RF, CCP all neg. CT CAP neg for malignancy. As per Rheum started on prednisone 50 mg on 3/17. on PCP prox. As per Derm consult, s/p bx on 3/17 pending, advise topical steroids.        REVIEW OF SYSTEMS  Constitutional - No fever, No weight loss, +fatigue   HEENT - No eye pain, No visual disturbances, No difficulty hearing, No tinnitus, No vertigo, No neck pain  Respiratory - No cough, No wheezing, No shortness of breath  Cardiovascular - No chest pain, No palpitations  Gastrointestinal - No abdominal pain, No nausea, No vomiting, No diarrhea, No constipation  Genitourinary - No dysuria, No frequency, No hematuria, No incontinence  Neurological - +Weakness if her knees and shoulder, +generalized weakness, No headaches, No memory loss, No numbness, No tremors  Skin - +rash and itching on her chest, scalp, neck  Endocrine - No temperature intolerance  Musculoskeletal - +muscle pain, left knee, ankle and shoulder pain, +joint swelling   Psychiatric - No depression, No anxiety    Vital Signs Last 24 Hrs  T(C): 37.5 (22 Mar 2021 05:15), Max: 37.5 (22 Mar 2021 05:15)  T(F): 99.5 (22 Mar 2021 05:15), Max: 99.5 (22 Mar 2021 05:15)  HR: 65 (22 Mar 2021 05:15) (65 - 78)  BP: 128/63 (22 Mar 2021 05:15) (128/63 - 139/64)  BP(mean): --  ABP: --  ABP(mean): --  RR: 17 (22 Mar 2021 05:15) (16 - 17)  SpO2: 97% (22 Mar 2021 05:15) (97% - 99%)      PAST MEDICAL & SURGICAL HISTORY  COVID-19    Vitamin D deficiency    Glaucoma    Hyperlipidemia    Hypertension    Diabetes mellitus    History of throat surgery    History of elbow surgery    S/P     No significant past surgical history      SOCIAL HISTORY  Smoking - Denied  EtOH - Denied   Drugs - Denied    FUNCTIONAL HISTORY  Lives with spouse in an apartment with no TAMMY. Elevator inside building.   Independent with ADL and ambulation without AD PTA.     CURRENT FUNCTIONAL STATUS  3/19:      Sit-Stand Transfer Training  Transfer Training Sit-to-Stand Transfer: minimum assist (75% patient effort);  1 person assist;  rolling walker  Transfer Training Stand-to-Sit Transfer: minimum assist (75% patient effort);  contact guard;  1 person assist;  verbal cues  Sit-to-Stand Transfer Training Transfer Safety Analysis: impaired balance;  decreased strength;  pain    Gait Training  Gait Training: minimum assist (75% patient effort);  1 person assist;  verbal cues;  rolling walker;  25 feet  Gait Analysis: decreased calin;  decreased step length;  decreased strength;  impaired balance;  pain        FAMILY HISTORY   No pertinent family history in first degree relatives        RECENT LABS/IMAGING  CBC Full  -  ( 21 Mar 2021 06:56 )  WBC Count : 18.10 K/uL  RBC Count : 3.69 M/uL  Hemoglobin : 10.0 g/dL  Hematocrit : 31.0 %  Platelet Count - Automated : 803 K/uL  Mean Cell Volume : 84.0 fL  Mean Cell Hemoglobin : 27.1 pg  Mean Cell Hemoglobin Concentration : 32.3 gm/dL  Auto Neutrophil # : x  Auto Lymphocyte # : x  Auto Monocyte # : x  Auto Eosinophil # : x  Auto Basophil # : x  Auto Neutrophil % : x  Auto Lymphocyte % : x  Auto Monocyte % : x  Auto Eosinophil % : x  Auto Basophil % : x        136  |  100  |  15  ----------------------------<  97  3.9   |  26  |  0.68    Ca    8.8      21 Mar 2021 06:56  Phos  4.4       Mg     2.4         TPro  6.5  /  Alb  2.9<L>  /  TBili  0.3  /  DBili  x   /  AST  94<H>  /  ALT  126<H>  /  AlkPhos  169<H>      < from: CT Chest w/ IV Cont (21 @ 22:21) >  IMPRESSION:  No evidence of malignancy in the chest, abdomen, or pelvis.    < end of copied text >      COVID-19 PCR: NotDetec: You can help in the fight against COVID-19. [facility] may contact  you to see if you are interested in voluntarily participating in one of  our clinical trials.  Testing is performed using polymerase chain reaction (PCR) or  transcription mediated amplification (TMA). This COVID-19 (SARS-CoV-2)  nucleic acid amplification test was validated by [employer] and is  in use under the FDA Emergency Use Authorization (EUA) for clinical labs  CLIA-certified to perform high complexity testing. Test results should be  correlated with clinical presentation, patient history, and epidemiology. (21 @ 00:25)        ALLERGIES  azithromycin (Hives; Swelling)  enalapril (Other (Mild to Mod))  penicillin (Swelling; Rash)      MEDICATIONS   acetaminophen   Tablet .. 650 milliGRAM(s) Oral every 6 hours PRN  atorvastatin 20 milliGRAM(s) Oral at bedtime  cholecalciferol 2000 Unit(s) Oral daily  dextrose 40% Gel 15 Gram(s) Oral once  dextrose 50% Injectable 25 Gram(s) IV Push once  dextrose 50% Injectable 12.5 Gram(s) IV Push once  dextrose 50% Injectable 25 Gram(s) IV Push once  diltiazem    milliGRAM(s) Oral daily  enoxaparin Injectable 40 milliGRAM(s) SubCutaneous daily  fluocinonide 0.05% Cream 1 Application(s) Topical two times a day  gabapentin 100 milliGRAM(s) Oral <User Schedule>  gabapentin 300 milliGRAM(s) Oral at bedtime  hydrocortisone 1% Ointment 1 Application(s) Topical two times a day  hydrOXYzine hydrochloride 25 milliGRAM(s) Oral every 6 hours PRN  insulin glargine Injectable (LANTUS) 60 Unit(s) SubCutaneous every morning  insulin lispro (ADMELOG) corrective regimen sliding scale   SubCutaneous three times a day before meals  insulin lispro (ADMELOG) corrective regimen sliding scale   SubCutaneous at bedtime  insulin lispro Injectable (ADMELOG) 34 Unit(s) SubCutaneous three times a day before meals  ketotifen 0.025% Ophthalmic Solution 1 Drop(s) Both EYES two times a day  latanoprost 0.005% Ophthalmic Solution 1 Drop(s) Both EYES at bedtime  nystatin    Suspension 079206 Unit(s) Swish and Swallow every 6 hours  pantoprazole    Tablet 40 milliGRAM(s) Oral before breakfast  petrolatum white Ointment 1 Application(s) Topical daily  predniSONE   Tablet 50 milliGRAM(s) Oral daily  triamcinolone 0.1% Ointment 1 Application(s) Topical two times a day  trimethoprim  160 mG/sulfamethoxazole 800 mG 1 Tablet(s) Oral <User Schedule>      ----------------------------------------------------------------------------------------  PHYSICAL EXAM  Constitutional - NAD, Comfortable  HEENT - NCAT, EOMI  Neck - Supple, No limited ROM, +rash   Chest - Breathing comfortably, No wheezing  Cardiovascular - S1S2   Abdomen - Soft   Extremities - +pitting edema bilaterally on LE, swelling on BUE, +tenderness on the left shoulder and knees. No calf tenderness   Neurologic Exam -                    Cognitive - Awake, Alert, AAO to self, place, date, year, situation     Communication - Fluent, No dysarthria     Motor -                     LEFT    UE - ShAB 4/5, EF 4/5, EE 4/5, WE 4/5,  4/5                    RIGHT UE - ShAB 4+/5, EF 4+/5, EE 4+/5, WE 4+/5,  4+/5                    LEFT    LE - HF 4+/5, KE 4+/5, DF 4+/5, PF 4+/5                    RIGHT LE - HF 4+/5, KE 4+/5, DF 4+/5, PF 4+/5        Sensory - Intact to LT     Reflexes - DTR Intact, No primitive reflexive  Psychiatric - Mood stable, Affect WNL  ----------------------------------------------------------------------------------------  ASSESSMENT/PLAN  65yFemale with joint pain and rash suspected to be 2/2 to dermatomyositis.    Dermatomyositis- Prednisone, PCP ppx, topical steroids, punch biopsy pending, gabapentin for rash   Thrombocytosis- likely reaactive   Anemia- Trend, outpatient FU   DM- Lantus, lispro, ISS   Pain - Tylenol  DVT PPX - Loveox   PT- ROM, Bed Mob, Transfers, Amb w AD and bracing as needed  OT- ADLs, bracing  Prec- Falls   Skin- Turn q2 h  Rehab -Will continue to follow for ongoing rehab needs and recommendations. Recommend ACUTE inpatient rehabilitation for the functional deficits consisting of 3 hours of therapy/day & 24 hour RN/daily PMR physician for comorbid medical management. Patient will be able to tolerate 3 hours a day.

## 2021-03-22 LAB
ALBUMIN SERPL ELPH-MCNC: 3.1 G/DL — LOW (ref 3.3–5)
ALP SERPL-CCNC: 162 U/L — HIGH (ref 40–120)
ALT FLD-CCNC: 130 U/L — HIGH (ref 4–33)
ANION GAP SERPL CALC-SCNC: 12 MMOL/L — SIGNIFICANT CHANGE UP (ref 7–14)
AST SERPL-CCNC: 76 U/L — HIGH (ref 4–32)
BILIRUB SERPL-MCNC: 0.2 MG/DL — SIGNIFICANT CHANGE UP (ref 0.2–1.2)
BUN SERPL-MCNC: 15 MG/DL — SIGNIFICANT CHANGE UP (ref 7–23)
CALCIUM SERPL-MCNC: 9.1 MG/DL — SIGNIFICANT CHANGE UP (ref 8.4–10.5)
CHLORIDE SERPL-SCNC: 99 MMOL/L — SIGNIFICANT CHANGE UP (ref 98–107)
CK SERPL-CCNC: 17 U/L — LOW (ref 25–170)
CO2 SERPL-SCNC: 27 MMOL/L — SIGNIFICANT CHANGE UP (ref 22–31)
CREAT SERPL-MCNC: 0.65 MG/DL — SIGNIFICANT CHANGE UP (ref 0.5–1.3)
CULTURE RESULTS: SIGNIFICANT CHANGE UP
CULTURE RESULTS: SIGNIFICANT CHANGE UP
GLUCOSE BLDC GLUCOMTR-MCNC: 110 MG/DL — HIGH (ref 70–99)
GLUCOSE BLDC GLUCOMTR-MCNC: 180 MG/DL — HIGH (ref 70–99)
GLUCOSE BLDC GLUCOMTR-MCNC: 188 MG/DL — HIGH (ref 70–99)
GLUCOSE BLDC GLUCOMTR-MCNC: 92 MG/DL — SIGNIFICANT CHANGE UP (ref 70–99)
GLUCOSE SERPL-MCNC: 75 MG/DL — SIGNIFICANT CHANGE UP (ref 70–99)
HCT VFR BLD CALC: 32.4 % — LOW (ref 34.5–45)
HGB BLD-MCNC: 10.3 G/DL — LOW (ref 11.5–15.5)
MAGNESIUM SERPL-MCNC: 2.5 MG/DL — SIGNIFICANT CHANGE UP (ref 1.6–2.6)
MCHC RBC-ENTMCNC: 27 PG — SIGNIFICANT CHANGE UP (ref 27–34)
MCHC RBC-ENTMCNC: 31.8 GM/DL — LOW (ref 32–36)
MCV RBC AUTO: 85 FL — SIGNIFICANT CHANGE UP (ref 80–100)
NRBC # BLD: 0 /100 WBCS — SIGNIFICANT CHANGE UP
NRBC # FLD: 0.02 K/UL — HIGH
PHOSPHATE SERPL-MCNC: 5.4 MG/DL — HIGH (ref 2.5–4.5)
PLATELET # BLD AUTO: 805 K/UL — HIGH (ref 150–400)
POTASSIUM SERPL-MCNC: 4.1 MMOL/L — SIGNIFICANT CHANGE UP (ref 3.5–5.3)
POTASSIUM SERPL-SCNC: 4.1 MMOL/L — SIGNIFICANT CHANGE UP (ref 3.5–5.3)
PROT SERPL-MCNC: 6.8 G/DL — SIGNIFICANT CHANGE UP (ref 6–8.3)
RBC # BLD: 3.81 M/UL — SIGNIFICANT CHANGE UP (ref 3.8–5.2)
RBC # FLD: 15.6 % — HIGH (ref 10.3–14.5)
SARS-COV-2 RNA SPEC QL NAA+PROBE: SIGNIFICANT CHANGE UP
SODIUM SERPL-SCNC: 138 MMOL/L — SIGNIFICANT CHANGE UP (ref 135–145)
SPECIMEN SOURCE: SIGNIFICANT CHANGE UP
SPECIMEN SOURCE: SIGNIFICANT CHANGE UP
SURGICAL PATHOLOGY STUDY: SIGNIFICANT CHANGE UP
TM INTERPRETATION: SIGNIFICANT CHANGE UP
WBC # BLD: 20.41 K/UL — HIGH (ref 3.8–10.5)
WBC # FLD AUTO: 20.41 K/UL — HIGH (ref 3.8–10.5)

## 2021-03-22 PROCEDURE — 99222 1ST HOSP IP/OBS MODERATE 55: CPT | Mod: GC

## 2021-03-22 PROCEDURE — 99232 SBSQ HOSP IP/OBS MODERATE 35: CPT | Mod: GC

## 2021-03-22 PROCEDURE — G0452: CPT | Mod: 26

## 2021-03-22 RX ORDER — INSULIN GLARGINE 100 [IU]/ML
52 INJECTION, SOLUTION SUBCUTANEOUS AT BEDTIME
Refills: 0 | Status: DISCONTINUED | OUTPATIENT
Start: 2021-03-22 | End: 2021-03-23

## 2021-03-22 RX ORDER — INSULIN LISPRO 100/ML
30 VIAL (ML) SUBCUTANEOUS
Refills: 0 | Status: DISCONTINUED | OUTPATIENT
Start: 2021-03-22 | End: 2021-03-25

## 2021-03-22 RX ADMIN — Medication 1 APPLICATION(S): at 05:18

## 2021-03-22 RX ADMIN — Medication 1 APPLICATION(S): at 17:57

## 2021-03-22 RX ADMIN — Medication 500000 UNIT(S): at 05:18

## 2021-03-22 RX ADMIN — GABAPENTIN 100 MILLIGRAM(S): 400 CAPSULE ORAL at 17:58

## 2021-03-22 RX ADMIN — Medication 2: at 12:43

## 2021-03-22 RX ADMIN — PANTOPRAZOLE SODIUM 40 MILLIGRAM(S): 20 TABLET, DELAYED RELEASE ORAL at 06:01

## 2021-03-22 RX ADMIN — Medication 25 MILLIGRAM(S): at 05:19

## 2021-03-22 RX ADMIN — GABAPENTIN 100 MILLIGRAM(S): 400 CAPSULE ORAL at 09:07

## 2021-03-22 RX ADMIN — Medication 34 UNIT(S): at 09:06

## 2021-03-22 RX ADMIN — Medication 1 APPLICATION(S): at 05:19

## 2021-03-22 RX ADMIN — Medication 25 MILLIGRAM(S): at 12:43

## 2021-03-22 RX ADMIN — LATANOPROST 1 DROP(S): 0.05 SOLUTION/ DROPS OPHTHALMIC; TOPICAL at 22:42

## 2021-03-22 RX ADMIN — Medication 50 MILLIGRAM(S): at 05:18

## 2021-03-22 RX ADMIN — Medication 1 APPLICATION(S): at 12:47

## 2021-03-22 RX ADMIN — Medication 500000 UNIT(S): at 17:57

## 2021-03-22 RX ADMIN — ATORVASTATIN CALCIUM 20 MILLIGRAM(S): 80 TABLET, FILM COATED ORAL at 22:42

## 2021-03-22 RX ADMIN — Medication 650 MILLIGRAM(S): at 12:43

## 2021-03-22 RX ADMIN — Medication 300 MILLIGRAM(S): at 05:18

## 2021-03-22 RX ADMIN — Medication 500000 UNIT(S): at 12:43

## 2021-03-22 RX ADMIN — Medication 2000 UNIT(S): at 12:42

## 2021-03-22 RX ADMIN — Medication 1 TABLET(S): at 09:11

## 2021-03-22 RX ADMIN — Medication 34 UNIT(S): at 17:57

## 2021-03-22 RX ADMIN — ENOXAPARIN SODIUM 40 MILLIGRAM(S): 100 INJECTION SUBCUTANEOUS at 12:42

## 2021-03-22 RX ADMIN — KETOTIFEN FUMARATE 1 DROP(S): 0.34 SOLUTION OPHTHALMIC at 17:58

## 2021-03-22 RX ADMIN — GABAPENTIN 300 MILLIGRAM(S): 400 CAPSULE ORAL at 22:42

## 2021-03-22 RX ADMIN — Medication 34 UNIT(S): at 12:42

## 2021-03-22 RX ADMIN — KETOTIFEN FUMARATE 1 DROP(S): 0.34 SOLUTION OPHTHALMIC at 05:18

## 2021-03-22 RX ADMIN — INSULIN GLARGINE 52 UNIT(S): 100 INJECTION, SOLUTION SUBCUTANEOUS at 22:42

## 2021-03-22 NOTE — PROGRESS NOTE ADULT - ASSESSMENT
64 y/o Fijian speaking Female with PMHx of DM (on insulin), HTN, HLD, COVID (2020) and glaucoma presenting with generalized weakness, fever, polyarthritis and rash, admitted for further workup, current working diagnosis: amyopathic dermatomyositis.

## 2021-03-22 NOTE — PROGRESS NOTE ADULT - SUBJECTIVE AND OBJECTIVE BOX
LIONEL COLORADOCARLOSHAZEL  [id number]    INTERVAL HPI/OVERNIGHT EVENTS:    MEDICATIONS  (STANDING):  atorvastatin 20 milliGRAM(s) Oral at bedtime  cholecalciferol 2000 Unit(s) Oral daily  dextrose 40% Gel 15 Gram(s) Oral once  dextrose 50% Injectable 25 Gram(s) IV Push once  dextrose 50% Injectable 12.5 Gram(s) IV Push once  dextrose 50% Injectable 25 Gram(s) IV Push once  diltiazem    milliGRAM(s) Oral daily  enoxaparin Injectable 40 milliGRAM(s) SubCutaneous daily  fluocinonide 0.05% Cream 1 Application(s) Topical two times a day  gabapentin 100 milliGRAM(s) Oral <User Schedule>  gabapentin 300 milliGRAM(s) Oral at bedtime  hydrocortisone 1% Ointment 1 Application(s) Topical two times a day  insulin glargine Injectable (LANTUS) 52 Unit(s) SubCutaneous at bedtime  insulin lispro (ADMELOG) corrective regimen sliding scale   SubCutaneous at bedtime  insulin lispro (ADMELOG) corrective regimen sliding scale   SubCutaneous three times a day before meals  insulin lispro Injectable (ADMELOG) 34 Unit(s) SubCutaneous three times a day before meals  ketotifen 0.025% Ophthalmic Solution 1 Drop(s) Both EYES two times a day  latanoprost 0.005% Ophthalmic Solution 1 Drop(s) Both EYES at bedtime  nystatin    Suspension 506070 Unit(s) Swish and Swallow every 6 hours  pantoprazole    Tablet 40 milliGRAM(s) Oral before breakfast  petrolatum white Ointment 1 Application(s) Topical daily  predniSONE   Tablet 50 milliGRAM(s) Oral daily  triamcinolone 0.1% Ointment 1 Application(s) Topical two times a day  trimethoprim  160 mG/sulfamethoxazole 800 mG 1 Tablet(s) Oral <User Schedule>    MEDICATIONS  (PRN):  acetaminophen   Tablet .. 650 milliGRAM(s) Oral every 6 hours PRN Temp greater or equal to 38C (100.4F), Mild Pain (1 - 3), Moderate Pain (4 - 6)  hydrOXYzine hydrochloride 25 milliGRAM(s) Oral every 6 hours PRN Itching      Allergies    azithromycin (Hives; Swelling)  enalapril (Other (Mild to Mod))  penicillin (Swelling; Rash)    Intolerances        Review of Systems:   General: No fevers/chills, no fatigue  HEENT: No blurry vision, dysphagia, or odynophagia  CVS: No CP/palpitations  Resp: No SOB/wheezing  GI: No N/V/C/D/abdominal pain  MSK:   Skin: No new rashes  Neuro: No headaches      Vital Signs Last 24 Hrs  T(C): 36.1 (22 Mar 2021 12:15), Max: 37.5 (22 Mar 2021 05:15)  T(F): 97 (22 Mar 2021 12:15), Max: 99.5 (22 Mar 2021 05:15)  HR: 73 (22 Mar 2021 12:15) (65 - 78)  BP: 120/63 (22 Mar 2021 12:15) (120/63 - 139/64)  BP(mean): --  RR: 18 (22 Mar 2021 12:15) (16 - 18)  SpO2: 99% (22 Mar 2021 12:15) (97% - 99%)    Physical Exam:  General: NAD  HEENT: EOMI, MMM  Cardio: +S1/S2, RRR  Resp: CTA b/l  GI: +BS, soft, NT/ND  MSK:  Neuro: AAOx3  Psych: wnl    LABS:                        10.3   20.41 )-----------( 805      ( 22 Mar 2021 08:32 )             32.4     03-22    138  |  99  |  15  ----------------------------<  75  4.1   |  27  |  0.65    Ca    9.1      22 Mar 2021 08:32  Phos  5.4     03-22  Mg     2.5     03-22    TPro  6.8  /  Alb  3.1<L>  /  TBili  0.2  /  DBili  x   /  AST  76<H>  /  ALT  130<H>  /  AlkPhos  162<H>  03-22            RADIOLOGY & ADDITIONAL TESTS:   LIONEL COLORADOCARLOSHAZEL  [id number]    INTERVAL HPI/OVERNIGHT EVENTS: Pt reports rash has improved somewhat, no itching today. Still with mild weakness/L. shoulder joint pain but somewhat improved. Denies dysphagia.     MEDICATIONS  (STANDING):  atorvastatin 20 milliGRAM(s) Oral at bedtime  cholecalciferol 2000 Unit(s) Oral daily  dextrose 40% Gel 15 Gram(s) Oral once  dextrose 50% Injectable 25 Gram(s) IV Push once  dextrose 50% Injectable 12.5 Gram(s) IV Push once  dextrose 50% Injectable 25 Gram(s) IV Push once  diltiazem    milliGRAM(s) Oral daily  enoxaparin Injectable 40 milliGRAM(s) SubCutaneous daily  fluocinonide 0.05% Cream 1 Application(s) Topical two times a day  gabapentin 100 milliGRAM(s) Oral <User Schedule>  gabapentin 300 milliGRAM(s) Oral at bedtime  hydrocortisone 1% Ointment 1 Application(s) Topical two times a day  insulin glargine Injectable (LANTUS) 52 Unit(s) SubCutaneous at bedtime  insulin lispro (ADMELOG) corrective regimen sliding scale   SubCutaneous at bedtime  insulin lispro (ADMELOG) corrective regimen sliding scale   SubCutaneous three times a day before meals  insulin lispro Injectable (ADMELOG) 34 Unit(s) SubCutaneous three times a day before meals  ketotifen 0.025% Ophthalmic Solution 1 Drop(s) Both EYES two times a day  latanoprost 0.005% Ophthalmic Solution 1 Drop(s) Both EYES at bedtime  nystatin    Suspension 531812 Unit(s) Swish and Swallow every 6 hours  pantoprazole    Tablet 40 milliGRAM(s) Oral before breakfast  petrolatum white Ointment 1 Application(s) Topical daily  predniSONE   Tablet 50 milliGRAM(s) Oral daily  triamcinolone 0.1% Ointment 1 Application(s) Topical two times a day  trimethoprim  160 mG/sulfamethoxazole 800 mG 1 Tablet(s) Oral <User Schedule>    MEDICATIONS  (PRN):  acetaminophen   Tablet .. 650 milliGRAM(s) Oral every 6 hours PRN Temp greater or equal to 38C (100.4F), Mild Pain (1 - 3), Moderate Pain (4 - 6)  hydrOXYzine hydrochloride 25 milliGRAM(s) Oral every 6 hours PRN Itching      Allergies    azithromycin (Hives; Swelling)  enalapril (Other (Mild to Mod))  penicillin (Swelling; Rash)    Intolerances          Vital Signs Last 24 Hrs  T(C): 36.1 (22 Mar 2021 12:15), Max: 37.5 (22 Mar 2021 05:15)  T(F): 97 (22 Mar 2021 12:15), Max: 99.5 (22 Mar 2021 05:15)  HR: 73 (22 Mar 2021 12:15) (65 - 78)  BP: 120/63 (22 Mar 2021 12:15) (120/63 - 139/64)  BP(mean): --  RR: 18 (22 Mar 2021 12:15) (16 - 18)  SpO2: 99% (22 Mar 2021 12:15) (97% - 99%)    Physical Exam:  General: No apparent distress, obese   HEENT: EOMI, MMM  CVS: +S1/S2, RRR, no murmurs/rubs/gallops  Resp: CTA b/l. No crackles/wheezing  GI: Soft, NT/ND +BS  MSK: L. shoulder with tenderness superiorly; tenderness on active/passive ROM.   No synovitis in other joints.   Neuro: AAOx3  Skin: Improved erythema on central chest, b/l upper extremities. Desquamating rash on b/l hand fingertips.     LABS:                        10.3   20.41 )-----------( 805      ( 22 Mar 2021 08:32 )             32.4     03-22    138  |  99  |  15  ----------------------------<  75  4.1   |  27  |  0.65    Ca    9.1      22 Mar 2021 08:32  Phos  5.4     03-22  Mg     2.5     03-22    TPro  6.8  /  Alb  3.1<L>  /  TBili  0.2  /  DBili  x   /  AST  76<H>  /  ALT  130<H>  /  AlkPhos  162<H>  03-22    Creatine Kinase 17 (L)          RADIOLOGY & ADDITIONAL TESTS:  CT Chest/Abdomen/Pelvis 3/17/21  FINDINGS:  CHEST:  LUNGS AND LARGE AIRWAYS: Patent central airways. Mild mosaic attenuation of the lungs, likely secondary to air trapping on this expiratory phase. No pulmonary nodules.  PLEURA: No pleural effusion.  VESSELS: Atherosclerotic changes of the aorta.  HEART: Heart size is normal. No pericardial effusion.  MEDIASTINUM AND CATRACHITO: No lymphadenopathy.  CHEST WALL AND LOWER NECK: Within normal limits.    ABDOMEN AND PELVIS:  LIVER: Hypodense region near the falciform ligament likely represents focal fat..  BILE DUCTS: Normal caliber.  GALLBLADDER: Within normal limits.  SPLEEN: Within normal limits.  PANCREAS: Within normal limits.  ADRENALS: Within normal limits.  KIDNEYS/URETERS: Within normal limits.    BLADDER: Within normal limits.  REPRODUCTIVE ORGANS: Calcified uterine fibroid. No adnexal masses.    BOWEL: No bowel obstruction. Appendix is normal.  PERITONEUM: No ascites.  VESSELS: Within normal limits.  RETROPERITONEUM/LYMPH NODES: No lymphadenopathy.  ABDOMINAL WALL: Diastasis of the rectus abdominis.  BONES: Degenerative changes.    IMPRESSION:  No evidence of malignancy in the chest, abdomen, or pelvis.

## 2021-03-22 NOTE — PROGRESS NOTE ADULT - PROBLEM SELECTOR PLAN 3
Blood glucose 213, 285  - f/u A1c  - On basaglar 17 U BID at home   - Currently on 60u basaglar and premeals to 34u, FSG starting to normalize  - Diabetic diet Blood glucose 213, 285  - f/u A1c  - On basaglar 17 U BID at home   - Currently on 60u basaglar, will back down to 52u as AM glu was low, and premeals to 34u, FSGs acceptable.  - Diabetic diet Blood glucose, Hba1c 10.6%  - On basaglar 17 U BID at home   - Currently on 60u basaglar, will back down to 52u as AM glu was low, and premeals to 34u, FSGs acceptable.  - Diabetic diet

## 2021-03-22 NOTE — PROGRESS NOTE ADULT - PROBLEM SELECTOR PLAN 6
- Platelets 751K, f/u blue top platelets  - Likely in the setting of inflammatory process with workup above   - Monitor CBC, maintain active T&S - Platelets 751K  - Likely in the setting of inflammatory process with workup above   - Monitor CBC, maintain active T&S

## 2021-03-22 NOTE — PROGRESS NOTE ADULT - PROBLEM SELECTOR PLAN 4
Suspect anemia of chronic disease mixed with iron def.  Hb 10.6 MCV 82, unknown baseline  - iron studies showing concomitant iron def anemia with inflammation, B12, folate, TSH - all wnl  - LDH elevated but hapto elevated and uric acid low.   - Maintain active T&S Suspect anemia of chronic disease mixed with iron def.  Hb 10.6 MCV 82, unknown baseline  - iron studies showing concomitant iron def anemia with inflammation, B12, folate, TSH - all wnl  - LDH elevated but hapto elevated and uric acid low.

## 2021-03-22 NOTE — PROGRESS NOTE ADULT - SUBJECTIVE AND OBJECTIVE BOX
Humble Wayne, PGY-1  Pager: [phone] / [phone]    CHIEF COMPLAINT: Patient is a 65y old  Female who presents with a chief complaint of joint pain (22 Mar 2021 06:59)    INTERVAL HPI/OVERNIGHT EVENTS: MARY pt was seen comfrotable at bedside in the AM. She reports improvement in the shoulder pain, no other joint/muscle pain, rash is improving. Still itchy, mostly in the scalp. Deniea any CP, sob, cough, abd pain, N/V. 3 BM yesterday solid.     MEDICATIONS (STANDING):  atorvastatin 20 milliGRAM(s) Oral at bedtime  cholecalciferol 2000 Unit(s) Oral daily  dextrose 40% Gel 15 Gram(s) Oral once  dextrose 50% Injectable 25 Gram(s) IV Push once  dextrose 50% Injectable 12.5 Gram(s) IV Push once  dextrose 50% Injectable 25 Gram(s) IV Push once  diltiazem    milliGRAM(s) Oral daily  enoxaparin Injectable 40 milliGRAM(s) SubCutaneous daily  fluocinonide 0.05% Cream 1 Application(s) Topical two times a day  gabapentin 100 milliGRAM(s) Oral <User Schedule>  gabapentin 300 milliGRAM(s) Oral at bedtime  hydrocortisone 1% Ointment 1 Application(s) Topical two times a day  insulin glargine Injectable (LANTUS) 60 Unit(s) SubCutaneous every morning  insulin lispro (ADMELOG) corrective regimen sliding scale   SubCutaneous at bedtime  insulin lispro (ADMELOG) corrective regimen sliding scale   SubCutaneous three times a day before meals  insulin lispro Injectable (ADMELOG) 34 Unit(s) SubCutaneous three times a day before meals  ketotifen 0.025% Ophthalmic Solution 1 Drop(s) Both EYES two times a day  latanoprost 0.005% Ophthalmic Solution 1 Drop(s) Both EYES at bedtime  nystatin    Suspension [id number] Unit(s) Swish and Swallow every 6 hours  pantoprazole    Tablet 40 milliGRAM(s) Oral before breakfast  petrolatum white Ointment 1 Application(s) Topical daily  predniSONE   Tablet 50 milliGRAM(s) Oral daily  triamcinolone 0.1% Ointment 1 Application(s) Topical two times a day  trimethoprim  160 mG/sulfamethoxazole 800 mG 1 Tablet(s) Oral <User Schedule>    MEDICATIONS  (PRN):  acetaminophen   Tablet .. 650 milliGRAM(s) Oral every 6 hours PRN  hydrOXYzine hydrochloride 25 milliGRAM(s) Oral every 6 hours PRN    REVIEW OF SYSTEMS:  CONSTITUTIONAL: No fever, weight loss, or fatigue  EYES: No eye pain, visual disturbances, or discharge  ENMT:  No difficulty hearing, tinnitus, vertigo; No sinus or throat pain  NECK: No pain or stiffness  RESPIRATORY: No cough, wheezing, chills or hemoptysis; No shortness of breath  CARDIOVASCULAR: No chest pain, palpitations, dizziness, or leg swelling  GASTROINTESTINAL: No abdominal or epigastric pain. No nausea, vomiting, or hematemesis; No diarrhea or constipation. No melena or hematochezia.  GENITOURINARY: No dysuria, frequency, hematuria, or incontinence  NEUROLOGICAL: No headaches, memory loss, loss of strength, numbness, or tremors  SKIN: ++ itching, + rashes  MUSCULOSKELETAL: +joint pain or swelling; No muscle, back, or extremity pain    VITAL SIGNS:  T(F): 99.5 (03-22-21 @ 05:15), Max: 99.5 (03-22-21 @ 05:15)  HR: 65 (03-22-21 @ 05:15) (65 - 78)  BP: 128/63 (03-22-21 @ 05:15) (128/63 - 139/64)  RR: 17 (03-22-21 @ 05:15) (16 - 17)  SpO2: 97% (03-22-21 @ 05:15) (97% - 99%)    CAPILLARY BLOOD GLUCOSE  POCT Blood Glucose.: 92 mg/dL (22 Mar 2021 08:41)  POCT Blood Glucose.: 134 mg/dL (21 Mar 2021 21:27)  POCT Blood Glucose.: 155 mg/dL (21 Mar 2021 17:21)  POCT Blood Glucose.: 183 mg/dL (21 Mar 2021 12:19)    I&O's Summary  21 Mar 2021 07:01  -  22 Mar 2021 07:00  --------------------------------------------------------  IN: 270 mL / OUT: 860 mL / NET: -590 mL    PHYSICAL EXAM:  GENERAL: NAD, well-groomed, well-developed  EYES: EOMI, PERRLA, conjunctiva and sclera clear  NECK: Supple, No JVD, Normal thyroid, no LAD  NERVOUS SYSTEM:  Alert & Oriented X3, Good concentration; Motor Strength 5/5 B/L upper and lower extremities  CHEST/LUNG: Clear to auscultation bilaterally on anterior auscultation; No rales, rhonchi, wheezing, or rubs  HEART: Regular rate and rhythm; No murmurs, rubs, or gallops  ABDOMEN: Soft, Nontender, Nondistended; Bowel sounds present, no HSM  EXTREMITIES:  2+ Peripheral Pulses, No clubbing, cyanosis, or edema, L shoulder TTP with full ROM - improved, Rest of joints wnl.   SKIN: confluent erythematous rash on chest, blanches - improving. Similar rash on both arms around elbows with scratch marks and dry skin - improving. No inguinal rash.     LABS:                        10.3   20.41 )-----------( 805      ( 22 Mar 2021 08:32 )             32.4     03-21    136  |  100  |  15  ----------------------------<  97  3.9   |  26  |  0.68    Ca    8.8      21 Mar 2021 06:56  Phos  4.4     03-21  Mg     2.4     03-21    TPro  6.5  /  Alb  2.9<L>  /  TBili  0.3  /  DBili  x   /  AST  94<H>  /  ALT  126<H>  /  AlkPhos  169<H>  03-21    RADIOLOGY & ADDITIONAL TESTS:

## 2021-03-22 NOTE — PROGRESS NOTE ADULT - PROBLEM SELECTOR PLAN 7
Resolved  - Na 129, Cl 92, possibly in the setting inflammation and SIADH vs. low salute intake  - F/u urine lytes, serum osmolality - suggesting SIADH  - TSH wnl, cortisol - pending  - S/p 1L NS in the ED - improving  - Monitor BMP qd  - d/c salt tabs Resolved  - F/u urine lytes, serum osmolality - suggesting SIADH  - TSH wnl  - Monitor BMP qd

## 2021-03-22 NOTE — PROGRESS NOTE ADULT - PROBLEM SELECTOR PLAN 1
- Pt presented with generalized weakness, subjective? fevers, polyarthralgia/arthritis x1 week of large joints, along with rash x1 month, +dry mouth/eyes?; no autoimmune Hx or FHx.  - S/p OSH discharge 3/12 for Lyme disease started on Doxycyline 21-day regimen and was treated there with Levaquin and 3x doses of Vanco.  - Leukocytosis to 23.4K (pmn), anemia to 10.6 with MCV 82, platelets 751, ESR 91, , elevated AST/ALT 40/51, alk phos 238, CK low 19  - UA 30 protein  - Likely inflammatory etiology. Workup: hepatitis panel - neg, HIV - neg, syphilis - neg, EBV - past infection, CMV - neg, lyme panel-neg , blood cultures - NGTD, C3 slightly elevated, C4 - wnl. VIPIN-neg, dsDNA - neg, CCP -neg, anti SSA/B - neg, Mallory-1 - neg. Myoglobin - low, RF - neg, anti Smith - neg  PENDING blood smear  - X-rays L shoulder and R elbow - wnl  - CT chest abd pelvis - wnl, no signs of malignancy  - Rheum consulted, appreciate recs: suspects DERMATOMYOSITIS, started on prednisone 50mg qd yesterday with mild response.   -start protonix 40mg qd for PUD ppx and Bactrim for PCP ppx.  - will add gabapentin for itchiness/pain  - Derm consulted, appreciate recs: f/u punch biopsy  - Pain control with Tylenol for mild pain, Toradol for moderate-severe pain  - Obtain outpatient medical records from recent hospitalization (was at [facility]) - papers sent. - Pt presented with generalized weakness, subjective? fevers, polyarthralgia/arthritis x1 week of large joints, along with rash x1 month, +dry mouth/eyes?; no autoimmune Hx or FHx.  - S/p OSH discharge 3/12 for Lyme disease started on Doxycyline 21-day regimen and was treated there with Levaquin and 3x doses of Vanco.  - Leukocytosis to 23.4K (pmn), anemia to 10.6 with MCV 82, platelets 751, ESR 91, , elevated AST/ALT 40/51, alk phos 238, CK low 19  - UA 30 protein  - Likely inflammatory etiology. Workup: hepatitis panel - neg, HIV - neg, syphilis - neg, EBV - past infection, CMV - neg, lyme panel-neg , blood cultures - NGTD, C3 slightly elevated, C4 - wnl. VIPIN-neg, dsDNA - neg, CCP -neg, anti SSA/B - neg, Mallory-1 - neg. Myoglobin - low, RF - neg, anti Smith - neg  PENDING blood smear  - X-rays L shoulder and R elbow - wnl  - CT chest abd pelvis - wnl, no signs of malignancy  - Rheum consulted, appreciate recs: suspects DERMATOMYOSITIS, started on prednisone 50mg qd yesterday with mild response.   -start protonix 40mg qd for PUD ppx and Bactrim for PCP ppx.  - will add gabapentin for itchiness/pain  - Derm consulted, appreciate recs: f/u punch biopsy - pending  - Pain control with Tylenol for mild pain, Toradol for moderate-severe pain  - Obtain outpatient medical records from recent hospitalization (was at [facility]) - papers sent. Pt presented with generalized weakness, subjective? fevers, polyarthralgia/arthritis x1 week of large joints, along with rash x1 month, +dry mouth/eyes?; no autoimmune Hx or FHx.  - S/p OSH discharge 3/12 for Lyme disease started on Doxycyline 21-day regimen and was treated there with Levaquin and 3x doses of Vanco.  - Leukocytosis to 23.4K (pmn), anemia to 10.6 with MCV 82, platelets 751, ESR 91, , elevated AST/ALT 40/51, alk phos 238, CK low 19  - UA 30 protein  - Likely inflammatory etiology. Workup: hepatitis panel - neg, HIV - neg, syphilis - neg, EBV - past infection, CMV - neg, lyme panel-neg , blood cultures - NGTD, C3 slightly elevated, C4 - wnl. VIPIN-neg, dsDNA - neg, CCP -neg, anti SSA/B - neg, Mallory-1 - neg. Myoglobin - low, RF - neg, anti Smith - neg  PENDING blood smear  - X-rays L shoulder and R elbow - wnl  - CT chest abd pelvis - wnl, no signs of malignancy  - Rheum consulted, appreciate recs: suspects DERMATOMYOSITIS, started on prednisone 50mg qd 3/17; on protonix 40mg qd for PUD ppx and Bactrim for PCP ppx.  - gabapentin for itchiness/pain  - Derm consulted, appreciate recs: f/u punch biopsy - pending  - Pain control with Tylenol for mild pain  - Obtain outpatient medical records from recent hospitalization (was at [facility]) - papers sent.

## 2021-03-22 NOTE — PROGRESS NOTE ADULT - PROBLEM SELECTOR PLAN 2
Improving  b/l lateral upper arms and upper chest  -Atrax and triamcinolone cream, fluocinonide prn   -Dermatology consulted s/p biopsy, f/u surg path Improving; b/l lateral upper arms and upper chest  -Atrax and triamcinolone cream, fluocinonide prn   -Dermatology consulted s/p biopsy, f/u surg path

## 2021-03-22 NOTE — PROGRESS NOTE ADULT - ASSESSMENT
65F with DM2 on insulin (poorly controlled), HTN, HLD, hx of COVID-19 infection, glaucoma, admitted for erythematous rash since 2/2021 as well as joint pains for past 9 days, s/p admission to outside hospital. Rheumatology consulted to evaluate for autoimmune etiology of these symptoms.     #Erythematous rash and acute polyarthritis- rash on chest, b/l arms and legs, started on face- ddx includes infectious (post-viral exanthem), vs. drug induced (pt with desquamating rash on hands which may be consistent with this, however, pt denies new medication use in past couple of months) vs. from amyopathic dermatomyositis (low creatine kinase). SLE ruled out as serologies negative. L. shoulder with tenderness on palpation superiorly- would suspect AC joint involvement- but Xray not demonstrating arthritis.   Urine protein/creatinine ratio is mildly elevated to 0.6, however this could be explained by her poorly controlled DM2 (A1c >10% this admission).   CT chest/abdomen/pelvis done this admission show no findings concerning for malignancy (checked as there is a concern for dermatomyositis which has an association with malignancies).     Plan:  - At this time, difficult to ascertain whether viral/drug exanthem vs. amyopathic DM- for either, plan will be to continue steroids, and starting 3/23, taper by 10 mg every week (40 mg QD x 1 week, followed by 30 mg QD x 1 week, followed by 20  mg QD x 1 week, then taper by 5 mg subsequently- 15 mg QD x 1 week, and so on).   - Patient will need to be seen as outpatient in 2 weeks of discharge.  - continue bactrim three times weekly for PCP prophylaxis.     Pt needs to follow up with Dr. Mckee at [address] [facility], [address], in [place] in 2 weeks. The office number is [phone]. We will facilitate appointment scheduling.     Discussed with Dr. Joseph Mena MD PGY4  Rheumatology Fellow  Pager [phone]

## 2021-03-22 NOTE — PROGRESS NOTE ADULT - PROBLEM SELECTOR PLAN 9
- DVT PPX: Lovenox  - Diet: CC DASH diet  - Dispo: PT rec rehab. - DVT PPX: Lovenox  - Diet: CC DASH diet  - Dispo: PMR rec acute rehab, will start planning for d/c

## 2021-03-23 ENCOUNTER — TRANSCRIPTION ENCOUNTER (OUTPATIENT)
Age: 66
End: 2021-03-23

## 2021-03-23 LAB
ALBUMIN SERPL ELPH-MCNC: 3.1 G/DL — LOW (ref 3.3–5)
ALP SERPL-CCNC: 140 U/L — HIGH (ref 40–120)
ALT FLD-CCNC: 111 U/L — HIGH (ref 4–33)
ANION GAP SERPL CALC-SCNC: 11 MMOL/L — SIGNIFICANT CHANGE UP (ref 7–14)
AST SERPL-CCNC: 49 U/L — HIGH (ref 4–32)
BILIRUB SERPL-MCNC: 0.2 MG/DL — SIGNIFICANT CHANGE UP (ref 0.2–1.2)
BUN SERPL-MCNC: 14 MG/DL — SIGNIFICANT CHANGE UP (ref 7–23)
CALCIUM SERPL-MCNC: 8.8 MG/DL — SIGNIFICANT CHANGE UP (ref 8.4–10.5)
CHLORIDE SERPL-SCNC: 100 MMOL/L — SIGNIFICANT CHANGE UP (ref 98–107)
CO2 SERPL-SCNC: 27 MMOL/L — SIGNIFICANT CHANGE UP (ref 22–31)
CREAT SERPL-MCNC: 0.74 MG/DL — SIGNIFICANT CHANGE UP (ref 0.5–1.3)
GLUCOSE BLDC GLUCOMTR-MCNC: 183 MG/DL — HIGH (ref 70–99)
GLUCOSE BLDC GLUCOMTR-MCNC: 191 MG/DL — HIGH (ref 70–99)
GLUCOSE BLDC GLUCOMTR-MCNC: 206 MG/DL — HIGH (ref 70–99)
GLUCOSE BLDC GLUCOMTR-MCNC: 95 MG/DL — SIGNIFICANT CHANGE UP (ref 70–99)
GLUCOSE SERPL-MCNC: 80 MG/DL — SIGNIFICANT CHANGE UP (ref 70–99)
MAGNESIUM SERPL-MCNC: 2.5 MG/DL — SIGNIFICANT CHANGE UP (ref 1.6–2.6)
PHOSPHATE SERPL-MCNC: 4.9 MG/DL — HIGH (ref 2.5–4.5)
POTASSIUM SERPL-MCNC: 4 MMOL/L — SIGNIFICANT CHANGE UP (ref 3.5–5.3)
POTASSIUM SERPL-SCNC: 4 MMOL/L — SIGNIFICANT CHANGE UP (ref 3.5–5.3)
PROT SERPL-MCNC: 6.1 G/DL — SIGNIFICANT CHANGE UP (ref 6–8.3)
SODIUM SERPL-SCNC: 138 MMOL/L — SIGNIFICANT CHANGE UP (ref 135–145)

## 2021-03-23 PROCEDURE — 99232 SBSQ HOSP IP/OBS MODERATE 35: CPT

## 2021-03-23 PROCEDURE — 99232 SBSQ HOSP IP/OBS MODERATE 35: CPT | Mod: GC

## 2021-03-23 RX ORDER — INSULIN GLARGINE 100 [IU]/ML
45 INJECTION, SOLUTION SUBCUTANEOUS AT BEDTIME
Refills: 0 | Status: DISCONTINUED | OUTPATIENT
Start: 2021-03-23 | End: 2021-03-24

## 2021-03-23 RX ORDER — GABAPENTIN 400 MG/1
300 CAPSULE ORAL EVERY 8 HOURS
Refills: 0 | Status: DISCONTINUED | OUTPATIENT
Start: 2021-03-23 | End: 2021-03-25

## 2021-03-23 RX ADMIN — Medication 2: at 12:25

## 2021-03-23 RX ADMIN — Medication 30 UNIT(S): at 17:23

## 2021-03-23 RX ADMIN — PANTOPRAZOLE SODIUM 40 MILLIGRAM(S): 20 TABLET, DELAYED RELEASE ORAL at 08:38

## 2021-03-23 RX ADMIN — Medication 1 APPLICATION(S): at 17:26

## 2021-03-23 RX ADMIN — LATANOPROST 1 DROP(S): 0.05 SOLUTION/ DROPS OPHTHALMIC; TOPICAL at 22:51

## 2021-03-23 RX ADMIN — Medication 1 APPLICATION(S): at 12:27

## 2021-03-23 RX ADMIN — ENOXAPARIN SODIUM 40 MILLIGRAM(S): 100 INJECTION SUBCUTANEOUS at 12:22

## 2021-03-23 RX ADMIN — INSULIN GLARGINE 45 UNIT(S): 100 INJECTION, SOLUTION SUBCUTANEOUS at 22:49

## 2021-03-23 RX ADMIN — Medication 4: at 17:24

## 2021-03-23 RX ADMIN — Medication 300 MILLIGRAM(S): at 05:18

## 2021-03-23 RX ADMIN — Medication 500000 UNIT(S): at 23:04

## 2021-03-23 RX ADMIN — ATORVASTATIN CALCIUM 20 MILLIGRAM(S): 80 TABLET, FILM COATED ORAL at 22:50

## 2021-03-23 RX ADMIN — Medication 500000 UNIT(S): at 05:18

## 2021-03-23 RX ADMIN — GABAPENTIN 300 MILLIGRAM(S): 400 CAPSULE ORAL at 13:51

## 2021-03-23 RX ADMIN — GABAPENTIN 300 MILLIGRAM(S): 400 CAPSULE ORAL at 22:57

## 2021-03-23 RX ADMIN — Medication 1 APPLICATION(S): at 05:17

## 2021-03-23 RX ADMIN — Medication 650 MILLIGRAM(S): at 22:57

## 2021-03-23 RX ADMIN — Medication 25 MILLIGRAM(S): at 05:24

## 2021-03-23 RX ADMIN — Medication 500000 UNIT(S): at 17:27

## 2021-03-23 RX ADMIN — Medication 1 APPLICATION(S): at 05:18

## 2021-03-23 RX ADMIN — GABAPENTIN 100 MILLIGRAM(S): 400 CAPSULE ORAL at 08:37

## 2021-03-23 RX ADMIN — KETOTIFEN FUMARATE 1 DROP(S): 0.34 SOLUTION OPHTHALMIC at 17:26

## 2021-03-23 RX ADMIN — Medication 30 UNIT(S): at 08:38

## 2021-03-23 RX ADMIN — Medication 1 APPLICATION(S): at 17:27

## 2021-03-23 RX ADMIN — Medication 500000 UNIT(S): at 12:22

## 2021-03-23 RX ADMIN — Medication 2000 UNIT(S): at 12:25

## 2021-03-23 RX ADMIN — Medication 40 MILLIGRAM(S): at 05:17

## 2021-03-23 RX ADMIN — Medication 25 MILLIGRAM(S): at 17:38

## 2021-03-23 RX ADMIN — KETOTIFEN FUMARATE 1 DROP(S): 0.34 SOLUTION OPHTHALMIC at 05:18

## 2021-03-23 RX ADMIN — Medication 1 APPLICATION(S): at 17:25

## 2021-03-23 RX ADMIN — Medication 30 UNIT(S): at 12:26

## 2021-03-23 NOTE — PROGRESS NOTE ADULT - ATTENDING COMMENTS
Patient seen and examined, care d/w HS4 team.    In summary 66 yo born in [place], obese (BMI 33), DM2, glaucoma, HTN, HLD, COVID (3/2020) who presents with joint pains and rash.  Reports started having a rash 2/7/20 started on face was itchy then progressed down body.  Reports 8 days ago developed migrating joint pains involving the shoulders, elbows, knees and fingers.  Reports moves from joint to joint.  No fevers noted at home.  No weight loss.  Reports went to OSH and is not sure if seen by derm or rheum.  Was started on doxycycline for lyme (unclear what dx was based on).      # Rash/ Joint pain- concern for Dermatomyositis on admission  –Labs with  elevated ESR/CRP and ferritin; mild anemia, mild proteinuria (but in DM2 pt).  HIV neg.  Reportedly neg TB at OSH LFTs mildly elevated CK normal. TSH wnl. CK and myoglobin wnl; ? cutaneous dermatomyositis, normal CK which is not uncommon.  Lyme neg here.  - serologies; VIPIN, ds-DNA, RF, CCP all neg   - CT CAP neg for malignancy, uptodate on mammo, will need OP c-scope (never had)  - Rheum consult appreciated - started on prednisone 50 mg on 3/17, decreased to 40 mg on 3/23, plan for taper, on PCP ppx with bactrim  - Derm consult appreciated, s/p bx on 3/17 now resulting with viral vs drug related   - OP rheum f/u     # Anemia: normocytic, mixed TREY and AOCD, no hemolysis (elevated LDH, normal hapto)  - trend, tx underlying issue   - FU outpt     # Thrombocytosis- Likely reactive    # DM2 poorly controlled, HbA1c 10.6%, now exacerbated by steroids; at home was on 70/30 pen 35 units BID reports was changed to basaglar s/p recent hospital stay   - c/w Lantus decrease to 45 units and Admelog  30 units  TIC ac, moderate dose AUTUMN as steroids tapering    # DVT Prox- Lovenox    Dispo - PMR rec [facility] pending OT eval  COVID neg 3/22

## 2021-03-23 NOTE — PROGRESS NOTE ADULT - PROBLEM SELECTOR PLAN 7
Resolved  - F/u urine lytes, serum osmolality - suggesting SIADH  - TSH wnl  - Monitor BMP qd - AST/ALT 40/51, likely in the setting of inflammation/muscle disorder  - hepatitis panel negative  - will lower Lipitor  - Trend CMP

## 2021-03-23 NOTE — PROGRESS NOTE ADULT - SUBJECTIVE AND OBJECTIVE BOX
Patient is a 65y old  Female who presents with a chief complaint of joint pain (23 Mar 2021 07:03)      HPI:  66 y/o Welsh speaking Female with PMHx of DM Type 2 (on insulin), HTN, HLD, COVID () and glaucoma presenting with generalized weakness, joint pain, and rash. Patient states that she was discharged from OSH on 3/12 after being evaluated for similar symptoms. They suspected that she may have Lyme disease and started her on doxycycline and sent her home. They also suspected that this might be post COVID related.   Patient has multiple complaints. Reports rash in the neck area and b/l arms, along with whole body itching. States that she has had a rash since 2021 and has seen various doctors and tried different things (steroid creams) without relief. The rash initially started on her face. Also complains of joint pain for 1 week. States she has joint pain and stiffness in the b/l shoulders, elbows, hands, and knees. States that the pain is currently worst in the L shoulder and R elbow. States that the pain is so significant that she is unable to walk currently. Tries Tylenol with minimal relief. Also reports that she has been unable to do her ADLs - clean herself, walk. Never has had joint pain like this before. Also reports subjective fever and chills, along with dry mouth.   Denies any N/V/D, abdominal pain, leg swelling, chest pain, dyspnea. Denies oral ulcers, hair loss, weight loss, dry eyes.     ED course: pt's vitals were T 98.3 HR 76-94 -165/72 RR 18-19 POX % RA. Pt treated with NS 1L bolus and Toradol 15 mg IV.  (17 Mar 2021 04:39)    interviewed with  [id number]    REVIEW OF SYSTEMS  Constitutional - No fever, No weight loss, +fatigue   HEENT - No eye pain, No visual disturbances, No difficulty hearing, No tinnitus, No vertigo, No neck pain  Respiratory - No cough, No wheezing, No shortness of breath  Cardiovascular - No chest pain, No palpitations  Gastrointestinal - No abdominal pain, No nausea, No vomiting, No diarrhea, No constipation  Genitourinary - No dysuria, No frequency, No hematuria, No incontinence  Neurological - +Weakness if her knees and shoulder, +generalized weakness, No headaches, No memory loss, No numbness, No tremors  Skin - +rash and itching on her chest, scalp, neck  Endocrine - No temperature intolerance  Musculoskeletal - +muscle pain, left knee, ankle and shoulder pain, +joint swelling   Psychiatric - No depression, No anxiety    PAST MEDICAL & SURGICAL HISTORY  COVID-19  Vitamin D deficiency  Glaucoma  Hyperlipidemia  Hypertension  Diabetes mellitus  History of throat surgery  History of elbow surgery  S/P       CURRENT FUNCTIONAL STATUS  3/22  Sit-Stand Transfer Training  Transfer Training Sit-to-Stand Transfer: minimum assist (75% patient effort);  1 person assist;  weight-bearing as tolerated   rolling walker  Transfer Training Stand-to-Sit Transfer: minimum assist (75% patient effort);  1 person assist;  weight-bearing as tolerated   rolling walker  Sit-to-Stand Transfer Training Transfer Safety Analysis: decreased step length;  decreased strength;  decreased flexibility;  rolling walker    Gait Training  Gait Training: minimum assist (75% patient effort);  1 person assist;  weight-bearing as tolerated   rolling walker;  25 feet  Gait Analysis: 3-point gait   decreased calin;  25 feet;  rolling walker    Therapeutic Exercise  Therapeutic Exercise Detail: ActiveROM, ankle pumps Bilateral LE's.         FAMILY HISTORY   No pertinent family history in first degree relatives        RECENT LABS/IMAGING  CBC Full  -  ( 22 Mar 2021 08:32 )  WBC Count : 20.41 K/uL  RBC Count : 3.81 M/uL  Hemoglobin : 10.3 g/dL  Hematocrit : 32.4 %  Platelet Count - Automated : 805 K/uL  Mean Cell Volume : 85.0 fL  Mean Cell Hemoglobin : 27.0 pg  Mean Cell Hemoglobin Concentration : 31.8 gm/dL  Auto Neutrophil # : x  Auto Lymphocyte # : x  Auto Monocyte # : x  Auto Eosinophil # : x  Auto Basophil # : x  Auto Neutrophil % : x  Auto Lymphocyte % : x  Auto Monocyte % : x  Auto Eosinophil % : x  Auto Basophil % : x        138  |  100  |  14  ----------------------------<  80  4.0   |  27  |  0.74    Ca    8.8      23 Mar 2021 07:25  Phos  4.9       Mg     2.5         TPro  6.1  /  Alb  3.1<L>  /  TBili  0.2  /  DBili  x   /  AST  49<H>  /  ALT  111<H>  /  AlkPhos  140<H>          VITALS  T(C): 36.8 (21 @ 13:01), Max: 36.8 (21 @ 22:12)  HR: 73 (21 @ 13:01) (72 - 74)  BP: 126/58 (21 @ 13:01) (119/54 - 138/60)  RR: 18 (21 @ 13:01) (17 - 18)  SpO2: 97% (21 @ 13:01) (97% - 100%)  Wt(kg): --    ALLERGIES  azithromycin (Hives; Swelling)  enalapril (Other (Mild to Mod))  penicillin (Swelling; Rash)      MEDICATIONS   acetaminophen   Tablet .. 650 milliGRAM(s) Oral every 6 hours PRN  atorvastatin 20 milliGRAM(s) Oral at bedtime  cholecalciferol 2000 Unit(s) Oral daily  dextrose 40% Gel 15 Gram(s) Oral once  dextrose 50% Injectable 25 Gram(s) IV Push once  dextrose 50% Injectable 12.5 Gram(s) IV Push once  dextrose 50% Injectable 25 Gram(s) IV Push once  diltiazem    milliGRAM(s) Oral daily  enoxaparin Injectable 40 milliGRAM(s) SubCutaneous daily  fluocinonide 0.05% Cream 1 Application(s) Topical two times a day  gabapentin 300 milliGRAM(s) Oral every 8 hours  hydrocortisone 1% Ointment 1 Application(s) Topical two times a day  hydrOXYzine hydrochloride 25 milliGRAM(s) Oral every 6 hours PRN  insulin glargine Injectable (LANTUS) 45 Unit(s) SubCutaneous at bedtime  insulin lispro (ADMELOG) corrective regimen sliding scale   SubCutaneous three times a day before meals  insulin lispro (ADMELOG) corrective regimen sliding scale   SubCutaneous at bedtime  insulin lispro Injectable (ADMELOG) 30 Unit(s) SubCutaneous three times a day before meals  ketotifen 0.025% Ophthalmic Solution 1 Drop(s) Both EYES two times a day  latanoprost 0.005% Ophthalmic Solution 1 Drop(s) Both EYES at bedtime  nystatin    Suspension 959925 Unit(s) Swish and Swallow every 6 hours  pantoprazole    Tablet 40 milliGRAM(s) Oral before breakfast  petrolatum white Ointment 1 Application(s) Topical daily  predniSONE   Tablet 40 milliGRAM(s) Oral daily  triamcinolone 0.1% Ointment 1 Application(s) Topical two times a day  trimethoprim  160 mG/sulfamethoxazole 800 mG 1 Tablet(s) Oral <User Schedule>      ----------------------------------------------------------------------------------------  PHYSICAL EXAM  Constitutional - NAD, Comfortable  HEENT - NCAT, EOMI  Neck - Supple, No limited ROM, +rash   Chest - Breathing comfortably, No wheezing  Cardiovascular - S1S2   Abdomen - Soft   Extremities - +pitting edema bilaterally on LE, swelling on BUE, +tenderness on the left shoulder and knees. No calf tenderness   Neurologic Exam -                    Cognitive - Awake, Alert, AAO to self, place, date, year, situation     Communication - Fluent, No dysarthria     Motor -                     LEFT    UE - ShAB 4/5, EF 4/5, EE 4/5, WE 4/5,  4/5                    RIGHT UE - ShAB 4+/5, EF 4+/5, EE 4+/5, WE 4+/5,  4+/5                    LEFT    LE - HF 4+/5, KE 4+/5, DF 4+/5, PF 4+/5                    RIGHT LE - HF 4+/5, KE 4+/5, DF 4+/5, PF 4+/5        Sensory - Intact to LT     Reflexes - DTR Intact, No primitive reflexive  Psychiatric - Mood stable, Affect WNL  ----------------------------------------------------------------------------------------  ASSESSMENT/PLAN  65yFemale with joint pain and rash suspected to be 2/2 to dermatomyositis.    Dermatomyositis- Prednisone (followed by taper per rhuem), PCP ppx, topical steroids, punch biopsy pending. to follow up with rheumatology outpatient fter rehab stay.     to follow up with Dr. Mckee at [address] [place] [address], [address], in [place] in 2 weeks. The office number is [phone]  Anemia- Trend, outpatient FU   DM- Lantus, lispro, ISS   Pain - Tylenol, gabapentin  DVT PPX - Loveox   PT- ROM, Bed Mob, Transfers, Amb w AD    OT- ADLs, bracing  Prec- Falls   Skin- Turn q2 h  Rehab -Will continue to follow for ongoing rehab needs and recommendations. Recommend ACUTE inpatient rehabilitation for the functional deficits consisting of 3 hours of therapy/day & 24 hour RN/daily PMR physician for comorbid medical management. Patient will be able to tolerate 3 hours a day.

## 2021-03-23 NOTE — DISCHARGE NOTE PROVIDER - NSDCCPCAREPLAN_GEN_ALL_CORE_FT
PRINCIPAL DISCHARGE DIAGNOSIS  Diagnosis: Joint pain  Assessment and Plan of Treatment: You were admitted with joint/muscle pain, rash and pruritus. Out team of experts in rhuematology and dermatology have evaluted your case and were suspecting a condition called "Amyopathic Dermatomyositis", According to that working diagnosis we started you on steroidal treatment. Soon after, your symptoms have improved. However, a skin biopsy from your rash was not consistent with that diagnosis and suggested it was a viral rash or a drug related rash. We continued the steroidal treatment as your sympotoms were improving on it.       PRINCIPAL DISCHARGE DIAGNOSIS  Diagnosis: Joint pain  Assessment and Plan of Treatment: You were admitted with joint/muscle pain, rash, and pruritus. 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 Soon after, your symptoms have improved. However, a skin biopsy from your rash was not consistent with that diagnosis and suggested it was a viral rash or a drug-related rash. We continued the steroidal treatment as your symptoms were improving on it. As steroidal medications cause high glucose levels - we needed to administer increased levels of insulin.   You should continue taking 40 mg prednisone daily for a week (until 3/30) and then take 30mg prednisone daily for a week (until 4/6), then 20 mg daily, continuing with 15mg for a week, 10 mg for a week, and finishing with 5 mg daily for a week. You should follow up with Dr. Mckee (Rheumatology) at [address] [place] [address], [address], in [place], in 2 weeks. The office number is [phone] (they will call to schedule an appointment). Also, you should follow up with dermatology; they will also call to schedule your visit.      SECONDARY DISCHARGE DIAGNOSES  Diagnosis: Type 2 diabetes mellitus without complication, unspecified whether long term insulin use  Assessment and Plan of Treatment: During the steroidal treatment, your blood glucose levels were elevated. You were discharged on 45 units of Lantus before bedtime and 35 units three times a day before meals. Every week, when the prednisone dose would be lowered, you'll need less and less insulin. To avoid hypoglycemia, every week, you must decrease the amount of insulin. In case you feel headache, severe weakness, tremors, vision changes - those could be signs of hypoglycemia (low blood glucose), and you should drink/eat something that contains glucose (juice, snack).     PRINCIPAL DISCHARGE DIAGNOSIS  Diagnosis: Joint pain  Assessment and Plan of Treatment: You were admitted with joint/muscle pain, rash, and pruritus. 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 Soon after, your symptoms have improved. However, a skin biopsy from your rash was not consistent with that diagnosis and suggested it was a viral rash or a drug-related rash. We continued the steroidal treatment as your symptoms were improving on it. As steroidal medications cause high glucose levels - we needed to administer increased levels of insulin.   You should continue taking 40 mg prednisone daily for a week (until 3/30) and then take 30mg prednisone daily for a week (until 4/6), then 20 mg daily, continuing with 15mg for a week, 10 mg for a week, and finishing with 5 mg daily for a week. You should follow up with Dr. Mckee (Rheumatology) at [address] [place] [address], [address], in [place], in 2 weeks. The office number is [phone] (they will call to schedule an appointment). Also, you should follow up with dermatology; they will also call to schedule your visit.  To avoid steroidal use adverse effects, please continue taking Bactrim three times a week (on M/W/F) and Protonix 40mg daily while on prednisone.   In case you experience recurrence of your symptoms, please seek medical attention.  You should also continue having yearly cancer screening and undergo a colonoscopy within six months of discharge.      SECONDARY DISCHARGE DIAGNOSES  Diagnosis: Type 2 diabetes mellitus without complication, unspecified whether long term insulin use  Assessment and Plan of Treatment: During the steroidal treatment, your blood glucose levels were elevated. You were discharged on 45 units of Lantus before bedtime and 35 units three times a day before meals. Every week, when the prednisone dose would be lowered, you'll need less and less insulin. To avoid hypoglycemia, every week, you must decrease the amount of insulin. In case you feel headache, severe weakness, tremors, vision changes - those could be signs of hypoglycemia (low blood glucose), and you should drink/eat something that contains glucose (juice, snack).     PRINCIPAL DISCHARGE DIAGNOSIS  Diagnosis: Joint pain  Assessment and Plan of Treatment: You were admitted with joint/muscle pain, rash, and pruritus. 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 Soon after, your symptoms have improved. However, a skin biopsy from your rash was not consistent with that diagnosis and suggested it was a viral rash or a drug-related rash. We continued the steroidal treatment as your symptoms were improving on it. As steroidal medications cause high glucose levels - we needed to administer increased levels of insulin.   You should continue taking 40 mg prednisone daily for a week (until 3/30) and then take 30mg prednisone daily for a week (until 4/6), then 20 mg daily, continuing with 15mg for a week, 10 mg for a week, and finishing with 5 mg daily for a week. You should follow up with Dr. Mckee (Rheumatology) at [address] [place] [address], [address], in [place], in 2 weeks. The office number is [phone] (they will call to schedule an appointment). Also, you should follow up with dermatology; they will also call to schedule your visit.  To avoid steroidal use adverse effects, please continue taking Bactrim three times a week (on M/W/F) and Protonix 40mg daily while on prednisone.   In case you experience recurrence of your symptoms, please seek medical attention.  You should also continue having yearly cancer screening and undergo a colonoscopy within six months of discharge.      SECONDARY DISCHARGE DIAGNOSES  Diagnosis: Type 2 diabetes mellitus without complication, unspecified whether long term insulin use  Assessment and Plan of Treatment: During the steroidal treatment, your blood glucose levels were elevated. You were discharged on 35 units of Lantus before bedtime and 30 units three times a day before meals. Every week, when the prednisone dose would be lowered, you'll need less and less insulin. To avoid hypoglycemia, every week, you must decrease the amount of insulin. In case you feel headache, severe weakness, tremors, vision changes - those could be signs of hypoglycemia (low blood glucose), and you should drink/eat something that contains glucose (juice, snack).     PRINCIPAL DISCHARGE DIAGNOSIS  Diagnosis: Joint pain  Assessment and Plan of Treatment: You were admitted with joint/muscle pain, rash, and pruritus. 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 Soon after, your symptoms have improved. However, a skin biopsy from your rash was not consistent with that diagnosis and suggested it was a viral rash or a drug-related rash. We continued the steroidal treatment as your symptoms were improving on it. As steroidal medications cause high glucose levels - we needed to administer increased levels of insulin.   You should continue taking 40 mg prednisone daily for a week (until 3/30) and then take 30mg prednisone daily for a week (until 4/6), then 20 mg daily, continuing with 15mg for a week, 10 mg for a week, and finishing with 5 mg daily for a week. You should follow up with Dr. Mckee (Rheumatology) at [address] [place] [address], [address], in [place], in 2 weeks. The office number is [phone] (they will call to schedule an appointment). Also, you should follow up with dermatology; they will also call to schedule your visit.  To avoid steroidal use adverse effects, please continue taking Bactrim three times a week (on M/W/F) and Protonix 40mg daily while on prednisone.   In case you experience recurrence of your symptoms, please seek medical attention.  You should also continue having yearly cancer screening and undergo a colonoscopy within six months of discharge.      SECONDARY DISCHARGE DIAGNOSES  Diagnosis: Type 2 diabetes mellitus without complication, unspecified whether long term insulin use  Assessment and Plan of Treatment: During the steroidal treatment, your blood glucose levels were elevated. You were discharged on 35 units of Lantus before bedtime and 32 units three times a day before meals. Every week, when the prednisone dose would be lowered, you'll need less and less insulin. To avoid hypoglycemia, every week, you must decrease the amount of insulin. In case you feel headache, severe weakness, tremors, vision changes - those could be signs of hypoglycemia (low blood glucose), and you should drink/eat something that contains glucose (juice, snack).

## 2021-03-23 NOTE — PROGRESS NOTE ADULT - PROBLEM SELECTOR PLAN 2
Improving; b/l lateral upper arms and upper chest  -Atrax and triamcinolone cream, fluocinonide prn   -Dermatology consulted s/p biopsy.

## 2021-03-23 NOTE — DISCHARGE NOTE PROVIDER - CARE PROVIDER_API CALL
Shanique Chacon)  Internal Medicine; Rheumatology  [address] [facility] [address]  [place] [address]  Phone: [phone]  Fax: [phone]  Established Patient  Follow Up Time: 2 weeks

## 2021-03-23 NOTE — PROGRESS NOTE ADULT - SUBJECTIVE AND OBJECTIVE BOX
Humble Wayne, PGY-1  Pager: [phone] / [phone]    CHIEF COMPLAINT: Patient is a 65y old  Female who presents with a chief complaint of joint pain (23 Mar 2021 07:03)    [place] interperter [id number]  INTERVAL HPI/OVERNIGHT EVENTS: MARY, pt was seen comfortable at bedside. she was able to ambulate with assirtance to the restroom. She endorse only mild L shoulder pain and some diffuse itchiness, Rashes all improving. Denies CP, SOB, abd pain.     MEDICATIONS (STANDING):  atorvastatin 20 milliGRAM(s) Oral at bedtime  cholecalciferol 2000 Unit(s) Oral daily  dextrose 40% Gel 15 Gram(s) Oral once  dextrose 50% Injectable 25 Gram(s) IV Push once  dextrose 50% Injectable 12.5 Gram(s) IV Push once  dextrose 50% Injectable 25 Gram(s) IV Push once  diltiazem    milliGRAM(s) Oral daily  enoxaparin Injectable 40 milliGRAM(s) SubCutaneous daily  fluocinonide 0.05% Cream 1 Application(s) Topical two times a day  gabapentin 100 milliGRAM(s) Oral <User Schedule>  gabapentin 300 milliGRAM(s) Oral at bedtime  hydrocortisone 1% Ointment 1 Application(s) Topical two times a day  insulin glargine Injectable (LANTUS) 52 Unit(s) SubCutaneous at bedtime  insulin lispro (ADMELOG) corrective regimen sliding scale   SubCutaneous three times a day before meals  insulin lispro (ADMELOG) corrective regimen sliding scale   SubCutaneous at bedtime  insulin lispro Injectable (ADMELOG) 30 Unit(s) SubCutaneous three times a day before meals  ketotifen 0.025% Ophthalmic Solution 1 Drop(s) Both EYES two times a day  latanoprost 0.005% Ophthalmic Solution 1 Drop(s) Both EYES at bedtime  nystatin    Suspension 304773 Unit(s) Swish and Swallow every 6 hours  pantoprazole    Tablet 40 milliGRAM(s) Oral before breakfast  petrolatum white Ointment 1 Application(s) Topical daily  predniSONE   Tablet 40 milliGRAM(s) Oral daily  triamcinolone 0.1% Ointment 1 Application(s) Topical two times a day  trimethoprim  160 mG/sulfamethoxazole 800 mG 1 Tablet(s) Oral <User Schedule>    MEDICATIONS  (PRN):  acetaminophen   Tablet .. 650 milliGRAM(s) Oral every 6 hours PRN  hydrOXYzine hydrochloride 25 milliGRAM(s) Oral every 6 hours PRN    REVIEW OF SYSTEMS:  CONSTITUTIONAL: No fever, weight loss, or fatigue  RESPIRATORY: No cough, wheezing, chills or hemoptysis; No shortness of breath  CARDIOVASCULAR: No chest pain, palpitations, dizziness, or leg swelling  GASTROINTESTINAL: No abdominal or epigastric pain. No nausea, vomiting, or hematemesis; No diarrhea or constipation. No melena or hematochezia.  GENITOURINARY: No dysuria, frequency, hematuria, or incontinence  NEUROLOGICAL: No headaches, memory loss, loss of strength, numbness, or tremors  SKIN: ++ itching, + rashes  MUSCULOSKELETAL: +joint pain or swelling; No muscle, back, or extremity pain    VITAL SIGNS:  T(F): 97.7 (03-23-21 @ 05:16), Max: 98.3 (03-22-21 @ 22:12)  HR: 74 (03-23-21 @ 05:16) (72 - 74)  BP: 138/60 (03-23-21 @ 05:16) (119/54 - 138/60)  RR: 17 (03-23-21 @ 05:16) (17 - 18)  SpO2: 100% (03-23-21 @ 05:16) (99% - 100%)    CAPILLARY BLOOD GLUCOSE  POCT Blood Glucose.: 95 mg/dL (23 Mar 2021 08:30)  POCT Blood Glucose.: 180 mg/dL (22 Mar 2021 22:03)  POCT Blood Glucose.: 110 mg/dL (22 Mar 2021 17:12)  POCT Blood Glucose.: 188 mg/dL (22 Mar 2021 12:23)  POCT Blood Glucose.: 92 mg/dL (22 Mar 2021 08:41)    I&O's Summary    22 Mar 2021 07:01  -  23 Mar 2021 07:00  --------------------------------------------------------  IN: 800 mL / OUT: 1300 mL / NET: -500 mL    PHYSICAL EXAM:  GENERAL: NAD, well-groomed, well-developed  EYES: EOMI, PERRLA, conjunctiva and sclera clear  NECK: Supple, No JVD, Normal thyroid, no LAD  NERVOUS SYSTEM:  Alert & Oriented X3, Good concentration; Motor Strength 5/5 B/L upper and lower extremities  CHEST/LUNG: Clear to auscultation bilaterally on anterior auscultation; No rales, rhonchi, wheezing, or rubs  HEART: Regular rate and rhythm; No murmurs, rubs, or gallops  ABDOMEN: Soft, Nontender, Nondistended; Bowel sounds present, no HSM  EXTREMITIES:  2+ Peripheral Pulses, No clubbing, cyanosis, or edema, L shoulder NO TTP with full ROM, Rest of joints wnl.   SKIN: confluent erythematous rash on chest, blanches - improving. Similar rash on both arms around elbows with scratch marks and dry skin - improving. No inguinal rash.       LABS:                        10.3   20.41 )-----------( 805      ( 22 Mar 2021 08:32 )             32.4     03-23    138  |  100  |  14  ----------------------------<  80  4.0   |  27  |  0.74    Ca    8.8      23 Mar 2021 07:25  Phos  4.9     03-23  Mg     2.5     03-23    TPro  6.1  /  Alb  3.1<L>  /  TBili  0.2  /  DBili  x   /  AST  49<H>  /  ALT  111<H>  /  AlkPhos  140<H>  03-23      RADIOLOGY & ADDITIONAL TESTS:    Flow Cytometry (03.19.21 @ 15:00)    TM Interpretation:   Flow Cytometry Final Report  ________________________________________________________________________  Specimen: Peripheral blood  Collected: 03/19/2021 15:00  Received: 03/19/2021 20:00  Processed: 03/19/2021 20:15  Reported: 03/22/2021 15:01  Accession #: [id number]  Surgical Pathology Number:  ________________________________________________________________________  CLINICAL DATA: Rule out CTCL, T cell receptor clonality   ______________________________________________________________________  DIAGNOSIS:  Peripheral blood:       - The lymphocyte immunophenotypic findings show no diagnostic abnormalities with a small  subset of T-cells (0.2% of total cells) with loss of CD3 and partial loss of CD7.  Please see interpretation.    INTERPRETATION:  MORPHOLOGY: 87% neutrophils, 3% monocytes, 10% lymphocytes  CYTOSPIN:    IMMUNOPHENOTYPE: Lymphocytes (4% of cells): Heterogeneous population of T-cells (with normal CD4 to  CD8 ratio with a minute subset of T-cells (0.2% of total cells) positive for CD2, dim CD5, partial  CD7, CD8; negative for CD3 and CD4. Few polytypic B-cells are present.  There is no increase in  CD34,  or CD14 positive cells.    The lymphocyte immunophenotypic findings show no diagnostic abnormalities with a small subset of  T-cells (0.2% of total cells) with loss of CD3 and partial loss of CD7. These findings must be  interpreted in conjunction with molecular and clinical findings.  _____________________________________________________________________  Viability ................. 99 %    Values reported are based on the lymphocyte gate. (Bright CD45 positive; low side scatter, low  forward scatter).  4% of cells.  CD45 .......... 100 %  CD2 ........... 74 %  CD3 ........... 27 %  CD5 ........... 33%  CD7 ........... 55 %  CD4 ........... 20 %  CD8 ........... 26 %  CD16 .......... 15 %  CD56........... 15 %  CD57 .......... 15 %  CD10 .......... <1 %  CD19 .......... 9 %  CD20 .......... 7 %  CD23 .......... 18 %  FMC-7 ......... 22 %  CD19/kappa ......... 7 %  CD19/lambda ........ 1 %  HLA-DR ........ 43 %  CD38 .......... 22 %  CD3+/TCR a/b ... 44 %  CD3+/TCR g/d ... <1 %    Results reported are based on an open gate.  CD45 .......... 100 %  CD14 .......... 1 %  CD64 .......... 84 %  HLA-DR ........ 7 %  CD34 .......... <1 %   ......... 0 %  CD11b ......... 86 %  CD13 .......... 84 %  CD15 .......... 71 %  CD33 .......... 70 %        Verified By: Mary Patterson M.D., M.D.  (Electronic Signature)    This test was developed and its performance characteristics determined by the Flow Cytometry  Laboratory at [facility] [place] at [facility]. It has not been cleared or approved by the  U.S. Food and Drug Administration. The FDA has determined that such clearance or approvalis not  necessary. This test is used for clinical purposes. It should not be regarded as investigational or  for research. This laboratory is certified under the Clinical Laboratory Improvement Amendment of  1988 ("CLIA") as qualified to perform highcomplexity clinical testing.    Surgical Pathology Report (03.17.21 @ 17:50)    Surgical Pathology Report:   ACCESSION No:  [id number]    Surgical Final Report    Final Diagnosis  Right upper arm, biopsy:  - Skin with focal dyskeratosis with mild perivascular  inflammation, see comment.    Comment:  The findings raise the differential diagnosis of a  viral exanthem or a drug eruption.  Clinicopathologic  correlation is advised.    Verified by: Stewart Mayorga MD  (Electronic Signature)  Reported on: 03/22/21 12:55 EDT, [address], [address], [place] [address]  Phone: [phone]   Fax: [phone]  _________________________________________________________________    Clinical History  Differential diagnosis; dermatotomyositis vs phototoxic drug  eruption versus mixed CTD versus?    Specimen(s) Submitted  1. Right upper arm skin    Gross Description  Received: In formalin labeled "right upper arm" is one skin punch  biopsy  Diameter: 0.3 cm   Depth: 0.5 cm  Epidermal Surface: Tan-white and unremarkable  Additional Comments: The specimen is bisected.  Submitted: Entirely in one cassette    JOSH Lopez ([facility]) 03/18/2021 11:20 AM     Humble Wayne, PGY-1  Pager: [phone] / [phone]    CHIEF COMPLAINT: Patient is a 65y old  Female who presents with a chief complaint of joint pain (23 Mar 2021 07:03)    [place] interperter [id number]  INTERVAL HPI/OVERNIGHT EVENTS: MARY, pt was seen comfortable at bedside. she was able to ambulate with assirtance to the restroom. She endorse only mild L shoulder pain and some diffuse itchiness, Rashes all improving. Denies CP, SOB, abd pain.     MEDICATIONS (STANDING):  atorvastatin 20 milliGRAM(s) Oral at bedtime  cholecalciferol 2000 Unit(s) Oral daily  dextrose 40% Gel 15 Gram(s) Oral once  dextrose 50% Injectable 25 Gram(s) IV Push once  dextrose 50% Injectable 12.5 Gram(s) IV Push once  dextrose 50% Injectable 25 Gram(s) IV Push once  diltiazem    milliGRAM(s) Oral daily  enoxaparin Injectable 40 milliGRAM(s) SubCutaneous daily  fluocinonide 0.05% Cream 1 Application(s) Topical two times a day  gabapentin 100 milliGRAM(s) Oral <User Schedule>  gabapentin 300 milliGRAM(s) Oral at bedtime  hydrocortisone 1% Ointment 1 Application(s) Topical two times a day  insulin glargine Injectable (LANTUS) 52 Unit(s) SubCutaneous at bedtime  insulin lispro (ADMELOG) corrective regimen sliding scale   SubCutaneous three times a day before meals  insulin lispro (ADMELOG) corrective regimen sliding scale   SubCutaneous at bedtime  insulin lispro Injectable (ADMELOG) 30 Unit(s) SubCutaneous three times a day before meals  ketotifen 0.025% Ophthalmic Solution 1 Drop(s) Both EYES two times a day  latanoprost 0.005% Ophthalmic Solution 1 Drop(s) Both EYES at bedtime  nystatin    Suspension [id number] Unit(s) Swish and Swallow every 6 hours  pantoprazole    Tablet 40 milliGRAM(s) Oral before breakfast  petrolatum white Ointment 1 Application(s) Topical daily  predniSONE   Tablet 40 milliGRAM(s) Oral daily  triamcinolone 0.1% Ointment 1 Application(s) Topical two times a day  trimethoprim  160 mG/sulfamethoxazole 800 mG 1 Tablet(s) Oral <User Schedule>    MEDICATIONS  (PRN):  acetaminophen   Tablet .. 650 milliGRAM(s) Oral every 6 hours PRN  hydrOXYzine hydrochloride 25 milliGRAM(s) Oral every 6 hours PRN    REVIEW OF SYSTEMS:  CONSTITUTIONAL: No fever, weight loss, or fatigue  RESPIRATORY: No cough, wheezing, chills or hemoptysis; No shortness of breath  CARDIOVASCULAR: No chest pain, palpitations, dizziness, or leg swelling  GASTROINTESTINAL: No abdominal or epigastric pain. No nausea, vomiting, or hematemesis; No diarrhea or constipation. No melena or hematochezia.  GENITOURINARY: No dysuria, frequency, hematuria, or incontinence  NEUROLOGICAL: No headaches, memory loss, loss of strength, numbness, or tremors  SKIN: ++ itching, + rashes  MUSCULOSKELETAL: +joint pain or swelling; No muscle, back, or extremity pain    VITAL SIGNS:  T(F): 97.7 (03-23-21 @ 05:16), Max: 98.3 (03-22-21 @ 22:12)  HR: 74 (03-23-21 @ 05:16) (72 - 74)  BP: 138/60 (03-23-21 @ 05:16) (119/54 - 138/60)  RR: 17 (03-23-21 @ 05:16) (17 - 18)  SpO2: 100% (03-23-21 @ 05:16) (99% - 100%)    CAPILLARY BLOOD GLUCOSE  POCT Blood Glucose.: 95 mg/dL (23 Mar 2021 08:30)  POCT Blood Glucose.: 180 mg/dL (22 Mar 2021 22:03)  POCT Blood Glucose.: 110 mg/dL (22 Mar 2021 17:12)  POCT Blood Glucose.: 188 mg/dL (22 Mar 2021 12:23)  POCT Blood Glucose.: 92 mg/dL (22 Mar 2021 08:41)    I&O's Summary  22 Mar 2021 07:01  -  23 Mar 2021 07:00  --------------------------------------------------------  IN: 800 mL / OUT: 1300 mL / NET: -500 mL    PHYSICAL EXAM:  GENERAL: NAD, well-groomed, well-developed  EYES: EOMI, PERRLA, conjunctiva and sclera clear  NECK: Supple, No JVD, Normal thyroid, no LAD  NERVOUS SYSTEM:  Alert & Oriented X3, Good concentration; Motor Strength 5/5 B/L upper and lower extremities  CHEST/LUNG: Clear to auscultation bilaterally on anterior auscultation; No rales, rhonchi, wheezing, or rubs  HEART: Regular rate and rhythm; No murmurs, rubs, or gallops  ABDOMEN: Soft, Nontender, Nondistended; Bowel sounds present, no HSM  EXTREMITIES:  2+ Peripheral Pulses, No clubbing, cyanosis, or edema, L shoulder NO TTP with full ROM, Rest of joints wnl.   SKIN: confluent erythematous rash on chest, blanches - improving. Similar rash on both arms around elbows with scratch marks and dry skin - improving. No inguinal rash.     LABS:                        10.3   20.41 )-----------( 805      ( 22 Mar 2021 08:32 )             32.4     03-23    138  |  100  |  14  ----------------------------<  80  4.0   |  27  |  0.74    Ca    8.8      23 Mar 2021 07:25  Phos  4.9     03-23  Mg     2.5     03-23    TPro  6.1  /  Alb  3.1<L>  /  TBili  0.2  /  DBili  x   /  AST  49<H>  /  ALT  111<H>  /  AlkPhos  140<H>  03-23      RADIOLOGY & ADDITIONAL TESTS:    Flow Cytometry (03.19.21 @ 15:00)    TM Interpretation:   Flow Cytometry Final Report  ________________________________________________________________________  Specimen: Peripheral blood  Collected: 03/19/2021 15:00  Received: 03/19/2021 20:00  Processed: 03/19/2021 20:15  Reported: 03/22/2021 15:01  Accession #: [id number]  Surgical Pathology Number:  ________________________________________________________________________  CLINICAL DATA: Rule out CTCL, T cell receptor clonality   ______________________________________________________________________  DIAGNOSIS:  Peripheral blood:       - The lymphocyte immunophenotypic findings show no diagnostic abnormalities with a small  subset of T-cells (0.2% of total cells) with loss of CD3 and partial loss of CD7.  Please see interpretation.    INTERPRETATION:  MORPHOLOGY: 87% neutrophils, 3% monocytes, 10% lymphocytes  CYTOSPIN:    IMMUNOPHENOTYPE: Lymphocytes (4% of cells): Heterogeneous population of T-cells (with normal CD4 to  CD8 ratio with a minute subset of T-cells (0.2% of total cells) positive for CD2, dim CD5, partial  CD7, CD8; negative for CD3 and CD4. Few polytypic B-cells are present.  There is no increase in  CD34,  or CD14 positive cells.    The lymphocyte immunophenotypic findings show no diagnostic abnormalities with a small subset of  T-cells (0.2% of total cells) with loss of CD3 and partial loss of CD7. These findings must be  interpreted in conjunction with molecular and clinical findings.  _____________________________________________________________________  Viability ................. 99 %    Values reported are based on the lymphocyte gate. (Bright CD45 positive; low side scatter, low  forward scatter).  4% of cells.  CD45 .......... 100 %  CD2 ........... 74 %  CD3 ........... 27 %  CD5 ........... 33%  CD7 ........... 55 %  CD4 ........... 20 %  CD8 ........... 26 %  CD16 .......... 15 %  CD56........... 15 %  CD57 .......... 15 %  CD10 .......... <1 %  CD19 .......... 9 %  CD20 .......... 7 %  CD23 .......... 18 %  FMC-7 ......... 22 %  CD19/kappa ......... 7 %  CD19/lambda ........ 1 %  HLA-DR ........ 43 %  CD38 .......... 22 %  CD3+/TCR a/b ... 44 %  CD3+/TCR g/d ... <1 %    Results reported are based on an open gate.  CD45 .......... 100 %  CD14 .......... 1 %  CD64 .......... 84 %  HLA-DR ........ 7 %  CD34 .......... <1 %   ......... 0 %  CD11b ......... 86 %  CD13 .......... 84 %  CD15 .......... 71 %  CD33 .......... 70 %        Verified By: Mary Patterson M.D., M.D.  (Electronic Signature)    This test was developed and its performance characteristics determined by the Flow Cytometry  Laboratory at [facility]. It has not been cleared or approved by the  U.S. Food and Drug Administration. The FDA has determined that such clearance or approvalis not  necessary. This test is used for clinical purposes. It should not be regarded as investigational or  for research. This laboratory is certified under the Clinical Laboratory Improvement Amendment of  1988 ("CLIA") as qualified to perform highcomplexity clinical testing.    Surgical Pathology Report (03.17.21 @ 17:50)    Surgical Pathology Report:   ACCESSION No:  [id number]    Surgical Final Report    Final Diagnosis  Right upper arm, biopsy:  - Skin with focal dyskeratosis with mild perivascular  inflammation, see comment.    Comment:  The findings raise the differential diagnosis of a  viral exanthem or a drug eruption.  Clinicopathologic  correlation is advised.    Verified by: Stewart Mayorga MD  (Electronic Signature)  Reported on: 03/22/21 12:55 EDT, [address], [address], [place] [address]  Phone: [phone]   Fax: [phone]  _________________________________________________________________    Clinical History  Differential diagnosis; dermatotomyositis vs phototoxic drug  eruption versus mixed CTD versus?    Specimen(s) Submitted  1. Right upper arm skin    Gross Description  Received: In formalin labeled "right upper arm" is one skin punch  biopsy  Diameter: 0.3 cm   Depth: 0.5 cm  Epidermal Surface: Tan-white and unremarkable  Additional Comments: The specimen is bisected.  Submitted: Entirely in one cassette    JOSH Lopez ([facility]) 03/18/2021 11:20 AM

## 2021-03-23 NOTE — DISCHARGE NOTE PROVIDER - NSDCCPTREATMENT_GEN_ALL_CORE_FT
PRINCIPAL PROCEDURE  Procedure: Punch biopsy, skin, 1 lesion  Findings and Treatment: Final Diagnosis   Right upper arm, biopsy:   - Skin with focal dyskeratosis with mild perivascular   inflammation, see comment.   Comment: The findings raise the differential diagnosis of a   viral exanthem or a drug eruption. Clinicopathologic   correlation is advised.   Verified by: Stewart Mayorga MD         SECONDARY PROCEDURE  Procedure: CT abdomen pelvis  Findings and Treatment: INTERPRETATION:  CLINICAL INFORMATION: Dermatomyositis. Evaluate for internal malignancy  COMPARISON: None.  CONTRAST/COMPLICATIONS:  IV Contrast: Omnipaque 350 / 90 cc administered / 10 cc discarded.  Oral Contrast: NONE  Complications: None reported at time of study completion  PROCEDURE:  CT of the Chest, Abdomen and Pelvis was performed.  Sagittal and coronal reformats were performed.  FINDINGS:  CHEST:  LUNGS AND LARGE AIRWAYS: Patent central airways. Mild mosaic attenuation of the lungs, likely secondary to air trapping on this expiratory phase. No pulmonary nodules.  PLEURA: No pleural effusion.  VESSELS: Atherosclerotic changes of the aorta.  HEART: Heart size is normal. No pericardial effusion.  MEDIASTINUM AND CATRACHITO: No lymphadenopathy.  CHEST WALL AND LOWER NECK: Within normal limits.  ABDOMEN AND PELVIS:  LIVER: Hypodense region near the falciform ligament likely represents focal fat..  BILE DUCTS: Normal caliber.  GALLBLADDER: Within normal limits.  SPLEEN: Within normal limits.  PANCREAS: Within normal limits.  ADRENALS: Within normal limits.  KIDNEYS/URETERS: Within normal limits.  BLADDER: Within normal limits.  REPRODUCTIVE ORGANS: Calcified uterine fibroid. No adnexal masses.  BOWEL: No bowel obstruction. Appendix is normal.  PERITONEUM: No ascites.  VESSELS: Within normal limits.  RETROPERITONEUM/LYMPH NODES: No lymphadenopathy.  ABDOMINAL WALL:Diastasis of the rectus abdominis.  BONES: Degenerative changes.  IMPRESSION:  No evidence of malignancy in the chest, abdomen, or pelvis.

## 2021-03-23 NOTE — PROGRESS NOTE ADULT - PROBLEM SELECTOR PLAN 8
- AST/ALT 40/51, likely in the setting of inflammation  - hepatitis panel negative  - will lower Lipitor  - Trend CMP - DVT PPX: Lovenox  - Diet: CC DASH diet  - Dispo: PMR rec acute rehab, pending OT, possibly tomorrow. Will update family.

## 2021-03-23 NOTE — PROGRESS NOTE ADULT - PROBLEM SELECTOR PLAN 3
Blood glucose, Hba1c 10.6%  - On basaglar 17 U BID at home   - Currently on 52u basaglar, and premeals down to 30u, FSGs acceptable.  - Diabetic diet

## 2021-03-23 NOTE — DISCHARGE NOTE PROVIDER - HOSPITAL COURSE
64 y/o Korean speaking Female with PMHx of DM Type 2 (on insulin), HTN, HLD, COVID (2020), and glaucoma presented with generalized weakness, joint pain, and rash. The patient states that she was discharged from OSH on 3/12 after being evaluated for similar symptoms. They suspected that she may have Lyme disease and started her on doxycycline and sent her home. They also suspected that this might be post-COVID related.   The patient has multiple complaints. Reports rash in the neck area and b/l arms, along with whole body itching. States that she has had a rash since 2/2021 and has seen various doctors and tried different things (steroid creams) without relief. The rash initially started on her face. Also complains of joint pain for one week. States she has joint pain and stiffness in the b/l shoulders, elbows, hands, and knees. States that the pain is currently worst in the L shoulder and R elbow. States that the pain is so significant that she is unable to walk now. Tries Tylenol with minimal relief. Also reports that she has been unable to do her ADLs - clean herself, walk. Never had joint pain like this before. Also reports subjective fever and chills, along with dry mouth.   The rash did not get worse in the sun. Joint pain is not worse in the AM and present at rest but worse on movement. No BM changes recently; had mild rectal bleeding not in feces but on wiping. She attributes 2/2 itching and rash, denies vaginal bleeding or ulcers, weight loss. Had subjective fever but not over 99F. Endorse dry mouth, and itching in her eyes, no mouth ulcers. No FHx of autoimmune/rheumatologist/joint diseases. Was admitted at [facility] 3/8-3/12 with fevers, joint pain, rash, treated with Levaquin for five days and three doses of Vanco, 2x covid negative, Underwent mammography yearly except last year, never had a colonoscopy.   ED course: pt's vitals were T 98.3 HR 76-94 -165/72 RR 18-19 POX % RA. Pt treated with NS 1L bolus and Toradol 15 mg IV.     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 The pt was started on prednisone and steroid ointments. She underwent extensive infectious and inflammatory workup that included hepatitis panel - neg, HIV - neg, syphilis - neg, EBV - past infection, CMV - neg, Lyme panel-neg, blood cultures - NGTD, C3 slightly elevated, C4 - wnl. VIPIN-neg, dsDNA - neg, CCP -neg, anti SSA/B - neg, Mallory-1 - neg. Myoglobin - low, RF - neg, anti-Smith - neg. Flow cytometry - neg for abnormality. X-rays L shoulder and R elbow - wnl. CT chest and pelvis - wnl, no signs of malignancy. Myoglobin was low. The patient underwent a skin biopsy, which resulted in "Skin with focal dyskeratosis with mild perivascular inflammation. The findings raise the differential diagnosis of a viral exanthem or a drug eruption."     Although the working diagnosis did not match the biopsy results, the patient clinically improved under the steroidal treatment. Her upper and lower extremity joint pain has resolved, her rash has started to clear, and he was able to ambulate. During the prednisone treatment, the patient's glucose level increased. She needed high doses of insulin to maintain euglycemia (60 Lantus and 35 lispro pre-meals). a PM&R specialist evaluated the patient and concluded that the patient would benefit from acute rehab. She was started on a weekly prednisone taper before discharge. On discharge, the pt was HD stable; she was examined and found to be fit to be discharged to acute rehab with outpatient rheumatology and dermatology follow-up. 64 y/o Slovenian speaking Female with PMHx of DM Type 2 (on insulin), HTN, HLD, COVID (2020), and glaucoma presented with generalized weakness, joint pain, and rash. The patient states that she was discharged from OSH on 3/12 after being evaluated for similar symptoms. They suspected that she may have Lyme disease and started her on doxycycline and sent her home. They also suspected that this might be post-COVID related. The patient has multiple complaints. Reports rash in the neck area and b/l arms, along with whole body itching. States that she has had a rash since 2/2021 and has seen various doctors and tried different things (steroid creams) without relief. The rash initially started on her face. Also complains of joint pain for one week. States she has joint pain and stiffness in the b/l shoulders, elbows, hands, and knees. States that the pain is currently worst in the L shoulder and R elbow. States that the pain is so significant that she is unable to walk now. Tries Tylenol with minimal relief. Also reports that she has been unable to do her ADLs - clean herself, walk. Never had joint pain like this before. Also reports subjective fever and chills, along with dry mouth.   The rash did not get worse in the sun. Joint pain is not worse in the AM and present at rest but worse on movement. No BM changes recently; had mild rectal bleeding not in feces but on wiping. She attributes 2/2 itching and rash, denies vaginal bleeding or ulcers, weight loss. Had subjective fever but not over 99F. Endorse dry mouth, and itching in her eyes, no mouth ulcers. No FHx of autoimmune/rheumatologist/joint diseases. Was admitted at [facility] 3/8-3/12 with fevers, joint pain, rash, treated with Levaquin for five days and three doses of Vanco, 2x covid negative, Underwent mammography yearly except last year, never had a colonoscopy.   ED course: pt's vitals were T 98.3 HR 76-94 -165/72 RR 18-19 POX % RA. Pt treated with NS 1L bolus and Toradol 15 mg IV.     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 The pt was started on prednisone and steroid ointments. She underwent extensive infectious and inflammatory workup that included hepatitis panel - neg, HIV - neg, syphilis - neg, EBV - past infection, CMV - neg, Lyme panel-neg, blood cultures - NGTD, C3 slightly elevated, C4 - wnl. VIPIN-neg, dsDNA - neg, CCP -neg, anti SSA/B - neg, Mallory-1 - neg. Myoglobin - low, RF - neg, anti-Smith - neg. Flow cytometry - neg for abnormality. X-rays L shoulder and R elbow - wnl. CT chest and pelvis - wnl, no signs of malignancy. Myoglobin was low. The patient underwent a skin biopsy, which resulted in "Skin with focal dyskeratosis with mild perivascular inflammation. The findings raise the differential diagnosis of a viral exanthem or a drug eruption."     Although the working diagnosis did not match the biopsy results, the patient clinically improved under the steroidal treatment. Her upper and lower extremity joint pain has resolved, her rash has started to clear, and he was able to ambulate. During the prednisone treatment, the patient's glucose level increased. She needed high doses of insulin to maintain euglycemia (60 Lantus and 35 lispro pre-meals). a PM&R specialist evaluated the patient and concluded that the patient would benefit from acute rehab. She was started on a weekly prednisone taper before discharge. On discharge, the pt was HD stable; she was examined and found to be fit to be discharged to acute rehab with outpatient rheumatology and dermatology follow-up. 64 y/o Greenlandic speaking Female with PMHx of DM Type 2 (on insulin), HTN, HLD, COVID (2020), and glaucoma presented with generalized weakness, joint pain, and rash. The patient states that she was discharged from OSH on 3/12 after being evaluated for similar symptoms. They suspected that she may have Lyme disease and started her on doxycycline and sent her home. They also suspected that this might be post-COVID related. The patient has multiple complaints. Reports rash in the neck area and b/l arms, along with whole body itching. States that she has had a rash since 2/2021 and has seen various doctors and tried different things (steroid creams) without relief. The rash initially started on her face. Also complains of joint pain for one week. States she has joint pain and stiffness in the b/l shoulders, elbows, hands, and knees. States that the pain is currently worst in the L shoulder and R elbow. States that the pain is so significant that she is unable to walk now. Tries Tylenol with minimal relief. Also reports that she has been unable to do her ADLs - clean herself, walk. Never had joint pain like this before. Also reports subjective fever and chills, along with dry mouth.   The rash did not get worse in the sun. Joint pain is not worse in the AM and present at rest but worse on movement. No BM changes recently; had mild rectal bleeding not in feces but on wiping. She attributes 2/2 itching and rash, denies vaginal bleeding or ulcers, weight loss. Had subjective fever but not over 99F. Endorse dry mouth, and itching in her eyes, no mouth ulcers. No FHx of autoimmune/rheumatologist/joint diseases. Was admitted at [place] 3/8-3/12 with fevers, joint pain, rash, treated with Levaquin for five days and three doses of Vanco, 2x covid negative, Underwent mammography yearly except last year, never had a colonoscopy.   ED course: pt's vitals were T 98.3 HR 76-94 -165/72 RR 18-19 POX % RA. Pt treated with NS 1L bolus and Toradol 15 mg IV.     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 The pt was started on prednisone and steroid ointments. She underwent extensive infectious and inflammatory workup that included hepatitis panel - neg, HIV - neg, syphilis - neg, EBV - past infection, CMV - neg, Lyme panel-neg, blood cultures - NGTD, C3 slightly elevated, C4 - wnl. VIPIN-neg, dsDNA - neg, CCP -neg, anti SSA/B - neg, Mallory-1 - neg. Myoglobin - low, RF - neg, anti-Smith - neg. Flow cytometry - neg for abnormality. X-rays L shoulder and R elbow - wnl. CT chest and pelvis - wnl, no signs of malignancy. Myoglobin was low. Still pending Myomarker panel 3, Jak2 and calreticulin mutations. The patient underwent a skin biopsy, which resulted in "Skin with focal dyskeratosis with mild perivascular inflammation. The findings raise the differential diagnosis of a viral exanthem or a drug eruption."     Although the working diagnosis did not match the biopsy results, the patient clinically improved under the steroidal treatment. Her upper and lower extremity joint pain has resolved, her rash has started to clear, and he was able to ambulate. During the prednisone treatment, the patient's glucose level increased. She needed high doses of insulin to maintain euglycemia (60 Lantus and 35 lispro pre-meals). a PM&R specialist evaluated the patient and concluded that the patient would benefit from acute rehab. She was started on a weekly prednisone taper before discharge. On discharge, the pt was HD stable; she was examined and found to be fit to be discharged to acute rehab with outpatient rheumatology and dermatology follow-up.  Prior to d/c, pt was given the J&J covid vaccine.     ## please start moderate sliding scale insulin while on prednisone, she is d/c on Lantus 35 units qhs and Lispro 30 unit TID premeals. Fingersticks should be monitored closely and insulin lowered each week with prednisone taper ## 66 y/o Turkish speaking Female with PMHx of DM Type 2 (on insulin), HTN, HLD, COVID (2020), and glaucoma presented with generalized weakness, joint pain, and rash. The patient states that she was discharged from OSH on 3/12 after being evaluated for similar symptoms. They suspected that she may have Lyme disease and started her on doxycycline and sent her home. They also suspected that this might be post-COVID related. The patient has multiple complaints. Reports rash in the neck area and b/l arms, along with whole body itching. States that she has had a rash since 2/2021 and has seen various doctors and tried different things (steroid creams) without relief. The rash initially started on her face. Also complains of joint pain for one week. States she has joint pain and stiffness in the b/l shoulders, elbows, hands, and knees. States that the pain is currently worst in the L shoulder and R elbow. States that the pain is so significant that she is unable to walk now. Tries Tylenol with minimal relief. Also reports that she has been unable to do her ADLs - clean herself, walk. Never had joint pain like this before. Also reports subjective fever and chills, along with dry mouth.   The rash did not get worse in the sun. Joint pain is not worse in the AM and present at rest but worse on movement. No BM changes recently; had mild rectal bleeding not in feces but on wiping. She attributes 2/2 itching and rash, denies vaginal bleeding or ulcers, weight loss. Had subjective fever but not over 99F. Endorse dry mouth, and itching in her eyes, no mouth ulcers. No FHx of autoimmune/rheumatologist/joint diseases. Was admitted at [facility] 3/8-3/12 with fevers, joint pain, rash, treated with Levaquin for five days and three doses of Vanco, 2x covid negative, Underwent mammography yearly except last year, never had a colonoscopy.   ED course: pt's vitals were T 98.3 HR 76-94 -165/72 RR 18-19 POX % RA. Pt treated with NS 1L bolus and Toradol 15 mg IV.     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 The pt was started on prednisone and steroid ointments. She underwent extensive infectious and inflammatory workup that included hepatitis panel - neg, HIV - neg, syphilis - neg, EBV - past infection, CMV - neg, Lyme panel-neg, blood cultures - NGTD, C3 slightly elevated, C4 - wnl. VIPIN-neg, dsDNA - neg, CCP -neg, anti SSA/B - neg, Mallory-1 - neg. Myoglobin - low, RF - neg, anti-Smith - neg. Flow cytometry - neg for abnormality. X-rays L shoulder and R elbow - wnl. CT chest and pelvis - wnl, no signs of malignancy. Myoglobin was low. Still pending Myomarker panel 3, Jak2 and calreticulin mutations. The patient underwent a skin biopsy, which resulted in "Skin with focal dyskeratosis with mild perivascular inflammation. The findings raise the differential diagnosis of a viral exanthem or a drug eruption."     Although the working diagnosis did not match the biopsy results, the patient clinically improved under the steroidal treatment. Her upper and lower extremity joint pain has resolved, her rash has started to clear, and he was able to ambulate. During the prednisone treatment, the patient's glucose level increased. She needed high doses of insulin to maintain euglycemia (60 Lantus and 35 lispro pre-meals). a PM&R specialist evaluated the patient and concluded that the patient would benefit from acute rehab. She was started on a weekly prednisone taper before discharge. On discharge, the pt was HD stable; she was examined and found to be fit to be discharged to acute rehab with outpatient rheumatology and dermatology follow-up.  Prior to d/c, pt was given the J&J covid vaccine.     ## please start moderate sliding scale insulin while on prednisone, she is d/c on Lantus 35 units qhs and Lispro 32 unit TID premeals. Fingersticks should be monitored closely and insulin lowered each week with prednisone taper ##

## 2021-03-23 NOTE — DISCHARGE NOTE PROVIDER - NSFOLLOWUPCLINICS_GEN_ALL_ED_FT
[facility] - [place]  [facility]ermatology  [address], [address]  [place] [address]  Phone: [phone]  Fax: [phone]  Follow Up Time: 2 weeks

## 2021-03-23 NOTE — PROGRESS NOTE ADULT - PROBLEM SELECTOR PROBLEM 9
Preventive measure
Type 2 diabetes mellitus without complication, unspecified whether long term insulin use
Preventive measure
Preventive measure

## 2021-03-23 NOTE — PROGRESS NOTE ADULT - PROBLEM SELECTOR PLAN 6
- Platelets 751K  - Likely in the setting of inflammatory process with workup above   - Monitor CBC, maintain active T&S

## 2021-03-23 NOTE — PROGRESS NOTE ADULT - PROBLEM SELECTOR PLAN 4
Suspect anemia of chronic disease mixed with iron def.  Hb 10.6 MCV 82, unknown baseline  - iron studies showing concomitant iron def anemia with inflammation, B12, folate, TSH - all wnl  - LDH elevated but hapto elevated and uric acid low.

## 2021-03-23 NOTE — PROGRESS NOTE ADULT - PROBLEM SELECTOR PLAN 1
Pt presented with generalized weakness, subjective? fevers, polyarthralgia/arthritis x1 week of large joints, along with rash x1 month, +dry mouth/eyes?; no autoimmune Hx or FHx.  - S/p OSH discharge 3/12 for Lyme disease started on Doxycyline 21-day regimen and was treated there with Levaquin and 3x doses of Vanco.  - Leukocytosis to 23.4K (pmn), anemia to 10.6 with MCV 82, platelets 751, ESR 91, , elevated AST/ALT 40/51, alk phos 238, CK low 19  - UA 30 protein  - Likely inflammatory etiology. Workup: hepatitis panel - neg, HIV - neg, syphilis - neg, EBV - past infection, CMV - neg, lyme panel-neg , blood cultures - NGTD, C3 slightly elevated, C4 - wnl. VIPIN-neg, dsDNA - neg, CCP -neg, anti SSA/B - neg, Mallory-1 - neg. Myoglobin - low, RF - neg, anti Smith - neg  PENDING blood smear  - X-rays L shoulder and R elbow - wnl  - CT chest abd pelvis - wnl, no signs of malignancy  - Rheum consulted, appreciate recs: suspects DERMATOMYOSITIS, started on prednisone 50mg qd 3/17 starting taper per rhuem, currently on 40 mg qd for a week; on protonix 40mg qd for PUD ppx and Bactrim for PCP ppx.  - However, skin punch biopsy showing inflammation fits viral rash vs. drug eruption.   - gabapentin for itchiness/pain  - Derm consulted, appreciate recs: f/u as outpt, cont creams for total 14 days.  - Pain control with Tylenol for mild pain  - Obtain outpatient medical records from recent hospitalization (was at [facility]) - papers sent. Pt presented with generalized weakness, subjective? fevers, polyarthralgia/arthritis x1 week of large joints, along with rash x1 month, +dry mouth/eyes?; no autoimmune Hx or FHx.  - S/p OSH discharge 3/12 for Lyme disease started on Doxycyline 21-day regimen and was treated there with Levaquin and 3x doses of Vanco.  - Leukocytosis to 23.4K (pmn), anemia to 10.6 with MCV 82, platelets 751, ESR 91, , elevated AST/ALT 40/51, alk phos 238, CK low 19  - UA 30 protein  - Likely inflammatory etiology. Workup: hepatitis panel - neg, HIV - neg, syphilis - neg, EBV - past infection, CMV - neg, lyme panel-neg , blood cultures - NGTD, C3 slightly elevated, C4 - wnl. VIPIN-neg, dsDNA - neg, CCP -neg, anti SSA/B - neg, Mallory-1 - neg. Myoglobin - low, RF - neg, anti Smith - neg. Flow cytometry - neg.   - X-rays L shoulder and R elbow - wnl  - CT chest abd pelvis - wnl, no signs of malignancy  - Rheum consulted, appreciate recs: suspects DERMATOMYOSITIS, started on prednisone 50mg qd 3/17 starting taper per rhuem, currently on 40 mg qd for a week; on protonix 40mg qd for PUD ppx and Bactrim for PCP ppx.  - However, skin punch biopsy showing inflammation fits viral rash vs. drug eruption, will continue pred taper per rheum.   - gabapentin for itchiness/pain  - Derm consulted, appreciate recs: f/u as outpt, cont creams for total 14 days.  - Pain control with Tylenol for mild pain  - Obtain outpatient medical records from recent hospitalization (was at [facility]) - papers sent.

## 2021-03-24 ENCOUNTER — TRANSCRIPTION ENCOUNTER (OUTPATIENT)
Age: 66
End: 2021-03-24

## 2021-03-24 PROBLEM — H40.9 UNSPECIFIED GLAUCOMA: Chronic | Status: ACTIVE | Noted: 2021-03-17

## 2021-03-24 PROBLEM — E11.9 TYPE 2 DIABETES MELLITUS WITHOUT COMPLICATIONS: Chronic | Status: ACTIVE | Noted: 2021-03-17

## 2021-03-24 PROBLEM — E78.5 HYPERLIPIDEMIA, UNSPECIFIED: Chronic | Status: ACTIVE | Noted: 2021-03-17

## 2021-03-24 PROBLEM — E55.9 VITAMIN D DEFICIENCY, UNSPECIFIED: Chronic | Status: ACTIVE | Noted: 2021-03-17

## 2021-03-24 PROBLEM — U07.1 COVID-19: Chronic | Status: ACTIVE | Noted: 2021-03-17

## 2021-03-24 PROBLEM — I10 ESSENTIAL (PRIMARY) HYPERTENSION: Chronic | Status: ACTIVE | Noted: 2021-03-17

## 2021-03-24 LAB
ALBUMIN SERPL ELPH-MCNC: 3.1 G/DL — LOW (ref 3.3–5)
ALP SERPL-CCNC: 133 U/L — HIGH (ref 40–120)
ALT FLD-CCNC: 94 U/L — HIGH (ref 4–33)
ANION GAP SERPL CALC-SCNC: 11 MMOL/L — SIGNIFICANT CHANGE UP (ref 7–14)
AST SERPL-CCNC: 40 U/L — HIGH (ref 4–32)
BILIRUB SERPL-MCNC: 0.3 MG/DL — SIGNIFICANT CHANGE UP (ref 0.2–1.2)
BUN SERPL-MCNC: 19 MG/DL — SIGNIFICANT CHANGE UP (ref 7–23)
CALCIUM SERPL-MCNC: 9.1 MG/DL — SIGNIFICANT CHANGE UP (ref 8.4–10.5)
CHLORIDE SERPL-SCNC: 100 MMOL/L — SIGNIFICANT CHANGE UP (ref 98–107)
CO2 SERPL-SCNC: 28 MMOL/L — SIGNIFICANT CHANGE UP (ref 22–31)
CREAT SERPL-MCNC: 0.74 MG/DL — SIGNIFICANT CHANGE UP (ref 0.5–1.3)
GAMMA INTERFERON BACKGROUND BLD IA-ACNC: 0.02 IU/ML — SIGNIFICANT CHANGE UP
GLUCOSE BLDC GLUCOMTR-MCNC: 180 MG/DL — HIGH (ref 70–99)
GLUCOSE BLDC GLUCOMTR-MCNC: 195 MG/DL — HIGH (ref 70–99)
GLUCOSE BLDC GLUCOMTR-MCNC: 241 MG/DL — HIGH (ref 70–99)
GLUCOSE BLDC GLUCOMTR-MCNC: 275 MG/DL — HIGH (ref 70–99)
GLUCOSE BLDC GLUCOMTR-MCNC: 83 MG/DL — SIGNIFICANT CHANGE UP (ref 70–99)
GLUCOSE SERPL-MCNC: 66 MG/DL — LOW (ref 70–99)
HCT VFR BLD CALC: 32.1 % — LOW (ref 34.5–45)
HGB BLD-MCNC: 10.4 G/DL — LOW (ref 11.5–15.5)
JAK2 P.V617F BLD/T QL: SIGNIFICANT CHANGE UP
M TB IFN-G BLD-IMP: ABNORMAL
M TB IFN-G CD4+ BCKGRND COR BLD-ACNC: 0 IU/ML — SIGNIFICANT CHANGE UP
M TB IFN-G CD4+CD8+ BCKGRND COR BLD-ACNC: 0 IU/ML — SIGNIFICANT CHANGE UP
MAGNESIUM SERPL-MCNC: 2.7 MG/DL — HIGH (ref 1.6–2.6)
MCHC RBC-ENTMCNC: 27.8 PG — SIGNIFICANT CHANGE UP (ref 27–34)
MCHC RBC-ENTMCNC: 32.4 GM/DL — SIGNIFICANT CHANGE UP (ref 32–36)
MCV RBC AUTO: 85.8 FL — SIGNIFICANT CHANGE UP (ref 80–100)
NRBC # BLD: 0 /100 WBCS — SIGNIFICANT CHANGE UP
NRBC # FLD: 0 K/UL — SIGNIFICANT CHANGE UP
PHOSPHATE SERPL-MCNC: 4.8 MG/DL — HIGH (ref 2.5–4.5)
PLATELET # BLD AUTO: 750 K/UL — HIGH (ref 150–400)
POTASSIUM SERPL-MCNC: 4.2 MMOL/L — SIGNIFICANT CHANGE UP (ref 3.5–5.3)
POTASSIUM SERPL-SCNC: 4.2 MMOL/L — SIGNIFICANT CHANGE UP (ref 3.5–5.3)
PROT SERPL-MCNC: 6.4 G/DL — SIGNIFICANT CHANGE UP (ref 6–8.3)
QUANT TB PLUS MITOGEN MINUS NIL: 0.09 IU/ML — SIGNIFICANT CHANGE UP
RBC # BLD: 3.74 M/UL — LOW (ref 3.8–5.2)
RBC # FLD: 15.9 % — HIGH (ref 10.3–14.5)
SODIUM SERPL-SCNC: 139 MMOL/L — SIGNIFICANT CHANGE UP (ref 135–145)
WBC # BLD: 13.74 K/UL — HIGH (ref 3.8–10.5)
WBC # FLD AUTO: 13.74 K/UL — HIGH (ref 3.8–10.5)

## 2021-03-24 PROCEDURE — 99232 SBSQ HOSP IP/OBS MODERATE 35: CPT | Mod: GC

## 2021-03-24 PROCEDURE — 99232 SBSQ HOSP IP/OBS MODERATE 35: CPT

## 2021-03-24 RX ORDER — ENOXAPARIN SODIUM 100 MG/ML
40 INJECTION SUBCUTANEOUS
Qty: 0 | Refills: 0 | DISCHARGE
Start: 2021-03-24

## 2021-03-24 RX ORDER — INSULIN LISPRO 100/ML
VIAL (ML) SUBCUTANEOUS AT BEDTIME
Refills: 0 | Status: DISCONTINUED | OUTPATIENT
Start: 2021-03-25 | End: 2021-04-06

## 2021-03-24 RX ORDER — DEXTROSE 50 % IN WATER 50 %
15 SYRINGE (ML) INTRAVENOUS ONCE
Refills: 0 | Status: DISCONTINUED | OUTPATIENT
Start: 2021-03-25 | End: 2021-04-06

## 2021-03-24 RX ORDER — FLUOCINONIDE/EMOLLIENT BASE 0.05 %
1 CREAM (GRAM) TOPICAL
Refills: 0 | Status: DISCONTINUED | OUTPATIENT
Start: 2021-03-25 | End: 2021-03-25

## 2021-03-24 RX ORDER — DILTIAZEM HCL 120 MG
300 CAPSULE, EXT RELEASE 24 HR ORAL DAILY
Refills: 0 | Status: DISCONTINUED | OUTPATIENT
Start: 2021-03-26 | End: 2021-04-06

## 2021-03-24 RX ORDER — GABAPENTIN 400 MG/1
1 CAPSULE ORAL
Qty: 0 | Refills: 0 | DISCHARGE
Start: 2021-03-24

## 2021-03-24 RX ORDER — DIPHENHYDRAMINE HCL 50 MG
1 CAPSULE ORAL
Qty: 0 | Refills: 0 | DISCHARGE

## 2021-03-24 RX ORDER — DEXTROSE 50 % IN WATER 50 %
25 SYRINGE (ML) INTRAVENOUS ONCE
Refills: 0 | Status: DISCONTINUED | OUTPATIENT
Start: 2021-03-25 | End: 2021-04-06

## 2021-03-24 RX ORDER — INSULIN GLARGINE 100 [IU]/ML
35 INJECTION, SOLUTION SUBCUTANEOUS AT BEDTIME
Refills: 0 | Status: DISCONTINUED | OUTPATIENT
Start: 2021-03-24 | End: 2021-03-25

## 2021-03-24 RX ORDER — DEXTROSE 50 % IN WATER 50 %
50 SYRINGE (ML) INTRAVENOUS
Qty: 0 | Refills: 0 | DISCHARGE
Start: 2021-03-24

## 2021-03-24 RX ORDER — GABAPENTIN 400 MG/1
300 CAPSULE ORAL EVERY 8 HOURS
Refills: 0 | Status: DISCONTINUED | OUTPATIENT
Start: 2021-03-25 | End: 2021-04-06

## 2021-03-24 RX ORDER — PANTOPRAZOLE SODIUM 20 MG/1
1 TABLET, DELAYED RELEASE ORAL
Qty: 0 | Refills: 0 | DISCHARGE
Start: 2021-03-24

## 2021-03-24 RX ORDER — CHOLECALCIFEROL (VITAMIN D3) 125 MCG
2000 CAPSULE ORAL DAILY
Refills: 0 | Status: DISCONTINUED | OUTPATIENT
Start: 2021-03-26 | End: 2021-04-06

## 2021-03-24 RX ORDER — INSULIN LISPRO 100/ML
30 VIAL (ML) SUBCUTANEOUS
Refills: 0 | Status: DISCONTINUED | OUTPATIENT
Start: 2021-03-25 | End: 2021-03-25

## 2021-03-24 RX ORDER — KETOTIFEN FUMARATE 0.34 MG/ML
1 SOLUTION OPHTHALMIC
Refills: 0 | Status: DISCONTINUED | OUTPATIENT
Start: 2021-03-25 | End: 2021-04-06

## 2021-03-24 RX ORDER — HYDROXYZINE HCL 10 MG
1 TABLET ORAL
Qty: 0 | Refills: 0 | DISCHARGE
Start: 2021-03-24

## 2021-03-24 RX ORDER — LATANOPROST 0.05 MG/ML
1 SOLUTION/ DROPS OPHTHALMIC; TOPICAL AT BEDTIME
Refills: 0 | Status: DISCONTINUED | OUTPATIENT
Start: 2021-03-25 | End: 2021-04-06

## 2021-03-24 RX ORDER — NYSTATIN 500MM UNIT
500000 POWDER (EA) MISCELLANEOUS EVERY 6 HOURS
Refills: 0 | Status: DISCONTINUED | OUTPATIENT
Start: 2021-03-25 | End: 2021-04-05

## 2021-03-24 RX ORDER — INSULIN LISPRO 100/ML
32 VIAL (ML) SUBCUTANEOUS
Qty: 0 | Refills: 0 | DISCHARGE
Start: 2021-03-24

## 2021-03-24 RX ORDER — PANTOPRAZOLE SODIUM 20 MG/1
40 TABLET, DELAYED RELEASE ORAL
Refills: 0 | Status: DISCONTINUED | OUTPATIENT
Start: 2021-03-26 | End: 2021-04-06

## 2021-03-24 RX ORDER — INSULIN LISPRO 100/ML
VIAL (ML) SUBCUTANEOUS
Refills: 0 | Status: DISCONTINUED | OUTPATIENT
Start: 2021-03-25 | End: 2021-04-06

## 2021-03-24 RX ORDER — GLUCAGON INJECTION, SOLUTION 0.5 MG/.1ML
1 INJECTION, SOLUTION SUBCUTANEOUS ONCE
Refills: 0 | Status: DISCONTINUED | OUTPATIENT
Start: 2021-03-25 | End: 2021-04-06

## 2021-03-24 RX ORDER — ATORVASTATIN CALCIUM 80 MG/1
20 TABLET, FILM COATED ORAL AT BEDTIME
Refills: 0 | Status: DISCONTINUED | OUTPATIENT
Start: 2021-03-25 | End: 2021-03-25

## 2021-03-24 RX ORDER — ACETAMINOPHEN 500 MG
650 TABLET ORAL EVERY 6 HOURS
Refills: 0 | Status: DISCONTINUED | OUTPATIENT
Start: 2021-03-25 | End: 2021-04-06

## 2021-03-24 RX ORDER — INSULIN GLARGINE 100 [IU]/ML
17 INJECTION, SOLUTION SUBCUTANEOUS
Qty: 0 | Refills: 0 | DISCHARGE

## 2021-03-24 RX ORDER — FLUOCINONIDE/EMOLLIENT BASE 0.05 %
1 CREAM (GRAM) TOPICAL
Qty: 0 | Refills: 0 | DISCHARGE
Start: 2021-03-24

## 2021-03-24 RX ORDER — DEXTROSE 50 % IN WATER 50 %
25 SYRINGE (ML) INTRAVENOUS
Qty: 0 | Refills: 0 | DISCHARGE
Start: 2021-03-24

## 2021-03-24 RX ORDER — DEXTROSE 50 % IN WATER 50 %
12.5 SYRINGE (ML) INTRAVENOUS ONCE
Refills: 0 | Status: DISCONTINUED | OUTPATIENT
Start: 2021-03-25 | End: 2021-04-06

## 2021-03-24 RX ORDER — INSULIN GLARGINE 100 [IU]/ML
40 INJECTION, SOLUTION SUBCUTANEOUS AT BEDTIME
Refills: 0 | Status: DISCONTINUED | OUTPATIENT
Start: 2021-03-24 | End: 2021-03-24

## 2021-03-24 RX ORDER — HYDROXYZINE HCL 10 MG
25 TABLET ORAL EVERY 6 HOURS
Refills: 0 | Status: DISCONTINUED | OUTPATIENT
Start: 2021-03-25 | End: 2021-04-06

## 2021-03-24 RX ORDER — SODIUM CHLORIDE 9 MG/ML
1000 INJECTION, SOLUTION INTRAVENOUS
Refills: 0 | Status: DISCONTINUED | OUTPATIENT
Start: 2021-03-25 | End: 2021-04-06

## 2021-03-24 RX ORDER — INSULIN GLARGINE 100 [IU]/ML
35 INJECTION, SOLUTION SUBCUTANEOUS
Qty: 0 | Refills: 0 | DISCHARGE
Start: 2021-03-24

## 2021-03-24 RX ORDER — INSULIN GLARGINE 100 [IU]/ML
35 INJECTION, SOLUTION SUBCUTANEOUS AT BEDTIME
Refills: 0 | Status: DISCONTINUED | OUTPATIENT
Start: 2021-03-25 | End: 2021-03-26

## 2021-03-24 RX ORDER — PETROLATUM,WHITE
1 JELLY (GRAM) TOPICAL DAILY
Refills: 0 | Status: DISCONTINUED | OUTPATIENT
Start: 2021-03-25 | End: 2021-04-06

## 2021-03-24 RX ORDER — INSULIN LISPRO 100/ML
30 VIAL (ML) SUBCUTANEOUS
Qty: 0 | Refills: 0 | DISCHARGE
Start: 2021-03-24

## 2021-03-24 RX ORDER — DEXTROSE 50 % IN WATER 50 %
0 SYRINGE (ML) INTRAVENOUS
Qty: 0 | Refills: 0 | DISCHARGE
Start: 2021-03-24

## 2021-03-24 RX ORDER — JNJ-78436735 50000000000 [PFU]/.5ML
0.5 SUSPENSION INTRAMUSCULAR ONCE
Refills: 0 | Status: COMPLETED | OUTPATIENT
Start: 2021-03-24 | End: 2021-03-24

## 2021-03-24 RX ORDER — ENOXAPARIN SODIUM 100 MG/ML
40 INJECTION SUBCUTANEOUS DAILY
Refills: 0 | Status: DISCONTINUED | OUTPATIENT
Start: 2021-03-26 | End: 2021-04-06

## 2021-03-24 RX ADMIN — LATANOPROST 1 DROP(S): 0.05 SOLUTION/ DROPS OPHTHALMIC; TOPICAL at 22:47

## 2021-03-24 RX ADMIN — INSULIN GLARGINE 35 UNIT(S): 100 INJECTION, SOLUTION SUBCUTANEOUS at 22:47

## 2021-03-24 RX ADMIN — ATORVASTATIN CALCIUM 20 MILLIGRAM(S): 80 TABLET, FILM COATED ORAL at 22:47

## 2021-03-24 RX ADMIN — Medication 30 UNIT(S): at 17:40

## 2021-03-24 RX ADMIN — Medication 500000 UNIT(S): at 22:47

## 2021-03-24 RX ADMIN — GABAPENTIN 300 MILLIGRAM(S): 400 CAPSULE ORAL at 22:47

## 2021-03-24 RX ADMIN — Medication 30 UNIT(S): at 08:44

## 2021-03-24 RX ADMIN — Medication 1 TABLET(S): at 08:46

## 2021-03-24 RX ADMIN — JNJ-78436735 0.5 MILLILITER(S): 50000000000 SUSPENSION INTRAMUSCULAR at 15:27

## 2021-03-24 RX ADMIN — Medication 4: at 17:41

## 2021-03-24 RX ADMIN — Medication 1 APPLICATION(S): at 17:42

## 2021-03-24 RX ADMIN — Medication 25 MILLIGRAM(S): at 06:15

## 2021-03-24 RX ADMIN — Medication 300 MILLIGRAM(S): at 06:16

## 2021-03-24 RX ADMIN — Medication 500000 UNIT(S): at 17:42

## 2021-03-24 RX ADMIN — Medication 1 APPLICATION(S): at 12:13

## 2021-03-24 RX ADMIN — Medication 500000 UNIT(S): at 06:19

## 2021-03-24 RX ADMIN — ENOXAPARIN SODIUM 40 MILLIGRAM(S): 100 INJECTION SUBCUTANEOUS at 11:40

## 2021-03-24 RX ADMIN — Medication 40 MILLIGRAM(S): at 06:16

## 2021-03-24 RX ADMIN — Medication 1 APPLICATION(S): at 06:17

## 2021-03-24 RX ADMIN — GABAPENTIN 300 MILLIGRAM(S): 400 CAPSULE ORAL at 06:15

## 2021-03-24 RX ADMIN — GABAPENTIN 300 MILLIGRAM(S): 400 CAPSULE ORAL at 14:16

## 2021-03-24 RX ADMIN — Medication 2: at 12:11

## 2021-03-24 RX ADMIN — KETOTIFEN FUMARATE 1 DROP(S): 0.34 SOLUTION OPHTHALMIC at 17:42

## 2021-03-24 RX ADMIN — KETOTIFEN FUMARATE 1 DROP(S): 0.34 SOLUTION OPHTHALMIC at 06:18

## 2021-03-24 RX ADMIN — Medication 2000 UNIT(S): at 11:39

## 2021-03-24 RX ADMIN — PANTOPRAZOLE SODIUM 40 MILLIGRAM(S): 20 TABLET, DELAYED RELEASE ORAL at 06:19

## 2021-03-24 RX ADMIN — Medication 30 UNIT(S): at 12:10

## 2021-03-24 RX ADMIN — Medication 500000 UNIT(S): at 11:39

## 2021-03-24 RX ADMIN — Medication 1 APPLICATION(S): at 17:43

## 2021-03-24 NOTE — PROVIDER CONTACT NOTE (OTHER) - RECOMMENDATIONS
notify MD , Nursing care continued
Nursing care continued
give sliding scale and pre-meal insulin as ordered
notify MD , Nursing care continued

## 2021-03-24 NOTE — DISCHARGE NOTE NURSING/CASE MANAGEMENT/SOCIAL WORK - NSDCVIVACCINE_GEN_ALL_CORE_FT
No Vaccines Administered. Severe acute respiratory syndrome coronavirus 2 (SARS-CoV-2) (Coronavirus disease [COVID-19]) vaccine , 2021/3/24 15:27 , Linda Blanco (RN)

## 2021-03-24 NOTE — PROGRESS NOTE ADULT - ATTENDING COMMENTS
Patient seen and examined, care d/w HS4 team.    In summary 64 yo born in [place], obese (BMI 33), DM2, glaucoma, HTN, HLD, COVID (3/2020) who presents with joint pains and rash.  Reports started having a rash 2/7/20 started on face was itchy then progressed down body.  Reports 8 days ago developed migrating joint pains involving the shoulders, elbows, knees and fingers.  Reports moves from joint to joint.  No fevers noted at home.  No weight loss.  Reports went to OSH and is not sure if seen by derm or rheum.  Was started on doxycycline for lyme (unclear what dx was based on).      # Rash/ Joint pain- concern for Dermatomyositis on admission  –Labs with  elevated ESR/CRP and ferritin; mild anemia, mild proteinuria (but in DM2 pt).  HIV neg.  Reportedly neg TB at OSH LFTs mildly elevated CK normal. TSH wnl. CK and myoglobin wnl; ? cutaneous dermatomyositis, normal CK which is not uncommon.  Lyme neg here.  - serologies; VIPIN, ds-DNA, RF, CCP all neg   - CT CAP neg for malignancy, uptodate on mammo, will need OP c-scope (never had)  - Rheum consult appreciated - started on prednisone 50 mg on 3/17, decreased to 40 mg on 3/23, plan for taper, on PCP ppx with bactrim  - Derm consult appreciated, s/p bx on 3/17 now resulting with viral vs drug related   - OP rheum f/u     # Anemia: normocytic, mixed TREY and AOCD, no hemolysis (elevated LDH, normal hapto)  - trend, tx underlying issue   - FU outpt     # Thrombocytosis- Likely reactive    # DM2 poorly controlled, HbA1c 10.6%, now exacerbated by steroids; at home was on 70/30 pen 35 units BID reports was changed to basaglar s/p recent hospital stay   - c/w Lantus decrease to 35 units and Admelog  30 units  TIC ac, moderate dose AUTUMN as steroids tapering    # DVT Prox- Lovenox    Dispo - PMR rec AR pending OT eval  COVID neg 3/22 Patient seen and examined, care d/w HS4 team.    In summary 66 yo born in [place], obese (BMI 33), DM2, glaucoma, HTN, HLD, COVID (3/2020) who presents with joint pains and rash.  Reports started having a rash 2/7/20 started on face was itchy then progressed down body.  Reports 8 days ago developed migrating joint pains involving the shoulders, elbows, knees and fingers.  Reports moves from joint to joint.  No fevers noted at home.  No weight loss.  Reports went to OSH and is not sure if seen by derm or rheum.  Was started on doxycycline for lyme (unclear what dx was based on).      # Rash/ Joint pain- concern for Dermatomyositis on admission  –Labs with  elevated ESR/CRP and ferritin; mild anemia, mild proteinuria (but in DM2 pt).  HIV neg.  Reportedly neg TB at OSH , here indeterminate, but CT chest neg and no resp sx, steroids being tapered. LFTs mildly elevated (downtrending, CT normal liver/bile ducts). TSH wnl. CK and myoglobin wnl; ? cutaneous dermatomyositis.  Lyme neg here.  - serologies; VIPIN, ds-DNA, RF, CCP all neg   - CT CAP neg for malignancy, uptodate on mammo, will need OP c-scope (never had)  - Rheum consult appreciated - started on prednisone 50 mg on 3/17, decreased to 40 mg on 3/23, plan for taper, on PCP ppx with bactrim  - Derm consult appreciated, s/p bx on 3/17 now resulting with viral vs drug related, OP f/u   - OP rheum f/u     # Anemia: normocytic, mixed TREY and AOCD, no hemolysis (elevated LDH, normal hapto)  - trend, tx underlying issue   - FU outpt, needs routine c-scope    # Thrombocytosis- Likely reactive    # DM2 poorly controlled, HbA1c 10.6%, now exacerbated by steroids; at home was on 70/30 pen 35 units BID reports was changed to basaglar s/p recent hospital stay   - am hypoglycemia, asymtpomatic, on steroid taper, decreased basal 52u-->35 units   - c/w Lantus decrease to 35 units and Admelog  30 units  TIC ac, moderate dose AUTUMN as steroids tapering    # DVT Prox- Lovenox    Dispo - PMR rec AR, dispo time 33 min   COVID neg 3/22

## 2021-03-24 NOTE — PROGRESS NOTE ADULT - PROBLEM SELECTOR PLAN 7
- AST/ALT 40/51, likely in the setting of inflammation/muscle disorder  - hepatitis panel negative  - will lower Lipitor  - Trend CMP

## 2021-03-24 NOTE — H&P ADULT - HISTORY OF PRESENT ILLNESS
This is a 66 y/o Serbian speaking Female with PMHx of DM Type 2 (on insulin), HTN, HLD, COVID (2020), and glaucoma presented with generalized weakness, joint pain, and rash. The patient states that she was discharged from OSH on 3/12 after being evaluated for similar symptoms. They suspected that she may have Lyme disease and started her on doxycycline and sent her home. They also suspected that this might be post-COVID related. The patient has multiple complaints. Reports rash in the neck area and b/l arms, along with whole body itching. States that she has had a rash since 2/2021 and has seen various doctors and tried different things (steroid creams) without relief. The rash initially started on her face. Also complains of joint pain for one week. States she has joint pain and stiffness in the b/l shoulders, elbows, hands, and knees. States that the pain is currently worst in the L shoulder and R elbow. States that the pain is so significant that she is unable to walk now. Tries Tylenol with minimal relief. Also reports that she has been unable to do her ADLs - clean herself, walk. Never had joint pain like this before. Also reports subjective fever and chills, along with dry mouth.   The rash did not get worse in the sun. Joint pain is not worse in the AM and present at rest but worse on movement. No BM changes recently; had mild rectal bleeding not in feces but on wiping. She attributes 2/2 itching and rash, denies vaginal bleeding or ulcers, weight loss. Had subjective fever but not over 99F. Endorse dry mouth, and itching in her eyes, no mouth ulcers. No FHx of autoimmune/rheumatologist/joint diseases. Was admitted at [facility] 3/8-3/12 with fevers, joint pain, rash, treated with Levaquin for five days and three doses of Vanco, 2x covid negative.    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 The pt was started on prednisone and steroid ointments. She underwent extensive infectious and inflammatory workup that included hepatitis panel - neg, HIV - neg, syphilis - neg, EBV - past infection, CMV - neg, Lyme panel-neg, blood cultures - NGTD, C3 slightly elevated, C4 - wnl. VIPIN-neg, dsDNA - neg, CCP -neg, anti SSA/B - neg, Mallory-1 - neg. Myoglobin - low, RF - neg, anti-Smith - neg. Flow cytometry - neg for abnormality. X-rays L shoulder and R elbow - wnl. CT chest and pelvis - wnl, no signs of malignancy. Myoglobin was low. Still pending Myomarker panel 3, Jak2 and calreticulin mutations. The patient underwent a skin biopsy, which resulted in "Skin with focal dyskeratosis with mild perivascular inflammation. The findings raise the differential diagnosis of a viral exanthem or a drug eruption."     Although the working diagnosis did not match the biopsy results, the patient clinically improved under the steroidal treatment. Her upper and lower extremity joint pain has resolved, her rash has started to clear, and he was able to ambulate. During the prednisone treatment, the patient's glucose level increased. She needed high doses of insulin to maintain euglycemia (60 Lantus and 35 lispro pre-meals). PM&R evaluated the patient and concluded that the patient would benefit from acute rehab. She was started on a weekly prednisone taper before discharge. Prior to d/c, pt was given the J&J covid vaccine.

## 2021-03-24 NOTE — PROGRESS NOTE ADULT - PROBLEM SELECTOR PLAN 8
- DVT PPX: Lovenox  - Diet: [facility] DASH diet  - Dispo: PMR rec acute rehab, pending OT, possibly tomorrow. Will update family. - DVT PPX: Lovenox  - Diet: CC DASH diet  - Dispo: PMR rec acute rehab, pending OT, possibly today. Family updated on 3/23. Pt wants [facility].

## 2021-03-24 NOTE — OCCUPATIONAL THERAPY INITIAL EVALUATION ADULT - PERTINENT HX OF CURRENT PROBLEM, REHAB EVAL
Pt is a 64 y/o [place]ots[place]an speaking Female with PMH of DM (on insulin), HTN, HLD, COVID (2020) and glaucoma presenting with generalized weakness, fever, polyarthritis and rash, admitted for further workup, current working diagnosis: amyopathic dermatomyositis.

## 2021-03-24 NOTE — PROVIDER CONTACT NOTE (OTHER) - BACKGROUND
Patient admitted for Arthralgia, PMH of Type 2 DM
type 2 diabetic, pt admitted for arthralgia
Patient admitted for Arthralgia, PMH of Type 2 DM
type 2 diabetic, pt admitted for arthralgia

## 2021-03-24 NOTE — PROGRESS NOTE ADULT - SUBJECTIVE AND OBJECTIVE BOX
Patient is a 65y old  Female who presents with a chief complaint of joint pain (24 Mar 2021 06:50)      HPI:  66 y/o Dutch speaking Female with PMHx of DM Type 2 (on insulin), HTN, HLD, COVID () and glaucoma presenting with generalized weakness, joint pain, and rash. Patient states that she was discharged from OSH on 3/12 after being evaluated for similar symptoms. They suspected that she may have Lyme disease and started her on doxycycline and sent her home. They also suspected that this might be post COVID related.   Patient has multiple complaints. Reports rash in the neck area and b/l arms, along with whole body itching. States that she has had a rash since 2021 and has seen various doctors and tried different things (steroid creams) without relief. The rash initially started on her face. Also complains of joint pain for 1 week. States she has joint pain and stiffness in the b/l shoulders, elbows, hands, and knees. States that the pain is currently worst in the L shoulder and R elbow. States that the pain is so significant that she is unable to walk currently. Tries Tylenol with minimal relief. Also reports that she has been unable to do her ADLs - clean herself, walk. Never has had joint pain like this before. Also reports subjective fever and chills, along with dry mouth.   Denies any N/V/D, abdominal pain, leg swelling, chest pain, dyspnea. Denies oral ulcers, hair loss, weight loss, dry eyes.     ED course: pt's vitals were T 98.3 HR 76-94 -165/72 RR 18-19 POX % RA. Pt treated with NS 1L bolus and Toradol 15 mg IV.  (17 Mar 2021 04:39)    interval history: pt reports she feels shaky when blood sugar is low, but states she feels ready to go to rehab when medically cleared.  blood glucose 66 on labwork, improved on fingerstick.     interviewed with  ID [id number]  reports shoulder pain improves with tylenol    REVIEW OF SYSTEMS  Constitutional - No fever, No weight loss, No fatigue  HEENT - No eye pain, No visual disturbances, No difficulty hearing, No tinnitus, No vertigo, No neck pain  Respiratory - No cough, No wheezing, No shortness of breath  Cardiovascular - No chest pain, No palpitations  Gastrointestinal - No abdominal pain, No nausea, No vomiting, No diarrhea, No constipation  Genitourinary - No dysuria, No frequency, No hematuria, No incontinence  Neurological - No headaches, No memory loss, +loss of strength, No numbness, No tremors  Skin - +rashes, No lesions + itching  Endocrine - No temperature intolerance  Musculoskeletal - + shoulder pain  psychiatric - No depression, No anxiety    PAST MEDICAL & SURGICAL HISTORY  COVID-19    Vitamin D deficiency    Glaucoma    Hyperlipidemia    Hypertension    Diabetes mellitus    History of throat surgery    History of elbow surgery    S/P          CURRENT FUNCTIONAL STATUS    OT 3/24: Bed Mobility: Rolling/Turning:     · Level of [place]	contact guard  · Physical Assist/Nonphysical Assist	1 person assist    Bed Mobility: Sit to Supine:     · Level of [place]	contact guard  · Physical Assist/Nonphysical Assist	1 person assist    Bed Mobility: Supine to Sit:     · Level of [place]	contact guard  · Physical Assist/Nonphysical Assist	1 person assist    Bed Mobility Analysis:     · Impairments Contributing to Impaired Bed Mobility	decreased strength    Transfer: Bed to Chair:    Bed to Chair Transfer:    Transfer Skill: Bed to Chair   · Level of [place]	minimum assist (75% patients effort)  · Physical Assist/Nonphysical Assist	1 person assist    Transfer: Chair to Bed:     · Level of [place]	minimum assist (75% patients effort)  · Physical Assist/Nonphysical Assist	1 person assist    Bed to Chair Safety Analysis:     · Impairments Contributing to Impaired Transfers	impaired balance; decreased strength    Transfer: Sit to Stand:     · Level of [place]	contact guard; minimum assist (75% patients effort)  · Physical Assist/Nonphysical Assist	1 person assist    Transfer: Stand to Sit:     · Level of [place]	contact guard  · Physical Assist/Nonphysical Assist	1 person assist    Sit/Stand Transfer Safety Analysis:     · Impairments Contributing to Impaired Transfers	impaired balance; decreased strength    Balance Skills Assessment:     · Sitting Balance: Static	good minus  · Sitting Balance: Dynamic	fair balance  · Sit-to-Stand Balance	fair balance  · Standing Balance: Static	fair plus  · Standing Balance: Dynamic	fair balance    Sensory Examination:     Light Touch Sensation:   · Left UE	within normal limits  · Right UE	within normal limits        3/22: Sit-Stand Transfer Training  Transfer Training Sit-to-Stand Transfer: minimum assist (75% patient effort);  1 person assist;  weight-bearing as tolerated   rolling walker  Transfer Training Stand-to-Sit Transfer: minimum assist (75% patient effort);  1 person assist;  weight-bearing as tolerated   rolling walker  Sit-to-Stand Transfer Training Transfer Safety Analysis: decreased step length;  decreased strength;  decreased flexibility;  rolling walker    Gait Training  Gait Training: minimum assist (75% patient effort);  1 person assist;  weight-bearing as tolerated   rolling walker;  25 feet  Gait Analysis: 3-point gait   decreased calin;  25 feet;  rolling walker        FAMILY HISTORY   No pertinent family history in first degree relatives        RECENT LABS/IMAGING  CBC Full  -  ( 24 Mar 2021 07:59 )  WBC Count : 13.74 K/uL  RBC Count : 3.74 M/uL  Hemoglobin : 10.4 g/dL  Hematocrit : 32.1 %  Platelet Count - Automated : 750 K/uL  Mean Cell Volume : 85.8 fL  Mean Cell Hemoglobin : 27.8 pg  Mean Cell Hemoglobin Concentration : 32.4 gm/dL  Auto Neutrophil # : x  Auto Lymphocyte # : x  Auto Monocyte # : x  Auto Eosinophil # : x  Auto Basophil # : x  Auto Neutrophil % : x  Auto Lymphocyte % : x  Auto Monocyte % : x  Auto Eosinophil % : x  Auto Basophil % : x        139  |  100  |  19  ----------------------------<  66<L>  4.2   |  28  |  0.74    Ca    9.1      24 Mar 2021 07:59  Phos  4.8       Mg     2.7         TPro  6.4  /  Alb  3.1<L>  /  TBili  0.3  /  DBili  x   /  AST  40<H>  /  ALT  94<H>  /  AlkPhos  133<H>      VITALS  T(C): 36.4 (21 @ 06:11), Max: 36.8 (21 @ 13:01)  HR: 73 (21 @ 06:11) (73 - 77)  BP: 127/62 (21 @ 06:11) (126/58 - 135/61)  RR: 16 (21 @ 06:11) (16 - 18)  SpO2: 100% (21 @ 06:11) (97% - 100%)  Wt(kg): --    ALLERGIES  azithromycin (Hives; Swelling)  enalapril (Other (Mild to Mod))  penicillin (Swelling; Rash)      MEDICATIONS   acetaminophen   Tablet .. 650 milliGRAM(s) Oral every 6 hours PRN  atorvastatin 20 milliGRAM(s) Oral at bedtime  cholecalciferol 2000 Unit(s) Oral daily  dextrose 40% Gel 15 Gram(s) Oral once  dextrose 50% Injectable 25 Gram(s) IV Push once  dextrose 50% Injectable 12.5 Gram(s) IV Push once  dextrose 50% Injectable 25 Gram(s) IV Push once  diltiazem    milliGRAM(s) Oral daily  enoxaparin Injectable 40 milliGRAM(s) SubCutaneous daily  fluocinonide 0.05% Cream 1 Application(s) Topical two times a day  gabapentin 300 milliGRAM(s) Oral every 8 hours  hydrocortisone 1% Ointment 1 Application(s) Topical two times a day  hydrOXYzine hydrochloride 25 milliGRAM(s) Oral every 6 hours PRN  insulin glargine Injectable (LANTUS) 35 Unit(s) SubCutaneous at bedtime  insulin lispro (ADMELOG) corrective regimen sliding scale   SubCutaneous three times a day before meals  insulin lispro (ADMELOG) corrective regimen sliding scale   SubCutaneous at bedtime  insulin lispro Injectable (ADMELOG) 30 Unit(s) SubCutaneous three times a day before meals  ketotifen 0.025% Ophthalmic Solution 1 Drop(s) Both EYES two times a day  latanoprost 0.005% Ophthalmic Solution 1 Drop(s) Both EYES at bedtime  nystatin    Suspension 378631 Unit(s) Swish and Swallow every 6 hours  pantoprazole    Tablet 40 milliGRAM(s) Oral before breakfast  petrolatum white Ointment 1 Application(s) Topical daily  predniSONE   Tablet 40 milliGRAM(s) Oral daily  triamcinolone 0.1% Ointment 1 Application(s) Topical two times a day  trimethoprim  160 mG/sulfamethoxazole 800 mG 1 Tablet(s) Oral <User Schedule>      ----------------------------------------------------------------------------------------  PHYSICAL EXAM  Constitutional - NAD, Comfortable  HEENT - NCAT, EOMI  Neck - Supple, No limited ROM, +rash   Chest - Breathing comfortably, No wheezing  Cardiovascular - S1S2   Abdomen - Soft   Extremities - +pitting edema bilaterally on LE, swelling on BUE, +tenderness on the left shoulder and knees. No calf tenderness   Neurologic Exam -                    Cognitive - Awake, Alert, AAO to self, place, date, year, situation     Communication - Fluent, No dysarthria     Motor -                     LEFT    UE - ShAB 4/5, EF 4/5, EE 4/5, WE 4/5,  4/5                    RIGHT UE - ShAB 4+/5, EF 4+/5, EE 4+/5, WE 4+/5,  4+/5                    LEFT    LE - HF 4+/5, KE 4+/5, DF 4+/5, PF 4+/5                    RIGHT LE - HF 4+/5, KE 4+/5, DF 4+/5, PF 4+/5        Sensory - Intact to LT     Reflexes - DTR Intact, No primitive reflexive  Psychiatric - Mood stable, Affect WNL  ----------------------------------------------------------------------------------------  ASSESSMENT/PLAN  65yFemale with joint pain and 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 to follow up with rheumatology outpatient after rehab stay.     to follow up with Dr. Mckee at [address] [facility], [address], in [place]    Pain - Tylenol, gabapentin  DVT PPX - Loveox   PT- ROM, Bed Mob, Transfers, Amb w AD    OT- ADLs, bracing  Prec- [place]   Skin- Turn q2 h  Rehab -Will continue to follow for ongoing rehab needs and recommendations. Recommend ACUTE inpatient rehabilitation for the functional deficits consisting of 3 hours of therapy/day & 24 hour RN/daily PMR physician for comorbid medical management. Patient will be able to tolerate 3 hours a day.       Patient is a 65y old  Female who presents with a chief complaint of joint pain (24 Mar 2021 06:50)      HPI:  66 y/o Ivorian speaking Female with PMHx of DM Type 2 (on insulin), HTN, HLD, COVID () and glaucoma presenting with generalized weakness, joint pain, and rash. Patient states that she was discharged from OSH on 3/12 after being evaluated for similar symptoms. They suspected that she may have Lyme disease and started her on doxycycline and sent her home. They also suspected that this might be post COVID related.   Patient has multiple complaints. Reports rash in the neck area and b/l arms, along with whole body itching. States that she has had a rash since 2021 and has seen various doctors and tried different things (steroid creams) without relief. The rash initially started on her face. Also complains of joint pain for 1 week. States she has joint pain and stiffness in the b/l shoulders, elbows, hands, and knees. States that the pain is currently worst in the L shoulder and R elbow. States that the pain is so significant that she is unable to walk currently. Tries Tylenol with minimal relief. Also reports that she has been unable to do her ADLs - clean herself, walk. Never has had joint pain like this before. Also reports subjective fever and chills, along with dry mouth.   Denies any N/V/D, abdominal pain, leg swelling, chest pain, dyspnea. Denies oral ulcers, hair loss, weight loss, dry eyes.     ED course: pt's vitals were T 98.3 HR 76-94 -165/72 RR 18-19 POX % RA. Pt treated with NS 1L bolus and Toradol 15 mg IV.  (17 Mar 2021 04:39)    interval history: pt reports she feels shaky when blood sugar is low, but states she feels ready to go to rehab when medically cleared.  blood glucose 66 on labwork, improved on fingerstick.     interviewed with  ID [id number]  reports shoulder pain improves with tylenol    REVIEW OF SYSTEMS  Constitutional - No fever, No weight loss, No fatigue  HEENT - No eye pain, No visual disturbances, No difficulty hearing, No tinnitus, No vertigo, No neck pain  Respiratory - No cough, No wheezing, No shortness of breath  Cardiovascular - No chest pain, No palpitations  Gastrointestinal - No abdominal pain, No nausea, No vomiting, No diarrhea, No constipation  Genitourinary - No dysuria, No frequency, No hematuria, No incontinence  Neurological - No headaches, No memory loss, +loss of strength, No numbness, No tremors  Skin - +rashes, No lesions + itching  Endocrine - No temperature intolerance  Musculoskeletal - + shoulder pain  psychiatric - No depression, No anxiety    PAST MEDICAL & SURGICAL HISTORY  COVID-19    Vitamin D deficiency    Glaucoma    Hyperlipidemia    Hypertension    Diabetes mellitus    History of throat surgery    History of elbow surgery    S/P          CURRENT FUNCTIONAL STATUS    OT 3/24: Bed Mobility: Rolling/Turning:     · Level of [place]	contact guard  · Physical Assist/Nonphysical Assist	1 person assist    Bed Mobility: Sit to Supine:     · Level of [place]	contact guard  · Physical Assist/Nonphysical Assist	1 person assist    Bed Mobility: Supine to Sit:     · Level of [place]	contact guard  · Physical Assist/Nonphysical Assist	1 person assist    Bed Mobility Analysis:     · Impairments Contributing to Impaired Bed Mobility	decreased strength    Transfer: Bed to Chair:    Bed to Chair Transfer:    Transfer Skill: Bed to Chair   · Level of [place]	minimum assist (75% patients effort)  · Physical Assist/Nonphysical Assist	1 person assist    Transfer: Chair to Bed:     · Level of [place]	minimum assist (75% patients effort)  · Physical Assist/Nonphysical Assist	1 person assist    Bed to Chair Safety Analysis:     · Impairments Contributing to Impaired Transfers	impaired balance; decreased strength    Transfer: Sit to Stand:     · Level of [place]	contact guard; minimum assist (75% patients effort)  · Physical Assist/Nonphysical Assist	1 person assist    Transfer: Stand to Sit:     · Level of [place]	contact guard  · Physical Assist/Nonphysical Assist	1 person assist    Sit/Stand Transfer Safety Analysis:     · Impairments Contributing to Impaired Transfers	impaired balance; decreased strength    Balance Skills Assessment:     · Sitting Balance: Static	good minus  · Sitting Balance: Dynamic	fair balance  · Sit-to-Stand Balance	fair balance  · Standing Balance: Static	fair plus  · Standing Balance: Dynamic	fair balance    Sensory Examination:     Light Touch Sensation:   · Left UE	within normal limits  · Right UE	within normal limits        3/22: Sit-Stand Transfer Training  Transfer Training Sit-to-Stand Transfer: minimum assist (75% patient effort);  1 person assist;  weight-bearing as tolerated   rolling walker  Transfer Training Stand-to-Sit Transfer: minimum assist (75% patient effort);  1 person assist;  weight-bearing as tolerated   rolling walker  Sit-to-Stand Transfer Training Transfer Safety Analysis: decreased step length;  decreased strength;  decreased flexibility;  rolling walker    Gait Training  Gait Training: minimum assist (75% patient effort);  1 person assist;  weight-bearing as tolerated   rolling walker;  25 feet  Gait Analysis: 3-point gait   decreased calin;  25 feet;  rolling walker        FAMILY HISTORY   No pertinent family history in first degree relatives        RECENT LABS/IMAGING  CBC Full  -  ( 24 Mar 2021 07:59 )  WBC Count : 13.74 K/uL  RBC Count : 3.74 M/uL  Hemoglobin : 10.4 g/dL  Hematocrit : 32.1 %  Platelet Count - Automated : 750 K/uL  Mean Cell Volume : 85.8 fL  Mean Cell Hemoglobin : 27.8 pg  Mean Cell Hemoglobin Concentration : 32.4 gm/dL  Auto Neutrophil # : x  Auto Lymphocyte # : x  Auto Monocyte # : x  Auto Eosinophil # : x  Auto Basophil # : x  Auto Neutrophil % : x  Auto Lymphocyte % : x  Auto Monocyte % : x  Auto Eosinophil % : x  Auto Basophil % : x        139  |  100  |  19  ----------------------------<  66<L>  4.2   |  28  |  0.74    Ca    9.1      24 Mar 2021 07:59  Phos  4.8       Mg     2.7         TPro  6.4  /  Alb  3.1<L>  /  TBili  0.3  /  DBili  x   /  AST  40<H>  /  ALT  94<H>  /  AlkPhos  133<H>      VITALS  T(C): 36.4 (21 @ 06:11), Max: 36.8 (21 @ 13:01)  HR: 73 (21 @ 06:11) (73 - 77)  BP: 127/62 (21 @ 06:11) (126/58 - 135/61)  RR: 16 (21 @ 06:11) (16 - 18)  SpO2: 100% (21 @ 06:11) (97% - 100%)  Wt(kg): --    ALLERGIES  azithromycin (Hives; Swelling)  enalapril (Other (Mild to Mod))  penicillin (Swelling; Rash)      MEDICATIONS   acetaminophen   Tablet .. 650 milliGRAM(s) Oral every 6 hours PRN  atorvastatin 20 milliGRAM(s) Oral at bedtime  cholecalciferol 2000 Unit(s) Oral daily  dextrose 40% Gel 15 Gram(s) Oral once  dextrose 50% Injectable 25 Gram(s) IV Push once  dextrose 50% Injectable 12.5 Gram(s) IV Push once  dextrose 50% Injectable 25 Gram(s) IV Push once  diltiazem    milliGRAM(s) Oral daily  enoxaparin Injectable 40 milliGRAM(s) SubCutaneous daily  fluocinonide 0.05% Cream 1 Application(s) Topical two times a day  gabapentin 300 milliGRAM(s) Oral every 8 hours  hydrocortisone 1% Ointment 1 Application(s) Topical two times a day  hydrOXYzine hydrochloride 25 milliGRAM(s) Oral every 6 hours PRN  insulin glargine Injectable (LANTUS) 35 Unit(s) SubCutaneous at bedtime  insulin lispro (ADMELOG) corrective regimen sliding scale   SubCutaneous three times a day before meals  insulin lispro (ADMELOG) corrective regimen sliding scale   SubCutaneous at bedtime  insulin lispro Injectable (ADMELOG) 30 Unit(s) SubCutaneous three times a day before meals  ketotifen 0.025% Ophthalmic Solution 1 Drop(s) Both EYES two times a day  latanoprost 0.005% Ophthalmic Solution 1 Drop(s) Both EYES at bedtime  nystatin    Suspension [id number] Unit(s) Swish and Swallow every 6 hours  pantoprazole    Tablet 40 milliGRAM(s) Oral before breakfast  petrolatum white Ointment 1 Application(s) Topical daily  predniSONE   Tablet 40 milliGRAM(s) Oral daily  triamcinolone 0.1% Ointment 1 Application(s) Topical two times a day  trimethoprim  160 mG/sulfamethoxazole 800 mG 1 Tablet(s) Oral <User Schedule>      ----------------------------------------------------------------------------------------  PHYSICAL EXAM  Constitutional - NAD, Comfortable  HEENT - NCAT, EOMI  Neck - Supple, No limited ROM, +rash   Chest - Breathing comfortably, No wheezing  Cardiovascular - S1S2   Abdomen - Soft   Extremities - +pitting edema bilaterally on LE , +tenderness on the left shoulder and knees. No calf tenderness   Neurologic Exam -                    Cognitive - Awake, Alert, AAO to self, place, date, year, situation     Communication - Fluent, No dysarthria     Motor -                     LEFT    UE - ShAB 4/5, EF 4/5, EE 4/5, WE 4/5,  4/5                    RIGHT UE - ShAB 4+/5, EF 4+/5, EE 4+/5, WE 4+/5,  4+/5                    LEFT    LE - HF 4+/5, KE 4+/5, DF 4+/5, PF 4+/5                    RIGHT LE - HF 4+/5, KE 4+/5, DF 4+/5, PF 4+/5        Sensory - Intact to LT     Reflexes - DTR Intact, No primitive reflexive  Psychiatric - Mood stable, Affect WNL  ----------------------------------------------------------------------------------------  ASSESSMENT/PLAN  65yFemale with joint pain and 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 to follow up with rheumatology outpatient after rehab stay.     to follow up with Dr. Mckee at [address] [facility], [address], in [place]    Pain - Tylenol, gabapentin  DVT PPX - Loveox   PT- ROM, Bed Mob, Transfers, Amb w AD    OT- ADLs, bracing  Prec- [place]   Skin- Turn q2 h  Rehab -Will continue to follow for ongoing rehab needs and recommendations. Recommend ACUTE inpatient rehabilitation for the functional deficits consisting of 3 hours of therapy/day & 24 hour RN/daily PMR physician for comorbid medical management. Patient will be able to tolerate 3 hours a day.

## 2021-03-24 NOTE — H&P ADULT - NSHPSOCIALHISTORY_GEN_ALL_CORE
Smoking - Denied  EtOH - Denied   Drugs - Denied    FUNCTIONAL HISTORY  Lives with spouse in an apartment with no TAMMY. Elevator inside building.   Independent with ADL and ambulation without AD PTA.       Current functional status fluctuates from CG to min A Smoking - Denied  EtOH - Denied   Drugs - Denied    FUNCTIONAL HISTORY  Lives with spouse in an apartment with no TAMMY. Elevator inside building.   Independent with ADL and ambulation without AD PTA.       Current functional status:  OT 3/24  CG Bed Mobs  min A transfers  PT 3/24  Min A RW 50'

## 2021-03-24 NOTE — PROVIDER CONTACT NOTE (OTHER) - ASSESSMENT
/58, Pt stable, no acute distress noted
Bp 136/52. Patient stable, no acute distress noted
Received call from Nerissa MERAZ stating pt was not picked up. I called [facility] and was told they have no trips. Spoke with Juliet in  office, she arranged [facility] with W/[facility] [phone]. P/U 9:30. I spoke with Burt at [facility] and she stated that she confirmed with Nurse and pt can come anytime at night. No time limit. She also said pt will be going to Room 102 on 1st floor. P/U 9:30.
no signs of acute distress noted.
Blood Glucose of 403 repeat 359, No acute distress noted.

## 2021-03-24 NOTE — PROGRESS NOTE ADULT - PROBLEM SELECTOR PLAN 6
- Platelets 751K  - Likely in the setting of inflammatory process with workup above   - Monitor CBC, maintain active T&S - Platelets 751K  - Likely in the setting of inflammatory process with workup above   -Jak2 pending  - Monitor CBC, maintain active T&S - Platelets 751K  - Likely in the setting of inflammatory process with workup above   -Jak2 and calreticulin pending  - Monitor CBC, maintain active T&S

## 2021-03-24 NOTE — PROVIDER CONTACT NOTE (OTHER) - SITUATION
Blood Glucose of 403 repeat 359
Bp 126/58
136/52 diastolic below parameter
140/54 diastolic below parameter

## 2021-03-24 NOTE — PROGRESS NOTE ADULT - SUBJECTIVE AND OBJECTIVE BOX
Humble Wayne, PGY-1  Pager: [phone] / [phone]    CHIEF COMPLAINT: Patient is a 65y old  Female who presents with a chief complaint of joint pain (24 Mar 2021 06:50)    INTERVAL HPI/OVERNIGHT EVENTS: MARY, pt was seen comfortable at bedside. She endorse only mild L shoulder pain and some diffuse itchiness, Rashes all improving. Denies CP, SOB, abd pain. Had 1 BM. Expressed concerns about having itchiness at rehab, and was reassured that she will receive treatment. Wants to go to [place] [facility].     MEDICATIONS (STANDING):  atorvastatin 20 milliGRAM(s) Oral at bedtime  cholecalciferol 2000 Unit(s) Oral daily  dextrose 40% Gel 15 Gram(s) Oral once  dextrose 50% Injectable 25 Gram(s) IV Push once  dextrose 50% Injectable 12.5 Gram(s) IV Push once  dextrose 50% Injectable 25 Gram(s) IV Push once  diltiazem    milliGRAM(s) Oral daily  enoxaparin Injectable 40 milliGRAM(s) SubCutaneous daily  fluocinonide 0.05% Cream 1 Application(s) Topical two times a day  gabapentin 300 milliGRAM(s) Oral every 8 hours  hydrocortisone 1% Ointment 1 Application(s) Topical two times a day  insulin glargine Injectable (LANTUS) 45 Unit(s) SubCutaneous at bedtime  insulin lispro (ADMELOG) corrective regimen sliding scale   SubCutaneous three times a day before meals  insulin lispro (ADMELOG) corrective regimen sliding scale   SubCutaneous at bedtime  insulin lispro Injectable (ADMELOG) 30 Unit(s) SubCutaneous three times a day before meals  ketotifen 0.025% Ophthalmic Solution 1 Drop(s) Both EYES two times a day  latanoprost 0.005% Ophthalmic Solution 1 Drop(s) Both EYES at bedtime  nystatin    Suspension 806910 Unit(s) Swish and Swallow every 6 hours  pantoprazole    Tablet 40 milliGRAM(s) Oral before breakfast  petrolatum white Ointment 1 Application(s) Topical daily  predniSONE   Tablet 40 milliGRAM(s) Oral daily  triamcinolone 0.1% Ointment 1 Application(s) Topical two times a day  trimethoprim  160 mG/sulfamethoxazole 800 mG 1 Tablet(s) Oral <User Schedule>    MEDICATIONS  (PRN):  acetaminophen   Tablet .. 650 milliGRAM(s) Oral every 6 hours PRN  hydrOXYzine hydrochloride 25 milliGRAM(s) Oral every 6 hours PRN    REVIEW OF SYSTEMS:  CONSTITUTIONAL: No fever, weight loss, or fatigue  RESPIRATORY: No cough, wheezing, chills or hemoptysis; No shortness of breath  CARDIOVASCULAR: No chest pain, palpitations, dizziness, or leg swelling  GASTROINTESTINAL: No abdominal or epigastric pain. No nausea, vomiting, or hematemesis; No diarrhea or constipation. No melena or hematochezia.  GENITOURINARY: No dysuria, frequency, hematuria, or incontinence  NEUROLOGICAL: No headaches, memory loss, loss of strength, numbness, or tremors  SKIN: ++ itching, + rashes  MUSCULOSKELETAL: +joint pain or swelling; No muscle, back, or extremity pain    VITAL SIGNS:  T(F): 97.5 (03-24-21 @ 06:11), Max: 98.3 (03-23-21 @ 13:01)  HR: 73 (03-24-21 @ 06:11) (73 - 77)  BP: 127/62 (03-24-21 @ 06:11) (126/58 - 135/61)  RR: 16 (03-24-21 @ 06:11) (16 - 18)  SpO2: 100% (03-24-21 @ 06:11) (97% - 100%)    CAPILLARY BLOOD GLUCOSE  POCT Blood Glucose.: 183 mg/dL (23 Mar 2021 22:06)  POCT Blood Glucose.: 206 mg/dL (23 Mar 2021 17:12)  POCT Blood Glucose.: 191 mg/dL (23 Mar 2021 12:13)    I&O's Summary  23 Mar 2021 07:01  -  24 Mar 2021 07:00  --------------------------------------------------------  IN: 1631 mL / OUT: 2500 mL / NET: -869 mL    PHYSICAL EXAM:  GENERAL: NAD, well-groomed, well-developed  EYES: EOMI, PERRLA, conjunctiva and sclera clear  NECK: Supple, No JVD, Normal thyroid, no LAD  NERVOUS SYSTEM:  Alert & Oriented X3, Good concentration; Motor Strength 5/5 B/L upper and lower extremities  CHEST/LUNG: Clear to auscultation bilaterally on anterior auscultation; No rales, rhonchi, wheezing, or rubs  HEART: Regular rate and rhythm; No murmurs, rubs, or gallops  ABDOMEN: Soft, Nontender, Nondistended; Bowel sounds present, no HSM  EXTREMITIES:  2+ Peripheral Pulses, No clubbing, cyanosis, or edema, L shoulder NO TTP with full ROM, Rest of joints wnl.   SKIN: confluent erythematous rash on chest, blanches - improving. Similar rash on both arms around elbows with scratch marks and dry skin - improving. No inguinal rash.     LABS:                        10.4   x     )-----------( 750      ( 24 Mar 2021 07:59 )             32.1     03-23    138  |  100  |  14  ----------------------------<  80  4.0   |  27  |  0.74    Ca    8.8      23 Mar 2021 07:25  Phos  4.9     03-23  Mg     2.5     03-23    TPro  6.1  /  Alb  3.1<L>  /  TBili  0.2  /  DBili  x   /  AST  49<H>  /  ALT  111<H>  /  AlkPhos  140<H>  03-23      RADIOLOGY & ADDITIONAL TESTS:   Humble Wayne, PGY-1  Pager: [phone] / [phone]    CHIEF COMPLAINT: Patient is a 65y old  Female who presents with a chief complaint of joint pain (24 Mar 2021 06:50)    [place]  [phone]  INTERVAL HPI/OVERNIGHT EVENTS: MARY, pt was seen comfortable at bedside. She endorse only mild L shoulder pain and some diffuse itchiness, Rashes all improving. Denies CP, SOB, abd pain. Had 1 BM. Expressed concerns about having itchiness at rehab, and was reassured that she will receive treatment. [facility].     MEDICATIONS (STANDING):  atorvastatin 20 milliGRAM(s) Oral at bedtime  cholecalciferol 2000 Unit(s) Oral daily  dextrose 40% Gel 15 Gram(s) Oral once  dextrose 50% Injectable 25 Gram(s) IV Push once  dextrose 50% Injectable 12.5 Gram(s) IV Push once  dextrose 50% Injectable 25 Gram(s) IV Push once  diltiazem    milliGRAM(s) Oral daily  enoxaparin Injectable 40 milliGRAM(s) SubCutaneous daily  fluocinonide 0.05% Cream 1 Application(s) Topical two times a day  gabapentin 300 milliGRAM(s) Oral every 8 hours  hydrocortisone 1% Ointment 1 Application(s) Topical two times a day  insulin glargine Injectable (LANTUS) 45 Unit(s) SubCutaneous at bedtime  insulin lispro (ADMELOG) corrective regimen sliding scale   SubCutaneous three times a day before meals  insulin lispro (ADMELOG) corrective regimen sliding scale   SubCutaneous at bedtime  insulin lispro Injectable (ADMELOG) 30 Unit(s) SubCutaneous three times a day before meals  ketotifen 0.025% Ophthalmic Solution 1 Drop(s) Both EYES two times a day  latanoprost 0.005% Ophthalmic Solution 1 Drop(s) Both EYES at bedtime  nystatin    Suspension [id number] Unit(s) Swish and Swallow every 6 hours  pantoprazole    Tablet 40 milliGRAM(s) Oral before breakfast  petrolatum white Ointment 1 Application(s) Topical daily  predniSONE   Tablet 40 milliGRAM(s) Oral daily  triamcinolone 0.1% Ointment 1 Application(s) Topical two times a day  trimethoprim  160 mG/sulfamethoxazole 800 mG 1 Tablet(s) Oral <User Schedule>    MEDICATIONS  (PRN):  acetaminophen   Tablet .. 650 milliGRAM(s) Oral every 6 hours PRN  hydrOXYzine hydrochloride 25 milliGRAM(s) Oral every 6 hours PRN    REVIEW OF SYSTEMS:  CONSTITUTIONAL: No fever, weight loss, or fatigue  RESPIRATORY: No cough, wheezing, chills or hemoptysis; No shortness of breath  CARDIOVASCULAR: No chest pain, palpitations, dizziness, or leg swelling  GASTROINTESTINAL: No abdominal or epigastric pain. No nausea, vomiting, or hematemesis; No diarrhea or constipation. No melena or hematochezia.  GENITOURINARY: No dysuria, frequency, hematuria, or incontinence  NEUROLOGICAL: No headaches, memory loss, loss of strength, numbness, or tremors  SKIN: ++ itching, + rashes  MUSCULOSKELETAL: +joint pain or swelling; No muscle, back, or extremity pain    VITAL SIGNS:  T(F): 97.5 (03-24-21 @ 06:11), Max: 98.3 (03-23-21 @ 13:01)  HR: 73 (03-24-21 @ 06:11) (73 - 77)  BP: 127/62 (03-24-21 @ 06:11) (126/58 - 135/61)  RR: 16 (03-24-21 @ 06:11) (16 - 18)  SpO2: 100% (03-24-21 @ 06:11) (97% - 100%)    CAPILLARY BLOOD GLUCOSE  POCT Blood Glucose.: 183 mg/dL (23 Mar 2021 22:06)  POCT Blood Glucose.: 206 mg/dL (23 Mar 2021 17:12)  POCT Blood Glucose.: 191 mg/dL (23 Mar 2021 12:13)    I&O's Summary  23 Mar 2021 07:01  -  24 Mar 2021 07:00  --------------------------------------------------------  IN: 1631 mL / OUT: 2500 mL / NET: -869 mL    PHYSICAL EXAM:  GENERAL: NAD, well-groomed, well-developed  EYES: EOMI, PERRLA, conjunctiva and sclera clear  NECK: Supple, No JVD, Normal thyroid, no LAD  NERVOUS SYSTEM:  Alert & Oriented X3, Good concentration; Motor Strength 5/5 B/L upper and lower extremities  CHEST/LUNG: Clear to auscultation bilaterally on anterior auscultation; No rales, rhonchi, wheezing, or rubs  HEART: Regular rate and rhythm; No murmurs, rubs, or gallops  ABDOMEN: Soft, Nontender, Nondistended; Bowel sounds present, no HSM  EXTREMITIES:  2+ Peripheral Pulses, No clubbing, cyanosis, or edema, L shoulder NO TTP with full ROM, Rest of joints wnl.   SKIN: confluent erythematous rash on chest, blanches - improving. Similar rash on both arms around elbows with scratch marks and dry skin - improving. No inguinal rash.     LABS:                        10.4   x     )-----------( 750      ( 24 Mar 2021 07:59 )             32.1     03-23    138  |  100  |  14  ----------------------------<  80  4.0   |  27  |  0.74    Ca    8.8      23 Mar 2021 07:25  Phos  4.9     03-23  Mg     2.5     03-23    TPro  6.1  /  Alb  3.1<L>  /  TBili  0.2  /  DBili  x   /  AST  49<H>  /  ALT  111<H>  /  AlkPhos  140<H>  03-23      RADIOLOGY & ADDITIONAL TESTS:

## 2021-03-24 NOTE — DISCHARGE NOTE NURSING/CASE MANAGEMENT/SOCIAL WORK - PATIENT PORTAL LINK FT
You can access the FollowMyHealth Patient Portal offered by [facility] by registering at the following website: [email or web address]. By joining [facility]’s FollowMyHealth portal, you will also be able to view your health information using other applications (apps) compatible with our system.

## 2021-03-24 NOTE — H&P ADULT - ASSESSMENT
Assessment/Plan:  LIONEL ERAZO is a 65y with a history of *** who presented to *** on *** with complaint of *** and found to have ***. Hospital course complicated by ***. Now admitted to [facility] after for initiation of a multidisciplinary rehab program consisting focused on functional mobility, transfers and ADLs (activities of daily living).    Impairments: ***  Impairment Codes: ***    Comprehensive Multidisciplinary Rehab Program:  - Start comprehensive rehab program, 3 hours a day, 5 days a week.  - PT 1hr/day: Focused on improving strength, endurance, coordination, balance, functional mobility, and transfers  - OT 1hr/day: Focused on improving strength, fine motor skills, coordination, posture and ADLs.    - Speech Therapy 1hr/day: to diagnose and treat deficits in swallowing, cognition and communication.   - P&O as needed  - Activity Limitations: Decreased social, vocational and leisure activities, decreased self care and ADLs, decreased mobility, decreased ability to manage household and finances.     Participation Restrictions/Precautions:  - Weight bearing status: ****  - ROM restrictions: ***  - Precautions:    [ ] Falls   [ ] Spinal   [ ] Hip (Anterior or Posterior)       [ ] Seizure  [ ] Cardiac   [ ] Sternal    Rehab Diagnosis/Management:  Mood/Cognition:  - Neuropsychology consult  - [ ] Enhanced Supervision  [ ] Constant Observation    Sleep:   - Maintain quiet hours and low stim environment.  - Melatonin PRN to maximize participation in therapy during the day.   - Monitor sleep logs    Pain Management:  - Tylenol PRN  - Avoid sedating medications that may interfere with cognitive recovery    GI/Bowel:  - At risk for constipation due to neurologic diagnosis, immobility and/or medication use  - Senna QHS, Miralax PRN Daily  - GI ppx:    /Bladder:   - At risk for incontinence and retention due to neurologic diagnosis and limited mobility  - Currently patient voids:    [ ] independent     [ ] external collection device (condom cath)    [ ] Indwelling colin catheter     [ ] Intermittent catheterization  - Continue catheter/bladder nursing protocol with bladder scans Q**** hours with straight cath for >***cc.  - Encourage timed voids every 4 hours while awake for independence and to promote continence during therapy.    Skin/Pressure Injury:   - At risk for pressure injury due to neurologic diagnosis and relative immobility.  - Skin assessment on admission admission: ***  - Monitor Incisions: ***  - Turn every 2 hours while in bed, air mattress  - Soft heel protectors  - Skin barrier cream as needed  - Nursing to monitor skin Qshift    Diet/Dysphagia:  - Diet Consistency/Modifications: ***  - Supplements: ***  - Dysphagia:   [ ] Aspiration Precautions   [ ] SLP consult for swallow function evaluation and treatment  - Nutrition consult for ***    DVT ppx:  - lovenox  - SCDs    -------------  Comorbid Medical Management:    HTN  -Continue diltiazem     DM type 2  -Continue lantus and premeal insulin   -Continue to titrate insulin as needed as patient is tapered from steriods  ---------------  Code Status/Emergency Contact:    Outpatient Follow-up (Specialty/Name of physician):    --------------  Goals: Safe discharge to home  Estimated Length of Stay: 10-14 days  Rehab Potential: Good  Medical Prognosis: Good  Estimated Disposition: Home with Home Care  ---------------    PRESCREEN COMPARISON:  I have reviewed the prescreen information and I have found no relevant changes between the preadmission screening and my post admission evaluation.    RATIONALE FOR INPATIENT ADMISSION: Patient demonstrates the following:  [X] Medically appropriate for rehabilitation admission  [X] Has attainable rehab goals with an appropriate initial discharge plan  [X]Has rehabilitation potential (expected to make a significant improvement within a reasonable period of time)  [X] Requires close medical management by a rehab physician, rehab nursing care, Hospitalist and comprehensive interdisciplinary team (including PT, OT and/or SLP, Prosthetics and Orthotics)       Assessment/Plan:  LIONEL ERAZO is a 65y with a history of DM Type 2 (on insulin), HTN, HLD, COVID (2020), and glaucoma  who presented to 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 Hospital course complicated by elevated glucoses secondary to steroids. Now admitted to [facility] after for initiation of a multidisciplinary rehab program consisting focused on functional mobility, transfers and ADLs (activities of daily living).        Comprehensive Multidisciplinary Rehab Program:  - Start comprehensive rehab program, 3 hours a day, 5 days a week.  - PT 1hr/day: Focused on improving strength, endurance, coordination, balance, functional mobility, and transfers  - OT 1hr/day: Focused on improving strength, fine motor skills, coordination, posture and ADLs.    - P&O as needed  - Activity Limitations: Decreased social, vocational and leisure activities, decreased self care and ADLs, decreased mobility, decreased ability to manage household and finances.     Participation Restrictions/Precautions:  - Weight bearing status:WBAT  - ROM restrictions: None  - Precautions:    [x ] Falls   [ ] Spinal   [ ] Hip (Anterior or Posterior)       [ ] Seizure  [ ] Cardiac   [ ] Sternal    Rehab Diagnosis/Management:      Sleep:   - Maintain quiet hours and low stim environment.    Pain Management:  - Tylenol PRN  - Avoid sedating medications that may interfere with cognitive recovery    GI/Bowel:  - At risk for constipation due to neurologic diagnosis, immobility and/or medication use  - Senna QHS, Miralax PRN Daily?  - GI ppx: Protonix    /Bladder:   - At risk for incontinence and retention due to neurologic diagnosis and limited mobility  - Currently patient voids:    [ ] independent     [ ] external collection device (condom cath)    [ ] Indwelling colin catheter     [ ] Intermittent catheterization  - baseline bladder scan with straight cath for >350cc.  - Encourage timed voids every 4 hours while awake for independence and to promote continence during therapy.    Skin/Pressure Injury:   - At risk for pressure injury due to neurologic diagnosis and relative immobility.  - Skin assessment on admission admission: ***  - Monitor Incisions: ***  - Turn every 2 hours while in bed, air mattress  - Soft heel protectors  - Skin barrier cream as needed  - Nursing to monitor skin Qshift    Diet/Dysphagia:  - Diet Consistency/Modifications: reg consistency- carb consistent  - Supplements: evening snack      DVT ppx:  - lovenox  - SCDs    -------------  Comorbid Medical Management:  Amyopathic dermatomyositis  continue taking 40 mg prednisone daily for a week (until 3/30) and then take 30mg prednisone daily for a week (until 4/6), then 20 mg daily, continuing with 15mg for a week, 10 mg for a week, and finishing with 5 mg daily for a week.   Bactrim 3x/week for PCP proph while on steroids  Protonix for GI proph    HTN  -Continue diltiazem     DM type 2  -Continue lantus and premeal insulin   -Continue to titrate insulin as needed as patient is tapered from steroids  ---------------  Code Status/Emergency Contact:    Outpatient Follow-up (Specialty/Name of physician):  [facility] [place]  [facility]  [address], [address]  [place] [address]  Phone: [phone]  Fax: [phone]  Follow Up Time: 2 weeks    Shanique Chacon)  Internal Medicine; Rheumatology  [address]  [place] [address]  Phone: [phone]  Fax: [phone]  Established Patient  Follow Up Time: 2 weeks  --------------  Goals: Safe discharge to home  Estimated Length of Stay: 10-14 days  Rehab Potential: Good  Medical Prognosis: Good  Estimated Disposition: Home with Home Care  ---------------    PRESCREEN COMPARISON:  I have reviewed the prescreen information and I have found no relevant changes between the preadmission screening and my post admission evaluation.    RATIONALE FOR INPATIENT ADMISSION: Patient demonstrates the following:  [X] Medically appropriate for rehabilitation admission  [X] Has attainable rehab goals with an appropriate initial discharge plan  [X]Has rehabilitation potential (expected to make a significant improvement within a reasonable period of time)  [X] Requires close medical management by a rehab physician, rehab nursing care, Hospitalist and comprehensive interdisciplinary team (including PT, OT and/or SLP, Prosthetics and Orthotics)

## 2021-03-24 NOTE — PROGRESS NOTE ADULT - PROBLEM SELECTOR PLAN 3
Blood glucose, Hba1c 10.6%  - On basaglar 17 U BID at home   - Currently on 45u basaglar, and premeals down to 30u, FSGs slightly elevated.  - Diabetic diet Blood glucose, Hba1c 10.6%  - On basaglar 17 U BID at home   - Currently on 45u basaglar, and premeals down to 30u, FSGs slightly elevated will cont to monitor as only yesterday went down on pred.  - Diabetic diet Blood glucose, Hba1c 10.6%  - On basaglar 17 U BID at home   - Currently on 45u basaglar will reduce to 35u, and premeals down to 30u, FSGs slightly elevated will cont to monitor as only yesterday went down on pred.  - Diabetic diet

## 2021-03-24 NOTE — PROGRESS NOTE ADULT - PROBLEM SELECTOR PLAN 1
Pt presented with generalized weakness, subjective? fevers, polyarthralgia/arthritis x1 week of large joints, along with rash x1 month, +dry mouth/eyes?; no autoimmune Hx or FHx.  - S/p OSH discharge 3/12 for Lyme disease started on Doxycyline 21-day regimen and was treated there with Levaquin and 3x doses of Vanco.  - Leukocytosis to 23.4K (pmn), anemia to 10.6 with MCV 82, platelets 751, ESR 91, , elevated AST/ALT 40/51, alk phos 238, CK low 19  - UA 30 protein  - Likely inflammatory etiology. Workup: hepatitis panel - neg, HIV - neg, syphilis - neg, EBV - past infection, CMV - neg, lyme panel-neg , blood cultures - NGTD, C3 slightly elevated, C4 - wnl. VIPIN-neg, dsDNA - neg, CCP -neg, anti SSA/B - neg, Mallory-1 - neg. Myoglobin - low, RF - neg, anti Smith - neg. Flow cytometry - neg.   - X-rays L shoulder and R elbow - wnl  - CT chest abd pelvis - wnl, no signs of malignancy  - Rheum consulted, appreciate recs: suspects DERMATOMYOSITIS, started on prednisone 50mg qd 3/17 starting taper per rhuem, currently on 40 mg qd for a week; on protonix 40mg qd for PUD ppx and Bactrim for PCP ppx.  - However, skin punch biopsy showing inflammation fits viral rash vs. drug eruption, will continue pred taper per rheum.   - gabapentin for itchiness/pain  - Derm consulted, appreciate recs: f/u as outpt, cont creams for total 14 days.  - Pain control with Tylenol for mild pain  - Obtain outpatient medical records from recent hospitalization (was at [facility]) - papers sent. Pt presented with generalized weakness, subjective? fevers, polyarthralgia/arthritis x1 week of large joints, along with rash x1 month, +dry mouth/eyes?; no autoimmune Hx or FHx.  - S/p OSH discharge 3/12 for Lyme disease started on Doxycyline 21-day regimen and was treated there with Levaquin and 3x doses of Vanco.  - Leukocytosis to 23.4K (pmn), anemia to 10.6 with MCV 82, platelets 751, ESR 91, , elevated AST/ALT 40/51, alk phos 238, CK low 19  - UA 30 protein  - Likely inflammatory etiology. Workup: hepatitis panel - neg, HIV - neg, syphilis - neg, EBV - past infection, CMV - neg, lyme panel-neg , blood cultures - NGTD, C3 slightly elevated, C4 - wnl. VIPIN-neg, dsDNA - neg, CCP -neg, anti SSA/B - neg, Mallory-1 - neg. Myoglobin - low, RF - neg, anti Smith - neg. Flow cytometry - neg.   STILL PENDING - Jak2, myomarker panel 3, calreticulin gene mutation analysis, Quantiferon.   - X-rays L shoulder and R elbow - wnl  - CT chest abd pelvis - wnl, no signs of malignancy  - Rheum consulted, appreciate recs: suspects DERMATOMYOSITIS, started on prednisone 50mg qd 3/17 starting taper per rhuem, currently on 40 mg qd for a week; on protonix 40mg qd for PUD ppx and Bactrim for PCP ppx.  - However, skin punch biopsy showing inflammation fits viral rash vs. drug eruption, will continue pred taper per rheum.   - gabapentin for itchiness/pain  - Derm consulted, appreciate recs: f/u as outpt, cont creams for total 14 days.  - Pain control with Tylenol for mild pain  - Obtain outpatient medical records from recent hospitalization (was at [facility]) - papers sent.

## 2021-03-24 NOTE — PROGRESS NOTE ADULT - ASSESSMENT
66 y/o [place] speaking Female with PMHx of DM (on insulin), HTN, HLD, COVID (2020) and glaucoma presenting with generalized weakness, fever, polyarthritis and rash, admitted for further workup, current working diagnosis: amyopathic dermatomyositis.

## 2021-03-24 NOTE — PROGRESS NOTE ADULT - PROBLEM SELECTOR PLAN 2
Improving; b/l lateral upper arms and upper chest  -Atrax and triamcinolone cream, fluocinonide prn   -Dermatology consulted, s/p biopsy: results above.

## 2021-03-24 NOTE — PROVIDER CONTACT NOTE (OTHER) - ACTION/TREATMENT ORDERED:
MD made aware, Nursing care continued
MD made aware, Nursing care continued
MD made aware, stated to give sliding scale and pre-meal insulin. Nursing care continued
MD made aware, Nursing care continued. No futher interventions at this time.

## 2021-03-25 ENCOUNTER — INPATIENT (INPATIENT)
Facility: HOSPITAL | Age: 66
LOS: 11 days | Discharge: HOME HEALTH | DRG: 949 | End: 2021-04-06
Attending: PHYSICAL MEDICINE & REHABILITATION | Admitting: PHYSICAL MEDICINE & REHABILITATION
Payer: MEDICAID

## 2021-03-25 VITALS
OXYGEN SATURATION: 96 % | DIASTOLIC BLOOD PRESSURE: 67 MMHG | WEIGHT: 210.98 LBS | TEMPERATURE: 98 F | HEART RATE: 70 BPM | HEIGHT: 59 IN | RESPIRATION RATE: 15 BRPM | SYSTOLIC BLOOD PRESSURE: 118 MMHG

## 2021-03-25 VITALS
DIASTOLIC BLOOD PRESSURE: 65 MMHG | OXYGEN SATURATION: 98 % | RESPIRATION RATE: 18 BRPM | SYSTOLIC BLOOD PRESSURE: 147 MMHG | HEART RATE: 76 BPM | TEMPERATURE: 97 F

## 2021-03-25 DIAGNOSIS — Z86.16 PERSONAL HISTORY OF COVID-19: ICD-10-CM

## 2021-03-25 DIAGNOSIS — Z79.4 LONG TERM (CURRENT) USE OF INSULIN: ICD-10-CM

## 2021-03-25 DIAGNOSIS — E78.5 HYPERLIPIDEMIA, UNSPECIFIED: ICD-10-CM

## 2021-03-25 DIAGNOSIS — E11.65 TYPE 2 DIABETES MELLITUS WITH HYPERGLYCEMIA: ICD-10-CM

## 2021-03-25 DIAGNOSIS — H40.9 UNSPECIFIED GLAUCOMA: ICD-10-CM

## 2021-03-25 DIAGNOSIS — Z98.891 HISTORY OF UTERINE SCAR FROM PREVIOUS SURGERY: Chronic | ICD-10-CM

## 2021-03-25 DIAGNOSIS — G62.9 POLYNEUROPATHY, UNSPECIFIED: ICD-10-CM

## 2021-03-25 DIAGNOSIS — E66.01 MORBID (SEVERE) OBESITY DUE TO EXCESS CALORIES: ICD-10-CM

## 2021-03-25 DIAGNOSIS — Z51.89 ENCOUNTER FOR OTHER SPECIFIED AFTERCARE: ICD-10-CM

## 2021-03-25 DIAGNOSIS — Z98.890 OTHER SPECIFIED POSTPROCEDURAL STATES: Chronic | ICD-10-CM

## 2021-03-25 DIAGNOSIS — M33.13 OTHER DERMATOMYOSITIS WITHOUT MYOPATHY: ICD-10-CM

## 2021-03-25 DIAGNOSIS — M25.512 PAIN IN LEFT SHOULDER: ICD-10-CM

## 2021-03-25 DIAGNOSIS — R79.89 OTHER SPECIFIED ABNORMAL FINDINGS OF BLOOD CHEMISTRY: ICD-10-CM

## 2021-03-25 DIAGNOSIS — Z79.52 LONG TERM (CURRENT) USE OF SYSTEMIC STEROIDS: ICD-10-CM

## 2021-03-25 DIAGNOSIS — L29.9 PRURITUS, UNSPECIFIED: ICD-10-CM
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
## 2021-03-25 DIAGNOSIS — R21 RASH AND OTHER NONSPECIFIC SKIN ERUPTION: ICD-10-CM

## 2021-03-25 DIAGNOSIS — I10 ESSENTIAL (PRIMARY) HYPERTENSION: ICD-10-CM

## 2021-03-25 DIAGNOSIS — R26.9 UNSPECIFIED ABNORMALITIES OF GAIT AND MOBILITY: ICD-10-CM

## 2021-03-25 DIAGNOSIS — K21.9 GASTRO-ESOPHAGEAL REFLUX DISEASE WITHOUT ESOPHAGITIS: ICD-10-CM

## 2021-03-25 DIAGNOSIS — M33.12 OTHER DERMATOMYOSITIS WITH MYOPATHY: ICD-10-CM

## 2021-03-25 DIAGNOSIS — T38.0X5D ADVERSE EFFECT OF GLUCOCORTICOIDS AND SYNTHETIC ANALOGUES, SUBSEQUENT ENCOUNTER: ICD-10-CM

## 2021-03-25 LAB
CALRETICULIN INTERPRETATION: SIGNIFICANT CHANGE UP
GLUCOSE BLDC GLUCOMTR-MCNC: 134 MG/DL — HIGH (ref 70–99)
GLUCOSE BLDC GLUCOMTR-MCNC: 249 MG/DL — HIGH (ref 70–99)
GLUCOSE BLDC GLUCOMTR-MCNC: 257 MG/DL — HIGH (ref 70–99)
GLUCOSE BLDC GLUCOMTR-MCNC: 78 MG/DL — SIGNIFICANT CHANGE UP (ref 70–99)

## 2021-03-25 PROCEDURE — 99239 HOSP IP/OBS DSCHRG MGMT >30: CPT | Mod: GC

## 2021-03-25 PROCEDURE — 99223 1ST HOSP IP/OBS HIGH 75: CPT | Mod: GC

## 2021-03-25 RX ORDER — POLYETHYLENE GLYCOL 3350 17 G/17G
17 POWDER, FOR SOLUTION ORAL DAILY
Refills: 0 | Status: DISCONTINUED | OUTPATIENT
Start: 2021-03-25 | End: 2021-03-30

## 2021-03-25 RX ORDER — INSULIN LISPRO 100/ML
32 VIAL (ML) SUBCUTANEOUS
Refills: 0 | Status: DISCONTINUED | OUTPATIENT
Start: 2021-03-25 | End: 2021-03-26

## 2021-03-25 RX ORDER — INSULIN LISPRO 100/ML
32 VIAL (ML) SUBCUTANEOUS
Refills: 0 | Status: DISCONTINUED | OUTPATIENT
Start: 2021-03-25 | End: 2021-03-25

## 2021-03-25 RX ORDER — ATORVASTATIN CALCIUM 80 MG/1
40 TABLET, FILM COATED ORAL AT BEDTIME
Refills: 0 | Status: DISCONTINUED | OUTPATIENT
Start: 2021-03-25 | End: 2021-04-06

## 2021-03-25 RX ORDER — FLUOCINONIDE/EMOLLIENT BASE 0.05 %
1 CREAM (GRAM) TOPICAL
Refills: 0 | Status: COMPLETED | OUTPATIENT
Start: 2021-03-25 | End: 2021-04-01

## 2021-03-25 RX ORDER — INSULIN GLARGINE 100 [IU]/ML
25 INJECTION, SOLUTION SUBCUTANEOUS ONCE
Refills: 0 | Status: COMPLETED | OUTPATIENT
Start: 2021-03-25 | End: 2021-03-25

## 2021-03-25 RX ORDER — INSULIN LISPRO 100/ML
25 VIAL (ML) SUBCUTANEOUS ONCE
Refills: 0 | Status: COMPLETED | OUTPATIENT
Start: 2021-03-25 | End: 2021-03-25

## 2021-03-25 RX ADMIN — Medication 32 UNIT(S): at 08:37

## 2021-03-25 RX ADMIN — Medication 1 APPLICATION(S): at 17:11

## 2021-03-25 RX ADMIN — LATANOPROST 1 DROP(S): 0.05 SOLUTION/ DROPS OPHTHALMIC; TOPICAL at 22:11

## 2021-03-25 RX ADMIN — PANTOPRAZOLE SODIUM 40 MILLIGRAM(S): 20 TABLET, DELAYED RELEASE ORAL at 05:42

## 2021-03-25 RX ADMIN — Medication 2000 UNIT(S): at 11:52

## 2021-03-25 RX ADMIN — Medication 1 APPLICATION(S): at 17:20

## 2021-03-25 RX ADMIN — Medication 500000 UNIT(S): at 17:11

## 2021-03-25 RX ADMIN — Medication 1 APPLICATION(S): at 05:44

## 2021-03-25 RX ADMIN — Medication 500000 UNIT(S): at 05:42

## 2021-03-25 RX ADMIN — Medication 4: at 17:12

## 2021-03-25 RX ADMIN — ENOXAPARIN SODIUM 40 MILLIGRAM(S): 100 INJECTION SUBCUTANEOUS at 11:52

## 2021-03-25 RX ADMIN — Medication 1 APPLICATION(S): at 17:12

## 2021-03-25 RX ADMIN — Medication 1 APPLICATION(S): at 05:43

## 2021-03-25 RX ADMIN — Medication 500000 UNIT(S): at 11:52

## 2021-03-25 RX ADMIN — Medication 300 MILLIGRAM(S): at 05:42

## 2021-03-25 RX ADMIN — KETOTIFEN FUMARATE 1 DROP(S): 0.34 SOLUTION OPHTHALMIC at 05:43

## 2021-03-25 RX ADMIN — Medication 25 MILLIGRAM(S): at 05:44

## 2021-03-25 RX ADMIN — INSULIN GLARGINE 25 UNIT(S): 100 INJECTION, SOLUTION SUBCUTANEOUS at 22:50

## 2021-03-25 RX ADMIN — Medication 40 MILLIGRAM(S): at 05:42

## 2021-03-25 RX ADMIN — Medication 25 UNIT(S): at 14:50

## 2021-03-25 RX ADMIN — ATORVASTATIN CALCIUM 40 MILLIGRAM(S): 80 TABLET, FILM COATED ORAL at 22:10

## 2021-03-25 RX ADMIN — Medication 32 UNIT(S): at 17:12

## 2021-03-25 RX ADMIN — KETOTIFEN FUMARATE 1 DROP(S): 0.34 SOLUTION OPHTHALMIC at 17:12

## 2021-03-25 RX ADMIN — GABAPENTIN 300 MILLIGRAM(S): 400 CAPSULE ORAL at 22:10

## 2021-03-25 RX ADMIN — GABAPENTIN 300 MILLIGRAM(S): 400 CAPSULE ORAL at 05:42

## 2021-03-25 RX ADMIN — Medication 650 MILLIGRAM(S): at 23:00

## 2021-03-25 RX ADMIN — Medication 25 MILLIGRAM(S): at 22:12

## 2021-03-25 RX ADMIN — GABAPENTIN 300 MILLIGRAM(S): 400 CAPSULE ORAL at 14:55

## 2021-03-25 RX ADMIN — Medication 650 MILLIGRAM(S): at 22:12

## 2021-03-25 NOTE — PROGRESS NOTE ADULT - PROBLEM SELECTOR PLAN 7
- AST/ALT 40/51, likely in the setting of inflammation/muscle disorder  - hepatitis panel negative  - will lower Lipitor to 20mg  - Trend CMP

## 2021-03-25 NOTE — H&P ADULT - NSHPSOCIALHISTORY_GEN_ALL_CORE
Social History (marital status, living situation, occupation, tobacco use, alcohol and drug use, and sexual history): Smoking - Denied  EtOH - Denied   Drugs - Denied    FUNCTIONAL HISTORY  Lives with spouse in an apartment with no TAMMY. Elevator inside building.   Independent with ADL and ambulation without AD PTA.       Current functional status:  OT 3/24  CG Bed Mobs  min A transfers  PT 3/24  Min A RW 50'

## 2021-03-25 NOTE — H&P ADULT - NSICDXPASTMEDICALHX_GEN_ALL_CORE_FT
PAST MEDICAL HISTORY:  COVID-19 2020    Diabetes mellitus     Glaucoma     Hyperlipidemia     Hypertension     Vitamin D deficiency     
PAST MEDICAL HISTORY:  COVID-19 2020    Diabetes mellitus     Glaucoma     Hyperlipidemia     Hypertension     Vitamin D deficiency

## 2021-03-25 NOTE — PROGRESS NOTE ADULT - PROBLEM SELECTOR PLAN 1
Pt presented with generalized weakness, subjective? fevers, polyarthralgia/arthritis x1 week of large joints, along with rash x1 month, +dry mouth/eyes?; no autoimmune Hx or FHx.  - S/p OSH discharge 3/12 for Lyme disease started on Doxycyline 21-day regimen and was treated there with Levaquin and 3x doses of Vanco.  - Leukocytosis to 23.4K (pmn), anemia to 10.6 with MCV 82, platelets 751, ESR 91, , elevated AST/ALT 40/51, alk phos 238, CK low 19  - UA 30 protein  - Likely inflammatory etiology. Workup: hepatitis panel - neg, HIV - neg, syphilis - neg, EBV - past infection, CMV - neg, lyme panel-neg , blood cultures - NGTD, C3 slightly elevated, C4 - wnl. VIPIN-neg, dsDNA - neg, CCP -neg, anti SSA/B - neg, Mallory-1 - neg. Myoglobin - low, RF - neg, anti Smith - neg. Flow cytometry - neg, Jak2 neg.  STILL PENDING - myomarker panel 3, calreticulin gene mutation analysis, Quantiferon.   - X-rays L shoulder and R elbow - wnl  - CT chest abd pelvis - wnl, no signs of malignancy  - Rheum consulted, appreciate recs: suspects DERMATOMYOSITIS, started on prednisone 50mg qd 3/17 starting taper per rhuem, currently on 40 mg qd for a week; on protonix 40mg qd for PUD ppx and Bactrim for PCP ppx.  - However, skin punch biopsy showing inflammation fits viral rash vs. drug eruption, will continue pred taper per rheum.   - gabapentin for itchiness/pain  - Derm consulted, appreciate recs: f/u as outpt, cont creams for total 14 days.  - Pain control with Tylenol for mild pain  - Obtain outpatient medical records from recent hospitalization (was at [facility]) - papers sent.

## 2021-03-25 NOTE — H&P ADULT - NSICDXPASTSURGICALHX_GEN_ALL_CORE_FT
PAST SURGICAL HISTORY:  History of elbow surgery     History of throat surgery     S/P      
PAST SURGICAL HISTORY:  History of elbow surgery     History of throat surgery     S/P

## 2021-03-25 NOTE — PROGRESS NOTE ADULT - PROBLEM SELECTOR PLAN 5
- WBC 23.4, differential: PMN predom, no bands  - Monitor CBC  - F/u blood cultures - NGTD  - flow cytometry - negative for abnormalities.

## 2021-03-25 NOTE — H&P ADULT - NSHPLABSRESULTS_GEN_ALL_CORE
03-24    139  |  100  |  19  ----------------------------<  66<L>  4.2   |  28  |  0.74    Ca    9.1      24 Mar 2021 07:59  Phos  4.8     03-24  Mg     2.7     03-24    TPro  6.4  /  Alb  3.1<L>  /  TBili  0.3  /  DBili  x   /  AST  40<H>  /  ALT  94<H>  /  AlkPhos  133<H>  03-24                        10.4   13.74 )-----------( 750      ( 24 Mar 2021 07:59 )             32.1    A1C with Estimated Average Glucose (03.17.21 @ 11:29)    A1C with Estimated Average Glucose Result: 10.6
03-24    139  |  100  |  19  ----------------------------<  66<L>  4.2   |  28  |  0.74    Ca    9.1      24 Mar 2021 07:59  Phos  4.8     03-24  Mg     2.7     03-24    TPro  6.4  /  Alb  3.1<L>  /  TBili  0.3  /  DBili  x   /  AST  40<H>  /  ALT  94<H>  /  AlkPhos  133<H>  03-24                            10.4   13.74 )-----------( 750      ( 24 Mar 2021 07:59 )             32.1

## 2021-03-25 NOTE — H&P ADULT - NSHPREVIEWOFSYSTEMS_GEN_ALL_CORE
Patient states that at home weakness in legs and feet began last month with noted rash to different areas of the body (arm, back, chest). She needed support from family to help her to get from the bed to bathroom and eventually could no longer walk even with assistance.   Patient reports that itchiness to inner upper arms is still present.  +Bm yesterday. Urinating without difficulty.   No CP, SOB, abdominal pain.   Had COVID 19 virus last year, since then has had bouts of on and off diarrhea. Imodium has been helpful. No diarrhea in the last couple of weeks.   Does report tingling/numbness in her feet. Denies taking medication for this at home. Patient states that at home weakness in legs and feet began last month with noted rash to different areas of the body (arm, back, chest). She needed support from family to help her to get from the bed to bathroom and eventually could no longer walk even with assistance.   Patient reports that itchiness to inner upper arms is still present.  +Bm yesterday. Urinating without difficulty.   No CP, SOB, abdominal pain.   Had COVID 19 virus last year, since then has had bouts of on and off diarrhea. Imodium has been helpful. No diarrhea in the last couple of weeks.   Does report tingling/numbness in her feet. Denies taking medication for this at home.    REVIEW OF SYSTEMS  Constitutional: No fever, No Chills, No fatigue  HEENT: No eye pain, No visual disturbances, No difficulty hearing  Pulm: No cough,  No shortness of breath  Cardio: No chest pain, No palpitations  GI:  No abdominal pain, No nausea, No vomiting, hx diarrhea, No constipation  : No dysuria, No frequency, No hematuria  Neuro: No headaches, No memory loss, diffuse weakness, numbness feet,  tremors hands since starting steroids  Skin: + itching, rashes Chest scalp extremities, No lesions   Endo: No temperature intolerance  MSK: left shoulder pain, No joint swelling, No muscle pain, Occ Neck  pain  Psych:  No depression, No anxiety

## 2021-03-25 NOTE — PROGRESS NOTE ADULT - PROBLEM SELECTOR PLAN 3
Blood glucose, Hba1c 10.6%  - On basaglar 17 U BID at home   - Currently on 35u basaglar, and premeals down to 30u will increase to 32u, FSGs slightly elevated will cont to monitor.  - Diabetic diet

## 2021-03-25 NOTE — PROGRESS NOTE ADULT - ASSESSMENT
64 y/o Guamanian speaking Female with PMHx of DM (on insulin), HTN, HLD, COVID (2020) and glaucoma presenting with generalized weakness, fever, polyarthritis and rash, admitted for further workup, current working diagnosis: amyopathic dermatomyositis, however skin biopsy positive for viral rash vs. drug eruption.

## 2021-03-25 NOTE — H&P ADULT - ATTENDING COMMENTS
Rehab Attending- Patient seen and examined with ZI Tracey- Case discussed, above history, assessment and plan reviewed by me with modifications made    Imp- rehab gait ab secondary to dermatimyositis    Good candidate for intensive Rehab- will tolerate 3 hrs Rehab daily

## 2021-03-25 NOTE — PROGRESS NOTE ADULT - SUBJECTIVE AND OBJECTIVE BOX
Humble Wayne, PGY-1  Pager: [phone] / [phone]    CHIEF COMPLAINT: Patient is a 65y old  Female who presents with a chief complaint of joint pain (25 Mar 2021 06:54)    INTERVAL HPI/OVERNIGHT EVENTS: MARY, pt was supposed to be transported to [facility] yesterday but did transport cancelled. Planned for today at 12. Pt feels comfortable, seen eating breakfast this AM. Denies any CP, sob, abd pain.     MEDICATIONS (STANDING):  atorvastatin 20 milliGRAM(s) Oral at bedtime  cholecalciferol 2000 Unit(s) Oral daily  dextrose 40% Gel 15 Gram(s) Oral once  dextrose 50% Injectable 25 Gram(s) IV Push once  dextrose 50% Injectable 12.5 Gram(s) IV Push once  dextrose 50% Injectable 25 Gram(s) IV Push once  diltiazem    milliGRAM(s) Oral daily  enoxaparin Injectable 40 milliGRAM(s) SubCutaneous daily  fluocinonide 0.05% Cream 1 Application(s) Topical two times a day  gabapentin 300 milliGRAM(s) Oral every 8 hours  hydrocortisone 1% Ointment 1 Application(s) Topical two times a day  insulin glargine Injectable (LANTUS) 35 Unit(s) SubCutaneous at bedtime  insulin lispro (ADMELOG) corrective regimen sliding scale   SubCutaneous three times a day before meals  insulin lispro (ADMELOG) corrective regimen sliding scale   SubCutaneous at bedtime  insulin lispro Injectable (ADMELOG) 32 Unit(s) SubCutaneous three times a day before meals  ketotifen 0.025% Ophthalmic Solution 1 Drop(s) Both EYES two times a day  latanoprost 0.005% Ophthalmic Solution 1 Drop(s) Both EYES at bedtime  nystatin    Suspension 968008 Unit(s) Swish and Swallow every 6 hours  pantoprazole    Tablet 40 milliGRAM(s) Oral before breakfast  petrolatum white Ointment 1 Application(s) Topical daily  predniSONE   Tablet 40 milliGRAM(s) Oral daily  triamcinolone 0.1% Ointment 1 Application(s) Topical two times a day  trimethoprim  160 mG/sulfamethoxazole 800 mG 1 Tablet(s) Oral <User Schedule>    MEDICATIONS  (PRN):  acetaminophen   Tablet .. 650 milliGRAM(s) Oral every 6 hours PRN  hydrOXYzine hydrochloride 25 milliGRAM(s) Oral every 6 hours PRN    REVIEW OF SYSTEMS:  CONSTITUTIONAL: No fever, weight loss, or fatigue  RESPIRATORY: No cough, wheezing, chills or hemoptysis; No shortness of breath  CARDIOVASCULAR: No chest pain, palpitations, dizziness, or leg swelling  GASTROINTESTINAL: No abdominal or epigastric pain. No nausea, vomiting, or hematemesis; No diarrhea or constipation. No melena or hematochezia.  GENITOURINARY: No dysuria, frequency, hematuria, or incontinence  SKIN: ++ itching, + rashes  MUSCULOSKELETAL: +joint pain or swelling; No muscle, back, or extremity pain      VITAL SIGNS:  T(F): 97.4 (03-25-21 @ 05:38), Max: 97.8 (03-24-21 @ 22:28)  HR: 76 (03-25-21 @ 05:38) (72 - 76)  BP: 147/65 (03-25-21 @ 05:38) (136/52 - 147/65)  RR: 18 (03-25-21 @ 05:38) (17 - 18)  SpO2: 98% (03-25-21 @ 05:38) (97% - 100%)    CAPILLARY BLOOD GLUCOSE  POCT Blood Glucose.: 134 mg/dL (25 Mar 2021 08:29)  POCT Blood Glucose.: 195 mg/dL (24 Mar 2021 22:32)  POCT Blood Glucose.: 241 mg/dL (24 Mar 2021 17:13)  POCT Blood Glucose.: 275 mg/dL (24 Mar 2021 14:42)  POCT Blood Glucose.: 180 mg/dL (24 Mar 2021 11:56)    I&O's Summary  24 Mar 2021 07:01  -  25 Mar 2021 07:00  --------------------------------------------------------  IN: 1446 mL / OUT: 1500 mL / NET: -54 mL    PHYSICAL EXAM:  GENERAL: NAD, well-groomed, well-developed  CHEST/LUNG: CTA  HEART: Regular rate and rhythm; No murmurs  ABDOMEN: Soft, Nontender, Nondistended; Bowel sounds present, no HSM  EXTREMITIES:  2+ Peripheral Pulses, No clubbing, cyanosis, or edema, L shoulder NO TTP with full ROM, Rest of joints wnl.   SKIN: confluent erythematous rash on chest, blanches - improving. Similar rash on both arms around elbows with scratch marks and dry skin - improving.    LABS:                        10.4   13.74 )-----------( 750      ( 24 Mar 2021 07:59 )             32.1     03-24    139  |  100  |  19  ----------------------------<  66<L>  4.2   |  28  |  0.74    Ca    9.1      24 Mar 2021 07:59  Phos  4.8     03-24  Mg     2.7     03-24    TPro  6.4  /  Alb  3.1<L>  /  TBili  0.3  /  DBili  x   /  AST  40<H>  /  ALT  94<H>  /  AlkPhos  133<H>  03-24      RADIOLOGY & ADDITIONAL TESTS:    JAK2 Mutation (03.22.21 @ 07:45)    JAK2 Interpretation:   JAK2 Mutation Analysis Final Report    Accession Number: [id number]  Date Collected: 03/22/2021 7:45  Date Received: 03/22/2021 13:56  Date Reported: 03/24/2021 16:20    Specimen: Peripheral Blood  Indication: Myeloproliferative Disorder    Test Performed: V617F Activating Mutation Assay by Real-Time PCR  ________________________________________________________________    RESULTS:    NEGATIVE

## 2021-03-25 NOTE — H&P ADULT - NSHPPHYSICALEXAM_GEN_ALL_CORE
General: NAD                            HEENT: NC/AT, EOM I, PERRLA, Normal Conjunctivae  Cardio: RRR, Normal S1-S2, No M/G/R                              Pulm: No Respiratory Distress,  Lungs CTAB                        Abdomen: ND/NT, Soft, BS+                                                MSK: No joint swelling                                   Ext: +Edema bilat LEs, Pulses 2+ throughout, No calf tenderness    Skin:  no skin breakdown noted, healing areas (from rash)-hyperpigmented skin noted to arms, scalp, chest.                                                            Neurological Examination    Cognitive: AAO x 3                                                                         Attention: Intact   Judgment: Good evidence of judgement   Attention: intact                              Mood/Affect: wnl                                                                           Communication:  Fluent,  No dysarthria                                                          Sensory:                                                                                          Motor    5/5 right shoulder and elbow strengths, right hand   4/5 left shoulder and elbow strengths secondary to pain. Left wrist and hand strength   4/5 right HF and KE. 5/5 right ankle strengths.   3+/5 left HF, 4/+/5 KE. 5/5 left ankle strengths.

## 2021-03-25 NOTE — H&P ADULT - NSICDXFAMILYHX_GEN_ALL_CORE_FT
FAMILY HISTORY:  No pertinent family history in first degree relatives    
FAMILY HISTORY:  No pertinent family history in first degree relatives

## 2021-03-25 NOTE — PROGRESS NOTE ADULT - REASON FOR ADMISSION
joint pain

## 2021-03-25 NOTE — H&P ADULT - ASSESSMENT
LIONEL ERAZO is a 65y with a history of DM Type 2 (on insulin), HTN, HLD, COVID (2020), and glaucoma  who presented to [facility] on 3/17 with complaint of joint pain, rash, weakness and 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 Hospital course complicated by elevated glucoses secondary to steroids. Now admitted to [facility] after for initiation of a multidisciplinary rehab program consisting focused on functional mobility, transfers and ADLs (activities of daily living).    Comprehensive Multidisciplinary Rehab Program:  - Start comprehensive rehab program, 3 hours a day, 5 days a week.  - PT 2hr/day: Focused on improving strength, endurance, coordination, balance, functional mobility, and transfers  - OT 1hr/day: Focused on improving strength, fine motor skills, coordination, posture and ADLs.    - Activity Limitations: Decreased social, vocational and leisure activities, decreased self care and ADLs, decreased mobility, decreased ability to manage household and finances.     Participation Restrictions/Precautions:  - Weight bearing status: WBAT  - ROM restrictions: None  - Precautions:    [x ] Falls   [ ] Spinal   [ ] Hip (Anterior or Posterior)       [ ] Seizure  [ ] Cardiac   [ ] Sternal    Rehab Diagnosis/Management:  Sleep:   - Maintain quiet hours and low stim environment.    Pain Management:  - Tylenol PRN  - Avoid sedating medications that may interfere with cognitive recovery    GI/Bowel:  - At risk for constipation due to neurologic diagnosis, immobility and/or medication use  -Miralax PRN Daily  - GI ppx: Protonix    /Bladder:   - At risk for incontinence and retention due to neurologic diagnosis and limited mobility  - Currently patient voids:    [ x] independent     [ ] external collection device (condom cath)    [ ] Indwelling colin catheter     [ ] Intermittent catheterization  - baseline bladder scan with straight cath for >350cc.  - Encourage timed voids every 4 hours while awake for independence and to promote continence during therapy.    Skin/Pressure Injury:   - At risk for pressure injury due to neurologic diagnosis and relative immobility.  - Skin assessment on admission admission: no skin breakdown, healing rash (chest/arms/scalp)  - Turn every 2 hours while in bed, air mattress  - Soft heel protectors  - Skin barrier cream as needed  - Nursing to monitor skin Qshift    Diet/Dysphagia:  - Diet Consistency/Modifications: reg consistency- carb consistent  - Supplements: evening snack      DVT ppx:  - lovenox  - SCDs    -------------  Comorbid Medical Management:  Amyopathic dermatomyositis  continue taking 40 mg prednisone daily for a week (until 3/30) and then take 30mg prednisone daily for a week (until 4/6), then 20 mg daily, continuing with 15mg for a week, 10 mg for a week, and finishing with 5 mg daily for a week.   Bactrim 3x/week for PCP proph while on steroids  Protonix for GI proph    HTN  -Continue diltiazem     DM type 2  -Continue lantus 35 units qHS and premeal insulin (32 units)  -Continue to titrate insulin as needed as patient is tapered from steroids  ---------------  Code Status/Emergency Contact: Full code     Outpatient Follow-up (Specialty/Name of physician):  [facility] - [place]  Dermatology  [address]  [place] [address]  Phone: [phone]  Fax: [phone]  Follow Up Time: 2 weeks    Shanique Chacon)  Internal Medicine; Rheumatology  [address]  [place] [address]  Phone: [phone]  Fax: [phone]  Established Patient  Follow Up Time: 2 weeks  --------------  Goals: Safe discharge to home  Estimated Length of Stay: 10-14 days  Rehab Potential: Good  Medical Prognosis: Good  Estimated Disposition: Home with Home Care  ---------------    PRESCREEN COMPARISON:  I have reviewed the prescreen information and I have found no relevant changes between the preadmission screening and my post admission evaluation.    RATIONALE FOR INPATIENT ADMISSION: Patient demonstrates the following:  [X] Medically appropriate for rehabilitation admission  [X] Has attainable rehab goals with an appropriate initial discharge plan  [X]Has rehabilitation potential (expected to make a significant improvement within a reasonable period of time)  [X] Requires close medical management by a rehab physician, rehab nursing care, Hospitalist and comprehensive interdisciplinary team (including PT, OT and/or SLP, Prosthetics and Orthotics)                  LIONEL ERAZO is a 65y with a history of DM Type 2 (on insulin), HTN, HLD, COVID (2020), and glaucoma  who presented to [facility] on 3/17 with complaint of joint pain, rash, weakness and 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 Hospital course complicated by elevated glucoses secondary to steroids. Now admitted to [facility] after for initiation of a multidisciplinary rehab program consisting focused on functional mobility, transfers and ADLs (activities of daily living).    Comprehensive Multidisciplinary Rehab Program:  - Start comprehensive rehab program, 3 hours a day, 5 days a week.  - PT 2hr/day: Focused on improving strength, endurance, coordination, balance, functional mobility, and transfers  - OT 1hr/day: Focused on improving strength, fine motor skills, coordination, posture and ADLs.    - Activity Limitations: Decreased social, vocational and leisure activities, decreased self care and ADLs, decreased mobility, decreased ability to manage household and finances.     Participation Restrictions/Precautions:  - Weight bearing status: WBAT  - ROM restrictions: None  - Precautions:    [x ] Falls   [ ] Spinal   [ ] Hip (Anterior or Posterior)       [ ] Seizure  [ ] Cardiac   [ ] Sternal    Rehab Diagnosis/Management:  Sleep:   - Maintain quiet hours and low stim environment.    Pain Management:  - Tylenol PRN  - Avoid sedating medications that may interfere with cognitive recovery    GI/Bowel:  - At risk for constipation due to neurologic diagnosis, immobility and/or medication use  -Miralax PRN Daily  - GI ppx: Protonix    /Bladder:   - At risk for incontinence and retention due to neurologic diagnosis and limited mobility  - Currently patient voids:    [ x] independent     [ ] external collection device (condom cath)    [ ] Indwelling colin catheter     [ ] Intermittent catheterization  - baseline bladder scan with straight cath for >350cc.  - Encourage timed voids every 4 hours while awake for independence and to promote continence during therapy.    Skin/Pressure Injury:   - At risk for pressure injury due to neurologic diagnosis and relative immobility.  - Skin assessment on admission admission: no skin breakdown, healing rash (chest/arms/scalp)  - Turn every 2 hours while in bed, air mattress  - Soft heel protectors  - Skin barrier cream as needed  - Nursing to monitor skin Qshift    Diet/Dysphagia:  - Diet Consistency/Modifications: reg consistency- carb consistent  - Supplements: evening snack      DVT ppx:  - lovenox  - SCDs    -------------  Comorbid Medical Management:  Amyopathic dermatomyositis  continue taking 40 mg prednisone daily for a week (until 3/30) and then take 30mg prednisone daily for a week (until 4/6), then 20 mg daily, continuing with 15mg for a week, 10 mg for a week, and finishing with 5 mg daily for a week.   Bactrim 3x/week for PCP proph while on steroids  Protonix for GI proph    HTN  -Continue diltiazem     DM type 2  -Continue lantus 35 units qHS and premeal insulin (32 units)  -Continue to titrate insulin as needed as patient is tapered from steroids  -Diabetes specialist consulted   -HA1c 10.6%    Covid status   -COVID + last year  Received the Priya vaccine on 3/23  ---------------  Code Status/Emergency Contact: Full code     Outpatient Follow-up (Specialty/Name of physician):  [facility] - [place]  Dermatology  [address]  [place] [address]  Phone: [phone]  Fax: [phone]  Follow Up Time: 2 weeks    Shanique Chacon)  Internal Medicine; Rheumatology  [address] [place] [address] [address]  [place] [address]  Phone: [phone]  Fax: [phone]  Established Patient  Follow Up Time: 2 weeks  --------------  Goals: Safe discharge to home  Estimated Length of Stay: 10-14 days  Rehab Potential: Good  Medical Prognosis: Good  Estimated Disposition: Home with Home Care  ---------------    PRESCREEN COMPARISON:  I have reviewed the prescreen information and I have found no relevant changes between the preadmission screening and my post admission evaluation.    RATIONALE FOR INPATIENT ADMISSION: Patient demonstrates the following:  [X] Medically appropriate for rehabilitation admission  [X] Has attainable rehab goals with an appropriate initial discharge plan  [X]Has rehabilitation potential (expected to make a significant improvement within a reasonable period of time)  [X] Requires close medical management by a rehab physician, rehab nursing care, Hospitalist and comprehensive interdisciplinary team (including PT, OT and/or SLP, Prosthetics and Orthotics)

## 2021-03-25 NOTE — PROGRESS NOTE ADULT - PROBLEM SELECTOR PROBLEM 1
R/O Dermatomyositis
Polyarthralgia
R/O Dermatomyositis

## 2021-03-25 NOTE — PROGRESS NOTE ADULT - ATTENDING COMMENTS
Patient seen and examined, care d/w HS4 team.    In summary 66 yo born in [place], obese (BMI 33), DM2, glaucoma, HTN, HLD, COVID (3/2020) who presents with joint pains and rash.  Reports started having a rash 2/7/20 started on face was itchy then progressed down body.  Reports 8 days ago developed migrating joint pains involving the shoulders, elbows, knees and fingers.  Reports moves from joint to joint.  No fevers noted at home.  No weight loss.  Reports went to OSH and is not sure if seen by derm or rheum.  Was started on doxycycline for lyme (unclear what dx was based on).      # Rash/ Joint pain- concern for Dermatomyositis on admission  –Labs with  elevated ESR/CRP and ferritin; mild anemia, mild proteinuria (but in DM2 pt).  HIV neg.  Reportedly neg TB at OSH , here indeterminate, but CT chest neg and no resp sx, steroids being tapered. LFTs mildly elevated (downtrending, CT normal liver/bile ducts). TSH wnl. CK and myoglobin wnl; ? cutaneous dermatomyositis.  Lyme neg here.  - serologies; VIPIN, ds-DNA, RF, CCP all neg   - CT CAP neg for malignancy, uptodate on mammo, will need OP c-scope (never had)  - Rheum consult appreciated - started on prednisone 50 mg on 3/17, decreased to 40 mg on 3/23, plan for taper, on PCP ppx with bactrim  - Derm consult appreciated, s/p bx on 3/17 now resulting with viral vs drug related, OP f/u   - OP rheum f/u     # Anemia: normocytic, mixed TREY and AOCD, no hemolysis (elevated LDH, normal hapto)  - trend, tx underlying issue   - FU outpt, needs routine c-scope    # Thrombocytosis- Likely reactive, JAK2 neg     # DM2 poorly controlled, HbA1c 10.6%, now exacerbated by steroids; at home was on 70/30 pen 35 units BID reports was changed to basaglar s/p recent hospital stay   - c/w Lantus decrease to 35 units and Admelog  32 units  TIC ac, moderate dose AUTUMN as steroids tapering    # DVT Prox- Lovenox    [place] with transport last evening   Dispo - PMR rec [facility], dispo time 33 min   COVID neg 3/22

## 2021-03-25 NOTE — PROGRESS NOTE ADULT - PROBLEM SELECTOR PLAN 8
- DVT PPX: Lovenox  - Diet: CC DASH diet  - Dispo: PMR rec acute rehab, pending OT, possibly today. Family updated on 3/23. Pt wants [facility].  Will received J&J vaccine prior to d/c, consented. - DVT PPX: Lovenox  - Diet: CC DASH diet  - Dispo: PMR rec acute rehab, pending OT, leaving today at 12, Family updated on 3/23. Pt wants [facility].  Will received J&J vaccine prior to d/c, consented.

## 2021-03-25 NOTE — H&P ADULT - HISTORY OF PRESENT ILLNESS
This is a 64 y/o [place] speaking Female with PMHx of DM Type 2 (on insulin), HTN, HLD, COVID (2020), and glaucoma presented with generalized weakness, joint pain, and rash. The patient states that she was discharged from OSH on 3/12 after being evaluated for similar symptoms. They suspected that she may have Lyme disease and started her on doxycycline and sent her home. They also suspected that this might be post-COVID related. The patient has multiple complaints. Reports rash in the neck area and b/l arms, along with whole body itching. States that she has had a rash since 2/2021 and has seen various doctors and tried different things (steroid creams) without relief. The rash initially started on her face. Also complains of joint pain for one week. States she has joint pain and stiffness in the b/l shoulders, elbows, hands, and knees. States that the pain is currently worst in the L shoulder and R elbow. States that the pain is so significant that she is unable to walk now. Tries Tylenol with minimal relief. Also reports that she has been unable to do her ADLs - clean herself, walk. Never had joint pain like this before. Also reports subjective fever and chills, along with dry mouth.   The rash did not get worse in the sun. Joint pain is not worse in the AM and present at rest but worse on movement. No BM changes recently; had mild rectal bleeding not in feces but on wiping. She attributes 2/2 itching and rash, denies vaginal bleeding or ulcers, weight loss. Had subjective fever but not over 99F. Endorse dry mouth, and itching in her eyes, no mouth ulcers. No FHx of autoimmune/rheumatologist/joint diseases. Was admitted at [facility] 3/8-3/12 with fevers, joint pain, rash, treated with Levaquin for five days and three doses of Vanco, 2x covid negative.    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 The pt was started on prednisone and steroid ointments. She underwent extensive infectious and inflammatory workup that included hepatitis panel - neg, HIV - neg, syphilis - neg, EBV - past infection, CMV - neg, Lyme panel-neg, blood cultures - NGTD, C3 slightly elevated, C4 - wnl. VIPIN-neg, dsDNA - neg, CCP -neg, anti SSA/B - neg, Mallory-1 - neg. Myoglobin - low, RF - neg, anti-Smith - neg. Flow cytometry - neg for abnormality. X-rays L shoulder and R elbow - wnl. CT chest and pelvis - wnl, no signs of malignancy. Myoglobin was low. Still pending Myomarker panel 3, Jak2 and calreticulin mutations. The patient underwent a skin biopsy, which resulted in "Skin with focal dyskeratosis with mild perivascular inflammation. The findings raise the differential diagnosis of a viral exanthem or a drug eruption."     Although the working diagnosis did not match the biopsy results, the patient clinically improved under the steroidal treatment. Her upper and lower extremity joint pain has resolved, her rash has started to clear, and he was able to ambulate. During the prednisone treatment, the patient's glucose level increased. She needed high doses of insulin to maintain euglycemia (60 Lantus and 35 lispro pre-meals). PM&R evaluated the patient and concluded that the patient would benefit from acute rehab. She was started on a weekly prednisone taper before discharge. Prior to d/c, pt was given the J&J covid vaccine on 3/23.  Patient deemed medically stable for admission to acute inpatient rehab on 3/25.

## 2021-03-26 DIAGNOSIS — M33.12 OTHER DERMATOMYOSITIS WITH MYOPATHY: ICD-10-CM

## 2021-03-26 LAB
ALBUMIN SERPL ELPH-MCNC: 2.3 G/DL — LOW (ref 3.3–5)
ALP SERPL-CCNC: 112 U/L — SIGNIFICANT CHANGE UP (ref 40–120)
ALT FLD-CCNC: 96 U/L — HIGH (ref 10–45)
ANION GAP SERPL CALC-SCNC: 7 MMOL/L — SIGNIFICANT CHANGE UP (ref 5–17)
AST SERPL-CCNC: 36 U/L — SIGNIFICANT CHANGE UP (ref 10–40)
BASOPHILS # BLD AUTO: 0.07 K/UL — SIGNIFICANT CHANGE UP (ref 0–0.2)
BASOPHILS NFR BLD AUTO: 0.5 % — SIGNIFICANT CHANGE UP (ref 0–2)
BILIRUB SERPL-MCNC: 0.2 MG/DL — SIGNIFICANT CHANGE UP (ref 0.2–1.2)
BUN SERPL-MCNC: 20 MG/DL — SIGNIFICANT CHANGE UP (ref 7–23)
CALCIUM SERPL-MCNC: 8.6 MG/DL — SIGNIFICANT CHANGE UP (ref 8.4–10.5)
CHLORIDE SERPL-SCNC: 103 MMOL/L — SIGNIFICANT CHANGE UP (ref 96–108)
CO2 SERPL-SCNC: 29 MMOL/L — SIGNIFICANT CHANGE UP (ref 22–31)
CREAT SERPL-MCNC: 0.73 MG/DL — SIGNIFICANT CHANGE UP (ref 0.5–1.3)
EOSINOPHIL # BLD AUTO: 1.03 K/UL — HIGH (ref 0–0.5)
EOSINOPHIL NFR BLD AUTO: 7.3 % — HIGH (ref 0–6)
GLUCOSE BLDC GLUCOMTR-MCNC: 155 MG/DL — HIGH (ref 70–99)
GLUCOSE BLDC GLUCOMTR-MCNC: 270 MG/DL — HIGH (ref 70–99)
GLUCOSE BLDC GLUCOMTR-MCNC: 328 MG/DL — HIGH (ref 70–99)
GLUCOSE BLDC GLUCOMTR-MCNC: 362 MG/DL — HIGH (ref 70–99)
GLUCOSE BLDC GLUCOMTR-MCNC: 388 MG/DL — HIGH (ref 70–99)
GLUCOSE SERPL-MCNC: 132 MG/DL — HIGH (ref 70–99)
HCT VFR BLD CALC: 31.2 % — LOW (ref 34.5–45)
HCV AB S/CO SERPL IA: 0.2 S/CO — SIGNIFICANT CHANGE UP (ref 0–0.99)
HCV AB SERPL-IMP: SIGNIFICANT CHANGE UP
HGB BLD-MCNC: 10 G/DL — LOW (ref 11.5–15.5)
IMM GRANULOCYTES NFR BLD AUTO: 1.1 % — SIGNIFICANT CHANGE UP (ref 0–1.5)
LYMPHOCYTES # BLD AUTO: 22.2 % — SIGNIFICANT CHANGE UP (ref 13–44)
LYMPHOCYTES # BLD AUTO: 3.13 K/UL — SIGNIFICANT CHANGE UP (ref 1–3.3)
MCHC RBC-ENTMCNC: 28.2 PG — SIGNIFICANT CHANGE UP (ref 27–34)
MCHC RBC-ENTMCNC: 32.1 GM/DL — SIGNIFICANT CHANGE UP (ref 32–36)
MCV RBC AUTO: 87.9 FL — SIGNIFICANT CHANGE UP (ref 80–100)
MONOCYTES # BLD AUTO: 0.85 K/UL — SIGNIFICANT CHANGE UP (ref 0–0.9)
MONOCYTES NFR BLD AUTO: 6 % — SIGNIFICANT CHANGE UP (ref 2–14)
NEUTROPHILS # BLD AUTO: 8.89 K/UL — HIGH (ref 1.8–7.4)
NEUTROPHILS NFR BLD AUTO: 62.9 % — SIGNIFICANT CHANGE UP (ref 43–77)
NRBC # BLD: 0 /100 WBCS — SIGNIFICANT CHANGE UP (ref 0–0)
PLATELET # BLD AUTO: 667 K/UL — HIGH (ref 150–400)
POTASSIUM SERPL-MCNC: 4 MMOL/L — SIGNIFICANT CHANGE UP (ref 3.5–5.3)
POTASSIUM SERPL-SCNC: 4 MMOL/L — SIGNIFICANT CHANGE UP (ref 3.5–5.3)
PROT SERPL-MCNC: 6 G/DL — SIGNIFICANT CHANGE UP (ref 6–8.3)
RBC # BLD: 3.55 M/UL — LOW (ref 3.8–5.2)
RBC # FLD: 16.3 % — HIGH (ref 10.3–14.5)
SARS-COV-2 RNA SPEC QL NAA+PROBE: SIGNIFICANT CHANGE UP
SODIUM SERPL-SCNC: 139 MMOL/L — SIGNIFICANT CHANGE UP (ref 135–145)
WBC # BLD: 14.12 K/UL — HIGH (ref 3.8–10.5)
WBC # FLD AUTO: 14.12 K/UL — HIGH (ref 3.8–10.5)

## 2021-03-26 PROCEDURE — 99232 SBSQ HOSP IP/OBS MODERATE 35: CPT | Mod: GC

## 2021-03-26 PROCEDURE — 99223 1ST HOSP IP/OBS HIGH 75: CPT

## 2021-03-26 RX ORDER — INSULIN GLARGINE 100 [IU]/ML
18 INJECTION, SOLUTION SUBCUTANEOUS AT BEDTIME
Refills: 0 | Status: DISCONTINUED | OUTPATIENT
Start: 2021-03-26 | End: 2021-03-26

## 2021-03-26 RX ORDER — INSULIN LISPRO 100/ML
32 VIAL (ML) SUBCUTANEOUS ONCE
Refills: 0 | Status: COMPLETED | OUTPATIENT
Start: 2021-03-26 | End: 2021-03-26

## 2021-03-26 RX ORDER — INSULIN LISPRO 100/ML
6 VIAL (ML) SUBCUTANEOUS
Refills: 0 | Status: DISCONTINUED | OUTPATIENT
Start: 2021-03-26 | End: 2021-03-27

## 2021-03-26 RX ORDER — INSULIN LISPRO 100/ML
4 VIAL (ML) SUBCUTANEOUS
Refills: 0 | Status: DISCONTINUED | OUTPATIENT
Start: 2021-03-26 | End: 2021-03-26

## 2021-03-26 RX ORDER — INSULIN GLARGINE 100 [IU]/ML
20 INJECTION, SOLUTION SUBCUTANEOUS AT BEDTIME
Refills: 0 | Status: DISCONTINUED | OUTPATIENT
Start: 2021-03-26 | End: 2021-03-27

## 2021-03-26 RX ADMIN — Medication 1 APPLICATION(S): at 06:30

## 2021-03-26 RX ADMIN — KETOTIFEN FUMARATE 1 DROP(S): 0.34 SOLUTION OPHTHALMIC at 17:06

## 2021-03-26 RX ADMIN — Medication 10: at 17:05

## 2021-03-26 RX ADMIN — Medication 650 MILLIGRAM(S): at 10:27

## 2021-03-26 RX ADMIN — GABAPENTIN 300 MILLIGRAM(S): 400 CAPSULE ORAL at 14:22

## 2021-03-26 RX ADMIN — Medication 500000 UNIT(S): at 00:34

## 2021-03-26 RX ADMIN — Medication 1 APPLICATION(S): at 17:06

## 2021-03-26 RX ADMIN — Medication 40 MILLIGRAM(S): at 06:29

## 2021-03-26 RX ADMIN — GABAPENTIN 300 MILLIGRAM(S): 400 CAPSULE ORAL at 22:29

## 2021-03-26 RX ADMIN — ENOXAPARIN SODIUM 40 MILLIGRAM(S): 100 INJECTION SUBCUTANEOUS at 12:03

## 2021-03-26 RX ADMIN — KETOTIFEN FUMARATE 1 DROP(S): 0.34 SOLUTION OPHTHALMIC at 06:29

## 2021-03-26 RX ADMIN — Medication 2: at 08:28

## 2021-03-26 RX ADMIN — LATANOPROST 1 DROP(S): 0.05 SOLUTION/ DROPS OPHTHALMIC; TOPICAL at 22:28

## 2021-03-26 RX ADMIN — Medication 1 APPLICATION(S): at 12:10

## 2021-03-26 RX ADMIN — Medication 300 MILLIGRAM(S): at 06:28

## 2021-03-26 RX ADMIN — Medication 1 APPLICATION(S): at 17:05

## 2021-03-26 RX ADMIN — Medication 2: at 22:29

## 2021-03-26 RX ADMIN — Medication 500000 UNIT(S): at 06:28

## 2021-03-26 RX ADMIN — Medication 500000 UNIT(S): at 17:07

## 2021-03-26 RX ADMIN — Medication 2000 UNIT(S): at 12:03

## 2021-03-26 RX ADMIN — ATORVASTATIN CALCIUM 40 MILLIGRAM(S): 80 TABLET, FILM COATED ORAL at 22:30

## 2021-03-26 RX ADMIN — PANTOPRAZOLE SODIUM 40 MILLIGRAM(S): 20 TABLET, DELAYED RELEASE ORAL at 06:27

## 2021-03-26 RX ADMIN — Medication 1 TABLET(S): at 09:18

## 2021-03-26 RX ADMIN — Medication 8: at 12:03

## 2021-03-26 RX ADMIN — GABAPENTIN 300 MILLIGRAM(S): 400 CAPSULE ORAL at 06:28

## 2021-03-26 RX ADMIN — Medication 650 MILLIGRAM(S): at 09:18

## 2021-03-26 RX ADMIN — INSULIN GLARGINE 20 UNIT(S): 100 INJECTION, SOLUTION SUBCUTANEOUS at 22:28

## 2021-03-26 RX ADMIN — Medication 25 MILLIGRAM(S): at 14:24

## 2021-03-26 RX ADMIN — Medication 6 UNIT(S): at 17:05

## 2021-03-26 RX ADMIN — Medication 500000 UNIT(S): at 12:03

## 2021-03-26 NOTE — DIETITIAN INITIAL EVALUATION ADULT. - PERTINENT LABORATORY DATA
3/24   Na-139  K-4.2  BUN-19  Creat-0.74  Gluc-66L  Hemoglobin A1c - 10.6H(on 3/17)  POCT (over Last 3 Days) - Ranging from

## 2021-03-26 NOTE — PROGRESS NOTE ADULT - ASSESSMENT
LIONEL ERAZO is a 65y with a history of DM Type 2 (on insulin), HTN, HLD, COVID (2020), and glaucoma  who presented to [facility] on 3/17 with complaint of joint pain, rash, weakness and 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 Hospital course complicated by elevated glucoses secondary to steroids. Now admitted to [facility] after for initiation of a multidisciplinary rehab program consisting focused on functional mobility, transfers and ADLs (activities of daily living).      Comprehensive Multidisciplinary Rehab Program:  - Start comprehensive rehab program, 3 hours a day, 5 days a week.  - PT 2hr/day: Focused on improving strength, endurance, coordination, balance, functional mobility, and transfers  - OT 1hr/day: Focused on improving strength, fine motor skills, coordination, posture and ADLs.    - Activity Limitations: Decreased social, vocational and leisure activities, decreased self care and ADLs, decreased mobility, decreased ability to manage household and finances.     Participation Restrictions/Precautions:  - Weight bearing status: WBAT   - ROM restrictions: none  - Precautions:    [x ] Falls   [ ] Spinal   [ ] Hip (Anterior or Posterior)       [ ] Seizure  [ ] Cardiac   [ ] Sternal    Rehab Diagnosis/Management  Sleep:   - Maintain quiet hours and low stim environment.  - Melatonin PRN to maximize participation in therapy during the day.   - Monitor sleep logs    Pain Management:  - Tylenol PRN      GI/Bowel:  -At risk for constipation due to neurologic diagnosis, immobility and/or medication use  -Miralax PRN Daily  -GI ppx: protonix     /Bladder:   - At risk for incontinence and retention due to neurologic diagnosis and limited mobility  - Currently patient voids:    [x ] independent     [ ] external collection device (condom cath)    [ ] Indwelling colin catheter     [ ] Intermittent catheterization  - Baseline PVR 20cc  - Encourage timed voids every 4 hours while awake for independence and to promote continence during therapy.    Skin/Pressure Injury:   - At risk for pressure injury due to neurologic diagnosis and relative immobility.  - Skin assessment on admission admission: no pressure injury   - Turn every 2 hours while in bed, air mattress  - Skin barrier cream as needed  - Nursing to monitor skin Qshift    Diet/Dysphagia:  - Diet Consistency/Modifications: CC      DVT ppx:  -lovenox  - SCDs    -------------  Comorbid Medical Management:    HTN  -c/w diltiazem    Type 2 Diabetes  -Lantus decreased per hospitalist  -Premeal insulin discontinued   -Diabetes specialist to see patient today   -Continue CC diet   -Continue steriod taper and adjust insulin as needed   -HA1C 10.6%    Covid Status   -Covid + last year  -Received the Priya vaccine on 3/23       ---------------  Code Status/Emergency Contact: full code     Outpatient Follow-up (Specialty/Name of physician):    Shanique Chacon)  Internal Medicine; Rheumatology  [address], [address]  [place] [address]  Phone: [phone]  Fax: [phone]  Established Patient  Follow Up Time: 2 weeks    [facility] - [place]  Dermatology  [address], [address]  [place] [address]  Phone: [phone]  Fax: [phone]  Follow Up Time: 2 weeks      --------------  Goals: Safe discharge to home   Estimated Length of Stay: 10-14 days  Rehab Potential: Good  Medical Prognosis: Good  Estimated Disposition: Home with Home Care  ---------------    PRESCREEN COMPARISON:  I have reviewed the prescreen information and I have found no relevant changes between the preadmission screening and my post admission evaluation.    RATIONALE FOR INPATIENT ADMISSION: Patient demonstrates the following:  [X] Medically appropriate for rehabilitation admission  [X] Has attainable rehab goals with an appropriate initial discharge plan  [X]Has rehabilitation potential (expected to make a significant improvement within a reasonable period of time)  [X] Requires close medical management by a rehab physician, rehab nursing care, Hospitalist and comprehensive interdisciplinary team (including PT, OT and/or SLP, Prosthetics and Orthotics)     LIONEL ERAZO is a 65y with a history of DM Type 2 (on insulin), HTN, HLD, COVID (2020), and glaucoma  who presented to [facility] on 3/17 with complaint of joint pain, rash, weakness and 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 Hospital course complicated by elevated glucoses secondary to steroids. Now admitted to [facility] after for initiation of a multidisciplinary rehab program consisting focused on functional mobility, transfers and ADLs (activities of daily living).      Comprehensive Multidisciplinary Rehab Program:  - Start comprehensive rehab program, 3 hours a day, 5 days a week.  - PT 2hr/day: Focused on improving strength, endurance, coordination, balance, functional mobility, and transfers  - OT 1hr/day: Focused on improving strength, fine motor skills, coordination, posture and ADLs.    - Activity Limitations: Decreased social, vocational and leisure activities, decreased self care and ADLs, decreased mobility, decreased ability to manage household and finances.     Participation Restrictions/Precautions:  - Weight bearing status: WBAT   - ROM restrictions: none  - Precautions:    [x ] Falls   [ ] Spinal   [ ] Hip (Anterior or Posterior)       [ ] Seizure  [ ] Cardiac   [ ] Sternal    Rehab Diagnosis/Management  Sleep:   - Maintain quiet hours and low stim environment.      Pain Management:  -Tylenol PRN  -Gabapentin      GI/Bowel:  -At risk for constipation due to neurologic diagnosis, immobility and/or medication use  -Miralax PRN Daily  -GI ppx: protonix     /Bladder:   - At risk for incontinence and retention due to neurologic diagnosis and limited mobility  - Currently patient voids:    [x ] independent     [ ] external collection device (condom cath)    [ ] Indwelling colin catheter     [ ] Intermittent catheterization  - Baseline PVR 20cc  - Encourage timed voids every 4 hours while awake for independence and to promote continence during therapy.    Skin/Pressure Injury:   - At risk for pressure injury due to neurologic diagnosis and relative immobility.  - Skin assessment on admission admission: no pressure injury   - Turn every 2 hours while in bed, air mattress  - Skin barrier cream as needed  - Nursing to monitor skin Qshift    Diet/Dysphagia:  - Diet Consistency/Modifications: CC      DVT ppx:  -lovenox  - SCDs    -------------  Comorbid Medical Management:    HTN  -c/w diltiazem    Type 2 Diabetes  -Lantus decreased per hospitalist  -Premeal insulin discontinued   -Diabetes specialist to see patient today   -Continue CC diet   -Continue steriod taper and adjust insulin as needed   -HA1C 10.6%    Covid Status   -Covid + last year  -Received the Priya vaccine on 3/23       ---------------  Code Status/Emergency Contact: full code     Outpatient Follow-up (Specialty/Name of physician):    Shanique Chacon (MD)  Internal Medicine; Rheumatology  [address], [address]  [place] [address]  Phone: [phone]  Fax: [phone]  Established Patient  Follow Up Time: 2 weeks    [facility] Dermat[facility] - [place]  Dermatology  [address], [address]  [place] [address]  Phone: [phone]  Fax: [phone]  Follow Up Time: 2 weeks      --------------  Goals: Safe discharge to home   Estimated Length of Stay: 10-14 days  Rehab Potential: Good  Medical Prognosis: Good  Estimated Disposition: Home with Home Care  ---------------    PRESCREEN COMPARISON:  I have reviewed the prescreen information and I have found no relevant changes between the preadmission screening and my post admission evaluation.    RATIONALE FOR INPATIENT ADMISSION: Patient demonstrates the following:  [X] Medically appropriate for rehabilitation admission  [X] Has attainable rehab goals with an appropriate initial discharge plan  [X]Has rehabilitation potential (expected to make a significant improvement within a reasonable period of time)  [X] Requires close medical management by a rehab physician, rehab nursing care, Hospitalist and comprehensive interdisciplinary team (including PT, OT and/or SLP, Prosthetics and Orthotics)

## 2021-03-26 NOTE — DIETITIAN INITIAL EVALUATION ADULT. - OTHER INFO
Initial Nutrition Assessment   65yr Old Female   Dx: Neuromuscular Disorder  Diet - Consistent Carbohydrate Diet w/ Thin Liquids   Denies Food Allergy/Intolerance  Tolerates Diet Well - No Chewing/Swallowing Complications (Per Patient)  No Pressure Ulcers (as Per Nursing Flow Sheets)  No Edema Noted (as Per Nursing Flow Sheets)  No Recent Nausea/Vomiting/Diarrhea/Constipation (as Per Patient)

## 2021-03-26 NOTE — DIETITIAN INITIAL EVALUATION ADULT. - PHYSCIAL ASSESSMENT
Pt Unsure of Weight Hx - States Doesn't Weigh Herself Regularly and Last Weight She Could Remember was 200lb obese

## 2021-03-26 NOTE — PROGRESS NOTE ADULT - SUBJECTIVE AND OBJECTIVE BOX
This is a 64 y/o Sinhala speaking Female with PMHx of DM Type 2 (on insulin), HTN, HLD, COVID (2020), and glaucoma presented with generalized weakness, joint pain, and rash. The patient states that she was discharged from OSH on 3/12 after being evaluated for similar symptoms. They suspected that she may have Lyme disease and started her on doxycycline and sent her home. They also suspected that this might be post-COVID related. The patient has multiple complaints. Reports rash in the neck area and b/l arms, along with whole body itching. States that she has had a rash since 2/2021 and has seen various doctors and tried different things (steroid creams) without relief. The rash initially started on her face. Also complains of joint pain for one week. States she has joint pain and stiffness in the b/l shoulders, elbows, hands, and knees. States that the pain is currently worst in the L shoulder and R elbow. States that the pain is so significant that she is unable to walk now. Tries Tylenol with minimal relief. Also reports that she has been unable to do her ADLs - clean herself, walk. Never had joint pain like this before. Also reports subjective fever and chills, along with dry mouth.   The rash did not get worse in the sun. Joint pain is not worse in the AM and present at rest but worse on movement. No BM changes recently; had mild rectal bleeding not in feces but on wiping. She attributes 2/2 itching and rash, denies vaginal bleeding or ulcers, weight loss. Had subjective fever but not over 99F. Endorse dry mouth, and itching in her eyes, no mouth ulcers. No FHx of autoimmune/rheumatologist/joint diseases. Was admitted at [facility] 3/8-3/12 with fevers, joint pain, rash, treated with Levaquin for five days and three doses of Vanco, 2x covid negative.    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 The pt was started on prednisone and steroid ointments. She underwent extensive infectious and inflammatory workup that included hepatitis panel - neg, HIV - neg, syphilis - neg, EBV - past infection, CMV - neg, Lyme panel-neg, blood cultures - NGTD, C3 slightly elevated, C4 - wnl. VIPIN-neg, dsDNA - neg, CCP -neg, anti SSA/B - neg, Mallory-1 - neg. Myoglobin - low, RF - neg, anti-Smith - neg. Flow cytometry - neg for abnormality. X-rays L shoulder and R elbow - wnl. CT chest and pelvis - wnl, no signs of malignancy. Myoglobin was low. Still pending Myomarker panel 3, Jak2 and calreticulin mutations. The patient underwent a skin biopsy, which resulted in "Skin with focal dyskeratosis with mild perivascular inflammation. The findings raise the differential diagnosis of a viral exanthem or a drug eruption."     Although the working diagnosis did not match the biopsy results, the patient clinically improved under the steroidal treatment. Her upper and lower extremity joint pain has resolved, her rash has started to clear, and he was able to ambulate. During the prednisone treatment, the patient's glucose level increased. She needed high doses of insulin to maintain euglycemia (60 Lantus and 35 lispro pre-meals). PM&R evaluated the patient and concluded that the patient would benefit from acute rehab. She was started on a weekly prednisone taper before discharge. Prior to d/c, pt was given the J&J covid vaccine on 3/23.  Patient deemed medically stable for admission to acute inpatient rehab on 3/25.      ROS: +BM, voiding ok   No c/o HA, dizziness, CP, SOB  Left shoulder pain   Blood sugar 150 this am, however due to low accuchecks last night and the last few mornings, hospitalist is readjusting insulin regimen and held pre meal insulin this morning.     MEDICATIONS  (STANDING):  atorvastatin 40 milliGRAM(s) Oral at bedtime  cholecalciferol 2000 Unit(s) Oral daily  dextrose 40% Gel 15 Gram(s) Oral once  dextrose 5%. 1000 milliLiter(s) (50 mL/Hr) IV Continuous <Continuous>  dextrose 5%. 1000 milliLiter(s) (100 mL/Hr) IV Continuous <Continuous>  dextrose 50% Injectable 25 Gram(s) IV Push once  dextrose 50% Injectable 12.5 Gram(s) IV Push once  dextrose 50% Injectable 25 Gram(s) IV Push once  diltiazem    milliGRAM(s) Oral daily  enoxaparin Injectable 40 milliGRAM(s) SubCutaneous daily  fluocinonide 0.05% Cream 1 Application(s) Topical two times a day  gabapentin 300 milliGRAM(s) Oral every 8 hours  glucagon  Injectable 1 milliGRAM(s) IntraMuscular once  insulin glargine Injectable (LANTUS) 18 Unit(s) SubCutaneous at bedtime  insulin lispro (ADMELOG) corrective regimen sliding scale   SubCutaneous three times a day before meals  insulin lispro (ADMELOG) corrective regimen sliding scale   SubCutaneous at bedtime  ketotifen 0.025% Ophthalmic Solution 1 Drop(s) Both EYES two times a day  latanoprost 0.005% Ophthalmic Solution 1 Drop(s) Both EYES at bedtime  nystatin    Suspension 568265 Unit(s) Swish and Swallow every 6 hours  pantoprazole    Tablet 40 milliGRAM(s) Oral before breakfast  petrolatum white Ointment 1 Application(s) Topical daily  predniSONE   Tablet 40 milliGRAM(s) Oral daily  triamcinolone 0.1% Ointment 1 Application(s) Topical two times a day  trimethoprim  160 mG/sulfamethoxazole 800 mG 1 Tablet(s) Oral <User Schedule>    MEDICATIONS  (PRN):  acetaminophen   Tablet .. 650 milliGRAM(s) Oral every 6 hours PRN Temp greater or equal to 38C (100.4F), Mild Pain (1 - 3), Moderate Pain (4 - 6)  hydrOXYzine hydrochloride 25 milliGRAM(s) Oral every 6 hours PRN Itching  polyethylene glycol 3350 17 Gram(s) Oral daily PRN Constipation      Labs pending       CAPILLARY BLOOD GLUCOSE  POCT Blood Glucose.: 388 mg/dL (26 Mar 2021 09:16)  POCT Blood Glucose.: 155 mg/dL (26 Mar 2021 08:01)  POCT Blood Glucose.: 78 mg/dL (25 Mar 2021 22:05)  POCT Blood Glucose.: 249 mg/dL (25 Mar 2021 16:52)  POCT Blood Glucose.: 257 mg/dL (25 Mar 2021 13:35)      Vital Signs Last 24 Hrs  T(C): 36.3 (26 Mar 2021 09:52), Max: 36.8 (25 Mar 2021 13:42)  T(F): 97.4 (26 Mar 2021 09:52), Max: 98.2 (25 Mar 2021 13:42)  HR: 72 (26 Mar 2021 09:52) (67 - 79)  BP: 132/73 (26 Mar 2021 09:52) (111/60 - 132/73)  RR: 15 (26 Mar 2021 09:52) (14 - 15)  SpO2: 95% (26 Mar 2021 09:52) (95% - 97%)       This is a 64 y/o Frisian speaking Female with PMHx of DM Type 2 (on insulin), HTN, HLD, COVID (2020), and glaucoma presented with generalized weakness, joint pain, and rash. The patient states that she was discharged from OSH on 3/12 after being evaluated for similar symptoms. They suspected that she may have Lyme disease and started her on doxycycline and sent her home. They also suspected that this might be post-COVID related. The patient has multiple complaints. Reports rash in the neck area and b/l arms, along with whole body itching. States that she has had a rash since 2/2021 and has seen various doctors and tried different things (steroid creams) without relief. The rash initially started on her face. Also complains of joint pain for one week. States she has joint pain and stiffness in the b/l shoulders, elbows, hands, and knees. States that the pain is currently worst in the L shoulder and R elbow. States that the pain is so significant that she is unable to walk now. Tries Tylenol with minimal relief. Also reports that she has been unable to do her ADLs - clean herself, walk. Never had joint pain like this before. Also reports subjective fever and chills, along with dry mouth.   The rash did not get worse in the sun. Joint pain is not worse in the AM and present at rest but worse on movement. No BM changes recently; had mild rectal bleeding not in feces but on wiping. She attributes 2/2 itching and rash, denies vaginal bleeding or ulcers, weight loss. Had subjective fever but not over 99F. Endorse dry mouth, and itching in her eyes, no mouth ulcers. No FHx of autoimmune/rheumatologist/joint diseases. Was admitted at [facility] 3/8-3/12 with fevers, joint pain, rash, treated with Levaquin for five days and three doses of Vanco, 2x covid negative.    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 The pt was started on prednisone and steroid ointments. She underwent extensive infectious and inflammatory workup that included hepatitis panel - neg, HIV - neg, syphilis - neg, EBV - past infection, CMV - neg, Lyme panel-neg, blood cultures - NGTD, C3 slightly elevated, C4 - wnl. VIPIN-neg, dsDNA - neg, CCP -neg, anti SSA/B - neg, Mallory-1 - neg. Myoglobin - low, RF - neg, anti-Smith - neg. Flow cytometry - neg for abnormality. X-rays L shoulder and R elbow - wnl. CT chest and pelvis - wnl, no signs of malignancy. Myoglobin was low. Still pending Myomarker panel 3, Jak2 and calreticulin mutations. The patient underwent a skin biopsy, which resulted in "Skin with focal dyskeratosis with mild perivascular inflammation. The findings raise the differential diagnosis of a viral exanthem or a drug eruption."     Although the working diagnosis did not match the biopsy results, the patient clinically improved under the steroidal treatment. Her upper and lower extremity joint pain has resolved, her rash has started to clear, and he was able to ambulate. During the prednisone treatment, the patient's glucose level increased. She needed high doses of insulin to maintain euglycemia (60 Lantus and 35 lispro pre-meals). PM&R evaluated the patient and concluded that the patient would benefit from acute rehab. She was started on a weekly prednisone taper before discharge. Prior to d/c, pt was given the J&J covid vaccine on 3/23.  Patient deemed medically stable for admission to acute inpatient rehab on 3/25.      ROS: +BM, voiding ok   No c/o HA, dizziness, CP, SOB  Left shoulder pain   Blood sugar 150 this am, however due to low accuchecks last night and the last few mornings, hospitalist is readjusting insulin regimen and held pre meal insulin this morning.     MEDICATIONS  (STANDING):  atorvastatin 40 milliGRAM(s) Oral at bedtime  cholecalciferol 2000 Unit(s) Oral daily  dextrose 40% Gel 15 Gram(s) Oral once  dextrose 5%. 1000 milliLiter(s) (50 mL/Hr) IV Continuous <Continuous>  dextrose 5%. 1000 milliLiter(s) (100 mL/Hr) IV Continuous <Continuous>  dextrose 50% Injectable 25 Gram(s) IV Push once  dextrose 50% Injectable 12.5 Gram(s) IV Push once  dextrose 50% Injectable 25 Gram(s) IV Push once  diltiazem    milliGRAM(s) Oral daily  enoxaparin Injectable 40 milliGRAM(s) SubCutaneous daily  fluocinonide 0.05% Cream 1 Application(s) Topical two times a day  gabapentin 300 milliGRAM(s) Oral every 8 hours  glucagon  Injectable 1 milliGRAM(s) IntraMuscular once  insulin glargine Injectable (LANTUS) 18 Unit(s) SubCutaneous at bedtime  insulin lispro (ADMELOG) corrective regimen sliding scale   SubCutaneous three times a day before meals  insulin lispro (ADMELOG) corrective regimen sliding scale   SubCutaneous at bedtime  ketotifen 0.025% Ophthalmic Solution 1 Drop(s) Both EYES two times a day  latanoprost 0.005% Ophthalmic Solution 1 Drop(s) Both EYES at bedtime  nystatin    Suspension 534143 Unit(s) Swish and Swallow every 6 hours  pantoprazole    Tablet 40 milliGRAM(s) Oral before breakfast  petrolatum white Ointment 1 Application(s) Topical daily  predniSONE   Tablet 40 milliGRAM(s) Oral daily  triamcinolone 0.1% Ointment 1 Application(s) Topical two times a day  trimethoprim  160 mG/sulfamethoxazole 800 mG 1 Tablet(s) Oral <User Schedule>    MEDICATIONS  (PRN):  acetaminophen   Tablet .. 650 milliGRAM(s) Oral every 6 hours PRN Temp greater or equal to 38C (100.4F), Mild Pain (1 - 3), Moderate Pain (4 - 6)  hydrOXYzine hydrochloride 25 milliGRAM(s) Oral every 6 hours PRN Itching  polyethylene glycol 3350 17 Gram(s) Oral daily PRN Constipation                            10.0   14.12 )-----------( 667      ( 26 Mar 2021 06:23 )             31.2   03-26    139  |  103  |  20  ----------------------------<  132<H>  4.0   |  29  |  0.73    Ca    8.6      26 Mar 2021 06:23    TPro  6.0  /  Alb  2.3<L>  /  TBili  0.2  /  DBili  x   /  AST  36  /  ALT  96<H>  /  AlkPhos  112  03-26        CAPILLARY BLOOD GLUCOSE  POCT Blood Glucose.: 388 mg/dL (26 Mar 2021 09:16)  POCT Blood Glucose.: 155 mg/dL (26 Mar 2021 08:01)  POCT Blood Glucose.: 78 mg/dL (25 Mar 2021 22:05)  POCT Blood Glucose.: 249 mg/dL (25 Mar 2021 16:52)  POCT Blood Glucose.: 257 mg/dL (25 Mar 2021 13:35)      Vital Signs Last 24 Hrs  T(C): 36.3 (26 Mar 2021 09:52), Max: 36.8 (25 Mar 2021 13:42)  T(F): 97.4 (26 Mar 2021 09:52), Max: 98.2 (25 Mar 2021 13:42)  HR: 72 (26 Mar 2021 09:52) (67 - 79)  BP: 132/73 (26 Mar 2021 09:52) (111/60 - 132/73)  RR: 15 (26 Mar 2021 09:52) (14 - 15)  SpO2: 95% (26 Mar 2021 09:52) (95% - 97%)    Physical Exam: General: NAD                            HEENT: NC/AT, EOM I, PERRLA, Normal Conjunctivae  Cardio: RRR, Normal S1-S2, No M/G/R                              Pulm: No Respiratory Distress,  Lungs CTAB                        Abdomen: ND/NT, Soft, BS+                                                MSK: No joint swelling                                   Ext: +Edema bilat LEs, Pulses 2+ throughout, No calf tenderness    Skin:  no skin breakdown noted, healing areas (from rash)-hyperpigmented skin noted to arms, scalp, chest.                                                            Neurological Examination    Cognitive: AAO x 3                                                                         Attention: Intact   Judgment: Good evidence of judgement   Attention: intact                              Mood/Affect: wnl                                                                           Communication:  Fluent,  No dysarthria                                                          Sensory:                                                                                          Motor    5/5 right shoulder and elbow strengths, right hand   4/5 left shoulder and elbow strengths secondary to pain. Left wrist and hand strength   4/5 right HF and KE. 5/5 right ankle strengths.   3+/5 left HF, 4/+/5 KE. 5/5 left ankle strengths.    Rehab eval in progress

## 2021-03-26 NOTE — DIETITIAN INITIAL EVALUATION ADULT. - ORAL INTAKE PTA/DIET HISTORY
on Consistent Carbohydrate Diet w/ Thin Liquids   Nutrition Education Provided on Consistent Carbohydrate Diet    Patient Does Not Follow Diet @Home, Consumes 2 Meals a Day & Doesn't Take Vitamin/Supplements @Home

## 2021-03-26 NOTE — CONSULT NOTE ADULT - ASSESSMENT
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 However, a skin biopsy of rash was not consistent with that diagnosis and suggested it was a viral rash or a drug-related rash. she was continued with steroidal treatment. Now admitted to [facility] after for debility and initiation of a multidisciplinary rehab program - pt/ot/dvt ppx, pain meds Rehab Diagnosis/Management    rash- prednisone taper, Bactrim 3x/week for PCP proph while on steroids  HTN-Continue diltiazem   HLD- on statin, monitor lft's, seem to be  downtrending  DM type 2- decrease Lantus , will dose based on weight for now, prior Accu-Cheks noted, few episodes of low BS in th elast few days, will likely not need very high doses in rehab since patient will be on  PT program and diet is also monitored, will change lantus to 18 u hs and evaluate for pre meal depending on accchecks. she takes metformin and insulin at home, will restart metformin once LFT'S improve. HA1c 10.6%  covid- COVID-19 PCR: Janae (03.25.21 @ 22:15)  Received the Priya vaccine on 3/23  gerd- Protonix  DVT ppx- Lovenox  will follow, d/w dr. 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 However, a skin biopsy of rash was not consistent with that diagnosis and suggested it was a viral rash or a drug-related rash. she was continued with steroidal treatment. Now admitted to [facility] after for debility and initiation of a multidisciplinary rehab program - pt/ot/dvt ppx, pain meds Rehab Diagnosis/Management    rash- prednisone taper, Bactrim 3x/week for PCP proph while on steroids  HTN- on diltiazem 300 mg CD, monitor BP , titrate down if needed, consider losartan for BP control  HLD- on statin, monitor lft's, seems to be downtrending  DM type 2- decrease Lantus , will dose based on weight for now, prior Accu-Cheks noted, few episodes of low BS in th elast few days, will likely not need very high doses in rehab since patient will be on  PT program and diet is also monitored, will change Lantus to 18 u hs and  pre meal to 4 u tid, titrate as needed, she takes metformin and insulin at home, can restart metformin once LFT'S improve. HA1c 10.6%  covid- COVID-19 PCR: Not Detec (03.25.21 @ 22:15)  Received the Priya vaccine on 3/23  gerd- Protonix  DVT ppx- Lovenox  will follow, d/w dr. szymanski 65y o f with a history of DM Type 2 (on insulin), HTN, HLD, COVID (2020), and glaucoma  who presented to [facility] on 3/17 -3/25/21 with complaint of joint pain, rash, weakness she underwent extensive infectious and inflammatory workup, rash was thought to be  dermatomyositis, she seen by rheumatology and derm, was treated  steroidal treatment, symptoms improved. However, a skin biopsy of rash was not consistent with that diagnosis and suggested it was a viral rash or a drug-related rash. she was continued with steroidal treatment. Now admitted to [facility] after for debility and initiation of a multidisciplinary rehab program - pt/ot/dvt ppx, pain meds Rehab Diagnosis/Management    rash- prednisone taper, Bactrim 3x/week for PCP proph while on steroids  HTN- on diltiazem 300 mg CD, monitor BP , titrate down if needed, consider losartan for BP control  HLD- on statin, monitor lft's, seems to be downtrending  DM type 2- decrease Lantus to 20 u hs, and pre meal to 6 u tid, plan to add metformin ( which she was on at home, once all lft's normalise, lft's down trending)  she will likely not need very high doses in rehab since patient will be on scheduled PT program as well as consistent carb diet,  titrate as needed. noted HA1c 10.6%  covid- COVID-19 PCR: Not Detec (03.25.21 @ 22:15)  Received the Priya vaccine on 3/23  gerd- Protonix  DVT ppx- Lovenox  will follow, d/w dr. szymanski

## 2021-03-26 NOTE — CONSULT NOTE ADULT - SUBJECTIVE AND OBJECTIVE BOX
64 y/o Danish speaking Female with PMHx of DM Type 2 (on metformin and insulin), HTN on diltiazem , HLD on statin, COVID (2020), and glaucoma presented with generalized weakness, joint pain, and rash, presented to OSH 3/12 and was d/c home with doxy for lymes disease. subsequently Was admitted to [facility] 3/17/21 to 3/25/21 with joint and muscle pain, rash, pruritis, she underwent extensive infectious and inflammatory workup that included hepatitis panel - neg, HIV - neg, syphilis - neg, EBV - past infection, CMV - neg, Lyme panel-neg, blood cultures - NGTD, C3 slightly elevated, C4 - wnl. VIPIN-neg, dsDNA - neg, CCP -neg, anti SSA/B - neg, Mallory-1 - neg. Myoglobin - low,   RF neg, anti-Smith - neg. Flow cytometry - neg for abnormality. X-rays L shoulder and R elbow - wnl. CT chest and pelvis - wnl, no signs of malignancy. Myoglobin was low. Still pending Myomarker panel 3, Jak2 and calreticulin mutations.   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 symptoms have improved. However, a skin biopsy of rash was not consistent with that diagnosis and suggested it was a viral rash or a drug-related rash. she was continued with steroidal treatment.  Hospital course complicated by elevated glucoses secondary to steroids. Now admitted to [facility] after for initiation of a multidisciplinary rehab program     patient seen at the bedside, for medical consultation, c/o weakness in the left arm and leg, denies n/v, sob, or dysuria.    ROS- per hpi , all other negative      Allergies and Intolerances:        Allergies:  	azithromycin: Drug, Hives, Swelling  	penicillin: Drug, Swelling, Rash  	enalapril: Drug, Other (Mild to Mod), cough    Home Medications:   * No Current Medications as of 25-Mar-2021 08:52 documented in Structured Notes  · 	enoxaparin: 40 milligram(s)  once a day  · 	sulfamethoxazole-trimethoprim 800 mg-160 mg oral tablet: 1 tab(s) orally 3 times a week M/W/ while on prednisone.  · 	pantoprazole 40 mg oral delayed release tablet: 1 tab(s) orally once a day (before a meal) while on prednisone.  · 	fluocinonide 0.05% topical cream: 1 application topically 2 times a day until 21. She needs to stop for one week in order to use again.  · 	triamcinolone 0.1% topical ointment: 1 application topically 2 times a day until 3/31. She needs to stop for one week in order to use again.  · 	hydrOXYzine hydrochloride 25 mg oral tablet: 1 tab(s) orally every 6 hours, As needed, Itching  · 	insulin lispro 100 units/mL injectable solution: 32 unit(s) injectable 3 times a day  · 	insulin glargine: 35 unit(s) subcutaneous once a day (at bedtime)  · 	gabapentin 300 mg oral capsule: 1 cap(s) orally every 8 hours  · 	latanoprost 0.005% ophthalmic solution: 1 drop(s) to each affected eye once a day (in the evening)  · 	DilTIAZem (Eqv-Cardizem CD) 300 mg/24 hours oral capsule, extended release: 1 cap(s) orally once a day  · 	Lipitor 40 mg oral tablet: 1 tab(s) orally once a day  · 	Zaditor 0.025% ophthalmic solution: 1 drop(s) to each affected eye 2 times a day  · 	Vitamin D3 1000 intl units (25 mcg) oral capsule: 2 cap(s) orally once a day    .    Patient History:    Past Medical, Past Surgical, and Family History:  PAST MEDICAL HISTORY:  COVID-2020    Diabetes mellitus     Glaucoma     Hyperlipidemia     Hypertension     Vitamin D deficiency.     PAST SURGICAL HISTORY:  History of elbow surgery     History of throat surgery     S/P .     FAMILY HISTORY:  sister with dm2  mom and dad alive healthy in 80's     Social History:  alcohol and drug use, and sexual history): Smoking - Denied  EtOH - Denied   Drugs - Denied    Vital Signs Last 24 Hrs  T(C): 36.3 (26 Mar 2021 09:52), Max: 36.8 (25 Mar 2021 13:42)  T(F): 97.4 (26 Mar 2021 09:52), Max: 98.2 (25 Mar 2021 13:42)  HR: 72 (26 Mar 2021 09:52) (67 - 79)  BP: 132/73 (26 Mar 2021 09:52) (111/60 - 132/73)  BP(mean): --  RR: 15 (26 Mar 2021 09:52) (14 - 15)  SpO2: 95% (26 Mar 2021 09:52) (95% - 97%)      GENERAL- NAD  EAR/NOSE/MOUTH/THROAT - no pharyngeal exudates, no oral leisions,  MMM  EYES- BRAVO, conjunctiva and Sclera clear  NECK- supple  RESPIRATORY-  clear to auscultation bilaterally  CARDIOVASCULAR - SIS2, RRR  GI - soft NT BS present  EXTREMITIES- mild pedal edema  NEUROLOGY- LUE AND LLE weakness  SKIN- some hyperpigmented areas on limbs  PSYCHIATRY- AAO X 3  MUSCULOSKELETAL- AROM restricted in the left LE and LUE         Comprehensive Metabolic Panel (21 @ 07:59)    Sodium, Serum: 139 mmol/L    Potassium, Serum: 4.2 mmol/L    Chloride, Serum: 100 mmol/L    Carbon Dioxide, Serum: 28 mmol/L    Anion Gap, Serum: 11 mmol/L    Blood Urea Nitrogen, Serum: 19 mg/dL    Creatinine, Serum: 0.74 mg/dL    Glucose, Serum: 66 mg/dL    Calcium, Total Serum: 9.1 mg/dL    Protein Total, Serum: 6.4 g/dL    Albumin, Serum: 3.1 g/dL    Bilirubin Total, Serum: 0.3 mg/dL    Alkaline Phosphatase, Serum: 133 U/L    Aspartate Aminotransferase (AST/SGOT): 40 U/L    Alanine Aminotransferase (ALT/SGPT): 94 U/L      Complete Blood Count in AM (21 @ 07:59)    Nucleated RBC: 0 /100 WBCs    WBC Count: 13.74 K/uL    RBC Count: 3.74 M/uL    Hemoglobin: 10.4 g/dL    Hematocrit: 32.1 %    Mean Cell Volume: 85.8 fL    Mean Cell Hemoglobin: 27.8 pg    Mean Cell Hemoglobin Conc: 32.4 gm/dL    Red Cell Distrib Width: 15.9 %    Platelet Count - Automated: 750 K/uL    Nucleated RBC #: 0.00 K/uL      CAPILLARY BLOOD GLUCOSE      POCT Blood Glucose.: 388 mg/dL (26 Mar 2021 09:16)  POCT Blood Glucose.: 155 mg/dL (26 Mar 2021 08:01)  POCT Blood Glucose.: 78 mg/dL (25 Mar 2021 22:05)  POCT Blood Glucose.: 249 mg/dL (25 Mar 2021 16:52)  POCT Blood Glucose.: 257 mg/dL (25 Mar 2021 13:35)        POCT Blood Glucose.: 388 mg/dL (26 Mar 2021 09:16)    Labs reviewed:     CXR personally reviewed: Clear lung fields bilaterally< from: Xray Chest 1 View-PORTABLE IMMEDIATE (Xray Chest 1 View-PORTABLE IMMEDIATE .) (21 @ 23:20) >    IMPRESSION:  Clear lungs.    < end of copied text >      < from: CT Chest w/ IV Cont (21 @ 22:21) >    IMPRESSION:  No evidence of malignancy in the chest, abdomen, or pelvis.    < end of copied text >      < from: Xray Shoulder 2 Views, Left (21 @ 13:16) >  IMPRESSION: Unremarkable radiographs of the left shoulder. No significant arthrosis is seen.    < end of copied text >      MEDICATIONS  (STANDING):  atorvastatin 40 milliGRAM(s) Oral at bedtime  cholecalciferol 2000 Unit(s) Oral daily  diltiazem    milliGRAM(s) Oral daily  enoxaparin Injectable 40 milliGRAM(s) SubCutaneous daily  fluocinonide 0.05% Cream 1 Application(s) Topical two times a day  gabapentin 300 milliGRAM(s) Oral every 8 hours  glucagon  Injectable 1 milliGRAM(s) IntraMuscular once  insulin glargine Injectable (LANTUS) 18 Unit(s) SubCutaneous at bedtime  insulin lispro (ADMELOG) corrective regimen sliding scale   SubCutaneous three times a day before meals  insulin lispro (ADMELOG) corrective regimen sliding scale   SubCutaneous at bedtime  ketotifen 0.025% Ophthalmic Solution 1 Drop(s) Both EYES two times a day  latanoprost 0.005% Ophthalmic Solution 1 Drop(s) Both EYES at bedtime  nystatin    Suspension 111666 Unit(s) Swish and Swallow every 6 hours  pantoprazole    Tablet 40 milliGRAM(s) Oral before breakfast  petrolatum white Ointment 1 Application(s) Topical daily  predniSONE   Tablet 40 milliGRAM(s) Oral daily  triamcinolone 0.1% Ointment 1 Application(s) Topical two times a day  trimethoprim  160 mG/sulfamethoxazole 800 mG 1 Tablet(s) Oral <User Schedule>    MEDICATIONS  (PRN):  acetaminophen   Tablet .. 650 milliGRAM(s) Oral every 6 hours PRN Temp greater or equal to 38C (100.4F), Mild Pain (1 - 3), Moderate Pain (4 - 6)  hydrOXYzine hydrochloride 25 milliGRAM(s) Oral every 6 hours PRN Itching  polyethylene glycol 3350 17 Gram(s) Oral daily PRN Constipation

## 2021-03-26 NOTE — DIETITIAN INITIAL EVALUATION ADULT. - ADD RECOMMEND
1) Monitor Weights, Intake, Tolerance, Skin, POCT & Labwork   2) Nutrition Education Provided on Consistent Carbohydrate Diet 3) Continue Nutrition Plan of Care

## 2021-03-27 LAB
GLUCOSE BLDC GLUCOMTR-MCNC: 221 MG/DL — HIGH (ref 70–99)
GLUCOSE BLDC GLUCOMTR-MCNC: 282 MG/DL — HIGH (ref 70–99)
GLUCOSE BLDC GLUCOMTR-MCNC: 289 MG/DL — HIGH (ref 70–99)
GLUCOSE BLDC GLUCOMTR-MCNC: 291 MG/DL — HIGH (ref 70–99)

## 2021-03-27 PROCEDURE — 99232 SBSQ HOSP IP/OBS MODERATE 35: CPT

## 2021-03-27 RX ORDER — INSULIN GLARGINE 100 [IU]/ML
23 INJECTION, SOLUTION SUBCUTANEOUS AT BEDTIME
Refills: 0 | Status: DISCONTINUED | OUTPATIENT
Start: 2021-03-27 | End: 2021-03-29

## 2021-03-27 RX ORDER — INSULIN LISPRO 100/ML
8 VIAL (ML) SUBCUTANEOUS
Refills: 0 | Status: DISCONTINUED | OUTPATIENT
Start: 2021-03-27 | End: 2021-03-29

## 2021-03-27 RX ADMIN — Medication 1 APPLICATION(S): at 17:54

## 2021-03-27 RX ADMIN — ATORVASTATIN CALCIUM 40 MILLIGRAM(S): 80 TABLET, FILM COATED ORAL at 21:42

## 2021-03-27 RX ADMIN — Medication 1 APPLICATION(S): at 06:03

## 2021-03-27 RX ADMIN — Medication 6: at 17:51

## 2021-03-27 RX ADMIN — Medication 1 APPLICATION(S): at 12:19

## 2021-03-27 RX ADMIN — PANTOPRAZOLE SODIUM 40 MILLIGRAM(S): 20 TABLET, DELAYED RELEASE ORAL at 06:01

## 2021-03-27 RX ADMIN — Medication 500000 UNIT(S): at 06:03

## 2021-03-27 RX ADMIN — INSULIN GLARGINE 23 UNIT(S): 100 INJECTION, SOLUTION SUBCUTANEOUS at 21:42

## 2021-03-27 RX ADMIN — Medication 40 MILLIGRAM(S): at 06:01

## 2021-03-27 RX ADMIN — GABAPENTIN 300 MILLIGRAM(S): 400 CAPSULE ORAL at 21:42

## 2021-03-27 RX ADMIN — Medication 2: at 21:42

## 2021-03-27 RX ADMIN — Medication 1 APPLICATION(S): at 17:56

## 2021-03-27 RX ADMIN — ENOXAPARIN SODIUM 40 MILLIGRAM(S): 100 INJECTION SUBCUTANEOUS at 12:05

## 2021-03-27 RX ADMIN — Medication 6 UNIT(S): at 07:47

## 2021-03-27 RX ADMIN — Medication 500000 UNIT(S): at 12:06

## 2021-03-27 RX ADMIN — Medication 500000 UNIT(S): at 00:30

## 2021-03-27 RX ADMIN — LATANOPROST 1 DROP(S): 0.05 SOLUTION/ DROPS OPHTHALMIC; TOPICAL at 21:42

## 2021-03-27 RX ADMIN — Medication 6 UNIT(S): at 12:05

## 2021-03-27 RX ADMIN — GABAPENTIN 300 MILLIGRAM(S): 400 CAPSULE ORAL at 06:01

## 2021-03-27 RX ADMIN — Medication 6: at 12:05

## 2021-03-27 RX ADMIN — Medication 8 UNIT(S): at 17:52

## 2021-03-27 RX ADMIN — Medication 2000 UNIT(S): at 12:05

## 2021-03-27 RX ADMIN — KETOTIFEN FUMARATE 1 DROP(S): 0.34 SOLUTION OPHTHALMIC at 06:01

## 2021-03-27 RX ADMIN — Medication 500000 UNIT(S): at 23:35

## 2021-03-27 RX ADMIN — Medication 300 MILLIGRAM(S): at 06:01

## 2021-03-27 RX ADMIN — Medication 1 APPLICATION(S): at 06:02

## 2021-03-27 RX ADMIN — GABAPENTIN 300 MILLIGRAM(S): 400 CAPSULE ORAL at 13:32

## 2021-03-27 RX ADMIN — KETOTIFEN FUMARATE 1 DROP(S): 0.34 SOLUTION OPHTHALMIC at 17:53

## 2021-03-27 RX ADMIN — Medication 500000 UNIT(S): at 17:56

## 2021-03-27 RX ADMIN — Medication 4: at 07:46

## 2021-03-27 NOTE — PROGRESS NOTE ADULT - SUBJECTIVE AND OBJECTIVE BOX
Cc: Gait dysfunction    Interview performed with use of .    HPI: Patient with no new medical issues today.  -300's overnight after reducing lantus.   Patient  denies symptoms associated with hyperglycemia.  Pain controlled, no chest pain, no N/V, no Fevers/Chills. No other new ROS  Has been tolerating rehabilitation program.    acetaminophen   Tablet .. 650 milliGRAM(s) Oral every 6 hours PRN  atorvastatin 40 milliGRAM(s) Oral at bedtime  cholecalciferol 2000 Unit(s) Oral daily  dextrose 40% Gel 15 Gram(s) Oral once  dextrose 5%. 1000 milliLiter(s) IV Continuous <Continuous>  dextrose 5%. 1000 milliLiter(s) IV Continuous <Continuous>  dextrose 50% Injectable 25 Gram(s) IV Push once  dextrose 50% Injectable 12.5 Gram(s) IV Push once  dextrose 50% Injectable 25 Gram(s) IV Push once  diltiazem    milliGRAM(s) Oral daily  enoxaparin Injectable 40 milliGRAM(s) SubCutaneous daily  fluocinonide 0.05% Cream 1 Application(s) Topical two times a day  gabapentin 300 milliGRAM(s) Oral every 8 hours  glucagon  Injectable 1 milliGRAM(s) IntraMuscular once  hydrOXYzine hydrochloride 25 milliGRAM(s) Oral every 6 hours PRN  insulin glargine Injectable (LANTUS) 20 Unit(s) SubCutaneous at bedtime  insulin lispro (ADMELOG) corrective regimen sliding scale   SubCutaneous three times a day before meals  insulin lispro (ADMELOG) corrective regimen sliding scale   SubCutaneous at bedtime  insulin lispro Injectable (ADMELOG) 6 Unit(s) SubCutaneous three times a day before meals  ketotifen 0.025% Ophthalmic Solution 1 Drop(s) Both EYES two times a day  latanoprost 0.005% Ophthalmic Solution 1 Drop(s) Both EYES at bedtime  nystatin    Suspension 432747 Unit(s) Swish and Swallow every 6 hours  pantoprazole    Tablet 40 milliGRAM(s) Oral before breakfast  petrolatum white Ointment 1 Application(s) Topical daily  polyethylene glycol 3350 17 Gram(s) Oral daily PRN  predniSONE   Tablet 40 milliGRAM(s) Oral daily  predniSONE   Tablet 40  Oral   triamcinolone 0.1% Ointment 1 Application(s) Topical two times a day  trimethoprim  160 mG/sulfamethoxazole 800 mG 1 Tablet(s) Oral <User Schedule>      T(C): 36.4 (03-27-21 @ 08:26), Max: 36.7 (03-26-21 @ 22:18)  HR: 71 (03-27-21 @ 08:26) (63 - 72)  BP: 118/72 (03-27-21 @ 08:26) (118/72 - 150/70)  RR: 15 (03-27-21 @ 08:26) (15 - 15)  SpO2: 95% (03-27-21 @ 08:26) (95% - 96%)    In NAD  HEENT- EOMI  Heart- RRR, S1S2  Lungs- CTA bl.  Abd- + BS, NT, ND  Ext- No calf pain  Neuro- Exam unchanged          Imp: Patient with diagnosis of Amyopathic dermatomyositis admitted for comprehensive acute rehabilitation.    Plan:  - Continue therapies  - BS elevated (300's) but asymptomatic.  Will continue SSI and diabetic diet.  Will defer to int. med. for further recommendaitons.  - DVT prophylaxis  - Skin- Turn q2h, check skin daily  - Continue current medications; patient medically stable.   - Patient is stable to continue current rehabilitation program.

## 2021-03-27 NOTE — PROGRESS NOTE ADULT - ASSESSMENT
65y o f with a history of DM Type 2 (on insulin), HTN, HLD, COVID (2020), and glaucoma  who presented to [facility] on 3/17 -3/25/21 with complaint of joint pain, rash, weakness she underwent extensive infectious and inflammatory workup, rash was thought to be  dermatomyositis, she seen by rheumatology and derm, was treated  steroidal treatment, symptoms improved. However, a skin biopsy of rash was not consistent with that diagnosis and suggested it was a viral rash or a drug-related rash. she was continued with steroidal treatment. Now admitted to [facility] after for debility and initiation of a multidisciplinary rehab program - pt/ot/dvt ppx, pain meds Rehab Diagnosis/Management    rash- prednisone taper, Bactrim 3x/week for PCP proph while on steroids  HTN- on diltiazem 300 mg CD, monitor BP , titrate down if needed, consider losartan for BP control  HLD- on statin, monitor lft's, seems to be downtrending  DM type 2- decrease Lantus to 20 u hs, and pre meal to 6 u tid, plan to add metformin ( which she was on at home, once all lft's normalise, lft's down trending)  she will likely not need very high doses in rehab since patient will be on scheduled PT program as well as consistent carb diet,  titrate as needed. noted HA1c 10.6%  covid- COVID-19 PCR: Not Detec (03.25.21 @ 22:15)  Received the Priya vaccine on 3/23  gerd- Protonix  DVT ppx- Lovenox   Deconditioning  PT/OT per rehab    Rash  Prednisone taper    HTN  Cardizem    DM2, a1c 10.6 with current steroid use  Inc lantus 23 /Premeal lispro 8    HLD  Statin    Elev LFt's  Improving monitor for now    DVT ppx  Lovenox

## 2021-03-27 NOTE — PROGRESS NOTE ADULT - SUBJECTIVE AND OBJECTIVE BOX
HPI:  This is a 64 y/o Sierra Leonean speaking Female with PMHx of DM Type 2 (on insulin), HTN, HLD, COVID (), and glaucoma presented with generalized weakness, joint pain, and rash. The patient states that she was discharged from OSH on 3/12 after being evaluated for similar symptoms. They suspected that she may have Lyme disease and started her on doxycycline and sent her home. They also suspected that this might be post-COVID related. The patient has multiple complaints. Reports rash in the neck area and b/l arms, along with whole body itching. States that she has had a rash since 2021 and has seen various doctors and tried different things (steroid creams) without relief. The rash initially started on her face. Also complains of joint pain for one week. States she has joint pain and stiffness in the b/l shoulders, elbows, hands, and knees. States that the pain is currently worst in the L shoulder and R elbow. States that the pain is so significant that she is unable to walk now. Tries Tylenol with minimal relief. Also reports that she has been unable to do her ADLs - clean herself, walk. Never had joint pain like this before. Also reports subjective fever and chills, along with dry mouth.   The rash did not get worse in the sun. Joint pain is not worse in the AM and present at rest but worse on movement. No BM changes recently; had mild rectal bleeding not in feces but on wiping. She attributes 2/2 itching and rash, denies vaginal bleeding or ulcers, weight loss. Had subjective fever but not over 99F. Endorse dry mouth, and itching in her eyes, no mouth ulcers. No FHx of autoimmune/rheumatologist/joint diseases. Was admitted at [facility] 3/8-3/12 with fevers, joint pain, rash, treated with Levaquin for five days and three doses of Vanco, 2x covid negative.    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 The pt was started on prednisone and steroid ointments. She underwent extensive infectious and inflammatory workup that included hepatitis panel - neg, HIV - neg, syphilis - neg, EBV - past infection, CMV - neg, Lyme panel-neg, blood cultures - NGTD, C3 slightly elevated, C4 - wnl. VIPIN-neg, dsDNA - neg, CCP -neg, anti SSA/B - neg, Mallory-1 - neg. Myoglobin - low, RF - neg, anti-Smith - neg. Flow cytometry - neg for abnormality. X-rays L shoulder and R elbow - wnl. CT chest and pelvis - wnl, no signs of malignancy. Myoglobin was low. Still pending Myomarker panel 3, Jak2 and calreticulin mutations. The patient underwent a skin biopsy, which resulted in "Skin with focal dyskeratosis with mild perivascular inflammation. The findings raise the differential diagnosis of a viral exanthem or a drug eruption."     Although the working diagnosis did not match the biopsy results, the patient clinically improved under the steroidal treatment. Her upper and lower extremity joint pain has resolved, her rash has started to clear, and he was able to ambulate. During the prednisone treatment, the patient's glucose level increased. She needed high doses of insulin to maintain euglycemia (60 Lantus and 35 lispro pre-meals). PM&R evaluated the patient and concluded that the patient would benefit from acute rehab. She was started on a weekly prednisone taper before discharge. Prior to d/c, pt was given the J&J covid vaccine on 3/23.  Patient deemed medically stable for admission to acute inpatient rehab on 3/25.     (25 Mar 2021 14:56)      Subjective    No new complaints. Eating well. Asking  for oatmeal.           PAST MEDICAL & SURGICAL HISTORY:  COVID-19      Vitamin D deficiency    Glaucoma    Hyperlipidemia    Hypertension    Diabetes mellitus    History of throat surgery    History of elbow surgery    S/P         MedsMEDICATIONS  (STANDING):  atorvastatin 40 milliGRAM(s) Oral at bedtime  cholecalciferol 2000 Unit(s) Oral daily  dextrose 40% Gel 15 Gram(s) Oral once  dextrose 5%. 1000 milliLiter(s) (50 mL/Hr) IV Continuous <Continuous>  dextrose 5%. 1000 milliLiter(s) (100 mL/Hr) IV Continuous <Continuous>  dextrose 50% Injectable 25 Gram(s) IV Push once  dextrose 50% Injectable 12.5 Gram(s) IV Push once  dextrose 50% Injectable 25 Gram(s) IV Push once  diltiazem    milliGRAM(s) Oral daily  enoxaparin Injectable 40 milliGRAM(s) SubCutaneous daily  fluocinonide 0.05% Cream 1 Application(s) Topical two times a day  gabapentin 300 milliGRAM(s) Oral every 8 hours  glucagon  Injectable 1 milliGRAM(s) IntraMuscular once  insulin glargine Injectable (LANTUS) 20 Unit(s) SubCutaneous at bedtime  insulin lispro (ADMELOG) corrective regimen sliding scale   SubCutaneous three times a day before meals  insulin lispro (ADMELOG) corrective regimen sliding scale   SubCutaneous at bedtime  insulin lispro Injectable (ADMELOG) 6 Unit(s) SubCutaneous three times a day before meals  ketotifen 0.025% Ophthalmic Solution 1 Drop(s) Both EYES two times a day  latanoprost 0.005% Ophthalmic Solution 1 Drop(s) Both EYES at bedtime  nystatin    Suspension 403178 Unit(s) Swish and Swallow every 6 hours  pantoprazole    Tablet 40 milliGRAM(s) Oral before breakfast  petrolatum white Ointment 1 Application(s) Topical daily  predniSONE   Tablet 40 milliGRAM(s) Oral daily  predniSONE   Tablet 40  Oral   triamcinolone 0.1% Ointment 1 Application(s) Topical two times a day  trimethoprim  160 mG/sulfamethoxazole 800 mG 1 Tablet(s) Oral <User Schedule>    MEDICATIONS  (PRN):  acetaminophen   Tablet .. 650 milliGRAM(s) Oral every 6 hours PRN Temp greater or equal to 38C (100.4F), Mild Pain (1 - 3), Moderate Pain (4 - 6)  hydrOXYzine hydrochloride 25 milliGRAM(s) Oral every 6 hours PRN Itching  polyethylene glycol 3350 17 Gram(s) Oral daily PRN Constipation      Vital Signs Last 24 Hrs  T(C): 36.4 (27 Mar 2021 08:26), Max: 36.7 (26 Mar 2021 22:18)  T(F): 97.6 (27 Mar 2021 08:26), Max: 98 (26 Mar 2021 22:18)  HR: 71 (27 Mar 2021 08:26) (63 - 72)  BP: 118/72 (27 Mar 2021 08:26) (118/72 - 150/70)  BP(mean): --  RR: 15 (27 Mar 2021 08:26) (15 - 15)  SpO2: 95% (27 Mar 2021 08:26) (95% - 96%)  I&O's Summary      PHYSICAL EXAM:  GENERAL: NAD  NECK: Supple  NERVOUS SYSTEM:  awake and alert  HEART: S1s2 NL , RRR  CHEST/LUNG: Clear to percussion bilaterally  ABDOMEN: Soft, Nontender, Nondistended; Bowel sounds present  EXTREMITIES:  No edema      LABS:( @ 06:23)                      10.0  14.12 )-----------( 667                 31.2    Neutrophils = 8.89 (62.9%)  Lymphocytes = 3.13 (22.2%)  Eosinophils = 1.03 (7.3%)  Basophils = 0.07 (0.5%)  Monocytes = 0.85 (6.0%)  Bands = --%        139  |  103  |  20  ----------------------------<  132<H>  4.0   |  29  |  0.73    Ca    8.6      26 Mar 2021 06:23    TPro  6.0  /  Alb  2.3<L>  /  TBili  0.2  /  DBili  x   /  AST  36  /  ALT  96<H>  /  AlkPhos  112            RVP:          Tox:           CAPILLARY BLOOD GLUCOSE      POCT Blood Glucose.: 282 mg/dL (27 Mar 2021 12:03)  POCT Blood Glucose.: 221 mg/dL (27 Mar 2021 07:34)  POCT Blood Glucose.: 270 mg/dL (26 Mar 2021 22:22)  POCT Blood Glucose.: 362 mg/dL (26 Mar 2021 16:36)      Imaging Personally Reviewed:  [ ] YES  [ ] NO        Care Discussed with Consultants/Other Providers [ x] YES  [ ] NO

## 2021-03-28 LAB
GLUCOSE BLDC GLUCOMTR-MCNC: 170 MG/DL — HIGH (ref 70–99)
GLUCOSE BLDC GLUCOMTR-MCNC: 257 MG/DL — HIGH (ref 70–99)
GLUCOSE BLDC GLUCOMTR-MCNC: 316 MG/DL — HIGH (ref 70–99)
GLUCOSE BLDC GLUCOMTR-MCNC: 360 MG/DL — HIGH (ref 70–99)

## 2021-03-28 PROCEDURE — 99232 SBSQ HOSP IP/OBS MODERATE 35: CPT

## 2021-03-28 RX ADMIN — GABAPENTIN 300 MILLIGRAM(S): 400 CAPSULE ORAL at 13:12

## 2021-03-28 RX ADMIN — Medication 2000 UNIT(S): at 12:12

## 2021-03-28 RX ADMIN — LATANOPROST 1 DROP(S): 0.05 SOLUTION/ DROPS OPHTHALMIC; TOPICAL at 23:37

## 2021-03-28 RX ADMIN — INSULIN GLARGINE 23 UNIT(S): 100 INJECTION, SOLUTION SUBCUTANEOUS at 23:37

## 2021-03-28 RX ADMIN — Medication 40 MILLIGRAM(S): at 05:39

## 2021-03-28 RX ADMIN — ENOXAPARIN SODIUM 40 MILLIGRAM(S): 100 INJECTION SUBCUTANEOUS at 12:13

## 2021-03-28 RX ADMIN — Medication 300 MILLIGRAM(S): at 05:39

## 2021-03-28 RX ADMIN — Medication 8: at 17:20

## 2021-03-28 RX ADMIN — GABAPENTIN 300 MILLIGRAM(S): 400 CAPSULE ORAL at 23:36

## 2021-03-28 RX ADMIN — Medication 500000 UNIT(S): at 12:13

## 2021-03-28 RX ADMIN — Medication 1 APPLICATION(S): at 17:22

## 2021-03-28 RX ADMIN — Medication 8 UNIT(S): at 12:11

## 2021-03-28 RX ADMIN — Medication 500000 UNIT(S): at 05:39

## 2021-03-28 RX ADMIN — Medication 1 APPLICATION(S): at 12:19

## 2021-03-28 RX ADMIN — Medication 1 APPLICATION(S): at 05:39

## 2021-03-28 RX ADMIN — KETOTIFEN FUMARATE 1 DROP(S): 0.34 SOLUTION OPHTHALMIC at 17:20

## 2021-03-28 RX ADMIN — Medication 1 APPLICATION(S): at 17:23

## 2021-03-28 RX ADMIN — Medication 25 MILLIGRAM(S): at 08:15

## 2021-03-28 RX ADMIN — GABAPENTIN 300 MILLIGRAM(S): 400 CAPSULE ORAL at 05:39

## 2021-03-28 RX ADMIN — Medication 500000 UNIT(S): at 23:38

## 2021-03-28 RX ADMIN — ATORVASTATIN CALCIUM 40 MILLIGRAM(S): 80 TABLET, FILM COATED ORAL at 23:37

## 2021-03-28 RX ADMIN — Medication 10: at 12:10

## 2021-03-28 RX ADMIN — Medication 2: at 08:15

## 2021-03-28 RX ADMIN — PANTOPRAZOLE SODIUM 40 MILLIGRAM(S): 20 TABLET, DELAYED RELEASE ORAL at 05:39

## 2021-03-28 RX ADMIN — Medication 8 UNIT(S): at 08:16

## 2021-03-28 RX ADMIN — KETOTIFEN FUMARATE 1 DROP(S): 0.34 SOLUTION OPHTHALMIC at 05:39

## 2021-03-28 RX ADMIN — Medication 500000 UNIT(S): at 17:21

## 2021-03-28 RX ADMIN — Medication 8 UNIT(S): at 17:20

## 2021-03-28 RX ADMIN — Medication 2: at 23:53

## 2021-03-28 NOTE — PROGRESS NOTE ADULT - ASSESSMENT
Deconditioning  PT/OT per rehab    Rash  Prednisone taper    HTN  Cardizem    DM2, a1c 10.6 with current steroid use  Inc'd lantus 23 /Premeal lispro 8    HLD  Statin    Elev LFt's  Improving monitor for now    DVT ppx  Lovenox

## 2021-03-28 NOTE — PROGRESS NOTE ADULT - SUBJECTIVE AND OBJECTIVE BOX
Cc: Gait dysfunction    Interview performed with use of .    HPI: Patient with no new medical issues today.  BS well controlled  Patient  denies symptoms associated with hyperglycemia.  Pain controlled, no chest pain, no N/V, no Fevers/Chills. No other new ROS  Has been tolerating rehabilitation program.    acetaminophen   Tablet .. 650 milliGRAM(s) Oral every 6 hours PRN  atorvastatin 40 milliGRAM(s) Oral at bedtime  cholecalciferol 2000 Unit(s) Oral daily  dextrose 40% Gel 15 Gram(s) Oral once  dextrose 5%. 1000 milliLiter(s) IV Continuous <Continuous>  dextrose 5%. 1000 milliLiter(s) IV Continuous <Continuous>  dextrose 50% Injectable 25 Gram(s) IV Push once  dextrose 50% Injectable 12.5 Gram(s) IV Push once  dextrose 50% Injectable 25 Gram(s) IV Push once  diltiazem    milliGRAM(s) Oral daily  enoxaparin Injectable 40 milliGRAM(s) SubCutaneous daily  fluocinonide 0.05% Cream 1 Application(s) Topical two times a day  gabapentin 300 milliGRAM(s) Oral every 8 hours  glucagon  Injectable 1 milliGRAM(s) IntraMuscular once  hydrOXYzine hydrochloride 25 milliGRAM(s) Oral every 6 hours PRN  insulin glargine Injectable (LANTUS) 20 Unit(s) SubCutaneous at bedtime  insulin lispro (ADMELOG) corrective regimen sliding scale   SubCutaneous three times a day before meals  insulin lispro (ADMELOG) corrective regimen sliding scale   SubCutaneous at bedtime  insulin lispro Injectable (ADMELOG) 6 Unit(s) SubCutaneous three times a day before meals  ketotifen 0.025% Ophthalmic Solution 1 Drop(s) Both EYES two times a day  latanoprost 0.005% Ophthalmic Solution 1 Drop(s) Both EYES at bedtime  nystatin    Suspension 430872 Unit(s) Swish and Swallow every 6 hours  pantoprazole    Tablet 40 milliGRAM(s) Oral before breakfast  petrolatum white Ointment 1 Application(s) Topical daily  polyethylene glycol 3350 17 Gram(s) Oral daily PRN  predniSONE   Tablet 40 milliGRAM(s) Oral daily  predniSONE   Tablet 40  Oral   triamcinolone 0.1% Ointment 1 Application(s) Topical two times a day  trimethoprim  160 mG/sulfamethoxazole 800 mG 1 Tablet(s) Oral <User Schedule>      T(C): 36.4 (03-27-21 @ 08:26), Max: 36.7 (03-26-21 @ 22:18)  HR: 71 (03-27-21 @ 08:26) (63 - 72)  BP: 118/72 (03-27-21 @ 08:26) (118/72 - 150/70)  RR: 15 (03-27-21 @ 08:26) (15 - 15)  SpO2: 95% (03-27-21 @ 08:26) (95% - 96%)    In NAD  HEENT- EOMI  Heart- RRR, S1S2  Lungs- CTA bl.  Abd- + BS, NT, ND  Ext- No calf pain  Neuro- Exam unchanged          Imp: Patient with diagnosis of Amyopathic dermatomyositis admitted for comprehensive acute rehabilitation.    Plan:  - Continue therapies  - BS improving overnight.  Will continue to monitor.  - DVT prophylaxis  - Skin- Turn q2h, check skin daily  - Continue current medications; patient medically stable.   - Patient is stable to continue current rehabilitation program.

## 2021-03-28 NOTE — PROGRESS NOTE ADULT - SUBJECTIVE AND OBJECTIVE BOX
HPI:  This is a 64 y/o [place] speaking Female with PMHx of DM Type 2 (on insulin), HTN, HLD, COVID (), and glaucoma presented with generalized weakness, joint pain, and rash. The patient states that she was discharged from OSH on 3/12 after being evaluated for similar symptoms. They suspected that she may have Lyme disease and started her on doxycycline and sent her home. They also suspected that this might be post-COVID related. The patient has multiple complaints. Reports rash in the neck area and b/l arms, along with whole body itching. States that she has had a rash since 2021 and has seen various doctors and tried different things (steroid creams) without relief. The rash initially started on her face. Also complains of joint pain for one week. States she has joint pain and stiffness in the b/l shoulders, elbows, hands, and knees. States that the pain is currently worst in the L shoulder and R elbow. States that the pain is so significant that she is unable to walk now. Tries Tylenol with minimal relief. Also reports that she has been unable to do her ADLs - clean herself, walk. Never had joint pain like this before. Also reports subjective fever and chills, along with dry mouth.   The rash did not get worse in the sun. Joint pain is not worse in the AM and present at rest but worse on movement. No BM changes recently; had mild rectal bleeding not in feces but on wiping. She attributes 2/2 itching and rash, denies vaginal bleeding or ulcers, weight loss. Had subjective fever but not over 99F. Endorse dry mouth, and itching in her eyes, no mouth ulcers. No FHx of autoimmune/rheumatologist/joint diseases. Was admitted at [facility] 3/8-3/12 with fevers, joint pain, rash, treated with Levaquin for five days and three doses of Vanco, 2x covid negative.    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 The pt was started on prednisone and steroid ointments. She underwent extensive infectious and inflammatory workup that included hepatitis panel - neg, HIV - neg, syphilis - neg, EBV - past infection, CMV - neg, Lyme panel-neg, blood cultures - NGTD, C3 slightly elevated, C4 - wnl. VIPIN-neg, dsDNA - neg, CCP -neg, anti SSA/B - neg, Mallory-1 - neg. Myoglobin - low, RF - neg, anti-Smith - neg. Flow cytometry - neg for abnormality. X-rays L shoulder and R elbow - wnl. CT chest and pelvis - wnl, no signs of malignancy. Myoglobin was low. Still pending Myomarker panel 3, Jak2 and calreticulin mutations. The patient underwent a skin biopsy, which resulted in "Skin with focal dyskeratosis with mild perivascular inflammation. The findings raise the differential diagnosis of a viral exanthem or a drug eruption."     Although the working diagnosis did not match the biopsy results, the patient clinically improved under the steroidal treatment. Her upper and lower extremity joint pain has resolved, her rash has started to clear, and he was able to ambulate. During the prednisone treatment, the patient's glucose level increased. She needed high doses of insulin to maintain euglycemia (60 Lantus and 35 lispro pre-meals). PM&R evaluated the patient and concluded that the patient would benefit from acute rehab. She was started on a weekly prednisone taper before discharge. Prior to d/c, pt was given the J&J covid vaccine on 3/23.  Patient deemed medically stable for admission to acute inpatient rehab on 3/25.     (25 Mar 2021 14:56)      Subjective    left trapezius pain little better(not new)  No other complaints.     PAST MEDICAL & SURGICAL HISTORY:  COVID-19      Vitamin D deficiency    Glaucoma    Hyperlipidemia    Hypertension    Diabetes mellitus    History of throat surgery    History of elbow surgery    S/P         MedsMEDICATIONS  (STANDING):  atorvastatin 40 milliGRAM(s) Oral at bedtime  cholecalciferol 2000 Unit(s) Oral daily  dextrose 40% Gel 15 Gram(s) Oral once  dextrose 5%. 1000 milliLiter(s) (50 mL/Hr) IV Continuous <Continuous>  dextrose 5%. 1000 milliLiter(s) (100 mL/Hr) IV Continuous <Continuous>  dextrose 50% Injectable 25 Gram(s) IV Push once  dextrose 50% Injectable 12.5 Gram(s) IV Push once  dextrose 50% Injectable 25 Gram(s) IV Push once  diltiazem    milliGRAM(s) Oral daily  enoxaparin Injectable 40 milliGRAM(s) SubCutaneous daily  fluocinonide 0.05% Cream 1 Application(s) Topical two times a day  gabapentin 300 milliGRAM(s) Oral every 8 hours  glucagon  Injectable 1 milliGRAM(s) IntraMuscular once  insulin glargine Injectable (LANTUS) 23 Unit(s) SubCutaneous at bedtime  insulin lispro (ADMELOG) corrective regimen sliding scale   SubCutaneous three times a day before meals  insulin lispro (ADMELOG) corrective regimen sliding scale   SubCutaneous at bedtime  insulin lispro Injectable (ADMELOG) 8 Unit(s) SubCutaneous three times a day before meals  ketotifen 0.025% Ophthalmic Solution 1 Drop(s) Both EYES two times a day  latanoprost 0.005% Ophthalmic Solution 1 Drop(s) Both EYES at bedtime  nystatin    Suspension 108029 Unit(s) Swish and Swallow every 6 hours  pantoprazole    Tablet 40 milliGRAM(s) Oral before breakfast  petrolatum white Ointment 1 Application(s) Topical daily  predniSONE   Tablet 40 milliGRAM(s) Oral daily  predniSONE   Tablet 40  Oral   triamcinolone 0.1% Ointment 1 Application(s) Topical two times a day  trimethoprim  160 mG/sulfamethoxazole 800 mG 1 Tablet(s) Oral <User Schedule>    MEDICATIONS  (PRN):  acetaminophen   Tablet .. 650 milliGRAM(s) Oral every 6 hours PRN Temp greater or equal to 38C (100.4F), Mild Pain (1 - 3), Moderate Pain (4 - 6)  hydrOXYzine hydrochloride 25 milliGRAM(s) Oral every 6 hours PRN Itching  polyethylene glycol 3350 17 Gram(s) Oral daily PRN Constipation      Vital Signs Last 24 Hrs  T(C): 36.5 (28 Mar 2021 09:09), Max: 36.6 (27 Mar 2021 21:28)  T(F): 97.7 (28 Mar 2021 09:09), Max: 97.8 (27 Mar 2021 21:28)  HR: 70 (28 Mar 2021 09:09) (59 - 70)  BP: 144/73 (28 Mar 2021 09:09) (127/68 - 152/78)  BP(mean): --  RR: 15 (28 Mar 2021 09:09) (15 - 15)  SpO2: 95% (28 Mar 2021 09:09) (95% - 98%)  I&O's Summary      PHYSICAL EXAM:  GENERAL: NAD  NECK: Supple  NERVOUS SYSTEM:  awake and alert  HEART: S1s2 NL , RRR  CHEST/LUNG: Clear to percussion bilaterally  ABDOMEN: Soft, Nontender, Nondistended; Bowel sounds present  EXTREMITIES:  No edema          CAPILLARY BLOOD GLUCOSE      POCT Blood Glucose.: 170 mg/dL (28 Mar 2021 07:36)  POCT Blood Glucose.: 291 mg/dL (27 Mar 2021 21:31)  POCT Blood Glucose.: 289 mg/dL (27 Mar 2021 16:42)  POCT Blood Glucose.: 282 mg/dL (27 Mar 2021 12:03)      Imaging Personally Reviewed:  [ ] YES  [ ] NO        Care Discussed with Consultants/Other Providers [ x] YES  [ ] NO

## 2021-03-28 NOTE — CHART NOTE - NSCHARTNOTEFT_GEN_A_CORE
[facility] Plan of Care    REHABILITATION DIAGNOSIS:  Other dermatomyositis with myopathy          COMORBIDITIES/COMPLICATING CONDITIONS IMPACTING REHABILITATION:  HEALTH ISSUES - PROBLEM Dx:  joint pain        PAST MEDICAL & SURGICAL HISTORY:  COVID-19      Vitamin D deficiency    Glaucoma    Hyperlipidemia    Hypertension    Diabetes mellitus    History of throat surgery    History of elbow surgery    S/P         Based upon consideration of the patient's impairments, functional status, complicating conditions and any other contributing factors and after information garnered from the assessments of all therapy disciplines involved in treating the patient and other pertinent clinicians:    INTERDISCIPLINARY REHABILITATION INTERVENTIONS:    [ X  ] Transfer Training  [ X  ] Bed Mobility  [ X  ] Therapeutic Exercise  [ X ] Balance/Coordination Exercises  [ X ] Locomotion retraining  [ X  ] Stairs  [  X ] Functional Transfer Training  [   ] Bowel/Bladder program  [  x ] Pain Management  [   ] Skin/Wound Care  [   ] Visual/Perceptual Training  [   ] Therapeutic Recreation Activities  [   ] Neuromuscular Re-education  [ X  ] Activities of Daily Living  [   ] Speech Exercise  [   ] Swallowing Exercises  [   ] Vital Stim  [   ] Dietary Supplements  [   ] Calorie Count  [   ] Cognitive Exercises  [   ] Congnitive/Linguistic Treatment  [   ] Behavior Program  [   ] Neuropsych Therapy  [ X  ] Patient/Family Counseling  [ X ] Family Training  [ X  ] Community Re-entry  [   ] Orthotic Evaluation  [   ] Prosthetic Eval/Training    MEDICAL PROGNOSIS:  good    REHAB POTENTIAL:  good  EXPECTED DAILY THERAPY:         PT:2hr       OT:1hr       ST:       P&O:    EXPECTED INTENSITY OF PROGRAM:  3 hrs / Day    EXPECTED FREQUENCY OF PROGRAM: 5 Days/ Week    ESTIMATED LOS:  [  ] 5-7 Days  [  ] 7-10 Days  [ x ] 10- 14 Days  [  ] 14- 18 Days  [  ] 18- 21 Days    ESTIMATED DISPOSITION:  [  ] Home   [  ] Home with Outpatient Therapies  [ x ] Home with Home Therapies  [  ] Assisted Living  [  ] Nursing Home  [  ] Long Term Acute Care    INTERDISCIPLINARY FUNCTIONAL OUTCOMES/GOALS:         Gait/Mobility:6       Transfers:6       ADLs:6       Functional Transfers:6       Medication Management:7       Communication:NA       Cognitive:NA       Dysphagia:NA       Bladder7       Bowel:7     Functional Independent Measures:   7 = Independent  6 = Modified Independent  5 = Supervision  4 = Minimal Assist/ Contact Guard  3 = Moderate Assistance  2 = Maximum Assistance  1 = Total Assistance  0 = Unable to assess

## 2021-03-29 LAB
ALBUMIN SERPL ELPH-MCNC: 2.5 G/DL — LOW (ref 3.3–5)
ALP SERPL-CCNC: 112 U/L — SIGNIFICANT CHANGE UP (ref 40–120)
ALT FLD-CCNC: 75 U/L — HIGH (ref 10–45)
ANION GAP SERPL CALC-SCNC: 5 MMOL/L — SIGNIFICANT CHANGE UP (ref 5–17)
AST SERPL-CCNC: 26 U/L — SIGNIFICANT CHANGE UP (ref 10–40)
BILIRUB SERPL-MCNC: 0.3 MG/DL — SIGNIFICANT CHANGE UP (ref 0.2–1.2)
BUN SERPL-MCNC: 20 MG/DL — SIGNIFICANT CHANGE UP (ref 7–23)
CALCIUM SERPL-MCNC: 8.7 MG/DL — SIGNIFICANT CHANGE UP (ref 8.4–10.5)
CHLORIDE SERPL-SCNC: 101 MMOL/L — SIGNIFICANT CHANGE UP (ref 96–108)
CO2 SERPL-SCNC: 30 MMOL/L — SIGNIFICANT CHANGE UP (ref 22–31)
CREAT SERPL-MCNC: 0.83 MG/DL — SIGNIFICANT CHANGE UP (ref 0.5–1.3)
GLUCOSE BLDC GLUCOMTR-MCNC: 202 MG/DL — HIGH (ref 70–99)
GLUCOSE BLDC GLUCOMTR-MCNC: 218 MG/DL — HIGH (ref 70–99)
GLUCOSE BLDC GLUCOMTR-MCNC: 298 MG/DL — HIGH (ref 70–99)
GLUCOSE BLDC GLUCOMTR-MCNC: 339 MG/DL — HIGH (ref 70–99)
GLUCOSE SERPL-MCNC: 221 MG/DL — HIGH (ref 70–99)
HCT VFR BLD CALC: 31.9 % — LOW (ref 34.5–45)
HGB BLD-MCNC: 10.5 G/DL — LOW (ref 11.5–15.5)
MCHC RBC-ENTMCNC: 28.3 PG — SIGNIFICANT CHANGE UP (ref 27–34)
MCHC RBC-ENTMCNC: 32.9 GM/DL — SIGNIFICANT CHANGE UP (ref 32–36)
MCV RBC AUTO: 86 FL — SIGNIFICANT CHANGE UP (ref 80–100)
NRBC # BLD: 0 /100 WBCS — SIGNIFICANT CHANGE UP (ref 0–0)
PLATELET # BLD AUTO: 488 K/UL — HIGH (ref 150–400)
POTASSIUM SERPL-MCNC: 4.5 MMOL/L — SIGNIFICANT CHANGE UP (ref 3.5–5.3)
POTASSIUM SERPL-SCNC: 4.5 MMOL/L — SIGNIFICANT CHANGE UP (ref 3.5–5.3)
PROT SERPL-MCNC: 6.6 G/DL — SIGNIFICANT CHANGE UP (ref 6–8.3)
RBC # BLD: 3.71 M/UL — LOW (ref 3.8–5.2)
RBC # FLD: 16.5 % — HIGH (ref 10.3–14.5)
SODIUM SERPL-SCNC: 136 MMOL/L — SIGNIFICANT CHANGE UP (ref 135–145)
WBC # BLD: 15.41 K/UL — HIGH (ref 3.8–10.5)
WBC # FLD AUTO: 15.41 K/UL — HIGH (ref 3.8–10.5)

## 2021-03-29 PROCEDURE — 99232 SBSQ HOSP IP/OBS MODERATE 35: CPT | Mod: GC

## 2021-03-29 PROCEDURE — 99232 SBSQ HOSP IP/OBS MODERATE 35: CPT

## 2021-03-29 RX ORDER — POLYETHYLENE GLYCOL 3350 17 G/17G
17 POWDER, FOR SOLUTION ORAL ONCE
Refills: 0 | Status: COMPLETED | OUTPATIENT
Start: 2021-03-29 | End: 2021-03-29

## 2021-03-29 RX ORDER — INSULIN LISPRO 100/ML
11 VIAL (ML) SUBCUTANEOUS
Refills: 0 | Status: DISCONTINUED | OUTPATIENT
Start: 2021-03-29 | End: 2021-03-31

## 2021-03-29 RX ORDER — INSULIN GLARGINE 100 [IU]/ML
28 INJECTION, SOLUTION SUBCUTANEOUS AT BEDTIME
Refills: 0 | Status: DISCONTINUED | OUTPATIENT
Start: 2021-03-29 | End: 2021-03-31

## 2021-03-29 RX ADMIN — Medication 8 UNIT(S): at 11:53

## 2021-03-29 RX ADMIN — Medication 8: at 11:52

## 2021-03-29 RX ADMIN — Medication 2000 UNIT(S): at 11:13

## 2021-03-29 RX ADMIN — LATANOPROST 1 DROP(S): 0.05 SOLUTION/ DROPS OPHTHALMIC; TOPICAL at 22:17

## 2021-03-29 RX ADMIN — GABAPENTIN 300 MILLIGRAM(S): 400 CAPSULE ORAL at 05:21

## 2021-03-29 RX ADMIN — Medication 40 MILLIGRAM(S): at 05:22

## 2021-03-29 RX ADMIN — Medication 1 APPLICATION(S): at 05:22

## 2021-03-29 RX ADMIN — Medication 300 MILLIGRAM(S): at 05:22

## 2021-03-29 RX ADMIN — ENOXAPARIN SODIUM 40 MILLIGRAM(S): 100 INJECTION SUBCUTANEOUS at 11:13

## 2021-03-29 RX ADMIN — Medication 1 APPLICATION(S): at 17:28

## 2021-03-29 RX ADMIN — KETOTIFEN FUMARATE 1 DROP(S): 0.34 SOLUTION OPHTHALMIC at 17:27

## 2021-03-29 RX ADMIN — ATORVASTATIN CALCIUM 40 MILLIGRAM(S): 80 TABLET, FILM COATED ORAL at 22:16

## 2021-03-29 RX ADMIN — GABAPENTIN 300 MILLIGRAM(S): 400 CAPSULE ORAL at 14:07

## 2021-03-29 RX ADMIN — Medication 1 APPLICATION(S): at 14:08

## 2021-03-29 RX ADMIN — PANTOPRAZOLE SODIUM 40 MILLIGRAM(S): 20 TABLET, DELAYED RELEASE ORAL at 05:22

## 2021-03-29 RX ADMIN — Medication 6: at 17:27

## 2021-03-29 RX ADMIN — Medication 4: at 07:50

## 2021-03-29 RX ADMIN — POLYETHYLENE GLYCOL 3350 17 GRAM(S): 17 POWDER, FOR SOLUTION ORAL at 11:13

## 2021-03-29 RX ADMIN — Medication 500000 UNIT(S): at 05:23

## 2021-03-29 RX ADMIN — Medication 500000 UNIT(S): at 11:13

## 2021-03-29 RX ADMIN — Medication 8 UNIT(S): at 07:50

## 2021-03-29 RX ADMIN — Medication 500000 UNIT(S): at 17:27

## 2021-03-29 RX ADMIN — KETOTIFEN FUMARATE 1 DROP(S): 0.34 SOLUTION OPHTHALMIC at 05:23

## 2021-03-29 RX ADMIN — Medication 1 TABLET(S): at 12:16

## 2021-03-29 RX ADMIN — INSULIN GLARGINE 28 UNIT(S): 100 INJECTION, SOLUTION SUBCUTANEOUS at 22:17

## 2021-03-29 RX ADMIN — Medication 11 UNIT(S): at 17:27

## 2021-03-29 RX ADMIN — Medication 500000 UNIT(S): at 23:02

## 2021-03-29 RX ADMIN — GABAPENTIN 300 MILLIGRAM(S): 400 CAPSULE ORAL at 23:01

## 2021-03-29 NOTE — PROGRESS NOTE ADULT - SUBJECTIVE AND OBJECTIVE BOX
This is a 64 y/o Macedonian speaking Female with PMHx of DM Type 2 (on insulin), HTN, HLD, COVID (2020), and glaucoma presented with generalized weakness, joint pain, and rash. The patient states that she was discharged from OSH on 3/12 after being evaluated for similar symptoms. They suspected that she may have Lyme disease and started her on doxycycline and sent her home. They also suspected that this might be post-COVID related. The patient has multiple complaints. Reports rash in the neck area and b/l arms, along with whole body itching. States that she has had a rash since 2/2021 and has seen various doctors and tried different things (steroid creams) without relief. The rash initially started on her face. Also complains of joint pain for one week. States she has joint pain and stiffness in the b/l shoulders, elbows, hands, and knees. States that the pain is currently worst in the L shoulder and R elbow. States that the pain is so significant that she is unable to walk now. Tries Tylenol with minimal relief. Also reports that she has been unable to do her ADLs - clean herself, walk. Never had joint pain like this before. Also reports subjective fever and chills, along with dry mouth.   The rash did not get worse in the sun. Joint pain is not worse in the AM and present at rest but worse on movement. No BM changes recently; had mild rectal bleeding not in feces but on wiping. She attributes 2/2 itching and rash, denies vaginal bleeding or ulcers, weight loss. Had subjective fever but not over 99F. Endorse dry mouth, and itching in her eyes, no mouth ulcers. No FHx of autoimmune/rheumatologist/joint diseases. Was admitted at [place] 3/8-3/12 with fevers, joint pain, rash, treated with Levaquin for five days and three doses of Vanco, 2x covid negative.    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 The pt was started on prednisone and steroid ointments. She underwent extensive infectious and inflammatory workup that included hepatitis panel - neg, HIV - neg, syphilis - neg, EBV - past infection, CMV - neg, Lyme panel-neg, blood cultures - NGTD, C3 slightly elevated, C4 - wnl. VIPIN-neg, dsDNA - neg, CCP -neg, anti SSA/B - neg, Mallory-1 - neg. Myoglobin - low, RF - neg, anti-Smith - neg. Flow cytometry - neg for abnormality. X-rays L shoulder and R elbow - wnl. CT chest and pelvis - wnl, no signs of malignancy. Myoglobin was low. Still pending Myomarker panel 3, Jak2 and calreticulin mutations. The patient underwent a skin biopsy, which resulted in "Skin with focal dyskeratosis with mild perivascular inflammation. The findings raise the differential diagnosis of a viral exanthem or a drug eruption."     Although the working diagnosis did not match the biopsy results, the patient clinically improved under the steroidal treatment. Her upper and lower extremity joint pain has resolved, her rash has started to clear, and he was able to ambulate. During the prednisone treatment, the patient's glucose level increased. She needed high doses of insulin to maintain euglycemia (60 Lantus and 35 lispro pre-meals). PM&R evaluated the patient and concluded that the patient would benefit from acute rehab. She was started on a weekly prednisone taper before discharge. Prior to d/c, pt was given the J&J covid vaccine on 3/23.  Patient deemed medically stable for admission to acute inpatient rehab on 3/25.      ROS:    ID [id number]  left shoulder pain with flexion  +BM (LBM 3/28) - but feels constipation, no urinary symptoms  No c/o HA, dizziness, CP, SOB  FS > 200 - will review DM regimen     MEDICATIONS  (STANDING):  atorvastatin 40 milliGRAM(s) Oral at bedtime  cholecalciferol 2000 Unit(s) Oral daily  dextrose 40% Gel 15 Gram(s) Oral once  dextrose 5%. 1000 milliLiter(s) (50 mL/Hr) IV Continuous <Continuous>  dextrose 5%. 1000 milliLiter(s) (100 mL/Hr) IV Continuous <Continuous>  dextrose 50% Injectable 25 Gram(s) IV Push once  dextrose 50% Injectable 12.5 Gram(s) IV Push once  dextrose 50% Injectable 25 Gram(s) IV Push once  diltiazem    milliGRAM(s) Oral daily  enoxaparin Injectable 40 milliGRAM(s) SubCutaneous daily  fluocinonide 0.05% Cream 1 Application(s) Topical two times a day  gabapentin 300 milliGRAM(s) Oral every 8 hours  glucagon  Injectable 1 milliGRAM(s) IntraMuscular once  insulin glargine Injectable (LANTUS) 28 Unit(s) SubCutaneous at bedtime  insulin lispro (ADMELOG) corrective regimen sliding scale   SubCutaneous at bedtime  insulin lispro (ADMELOG) corrective regimen sliding scale   SubCutaneous three times a day before meals  insulin lispro Injectable (ADMELOG) 11 Unit(s) SubCutaneous three times a day before meals  ketotifen 0.025% Ophthalmic Solution 1 Drop(s) Both EYES two times a day  latanoprost 0.005% Ophthalmic Solution 1 Drop(s) Both EYES at bedtime  nystatin    Suspension 635107 Unit(s) Swish and Swallow every 6 hours  pantoprazole    Tablet 40 milliGRAM(s) Oral before breakfast  petrolatum white Ointment 1 Application(s) Topical daily  predniSONE   Tablet 40 milliGRAM(s) Oral daily  predniSONE   Tablet 40  Oral   triamcinolone 0.1% Ointment 1 Application(s) Topical two times a day  trimethoprim  160 mG/sulfamethoxazole 800 mG 1 Tablet(s) Oral <User Schedule>    MEDICATIONS  (PRN):  acetaminophen   Tablet .. 650 milliGRAM(s) Oral every 6 hours PRN Temp greater or equal to 38C (100.4F), Mild Pain (1 - 3), Moderate Pain (4 - 6)  hydrOXYzine hydrochloride 25 milliGRAM(s) Oral every 6 hours PRN Itching  polyethylene glycol 3350 17 Gram(s) Oral daily PRN Constipation      LAB                        10.5   15.41 )-----------( 488      ( 29 Mar 2021 07:15 )             31.9     03-29    136  |  101  |  20  ----------------------------<  221<H>  4.5   |  30  |  0.83    Ca    8.7      29 Mar 2021 07:15    TPro  6.6  /  Alb  2.5<L>  /  TBili  0.3  /  DBili  x   /  AST  26  /  ALT  75<H>  /  AlkPhos  112  03-29    CAPILLARY BLOOD GLUCOSE  POCT Blood Glucose.: 339 mg/dL (29 Mar 2021 11:52)  POCT Blood Glucose.: 202 mg/dL (29 Mar 2021 07:41)  POCT Blood Glucose.: 257 mg/dL (28 Mar 2021 23:34)  POCT Blood Glucose.: 316 mg/dL (28 Mar 2021 16:48)    Vital Signs Last 24 Hrs  T(C): 36.3 (29 Mar 2021 08:47), Max: 36.3 (28 Mar 2021 23:35)  T(F): 97.4 (29 Mar 2021 08:47), Max: 97.4 (29 Mar 2021 08:47)  HR: 62 (29 Mar 2021 08:47) (62 - 63)  BP: 116/69 (29 Mar 2021 08:47) (116/69 - 138/70)  BP(mean): --  RR: 14 (29 Mar 2021 08:47) (14 - 16)  SpO2: 95% (29 Mar 2021 08:47) (95% - 97%)    Physical Exam: General: NAD                            HEENT: NC/AT, EOM I, PERRLA, Normal Conjunctivae  Cardio: RRR, Normal S1-S2, No M/G/R                              Pulm: No Respiratory Distress,  Lungs CTAB                        Abdomen: ND/NT, Soft, BS+                                                MSK: No joint swelling                                   Ext: +Edema bilat LEs, Pulses 2+ throughout, No calf tenderness    Skin:  no skin breakdown noted, healing areas (from rash)-hyperpigmented skin noted to arms, scalp, chest.                                                            Neurological Examination    Cognitive: AAO x 3                                                                         Attention: Intact   Judgment: Good evidence of judgement   Attention: intact                              Mood/Affect: wnl                                                                           Communication:  Fluent,  No dysarthria                                                          Sensory:                                                                                          Motor    5/5 right shoulder and elbow strengths, right hand   4/5 left shoulder and elbow strengths secondary to pain. Left wrist and hand strength   4/5 right HF and KE. 5/5 right ankle strengths.   3+/5 left HF, 4/+/5 KE. 5/5 left ankle strengths.    Rehab eval in progress This is a 64 y/o Hungarian speaking Female with PMHx of DM Type 2 (on insulin), HTN, HLD, COVID (2020), and glaucoma presented with generalized weakness, joint pain, and rash. The patient states that she was discharged from OSH on 3/12 after being evaluated for similar symptoms. They suspected that she may have Lyme disease and started her on doxycycline and sent her home. They also suspected that this might be post-COVID related. The patient has multiple complaints. Reports rash in the neck area and b/l arms, along with whole body itching. States that she has had a rash since 2/2021 and has seen various doctors and tried different things (steroid creams) without relief. The rash initially started on her face. Also complains of joint pain for one week. States she has joint pain and stiffness in the b/l shoulders, elbows, hands, and knees. States that the pain is currently worst in the L shoulder and R elbow. States that the pain is so significant that she is unable to walk now. Tries Tylenol with minimal relief. Also reports that she has been unable to do her ADLs - clean herself, walk. Never had joint pain like this before. Also reports subjective fever and chills, along with dry mouth.   The rash did not get worse in the sun. Joint pain is not worse in the AM and present at rest but worse on movement. No BM changes recently; had mild rectal bleeding not in feces but on wiping. She attributes 2/2 itching and rash, denies vaginal bleeding or ulcers, weight loss. Had subjective fever but not over 99F. Endorse dry mouth, and itching in her eyes, no mouth ulcers. No FHx of autoimmune/rheumatologist/joint diseases. Was admitted at [place] 3/8-3/12 with fevers, joint pain, rash, treated with Levaquin for five days and three doses of Vanco, 2x covid negative.    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 The pt was started on prednisone and steroid ointments. She underwent extensive infectious and inflammatory workup that included hepatitis panel - neg, HIV - neg, syphilis - neg, EBV - past infection, CMV - neg, Lyme panel-neg, blood cultures - NGTD, C3 slightly elevated, C4 - wnl. VIPIN-neg, dsDNA - neg, CCP -neg, anti SSA/B - neg, Mallory-1 - neg. Myoglobin - low, RF - neg, anti-Smith - neg. Flow cytometry - neg for abnormality. X-rays L shoulder and R elbow - wnl. CT chest and pelvis - wnl, no signs of malignancy. Myoglobin was low. Still pending Myomarker panel 3, Jak2 and calreticulin mutations. The patient underwent a skin biopsy, which resulted in "Skin with focal dyskeratosis with mild perivascular inflammation. The findings raise the differential diagnosis of a viral exanthem or a drug eruption."     Although the working diagnosis did not match the biopsy results, the patient clinically improved under the steroidal treatment. Her upper and lower extremity joint pain has resolved, her rash has started to clear, and he was able to ambulate. During the prednisone treatment, the patient's glucose level increased. She needed high doses of insulin to maintain euglycemia (60 Lantus and 35 lispro pre-meals). PM&R evaluated the patient and concluded that the patient would benefit from acute rehab. She was started on a weekly prednisone taper before discharge. Prior to d/c, pt was given the J&J covid vaccine on 3/23.  Patient deemed medically stable for admission to acute inpatient rehab on 3/25.      ROS:    ID [id number]  left shoulder pain with flexion  +BM (LBM 3/28) - but feels constipation, no urinary symptoms  No c/o HA, dizziness, CP, SOB  FS > 200 - will review DM regimen     MEDICATIONS  (STANDING):  atorvastatin 40 milliGRAM(s) Oral at bedtime  cholecalciferol 2000 Unit(s) Oral daily  dextrose 40% Gel 15 Gram(s) Oral once  dextrose 5%. 1000 milliLiter(s) (50 mL/Hr) IV Continuous <Continuous>  dextrose 5%. 1000 milliLiter(s) (100 mL/Hr) IV Continuous <Continuous>  dextrose 50% Injectable 25 Gram(s) IV Push once  dextrose 50% Injectable 12.5 Gram(s) IV Push once  dextrose 50% Injectable 25 Gram(s) IV Push once  diltiazem    milliGRAM(s) Oral daily  enoxaparin Injectable 40 milliGRAM(s) SubCutaneous daily  fluocinonide 0.05% Cream 1 Application(s) Topical two times a day  gabapentin 300 milliGRAM(s) Oral every 8 hours  glucagon  Injectable 1 milliGRAM(s) IntraMuscular once  insulin glargine Injectable (LANTUS) 28 Unit(s) SubCutaneous at bedtime  insulin lispro (ADMELOG) corrective regimen sliding scale   SubCutaneous at bedtime  insulin lispro (ADMELOG) corrective regimen sliding scale   SubCutaneous three times a day before meals  insulin lispro Injectable (ADMELOG) 11 Unit(s) SubCutaneous three times a day before meals  ketotifen 0.025% Ophthalmic Solution 1 Drop(s) Both EYES two times a day  latanoprost 0.005% Ophthalmic Solution 1 Drop(s) Both EYES at bedtime  nystatin    Suspension 052500 Unit(s) Swish and Swallow every 6 hours  pantoprazole    Tablet 40 milliGRAM(s) Oral before breakfast  petrolatum white Ointment 1 Application(s) Topical daily  predniSONE   Tablet 40 milliGRAM(s) Oral daily  predniSONE   Tablet 40  Oral   triamcinolone 0.1% Ointment 1 Application(s) Topical two times a day  trimethoprim  160 mG/sulfamethoxazole 800 mG 1 Tablet(s) Oral <User Schedule>    MEDICATIONS  (PRN):  acetaminophen   Tablet .. 650 milliGRAM(s) Oral every 6 hours PRN Temp greater or equal to 38C (100.4F), Mild Pain (1 - 3), Moderate Pain (4 - 6)  hydrOXYzine hydrochloride 25 milliGRAM(s) Oral every 6 hours PRN Itching  polyethylene glycol 3350 17 Gram(s) Oral daily PRN Constipation      LAB                        10.5   15.41 )-----------( 488      ( 29 Mar 2021 07:15 )             31.9     03-29    136  |  101  |  20  ----------------------------<  221<H>  4.5   |  30  |  0.83    Ca    8.7      29 Mar 2021 07:15    TPro  6.6  /  Alb  2.5<L>  /  TBili  0.3  /  DBili  x   /  AST  26  /  ALT  75<H>  /  AlkPhos  112  03-29    CAPILLARY BLOOD GLUCOSE  POCT Blood Glucose.: 339 mg/dL (29 Mar 2021 11:52)  POCT Blood Glucose.: 202 mg/dL (29 Mar 2021 07:41)  POCT Blood Glucose.: 257 mg/dL (28 Mar 2021 23:34)  POCT Blood Glucose.: 316 mg/dL (28 Mar 2021 16:48)    Vital Signs Last 24 Hrs  T(C): 36.3 (29 Mar 2021 08:47), Max: 36.3 (28 Mar 2021 23:35)  T(F): 97.4 (29 Mar 2021 08:47), Max: 97.4 (29 Mar 2021 08:47)  HR: 62 (29 Mar 2021 08:47) (62 - 63)  BP: 116/69 (29 Mar 2021 08:47) (116/69 - 138/70)  BP(mean): --  RR: 14 (29 Mar 2021 08:47) (14 - 16)  SpO2: 95% (29 Mar 2021 08:47) (95% - 97%)    Physical Exam: General: NAD                            HEENT: NC/AT, EOM I, PERRLA, Normal Conjunctivae  Cardio: RRR, Normal S1-S2, No M/G/R                              Pulm: No Respiratory Distress,  Lungs CTAB                        Abdomen: ND/NT, Soft, BS+                                                MSK: No joint swelling                                   Ext: +Edema bilat LEs, Pulses 2+ throughout, No calf tenderness    Skin:  no skin breakdown noted, healing areas (from rash)-hyperpigmented skin noted to arms, scalp, chest.                                                            Neurological Examination    Cognitive: AAO x 3                                                                         Attention: Intact   Judgment: Good evidence of judgement   Attention: intact                              Mood/Affect: wnl                                                                           Communication:  Fluent,  No dysarthria                                                          Sensory:                                                                                          Motor    5/5 right shoulder and elbow strengths, right hand   4/5 left shoulder and elbow strengths secondary to pain. Left wrist and hand strength   4/5 right HF and KE. 5/5 right ankle strengths.   3+/5 left HF, 4/+/5 KE. 5/5 left ankle strengths.    TEAM MEETING 3/29  SW:  lives with  + 2 grown sons (1 disable, 1 works part time)- apt + elevator access; 1 TAMMY  OT:  mod I eating and grooming, min A dressing, CG shower transfer  PT: Min A with transfer, ambulation 30 ft RW  barriers: pain and endurance  EDOD: 4/6 Home This is a 64 y/o Thai speaking Female with PMHx of DM Type 2 (on insulin), HTN, HLD, COVID (2020), and glaucoma presented with generalized weakness, joint pain, and rash. The patient states that she was discharged from OSH on 3/12 after being evaluated for similar symptoms. They suspected that she may have Lyme disease and started her on doxycycline and sent her home. They also suspected that this might be post-COVID related. The patient has multiple complaints. Reports rash in the neck area and b/l arms, along with whole body itching. States that she has had a rash since 2/2021 and has seen various doctors and tried different things (steroid creams) without relief. The rash initially started on her face. Also complains of joint pain for one week. States she has joint pain and stiffness in the b/l shoulders, elbows, hands, and knees. States that the pain is currently worst in the L shoulder and R elbow. States that the pain is so significant that she is unable to walk now. Tries Tylenol with minimal relief. Also reports that she has been unable to do her ADLs - clean herself, walk. Never had joint pain like this before. Also reports subjective fever and chills, along with dry mouth.   The rash did not get worse in the sun. Joint pain is not worse in the AM and present at rest but worse on movement. No BM changes recently; had mild rectal bleeding not in feces but on wiping. She attributes 2/2 itching and rash, denies vaginal bleeding or ulcers, weight loss. Had subjective fever but not over 99F. Endorse dry mouth, and itching in her eyes, no mouth ulcers. No FHx of autoimmune/rheumatologist/joint diseases. Was admitted at [place] 3/8-3/12 with fevers, joint pain, rash, treated with Levaquin for five days and three doses of Vanco, 2x covid negative.    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 The pt was started on prednisone and steroid ointments. She underwent extensive infectious and inflammatory workup that included hepatitis panel - neg, HIV - neg, syphilis - neg, EBV - past infection, CMV - neg, Lyme panel-neg, blood cultures - NGTD, C3 slightly elevated, C4 - wnl. VIPIN-neg, dsDNA - neg, CCP -neg, anti SSA/B - neg, Mallory-1 - neg. Myoglobin - low, RF - neg, anti-Smith - neg. Flow cytometry - neg for abnormality. X-rays L shoulder and R elbow - wnl. CT chest and pelvis - wnl, no signs of malignancy. Myoglobin was low. Still pending Myomarker panel 3, Jak2 and calreticulin mutations. The patient underwent a skin biopsy, which resulted in "Skin with focal dyskeratosis with mild perivascular inflammation. The findings raise the differential diagnosis of a viral exanthem or a drug eruption."     Although the working diagnosis did not match the biopsy results, the patient clinically improved under the steroidal treatment. Her upper and lower extremity joint pain has resolved, her rash has started to clear, and he was able to ambulate. During the prednisone treatment, the patient's glucose level increased. She needed high doses of insulin to maintain euglycemia (60 Lantus and 35 lispro pre-meals). PM&R evaluated the patient and concluded that the patient would benefit from acute rehab. She was started on a weekly prednisone taper before discharge. Prior to d/c, pt was given the J&J covid vaccine on 3/23.  Patient deemed medically stable for admission to acute inpatient rehab on 3/25.      ROS:    ID [id number]  left shoulder pain with flexion  +BM (LBM 3/28) - but feels constipation, no urinary symptoms  No c/o HA, dizziness, CP, SOB  no nausea,no vomiting  FS > 200 - will review DM regimen     MEDICATIONS  (STANDING):  atorvastatin 40 milliGRAM(s) Oral at bedtime  cholecalciferol 2000 Unit(s) Oral daily  dextrose 40% Gel 15 Gram(s) Oral once  dextrose 5%. 1000 milliLiter(s) (50 mL/Hr) IV Continuous <Continuous>  dextrose 5%. 1000 milliLiter(s) (100 mL/Hr) IV Continuous <Continuous>  dextrose 50% Injectable 25 Gram(s) IV Push once  dextrose 50% Injectable 12.5 Gram(s) IV Push once  dextrose 50% Injectable 25 Gram(s) IV Push once  diltiazem    milliGRAM(s) Oral daily  enoxaparin Injectable 40 milliGRAM(s) SubCutaneous daily  fluocinonide 0.05% Cream 1 Application(s) Topical two times a day  gabapentin 300 milliGRAM(s) Oral every 8 hours  glucagon  Injectable 1 milliGRAM(s) IntraMuscular once  insulin glargine Injectable (LANTUS) 28 Unit(s) SubCutaneous at bedtime  insulin lispro (ADMELOG) corrective regimen sliding scale   SubCutaneous at bedtime  insulin lispro (ADMELOG) corrective regimen sliding scale   SubCutaneous three times a day before meals  insulin lispro Injectable (ADMELOG) 11 Unit(s) SubCutaneous three times a day before meals  ketotifen 0.025% Ophthalmic Solution 1 Drop(s) Both EYES two times a day  latanoprost 0.005% Ophthalmic Solution 1 Drop(s) Both EYES at bedtime  nystatin    Suspension 424403 Unit(s) Swish and Swallow every 6 hours  pantoprazole    Tablet 40 milliGRAM(s) Oral before breakfast  petrolatum white Ointment 1 Application(s) Topical daily  predniSONE   Tablet 40 milliGRAM(s) Oral daily  predniSONE   Tablet 40  Oral   triamcinolone 0.1% Ointment 1 Application(s) Topical two times a day  trimethoprim  160 mG/sulfamethoxazole 800 mG 1 Tablet(s) Oral <User Schedule>    MEDICATIONS  (PRN):  acetaminophen   Tablet .. 650 milliGRAM(s) Oral every 6 hours PRN Temp greater or equal to 38C (100.4F), Mild Pain (1 - 3), Moderate Pain (4 - 6)  hydrOXYzine hydrochloride 25 milliGRAM(s) Oral every 6 hours PRN Itching  polyethylene glycol 3350 17 Gram(s) Oral daily PRN Constipation      LAB                        10.5   15.41 )-----------( 488      ( 29 Mar 2021 07:15 )             31.9     03-29    136  |  101  |  20  ----------------------------<  221<H>  4.5   |  30  |  0.83    Ca    8.7      29 Mar 2021 07:15    TPro  6.6  /  Alb  2.5<L>  /  TBili  0.3  /  DBili  x   /  AST  26  /  ALT  75<H>  /  AlkPhos  112  03-29    CAPILLARY BLOOD GLUCOSE  POCT Blood Glucose.: 339 mg/dL (29 Mar 2021 11:52)  POCT Blood Glucose.: 202 mg/dL (29 Mar 2021 07:41)  POCT Blood Glucose.: 257 mg/dL (28 Mar 2021 23:34)  POCT Blood Glucose.: 316 mg/dL (28 Mar 2021 16:48)    Vital Signs Last 24 Hrs  T(C): 36.3 (29 Mar 2021 08:47), Max: 36.3 (28 Mar 2021 23:35)  T(F): 97.4 (29 Mar 2021 08:47), Max: 97.4 (29 Mar 2021 08:47)  HR: 62 (29 Mar 2021 08:47) (62 - 63)  BP: 116/69 (29 Mar 2021 08:47) (116/69 - 138/70)  BP(mean): --  RR: 14 (29 Mar 2021 08:47) (14 - 16)  SpO2: 95% (29 Mar 2021 08:47) (95% - 97%)    Physical Exam: General: NAD                            HEENT: NC/AT, EOM I, PERRLA, Normal Conjunctivae  Cardio: RRR, Normal S1-S2, No M/G/R                              Pulm: No Respiratory Distress,  Lungs CTAB                        Abdomen: ND/NT, Soft, BS+                                                MSK: No joint swelling                                   Ext: +Edema bilat LEs, Pulses 2+ throughout, No calf tenderness    Skin:  no skin breakdown noted, healing areas (from rash)-hyperpigmented skin noted to arms, scalp, chest.                                                            Neurological Examination    Cognitive: AAO x 3                                                                         Attention: Intact   Judgment: Good evidence of judgement   Attention: intact                              Mood/Affect: wnl                                                                           Communication:  Fluent,  No dysarthria                                                          Sensory:                                                                                          Motor    5/5 right shoulder and elbow strengths, right hand   4/5 left shoulder and elbow strengths secondary to pain. Left wrist and hand strength   4/5 right HF and KE. 5/5 right ankle strengths.   3+/5 left HF, 4/+/5 KE. 5/5 left ankle strengths.    TEAM MEETING 3/29  SW:  lives with  + 2 grown sons (1 disable, 1 works part time)- apt + elevator access; 1 TAMMY  OT:  mod I eating and grooming, min A dressing, CG shower transfer  PT: Min A with transfer, ambulation 30 ft RW  barriers: pain and endurance  EDOD: 4/6 Home

## 2021-03-29 NOTE — PROGRESS NOTE ADULT - ASSESSMENT
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 Hospital course complicated by elevated glucoses secondary to steroids. Now admitted to [facility] after for initiation of a multidisciplinary rehab program consisting focused on functional mobility, transfers and ADLs (activities of daily living).    Comprehensive Multidisciplinary Rehab Program:  - Start comprehensive rehab program, 3 hours a day, 5 days a week.  - PT 2hr/day: Focused on improving strength, endurance, coordination, balance, functional mobility, and transfers  - OT 1hr/day: Focused on improving strength, fine motor skills, coordination, posture and ADLs.    - Activity Limitations: Decreased social, vocational and leisure activities, decreased self care and ADLs, decreased mobility, decreased ability to manage household and finances.     Participation Restrictions/Precautions:  - Weight bearing status: WBAT   - ROM restrictions: none  - Precautions:    [x ] Falls   [ ] Spinal   [ ] Hip (Anterior or Posterior)       [ ] Seizure  [ ] Cardiac   [ ] Sternal    Rehab Diagnosis/Management  Sleep:   - Maintain quiet hours and low stim environment.    Pain Management:  -Tylenol PRN  -Gabapentin    GI/Bowel:  -At risk for constipation due to neurologic diagnosis, immobility and/or medication use  -Miralax PRN Daily - will order for Mirlax today  -GI ppx: protonix     /Bladder:   - At risk for incontinence and retention due to neurologic diagnosis and limited mobility  - Currently patient voids:    [x ] independent     [ ] external collection device (condom cath)    [ ] Indwelling colin catheter     [ ] Intermittent catheterization  - Baseline PVR 20cc  - Encourage timed voids every 4 hours while awake for independence and to promote continence during therapy.    Skin/Pressure Injury:   - At risk for pressure injury due to neurologic diagnosis and relative immobility.  - Skin assessment on admission admission: no pressure injury   - Turn every 2 hours while in bed, air mattress  - Skin barrier cream as needed  - Nursing to monitor skin Qshift    Diet/Dysphagia:  - Diet Consistency/Modifications: CC    DVT ppx:  -lovenox  - SCDs    -------------  Comorbid Medical Management:    HTN  -c/w diltiazem    Type 2 Diabetes  -Lantus increased to 28U from 23  -Premeal insulin increased to 11U from 8  -Continue CC diet   -Continue steriod taper and adjust insulin as needed   -HA1C 10.6%    Covid Status   -Covid + last year  -Received the Priya vaccine on 3/23       ---------------  Code Status/Emergency Contact: full code     Outpatient Follow-up (Specialty/Name of physician):    Shanique Chacon (MD)  Internal Medicine; Rheumatology  [address], [address]  [place] [address]  Phone: [phone]  Fax: [phone]  Established Patient  Follow Up Time: 2 weeks    [facility] - [place]  Dermatology  [address]  [place] [address]  Phone: [phone]  Fax: [phone]  Follow Up Time: 2 weeks      --------------  Goals: Safe discharge to home   Estimated Length of Stay: 10-14 days  Rehab Potential: Good  Medical Prognosis: Good  Estimated Disposition: Home with Home Care  ---------------    PRESCREEN COMPARISON:  I have reviewed the prescreen information and I have found no relevant changes between the preadmission screening and my post admission evaluation.    RATIONALE FOR INPATIENT ADMISSION: Patient demonstrates the following:  [X] Medically appropriate for rehabilitation admission  [X] Has attainable rehab goals with an appropriate initial discharge plan  [X]Has rehabilitation potential (expected to make a significant improvement within a reasonable period of time)  [X] Requires close medical management by a rehab physician, rehab nursing care, Hospitalist and comprehensive interdisciplinary team (including PT, OT and/or SLP, Prosthetics and Orthotics)     LIONEL ERAZO is a 65y with a history of DM Type 2 (on insulin), HTN, HLD, COVID (2020), and glaucoma  who presented to [facility] on 3/17 with complaint of joint pain, rash, weakness and 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 Hospital course complicated by elevated glucoses secondary to steroids. Now admitted to [facility] after for initiation of a multidisciplinary rehab program consisting focused on functional mobility, transfers and ADLs (activities of daily living).    Comprehensive Multidisciplinary Rehab Program:  - Start comprehensive rehab program, 3 hours a day, 5 days a week.  - PT 2hr/day: Focused on improving strength, endurance, coordination, balance, functional mobility, and transfers  - OT 1hr/day: Focused on improving strength, fine motor skills, coordination, posture and ADLs.    - Activity Limitations: Decreased social, vocational and leisure activities, decreased self care and ADLs, decreased mobility, decreased ability to manage household and finances.     Participation Restrictions/Precautions:  - Weight bearing status: WBAT   - ROM restrictions: none  - Precautions:    [x ] Falls   [ ] Spinal   [ ] Hip (Anterior or Posterior)       [ ] Seizure  [ ] Cardiac   [ ] Sternal    Rehab Diagnosis/Management  Sleep:   - Maintain quiet hours and low stim environment.    Pain Management:  -Tylenol PRN  -Gabapentin    GI/Bowel:  -At risk for constipation due to neurologic diagnosis, immobility and/or medication use  -Miralax PRN Daily - will order for Mirlax today  -GI ppx: protonix     /Bladder:   - At risk for incontinence and retention due to neurologic diagnosis and limited mobility  - Currently patient voids:    [x ] independent     [ ] external collection device (condom cath)    [ ] Indwelling colin catheter     [ ] Intermittent catheterization  - Baseline PVR 20cc  - Encourage timed voids every 4 hours while awake for independence and to promote continence during therapy.    Skin/Pressure Injury:   - At risk for pressure injury due to neurologic diagnosis and relative immobility.  - Skin assessment on admission admission: no pressure injury   - Turn every 2 hours while in bed, air mattress  - Skin barrier cream as needed  - Nursing to monitor skin Qshift    Diet/Dysphagia:  - Diet Consistency/Modifications: CC    DVT ppx:  -lovenox  - SCDs    -------------  Comorbid Medical Management:    HTN  -c/w diltiazem    Type 2 Diabetes  -Lantus increased to 28U from 23  -Premeal insulin increased to 11U from 8  -Continue CC diet   -Continue steriod taper and adjust insulin as needed   -HA1C 10.6%    Covid Status   -Covid + last year  -Received the Priya vaccine on 3/23       TEAM MEETING 3/29  SW:  lives with  + 2 grown sons (1 disable, 1 works part time)- apt + elevator access; 1 TAMMY  OT:  mod I eating and grooming, min A dressing, CG shower transfer  PT: Min A with transfer, ambulation 30 ft RW  barriers: pain and endurance  EDOD: 4/6 Home    ---------------  Code Status/Emergency Contact: full code     Outpatient Follow-up (Specialty/Name of physician):    Shanique Chaocn)  Internal Medicine; Rheumatology  [address], [address]  [place] [address]  Phone: [phone]  Fax: [phone]  Established Patient  Follow Up Time: 2 weeks    [facility] Dermat[facility] - [place]  Dermatology  [address], [address]  [place] [address]  Phone: [phone]  Fax: [phone]  Follow Up Time: 2 weeks      --------------  Goals: Safe discharge to home   Estimated Length of Stay: 10-14 days  Rehab Potential: Good  Medical Prognosis: Good  Estimated Disposition: Home with Home Care  ---------------    PRESCREEN COMPARISON:  I have reviewed the prescreen information and I have found no relevant changes between the preadmission screening and my post admission evaluation.    RATIONALE FOR INPATIENT ADMISSION: Patient demonstrates the following:  [X] Medically appropriate for rehabilitation admission  [X] Has attainable rehab goals with an appropriate initial discharge plan  [X]Has rehabilitation potential (expected to make a significant improvement within a reasonable period of time)  [X] Requires close medical management by a rehab physician, rehab nursing care, Hospitalist and comprehensive interdisciplinary team (including PT, OT and/or SLP, Prosthetics and Orthotics)     LIONEL ERAZO is a 65y with a history of DM Type 2 (on insulin), HTN, HLD, COVID (2020), and glaucoma  who presented to [facility] on 3/17 with complaint of joint pain, rash, weakness and 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 Hospital course complicated by elevated glucoses secondary to steroids. Now admitted to [facility] after for initiation of a multidisciplinary rehab program consisting focused on functional mobility, transfers and ADLs (activities of daily living).    Comprehensive Multidisciplinary Rehab Program:  - Start comprehensive rehab program, 3 hours a day, 5 days a week.  - PT 2hr/day: Focused on improving strength, endurance, coordination, balance, functional mobility, and transfers  - OT 1hr/day: Focused on improving strength, fine motor skills, coordination, posture and ADLs.    - Activity Limitations: Decreased social, vocational and leisure activities, decreased self care and ADLs, decreased mobility, decreased ability to manage household and finances.     Participation Restrictions/Precautions:  - Weight bearing status: WBAT   - ROM restrictions: none  - Precautions:    [x ] Falls   [ ] Spinal   [ ] Hip (Anterior or Posterior)       [ ] Seizure  [ ] Cardiac   [ ] Sternal    Rehab Diagnosis/Management  Sleep:   - Maintain quiet hours and low stim environment.    Pain Management:  -Tylenol PRN  -Gabapentin    GI/Bowel:  -At risk for constipation due to neurologic diagnosis, immobility and/or medication use  -Miralax PRN Daily - will order for Mirlax today  -GI ppx: protonix     /Bladder:   - At risk for incontinence and retention due to neurologic diagnosis and limited mobility  - Currently patient voids:    [x ] independent     [ ] external collection device (condom cath)    [ ] Indwelling colin catheter     [ ] Intermittent catheterization  - Baseline PVR 20cc  - Encourage timed voids every 4 hours while awake for independence and to promote continence during therapy.    Skin/Pressure Injury:   - At risk for pressure injury due to neurologic diagnosis and relative immobility.  - Skin assessment on admission admission: no pressure injury   - Turn every 2 hours while in bed, air mattress  - Skin barrier cream as needed  - Nursing to monitor skin Qshift    Diet/Dysphagia:  - Diet Consistency/Modifications: CC    DVT ppx:  -lovenox  - SCDs    -------------  Comorbid Medical Management:    HTN  -c/w diltiazem    Type 2 Diabetes  -Lantus increased to 28U from 23  -Premeal insulin increased to 11U from 8  -Continue CC diet   -Continue steriod taper and adjust insulin as needed   -HA1C 10.6%    Covid Status   -Covid + last year  -Received the Priya vaccine on 3/23       ---------------  Code Status/Emergency Contact: full code     Outpatient Follow-up (Specialty/Name of physician):    Shanique Chacon (MD)  Internal Medicine; Rheumatology  [address], [address]  [place] [address]  Phone: [phone]  Fax: [phone]  Established Patient  Follow Up Time: 2 weeks    [facility] Dermatology - [place]  Dermatology  [address]  [place] [address]  Phone: [phone]  Fax: [phone]  Follow Up Time: 2 weeks      --------------  Goals: Safe discharge to home   Estimated Length of Stay: 10-14 days  Rehab Potential: Good  Medical Prognosis: Good  Estimated Disposition: Home with Home Care  ---------------    PRESCREEN COMPARISON:  I have reviewed the prescreen information and I have found no relevant changes between the preadmission screening and my post admission evaluation.    RATIONALE FOR INPATIENT ADMISSION: Patient demonstrates the following:  [X] Medically appropriate for rehabilitation admission  [X] Has attainable rehab goals with an appropriate initial discharge plan  [X]Has rehabilitation potential (expected to make a significant improvement within a reasonable period of time)  [X] Requires close medical management by a rehab physician, rehab nursing care, Hospitalist and comprehensive interdisciplinary team (including PT, OT and/or SLP, Prosthetics and Orthotics)     LIONEL ERAZO is a 65y with a history of DM Type 2 (on insulin), HTN, HLD, COVID (2020), and glaucoma  who presented to [facility] on 3/17 with complaint of joint pain, rash, weakness and 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 Hospital course complicated by elevated glucoses secondary to steroids. Now admitted to [facility] after for initiation of a multidisciplinary rehab program consisting focused on functional mobility, transfers and ADLs (activities of daily living).    Comprehensive Multidisciplinary Rehab Program:  - comprehensive rehab program, 3 hours a day, 5 days a week.  - PT 2hr/day: Focused on improving strength, endurance, coordination, balance, functional mobility, and transfers  - OT 1hr/day: Focused on improving strength, fine motor skills, coordination, posture and ADLs.    - Activity Limitations: Decreased social, vocational and leisure activities, decreased self care and ADLs, decreased mobility, decreased ability to manage household and finances.     Participation Restrictions/Precautions:  - Weight bearing status: WBAT   - ROM restrictions: none  - Precautions:    [x ] Falls   [ ] Spinal   [ ] Hip (Anterior or Posterior)       [ ] Seizure  [ ] Cardiac   [ ] Sternal    Rehab Diagnosis/Management  Sleep:   - Maintain quiet hours and low stim environment.    Pain Management:  -Tylenol PRN  -Gabapentin    GI/Bowel:  -At risk for constipation due to neurologic diagnosis, immobility and/or medication use  -Miralax PRN Daily - will order for Miralax today  -GI ppx: protonix     /Bladder:   - At risk for incontinence and retention due to neurologic diagnosis and limited mobility  - Currently patient voids:    [x ] independent     [ ] external collection device (condom cath)    [ ] Indwelling colin catheter     [ ] Intermittent catheterization  - Baseline PVR 20cc  - Encourage timed voids every 4 hours while awake for independence and to promote continence during therapy.    Skin/Pressure Injury:   - At risk for pressure injury due to neurologic diagnosis and relative immobility.  - Skin assessment on admission admission: no pressure injury   - Turn every 2 hours while in bed, air mattress  - Skin barrier cream as needed  - Nursing to monitor skin Qshift    Diet/Dysphagia:  - Diet Consistency/Modifications: CC    DVT ppx:  -lovenox  - SCDs    -------------  Comorbid Medical Management:    HTN  -c/w diltiazem    Type 2 Diabetes  -Lantus increased to 28U from 23  -Premeal insulin increased to 11U from 8  -Continue CC diet   -Continue steriod taper and adjust insulin as needed   -HA1C 10.6%    Covid Status   -Covid + last year  -Received the Priya vaccine on 3/23       ---------------  Code Status/Emergency Contact: full code     Outpatient Follow-up (Specialty/Name of physician):    Shanique Chacon (MD)  Internal Medicine; Rheumatology  [address], [address]  [place] [address]  Phone: [phone]  Fax: [phone]  Established Patient  Follow Up Time: 2 weeks    [facility] Dermatology - [place]  Dermatology  [address]  [place] [address]  Phone: [phone]  Fax: [phone]  Follow Up Time: 2 weeks      --------------  Goals: Safe discharge to home   Estimated Length of Stay: 10-14 days  Rehab Potential: Good  Medical Prognosis: Good  Estimated Disposition: Home with Home Care  ---------------    PRESCREEN COMPARISON:  I have reviewed the prescreen information and I have found no relevant changes between the preadmission screening and my post admission evaluation.    RATIONALE FOR INPATIENT ADMISSION: Patient demonstrates the following:  [X] Medically appropriate for rehabilitation admission  [X] Has attainable rehab goals with an appropriate initial discharge plan  [X]Has rehabilitation potential (expected to make a significant improvement within a reasonable period of time)  [X] Requires close medical management by a rehab physician, rehab nursing care, Hospitalist and comprehensive interdisciplinary team (including PT, OT and/or SLP, Prosthetics and Orthotics)

## 2021-03-29 NOTE — PROGRESS NOTE ADULT - SUBJECTIVE AND OBJECTIVE BOX
Patient is a 65y old  Female who presents with a chief complaint of 06.9 other arthritis (29 Mar 2021 12:12)      24 HOUR EVENTS:  No overnight events reported.     SUBJECTIVE:  Patient seen and examined at bedside. She has "itching" on her head.  - she has left shoulder and knee pain.  She denies fevers and chills. No trouble breathing.     ALLERGIES:  azithromycin (Hives; Swelling)  enalapril (Other (Mild to Mod))  penicillin (Swelling; Rash)    MEDICATIONS  (STANDING):  atorvastatin 40 milliGRAM(s) Oral at bedtime  cholecalciferol 2000 Unit(s) Oral daily  dextrose 40% Gel 15 Gram(s) Oral once  dextrose 5%. 1000 milliLiter(s) (50 mL/Hr) IV Continuous <Continuous>  dextrose 5%. 1000 milliLiter(s) (100 mL/Hr) IV Continuous <Continuous>  dextrose 50% Injectable 25 Gram(s) IV Push once  dextrose 50% Injectable 12.5 Gram(s) IV Push once  dextrose 50% Injectable 25 Gram(s) IV Push once  diltiazem    milliGRAM(s) Oral daily  enoxaparin Injectable 40 milliGRAM(s) SubCutaneous daily  fluocinonide 0.05% Cream 1 Application(s) Topical two times a day  gabapentin 300 milliGRAM(s) Oral every 8 hours  glucagon  Injectable 1 milliGRAM(s) IntraMuscular once  insulin glargine Injectable (LANTUS) 28 Unit(s) SubCutaneous at bedtime  insulin lispro (ADMELOG) corrective regimen sliding scale   SubCutaneous three times a day before meals  insulin lispro (ADMELOG) corrective regimen sliding scale   SubCutaneous at bedtime  insulin lispro Injectable (ADMELOG) 11 Unit(s) SubCutaneous three times a day before meals  ketotifen 0.025% Ophthalmic Solution 1 Drop(s) Both EYES two times a day  latanoprost 0.005% Ophthalmic Solution 1 Drop(s) Both EYES at bedtime  nystatin    Suspension 679988 Unit(s) Swish and Swallow every 6 hours  pantoprazole    Tablet 40 milliGRAM(s) Oral before breakfast  petrolatum white Ointment 1 Application(s) Topical daily  predniSONE   Tablet 40 milliGRAM(s) Oral daily  predniSONE   Tablet 40  Oral   triamcinolone 0.1% Ointment 1 Application(s) Topical two times a day  trimethoprim  160 mG/sulfamethoxazole 800 mG 1 Tablet(s) Oral <User Schedule>    MEDICATIONS  (PRN):  acetaminophen   Tablet .. 650 milliGRAM(s) Oral every 6 hours PRN Temp greater or equal to 38C (100.4F), Mild Pain (1 - 3), Moderate Pain (4 - 6)  hydrOXYzine hydrochloride 25 milliGRAM(s) Oral every 6 hours PRN Itching  polyethylene glycol 3350 17 Gram(s) Oral daily PRN Constipation    Vital Signs Last 24 Hrs  T(F): 97.4 (29 Mar 2021 08:47), Max: 97.4 (29 Mar 2021 08:47)  HR: 62 (29 Mar 2021 08:47) (62 - 63)  BP: 116/69 (29 Mar 2021 08:47) (116/69 - 138/70)  RR: 14 (29 Mar 2021 08:47) (14 - 16)  SpO2: 95% (29 Mar 2021 08:47) (95% - 97%)  I&O's Summary    PHYSICAL EXAM:  General: NAD, A/O x 3  ENT: Moist mucous membranes, no thrush  Neck: Supple, No JVD  Lungs: Clear to auscultation bilaterally, good air entry, non-labored breathing  Cardio: RRR, S1/S2, No murmur  Abdomen: Soft, Nontender, Nondistended; Bowel sounds present  Extremities: No calf tenderness, No pitting edema    LABS:                        10.5   15.41 )-----------( 488      ( 29 Mar 2021 07:15 )             31.9     03-29    136  |  101  |  20  ----------------------------<  221  4.5   |  30  |  0.83    Ca    8.7      29 Mar 2021 07:15    TPro  6.6  /  Alb  2.5  /  TBili  0.3  /  DBili  x   /  AST  26  /  ALT  75  /  AlkPhos  112  03-29        eGFR if Non African American: 75 mL/min/1.73M2 (03-29-21 @ 07:15)  eGFR if African American: 87 mL/min/1.73M2 (03-29-21 @ 07:15)      03-17 Chol 88 mg/dL LDL -- HDL 29 mg/dL Trig 129 mg/dL    POCT Blood Glucose.: 339 mg/dL (29 Mar 2021 11:52)  POCT Blood Glucose.: 202 mg/dL (29 Mar 2021 07:41)  POCT Blood Glucose.: 257 mg/dL (28 Mar 2021 23:34)  POCT Blood Glucose.: 316 mg/dL (28 Mar 2021 16:48)          RADIOLOGY & ADDITIONAL TESTS:    Care Discussed with Consultants/Other Providers:   PMR, Dr. Perez.

## 2021-03-29 NOTE — PROGRESS NOTE ADULT - ASSESSMENT
66 y/o Wolof speaking Female with PMHx of DM Type 2 (on metformin and insulin), HTN on diltiazem , HLD on statin, COVID (2020), and glaucoma, presented to [facility] 3/17/2021 with joint and muscle pain, rash, pruritis. 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 Skin biopsy not consistent with this diagnosis and suggestive of a viral rash or drug-related rash. She continues on steroids.     Now admitted to [facility] after for initiation of a multidisciplinary rehab program consisting focused on functional mobility, transfers and ADLs - PT/OT/DVT PPX PAIN MEDS    # Functional and gait instability:  - C/w PT/OT per primary team     #Rash, unclear etiology, viral vs. drug related.   - pt is on a prednisone taper  - bactrim 3x/week for PJP prophylaxis while on steroids.  - gabapentin for neuropathy.     # HTN  - cont diltiazem 300 mg CD  - will monitor BP closely.     # HLD  - cont statin    # DM2 w/ steroid induced hyperglycemia  Uncontrolled w/ hgba1c of 10.6%  - increased lantus to 28 units, lispro 11 units wm, moderate ISS  - appreciate the assistance of diabetes nurse, Zehra Lira    # GERD  - cont protonix    # Pruritis  - agree with hydroxyzine, monitor for anti-cholinergic side effects.     DVT ppx:   - lovenox.

## 2021-03-30 LAB
GLUCOSE BLDC GLUCOMTR-MCNC: 147 MG/DL — HIGH (ref 70–99)
GLUCOSE BLDC GLUCOMTR-MCNC: 239 MG/DL — HIGH (ref 70–99)
GLUCOSE BLDC GLUCOMTR-MCNC: 291 MG/DL — HIGH (ref 70–99)
GLUCOSE BLDC GLUCOMTR-MCNC: 316 MG/DL — HIGH (ref 70–99)

## 2021-03-30 PROCEDURE — 99232 SBSQ HOSP IP/OBS MODERATE 35: CPT

## 2021-03-30 PROCEDURE — 99232 SBSQ HOSP IP/OBS MODERATE 35: CPT | Mod: GC

## 2021-03-30 RX ORDER — SENNA PLUS 8.6 MG/1
2 TABLET ORAL AT BEDTIME
Refills: 0 | Status: DISCONTINUED | OUTPATIENT
Start: 2021-03-30 | End: 2021-04-06

## 2021-03-30 RX ORDER — POLYETHYLENE GLYCOL 3350 17 G/17G
17 POWDER, FOR SOLUTION ORAL DAILY
Refills: 0 | Status: DISCONTINUED | OUTPATIENT
Start: 2021-03-30 | End: 2021-04-06

## 2021-03-30 RX ADMIN — Medication 11 UNIT(S): at 17:26

## 2021-03-30 RX ADMIN — GABAPENTIN 300 MILLIGRAM(S): 400 CAPSULE ORAL at 14:39

## 2021-03-30 RX ADMIN — Medication 11 UNIT(S): at 12:02

## 2021-03-30 RX ADMIN — LATANOPROST 1 DROP(S): 0.05 SOLUTION/ DROPS OPHTHALMIC; TOPICAL at 21:26

## 2021-03-30 RX ADMIN — Medication 8: at 17:26

## 2021-03-30 RX ADMIN — PANTOPRAZOLE SODIUM 40 MILLIGRAM(S): 20 TABLET, DELAYED RELEASE ORAL at 05:37

## 2021-03-30 RX ADMIN — Medication 500000 UNIT(S): at 18:01

## 2021-03-30 RX ADMIN — Medication 650 MILLIGRAM(S): at 12:30

## 2021-03-30 RX ADMIN — Medication 650 MILLIGRAM(S): at 12:03

## 2021-03-30 RX ADMIN — Medication 500000 UNIT(S): at 12:02

## 2021-03-30 RX ADMIN — Medication 11 UNIT(S): at 07:59

## 2021-03-30 RX ADMIN — Medication 25 MILLIGRAM(S): at 23:53

## 2021-03-30 RX ADMIN — KETOTIFEN FUMARATE 1 DROP(S): 0.34 SOLUTION OPHTHALMIC at 18:01

## 2021-03-30 RX ADMIN — Medication 1 APPLICATION(S): at 12:06

## 2021-03-30 RX ADMIN — Medication 1 APPLICATION(S): at 18:02

## 2021-03-30 RX ADMIN — Medication 2000 UNIT(S): at 12:01

## 2021-03-30 RX ADMIN — SENNA PLUS 2 TABLET(S): 8.6 TABLET ORAL at 21:26

## 2021-03-30 RX ADMIN — Medication 500000 UNIT(S): at 23:53

## 2021-03-30 RX ADMIN — Medication 1 APPLICATION(S): at 18:01

## 2021-03-30 RX ADMIN — Medication 1 APPLICATION(S): at 05:38

## 2021-03-30 RX ADMIN — GABAPENTIN 300 MILLIGRAM(S): 400 CAPSULE ORAL at 05:45

## 2021-03-30 RX ADMIN — Medication 40 MILLIGRAM(S): at 05:39

## 2021-03-30 RX ADMIN — Medication 500000 UNIT(S): at 05:37

## 2021-03-30 RX ADMIN — GABAPENTIN 300 MILLIGRAM(S): 400 CAPSULE ORAL at 21:25

## 2021-03-30 RX ADMIN — ENOXAPARIN SODIUM 40 MILLIGRAM(S): 100 INJECTION SUBCUTANEOUS at 12:02

## 2021-03-30 RX ADMIN — INSULIN GLARGINE 28 UNIT(S): 100 INJECTION, SOLUTION SUBCUTANEOUS at 21:26

## 2021-03-30 RX ADMIN — ATORVASTATIN CALCIUM 40 MILLIGRAM(S): 80 TABLET, FILM COATED ORAL at 21:25

## 2021-03-30 RX ADMIN — Medication 300 MILLIGRAM(S): at 05:37

## 2021-03-30 RX ADMIN — KETOTIFEN FUMARATE 1 DROP(S): 0.34 SOLUTION OPHTHALMIC at 05:37

## 2021-03-30 RX ADMIN — Medication 6: at 12:01

## 2021-03-30 NOTE — PROGRESS NOTE ADULT - SUBJECTIVE AND OBJECTIVE BOX
Patient is a 65y old  Female who presents with a chief complaint of 06.9 other arthritis (29 Mar 2021 15:16)      24 HOUR EVENTS:  No overnight events reported.     SUBJECTIVE:  Patient seen and examined at bedside.   She feels like left leg is "trembling." She has itching under her arms and in her groin - thinks its from sweating. She denies having any discharge from the vagina.   She was encouraged to take hydroxyzine.     ALLERGIES:  azithromycin (Hives; Swelling)  enalapril (Other (Mild to Mod))  penicillin (Swelling; Rash)    MEDICATIONS  (STANDING):  atorvastatin 40 milliGRAM(s) Oral at bedtime  cholecalciferol 2000 Unit(s) Oral daily  dextrose 40% Gel 15 Gram(s) Oral once  dextrose 5%. 1000 milliLiter(s) (50 mL/Hr) IV Continuous <Continuous>  dextrose 5%. 1000 milliLiter(s) (100 mL/Hr) IV Continuous <Continuous>  dextrose 50% Injectable 25 Gram(s) IV Push once  dextrose 50% Injectable 12.5 Gram(s) IV Push once  dextrose 50% Injectable 25 Gram(s) IV Push once  diltiazem    milliGRAM(s) Oral daily  enoxaparin Injectable 40 milliGRAM(s) SubCutaneous daily  fluocinonide 0.05% Cream 1 Application(s) Topical two times a day  gabapentin 300 milliGRAM(s) Oral every 8 hours  glucagon  Injectable 1 milliGRAM(s) IntraMuscular once  insulin glargine Injectable (LANTUS) 28 Unit(s) SubCutaneous at bedtime  insulin lispro (ADMELOG) corrective regimen sliding scale   SubCutaneous three times a day before meals  insulin lispro (ADMELOG) corrective regimen sliding scale   SubCutaneous at bedtime  insulin lispro Injectable (ADMELOG) 11 Unit(s) SubCutaneous three times a day before meals  ketotifen 0.025% Ophthalmic Solution 1 Drop(s) Both EYES two times a day  latanoprost 0.005% Ophthalmic Solution 1 Drop(s) Both EYES at bedtime  nystatin    Suspension 556305 Unit(s) Swish and Swallow every 6 hours  pantoprazole    Tablet 40 milliGRAM(s) Oral before breakfast  petrolatum white Ointment 1 Application(s) Topical daily  predniSONE   Tablet 40  Oral   triamcinolone 0.1% Ointment 1 Application(s) Topical two times a day  trimethoprim  160 mG/sulfamethoxazole 800 mG 1 Tablet(s) Oral <User Schedule>    MEDICATIONS  (PRN):  acetaminophen   Tablet .. 650 milliGRAM(s) Oral every 6 hours PRN Temp greater or equal to 38C (100.4F), Mild Pain (1 - 3), Moderate Pain (4 - 6)  hydrOXYzine hydrochloride 25 milliGRAM(s) Oral every 6 hours PRN Itching  polyethylene glycol 3350 17 Gram(s) Oral daily PRN Constipation    Vital Signs Last 24 Hrs  T(F): 97.5 (30 Mar 2021 07:35), Max: 98 (29 Mar 2021 22:11)  HR: 68 (30 Mar 2021 07:35) (65 - 68)  BP: 145/80 (30 Mar 2021 07:35) (106/62 - 145/80)  RR: 15 (30 Mar 2021 07:35) (15 - 15)  SpO2: 98% (30 Mar 2021 07:35) (94% - 98%)  I&O's Summary    PHYSICAL EXAM:  General: NAD, A/O x 3  ENT: Moist mucous membranes, no thrush  Neck: Supple, No JVD  Lungs: Clear to auscultation bilaterally, good air entry, non-labored breathing  Cardio: RRR, S1/S2, No murmur  Abdomen: Soft, Nontender, Nondistended; Bowel sounds present  Extremities: No calf tenderness, No pitting edema    LABS:                        10.5   15.41 )-----------( 488      ( 29 Mar 2021 07:15 )             31.9     03-29    136  |  101  |  20  ----------------------------<  221  4.5   |  30  |  0.83    Ca    8.7      29 Mar 2021 07:15    TPro  6.6  /  Alb  2.5  /  TBili  0.3  /  DBili  x   /  AST  26  /  ALT  75  /  AlkPhos  112  03-29        eGFR if Non African American: 75 mL/min/1.73M2 (03-29-21 @ 07:15)  eGFR if African American: 87 mL/min/1.73M2 (03-29-21 @ 07:15)              03-17 Chol 88 mg/dL LDL -- HDL 29 mg/dL Trig 129 mg/dL              POCT Blood Glucose.: 291 mg/dL (30 Mar 2021 12:00)  POCT Blood Glucose.: 147 mg/dL (30 Mar 2021 07:38)  POCT Blood Glucose.: 218 mg/dL (29 Mar 2021 22:15)  POCT Blood Glucose.: 298 mg/dL (29 Mar 2021 16:42)          RADIOLOGY & ADDITIONAL TESTS:    Care Discussed with Consultants/Other Providers:   Dr. Perez, PMR Patient is a 65y old  Female who presents with a chief complaint of 06.9 other arthritis (29 Mar 2021 15:16)      24 HOUR EVENTS:  No overnight events reported.     SUBJECTIVE:  Patient seen and examined at bedside.   She feels like left leg is "trembling." She has itching under her arms and in her groin - thinks its from sweating. She denies having any discharge from the vagina. Spoke to her nurse who admitted her who said patient has had this complaint since admission.   She was encouraged to take hydroxyzine.     ALLERGIES:  azithromycin (Hives; Swelling)  enalapril (Other (Mild to Mod))  penicillin (Swelling; Rash)    MEDICATIONS  (STANDING):  atorvastatin 40 milliGRAM(s) Oral at bedtime  cholecalciferol 2000 Unit(s) Oral daily  dextrose 40% Gel 15 Gram(s) Oral once  dextrose 5%. 1000 milliLiter(s) (50 mL/Hr) IV Continuous <Continuous>  dextrose 5%. 1000 milliLiter(s) (100 mL/Hr) IV Continuous <Continuous>  dextrose 50% Injectable 25 Gram(s) IV Push once  dextrose 50% Injectable 12.5 Gram(s) IV Push once  dextrose 50% Injectable 25 Gram(s) IV Push once  diltiazem    milliGRAM(s) Oral daily  enoxaparin Injectable 40 milliGRAM(s) SubCutaneous daily  fluocinonide 0.05% Cream 1 Application(s) Topical two times a day  gabapentin 300 milliGRAM(s) Oral every 8 hours  glucagon  Injectable 1 milliGRAM(s) IntraMuscular once  insulin glargine Injectable (LANTUS) 28 Unit(s) SubCutaneous at bedtime  insulin lispro (ADMELOG) corrective regimen sliding scale   SubCutaneous three times a day before meals  insulin lispro (ADMELOG) corrective regimen sliding scale   SubCutaneous at bedtime  insulin lispro Injectable (ADMELOG) 11 Unit(s) SubCutaneous three times a day before meals  ketotifen 0.025% Ophthalmic Solution 1 Drop(s) Both EYES two times a day  latanoprost 0.005% Ophthalmic Solution 1 Drop(s) Both EYES at bedtime  nystatin    Suspension 271322 Unit(s) Swish and Swallow every 6 hours  pantoprazole    Tablet 40 milliGRAM(s) Oral before breakfast  petrolatum white Ointment 1 Application(s) Topical daily  predniSONE   Tablet 40  Oral   triamcinolone 0.1% Ointment 1 Application(s) Topical two times a day  trimethoprim  160 mG/sulfamethoxazole 800 mG 1 Tablet(s) Oral <User Schedule>    MEDICATIONS  (PRN):  acetaminophen   Tablet .. 650 milliGRAM(s) Oral every 6 hours PRN Temp greater or equal to 38C (100.4F), Mild Pain (1 - 3), Moderate Pain (4 - 6)  hydrOXYzine hydrochloride 25 milliGRAM(s) Oral every 6 hours PRN Itching  polyethylene glycol 3350 17 Gram(s) Oral daily PRN Constipation    Vital Signs Last 24 Hrs  T(F): 97.5 (30 Mar 2021 07:35), Max: 98 (29 Mar 2021 22:11)  HR: 68 (30 Mar 2021 07:35) (65 - 68)  BP: 145/80 (30 Mar 2021 07:35) (106/62 - 145/80)  RR: 15 (30 Mar 2021 07:35) (15 - 15)  SpO2: 98% (30 Mar 2021 07:35) (94% - 98%)  I&O's Summary    PHYSICAL EXAM:  General: NAD, A/O x 3  ENT: Moist mucous membranes, no thrush  Neck: Supple, No JVD  Lungs: Clear to auscultation bilaterally, good air entry, non-labored breathing  Cardio: RRR, S1/S2, No murmur  Abdomen: Soft, Nontender, Nondistended; Bowel sounds present  Extremities: No calf tenderness, No pitting edema    LABS:                        10.5   15.41 )-----------( 488      ( 29 Mar 2021 07:15 )             31.9     03-29    136  |  101  |  20  ----------------------------<  221  4.5   |  30  |  0.83    Ca    8.7      29 Mar 2021 07:15    TPro  6.6  /  Alb  2.5  /  TBili  0.3  /  DBili  x   /  AST  26  /  ALT  75  /  AlkPhos  112  03-29        eGFR if Non African American: 75 mL/min/1.73M2 (03-29-21 @ 07:15)  eGFR if African American: 87 mL/min/1.73M2 (03-29-21 @ 07:15)              03-17 Chol 88 mg/dL LDL -- HDL 29 mg/dL Trig 129 mg/dL              POCT Blood Glucose.: 291 mg/dL (30 Mar 2021 12:00)  POCT Blood Glucose.: 147 mg/dL (30 Mar 2021 07:38)  POCT Blood Glucose.: 218 mg/dL (29 Mar 2021 22:15)  POCT Blood Glucose.: 298 mg/dL (29 Mar 2021 16:42)          RADIOLOGY & ADDITIONAL TESTS:    Care Discussed with Consultants/Other Providers:   Dr. Perez, PMR Patient is a 65y old  Female who presents with a chief complaint of 06.9 other arthritis (29 Mar 2021 15:16)      24 HOUR EVENTS:  No overnight events reported.     SUBJECTIVE:  Patient seen and examined at bedside.   She feels like left leg is "trembling." She has itching under her arms and in her groin - thinks its from sweating. She denies having any discharge from the vagina. Spoke to her nurse who admitted her who said patient has had this complaint since admission.   She was encouraged to take hydroxyzine.     Subj obtained with the help of a  through [employer]  services.     ALLERGIES:  azithromycin (Hives; Swelling)  enalapril (Other (Mild to Mod))  penicillin (Swelling; Rash)    MEDICATIONS  (STANDING):  atorvastatin 40 milliGRAM(s) Oral at bedtime  cholecalciferol 2000 Unit(s) Oral daily  dextrose 40% Gel 15 Gram(s) Oral once  dextrose 5%. 1000 milliLiter(s) (50 mL/Hr) IV Continuous <Continuous>  dextrose 5%. 1000 milliLiter(s) (100 mL/Hr) IV Continuous <Continuous>  dextrose 50% Injectable 25 Gram(s) IV Push once  dextrose 50% Injectable 12.5 Gram(s) IV Push once  dextrose 50% Injectable 25 Gram(s) IV Push once  diltiazem    milliGRAM(s) Oral daily  enoxaparin Injectable 40 milliGRAM(s) SubCutaneous daily  fluocinonide 0.05% Cream 1 Application(s) Topical two times a day  gabapentin 300 milliGRAM(s) Oral every 8 hours  glucagon  Injectable 1 milliGRAM(s) IntraMuscular once  insulin glargine Injectable (LANTUS) 28 Unit(s) SubCutaneous at bedtime  insulin lispro (ADMELOG) corrective regimen sliding scale   SubCutaneous three times a day before meals  insulin lispro (ADMELOG) corrective regimen sliding scale   SubCutaneous at bedtime  insulin lispro Injectable (ADMELOG) 11 Unit(s) SubCutaneous three times a day before meals  ketotifen 0.025% Ophthalmic Solution 1 Drop(s) Both EYES two times a day  latanoprost 0.005% Ophthalmic Solution 1 Drop(s) Both EYES at bedtime  nystatin    Suspension 455816 Unit(s) Swish and Swallow every 6 hours  pantoprazole    Tablet 40 milliGRAM(s) Oral before breakfast  petrolatum white Ointment 1 Application(s) Topical daily  predniSONE   Tablet 40  Oral   triamcinolone 0.1% Ointment 1 Application(s) Topical two times a day  trimethoprim  160 mG/sulfamethoxazole 800 mG 1 Tablet(s) Oral <User Schedule>    MEDICATIONS  (PRN):  acetaminophen   Tablet .. 650 milliGRAM(s) Oral every 6 hours PRN Temp greater or equal to 38C (100.4F), Mild Pain (1 - 3), Moderate Pain (4 - 6)  hydrOXYzine hydrochloride 25 milliGRAM(s) Oral every 6 hours PRN Itching  polyethylene glycol 3350 17 Gram(s) Oral daily PRN Constipation    Vital Signs Last 24 Hrs  T(F): 97.5 (30 Mar 2021 07:35), Max: 98 (29 Mar 2021 22:11)  HR: 68 (30 Mar 2021 07:35) (65 - 68)  BP: 145/80 (30 Mar 2021 07:35) (106/62 - 145/80)  RR: 15 (30 Mar 2021 07:35) (15 - 15)  SpO2: 98% (30 Mar 2021 07:35) (94% - 98%)  I&O's Summary    PHYSICAL EXAM:  General: NAD, A/O x 3  ENT: Moist mucous membranes, no thrush  Neck: Supple, No JVD  Lungs: Clear to auscultation bilaterally, good air entry, non-labored breathing  Cardio: RRR, S1/S2, No murmur  Abdomen: Soft, Nontender, Nondistended; Bowel sounds present  Extremities: No calf tenderness, No pitting edema    LABS:                        10.5   15.41 )-----------( 488      ( 29 Mar 2021 07:15 )             31.9     03-29    136  |  101  |  20  ----------------------------<  221  4.5   |  30  |  0.83    Ca    8.7      29 Mar 2021 07:15    TPro  6.6  /  Alb  2.5  /  TBili  0.3  /  DBili  x   /  AST  26  /  ALT  75  /  AlkPhos  112  03-29        eGFR if Non African American: 75 mL/min/1.73M2 (03-29-21 @ 07:15)  eGFR if African American: 87 mL/min/1.73M2 (03-29-21 @ 07:15)              03-17 Chol 88 mg/dL LDL -- HDL 29 mg/dL Trig 129 mg/dL              POCT Blood Glucose.: 291 mg/dL (30 Mar 2021 12:00)  POCT Blood Glucose.: 147 mg/dL (30 Mar 2021 07:38)  POCT Blood Glucose.: 218 mg/dL (29 Mar 2021 22:15)  POCT Blood Glucose.: 298 mg/dL (29 Mar 2021 16:42)          RADIOLOGY & ADDITIONAL TESTS:    Care Discussed with Consultants/Other Providers:   Dr. Perez, PMR

## 2021-03-30 NOTE — PROGRESS NOTE ADULT - ASSESSMENT
5 y/o Anguillan speaking Female with PMHx of DM Type 2 (on metformin and insulin), HTN on diltiazem , HLD on statin, COVID (2020), and glaucoma, presented to [facility] 3/17/2021 with joint and muscle pain, rash, pruritis. 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 Skin biopsy not consistent with this diagnosis and suggestive of a viral rash or drug-related rash. She continues on steroids.     Now admitted to [facility] after for initiation of a multidisciplinary rehab program consisting focused on functional mobility, transfers and ADLs - PT/OT/DVT PPX PAIN MEDS    # Functional and gait instability:  - C/w PT/OT per primary team     #Rash, unclear etiology, viral vs. drug related.   - pt is on a prednisone taper  - bactrim 3x/week for PJP prophylaxis while on steroids.  - gabapentin for neuropathy.     # HTN  - cont diltiazem 300 mg CD  - will monitor BP closely.     # HLD  - cont statin    # DM2 w/ steroid induced hyperglycemia  Uncontrolled w/ hgba1c of 10.6%  - increased lantus to 28 units, lispro 11 units wm, moderate ISS  - appreciate the assistance of diabetes nurse, Zehra Lira  - fasting sugar within normal range.     # GERD  - cont protonix    # Pruritis  - agree with hydroxyzine, monitor for anti-cholinergic side effects.     DVT ppx:   - lovenox.  5 y/o Barbadian speaking Female with PMHx of DM Type 2 (on metformin and insulin), HTN on diltiazem , HLD on statin, COVID (2020), and glaucoma, presented to [facility] 3/17/2021 with joint and muscle pain, rash, pruritis. 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 Skin biopsy not consistent with this diagnosis and suggestive of a viral rash or drug-related rash. She continues on steroids.     Now admitted to [facility] after for initiation of a multidisciplinary rehab program consisting focused on functional mobility, transfers and ADLs - PT/OT/DVT PPX PAIN MEDS    # Functional and gait instability:  - C/w PT/OT per primary team     #Rash, unclear etiology, viral vs. drug related.   # associated pruritis  - pt is on a prednisone taper  - bactrim 3x/week for PJP prophylaxis while on steroids.  - gabapentin for neuropathy.   - agree with hydroxyzine PRN itching, monitor for anti-cholinergic side effects.   - nursing to f/u groin - will order nystatin if fungal rash.    # HTN  - cont diltiazem 300 mg CD  - will monitor BP closely.     # HLD  - cont statin    # DM2 w/ steroid induced hyperglycemia  Uncontrolled w/ hgba1c of 10.6%  - increased lantus to 28 units, lispro 11 units wm, moderate ISS  - appreciate the assistance of diabetes nurse, Zehra Lira  - fasting sugar within normal range.     # GERD  - cont protonix    DVT ppx:   - lovenox.  5 y/o Guatemalan speaking Female with PMHx of DM Type 2 (on metformin and insulin), HTN on diltiazem , HLD on statin, COVID (2020), and glaucoma, presented to [facility] 3/17/2021 with joint and muscle pain, rash, pruritis. 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 Skin biopsy not consistent with this diagnosis and suggestive of a viral rash or drug-related rash. She continues on steroids.     Now admitted to [facility] after for initiation of a multidisciplinary rehab program consisting focused on functional mobility, transfers and ADLs - PT/OT/DVT PPX PAIN MEDS    # Functional and gait instability:  - C/w PT/OT per primary team     #Rash, unclear etiology, viral vs. drug related. Possible Amyopathic Dermatomyositis  # associated pruritis  - pt is on a prednisone taper  - bactrim 3x/week for PJP prophylaxis while on steroids.  - gabapentin for neuropathy.   - agree with hydroxyzine PRN itching, monitor for anti-cholinergic side effects.   - nursing to f/u groin - will order nystatin if fungal rash.    # HTN  - cont diltiazem 300 mg CD  - will monitor BP closely.     # HLD  - cont statin    # DM2 w/ steroid induced hyperglycemia  Uncontrolled w/ hgba1c of 10.6%  - increased lantus to 28 units, lispro 11 units wm, moderate ISS  - appreciate the assistance of diabetes nurse, Zehra Lira  - fasting sugar within normal range.     # GERD  - cont protonix    DVT ppx:   - lovenox.

## 2021-03-30 NOTE — PROGRESS NOTE ADULT - ASSESSMENT
LIONEL ERAZO is a 65y with a history of DM Type 2 (on insulin), HTN, HLD, COVID (2020), and glaucoma  who presented to [facility] on 3/17 with complaint of joint pain, rash, weakness and 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 Hospital course complicated by elevated glucoses secondary to steroids. Now admitted to [facility] after for initiation of a multidisciplinary rehab program consisting focused on functional mobility, transfers and ADLs (activities of daily living).    Comprehensive Multidisciplinary Rehab Program:  - comprehensive rehab program, 3 hours a day, 5 days a week.  - PT 2hr/day: Focused on improving strength, endurance, coordination, balance, functional mobility, and transfers  - OT 1hr/day: Focused on improving strength, fine motor skills, coordination, posture and ADLs.    - Activity Limitations: Decreased social, vocational and leisure activities, decreased self care and ADLs, decreased mobility, decreased ability to manage household and finances.     Participation Restrictions/Precautions:  - Weight bearing status: WBAT   - ROM restrictions: none  - Precautions:    [x ] Falls   [ ] Spinal   [ ] Hip (Anterior or Posterior)       [ ] Seizure  [ ] Cardiac   [ ] Sternal    Rehab Diagnosis/Management  Sleep:   - Maintain quiet hours and low stim environment.    Pain Management:  -Tylenol PRN  -Gabapentin    GI/Bowel:  -At risk for constipation due to neurologic diagnosis, immobility and/or medication use  -Miralax PRN Daily - will order Miralax /senna daily  -GI ppx: protonix     /Bladder:   - At risk for incontinence and retention due to neurologic diagnosis and limited mobility  - Currently patient voids:    [x ] independent     [ ] external collection device (condom cath)    [ ] Indwelling colin catheter     [ ] Intermittent catheterization  - Baseline PVR 20cc  - Encourage timed voids every 4 hours while awake for independence and to promote continence during therapy.    Skin/Pressure Injury:   - At risk for pressure injury due to neurologic diagnosis and relative immobility.  - Skin assessment on admission admission: no pressure injury   - Turn every 2 hours while in bed, air mattress  - Skin barrier cream as needed  - Nursing to monitor skin Qshift    Diet/Dysphagia:  - Diet Consistency/Modifications: CC    DVT ppx:  -lovenox  - SCDs    -------------  Comorbid Medical Management:    HTN  -c/w diltiazem    Type 2 Diabetes  -Lantus increased to 28U from 23  -Premeal insulin increased to 11U from 8  -Continue CC diet   -Continue steriod taper and adjust insulin as needed   -HA1C 10.6%    Covid Status   -Covid + last year  -Received the Priya vaccine on 3/23       ---------------  Code Status/Emergency Contact: full code     Outpatient Follow-up (Specialty/Name of physician):    Shanique Chacon (MD)  Internal Medicine; Rheumatology  [address], [address]  [place] [address]  Phone: [phone]  Fax: [phone]  Established Patient  Follow Up Time: 2 weeks    [facility] - [place]  Dermatology  [address]  [place] [address]  Phone: [phone]  Fax: [phone]  Follow Up Time: 2 weeks      --------------  Goals: Safe discharge to home   Estimated Length of Stay: 10-14 days  Rehab Potential: Good  Medical Prognosis: Good  Estimated Disposition: Home with Home Care  ---------------    PRESCREEN COMPARISON:  I have reviewed the prescreen information and I have found no relevant changes between the preadmission screening and my post admission evaluation.    RATIONALE FOR INPATIENT ADMISSION: Patient demonstrates the following:  [X] Medically appropriate for rehabilitation admission  [X] Has attainable rehab goals with an appropriate initial discharge plan  [X]Has rehabilitation potential (expected to make a significant improvement within a reasonable period of time)  [X] Requires close medical management by a rehab physician, rehab nursing care, Hospitalist and comprehensive interdisciplinary team (including PT, OT and/or SLP, Prosthetics and Orthotics)

## 2021-03-30 NOTE — PROGRESS NOTE ADULT - SUBJECTIVE AND OBJECTIVE BOX
This is a 66 y/o Lithuanian speaking Female with PMHx of DM Type 2 (on insulin), HTN, HLD, COVID (2020), and glaucoma presented with generalized weakness, joint pain, and rash. The patient states that she was discharged from OSH on 3/12 after being evaluated for similar symptoms. They suspected that she may have Lyme disease and started her on doxycycline and sent her home. They also suspected that this might be post-COVID related. The patient has multiple complaints. Reports rash in the neck area and b/l arms, along with whole body itching. States that she has had a rash since 2/2021 and has seen various doctors and tried different things (steroid creams) without relief. The rash initially started on her face. Also complains of joint pain for one week. States she has joint pain and stiffness in the b/l shoulders, elbows, hands, and knees. States that the pain is currently worst in the L shoulder and R elbow. States that the pain is so significant that she is unable to walk now. Tries Tylenol with minimal relief. Also reports that she has been unable to do her ADLs - clean herself, walk. Never had joint pain like this before. Also reports subjective fever and chills, along with dry mouth.   The rash did not get worse in the sun. Joint pain is not worse in the AM and present at rest but worse on movement. No BM changes recently; had mild rectal bleeding not in feces but on wiping. She attributes 2/2 itching and rash, denies vaginal bleeding or ulcers, weight loss. Had subjective fever but not over 99F. Endorse dry mouth, and itching in her eyes, no mouth ulcers. No FHx of autoimmune/rheumatologist/joint diseases. Was admitted at [facility] 3/8-3/12 with fevers, joint pain, rash, treated with Levaquin for five days and three doses of Vanco, 2x covid negative.    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 The pt was started on prednisone and steroid ointments. She underwent extensive infectious and inflammatory workup that included hepatitis panel - neg, HIV - neg, syphilis - neg, EBV - past infection, CMV - neg, Lyme panel-neg, blood cultures - NGTD, C3 slightly elevated, C4 - wnl. VIPIN-neg, dsDNA - neg, CCP -neg, anti SSA/B - neg, Mallory-1 - neg. Myoglobin - low, RF - neg, anti-Smith - neg. Flow cytometry - neg for abnormality. X-rays L shoulder and R elbow - wnl. CT chest and pelvis - wnl, no signs of malignancy. Myoglobin was low. Still pending Myomarker panel 3, Jak2 and calreticulin mutations. The patient underwent a skin biopsy, which resulted in "Skin with focal dyskeratosis with mild perivascular inflammation. The findings raise the differential diagnosis of a viral exanthem or a drug eruption."     Although the working diagnosis did not match the biopsy results, the patient clinically improved under the steroidal treatment. Her upper and lower extremity joint pain has resolved, her rash has started to clear, and he was able to ambulate. During the prednisone treatment, the patient's glucose level increased. She needed high doses of insulin to maintain euglycemia (60 Lantus and 35 lispro pre-meals). PM&R evaluated the patient and concluded that the patient would benefit from acute rehab. She was started on a weekly prednisone taper before discharge. Prior to d/c, pt was given the J&J covid vaccine on 3/23.  Patient deemed medically stable for admission to acute inpatient rehab on 3/25.      ROS:     still co left shoulder pain with flexion  +BM with miralax, no urinary symptoms  No c/o HA, dizziness, CP, SOB  no nausea,no vomiting  glucoses remain elevated on increased insulin     MEDICATIONS  (STANDING):  atorvastatin 40 milliGRAM(s) Oral at bedtime  cholecalciferol 2000 Unit(s) Oral daily  dextrose 40% Gel 15 Gram(s) Oral once  dextrose 5%. 1000 milliLiter(s) (50 mL/Hr) IV Continuous <Continuous>  dextrose 5%. 1000 milliLiter(s) (100 mL/Hr) IV Continuous <Continuous>  dextrose 50% Injectable 25 Gram(s) IV Push once  dextrose 50% Injectable 12.5 Gram(s) IV Push once  dextrose 50% Injectable 25 Gram(s) IV Push once  diltiazem    milliGRAM(s) Oral daily  enoxaparin Injectable 40 milliGRAM(s) SubCutaneous daily  fluocinonide 0.05% Cream 1 Application(s) Topical two times a day  gabapentin 300 milliGRAM(s) Oral every 8 hours  glucagon  Injectable 1 milliGRAM(s) IntraMuscular once  insulin glargine Injectable (LANTUS) 28 Unit(s) SubCutaneous at bedtime  insulin lispro (ADMELOG) corrective regimen sliding scale   SubCutaneous three times a day before meals  insulin lispro (ADMELOG) corrective regimen sliding scale   SubCutaneous at bedtime  insulin lispro Injectable (ADMELOG) 11 Unit(s) SubCutaneous three times a day before meals  ketotifen 0.025% Ophthalmic Solution 1 Drop(s) Both EYES two times a day  latanoprost 0.005% Ophthalmic Solution 1 Drop(s) Both EYES at bedtime  nystatin    Suspension 260105 Unit(s) Swish and Swallow every 6 hours  pantoprazole    Tablet 40 milliGRAM(s) Oral before breakfast  petrolatum white Ointment 1 Application(s) Topical daily  predniSONE   Tablet 40  Oral   triamcinolone 0.1% Ointment 1 Application(s) Topical two times a day  trimethoprim  160 mG/sulfamethoxazole 800 mG 1 Tablet(s) Oral <User Schedule>    MEDICATIONS  (PRN):  acetaminophen   Tablet .. 650 milliGRAM(s) Oral every 6 hours PRN Temp greater or equal to 38C (100.4F), Mild Pain (1 - 3), Moderate Pain (4 - 6)  hydrOXYzine hydrochloride 25 milliGRAM(s) Oral every 6 hours PRN Itching  polyethylene glycol 3350 17 Gram(s) Oral daily PRN Constipation                                    10.5   15.41 )-----------( 488      ( 29 Mar 2021 07:15 )             31.9     03-29    136  |  101  |  20  ----------------------------<  221<H>  4.5   |  30  |  0.83    Ca    8.7      29 Mar 2021 07:15    TPro  6.6  /  Alb  2.5<L>  /  TBili  0.3  /  DBili  x   /  AST  26  /  ALT  75<H>  /  AlkPhos  112  03-29        CAPILLARY BLOOD GLUCOSE      POCT Blood Glucose.: 316 mg/dL (30 Mar 2021 16:49)  POCT Blood Glucose.: 291 mg/dL (30 Mar 2021 12:00)  POCT Blood Glucose.: 147 mg/dL (30 Mar 2021 07:38)  POCT Blood Glucose.: 218 mg/dL (29 Mar 2021 22:15)      Vital Signs Last 24 Hrs  T(C): 36.4 (30 Mar 2021 07:35), Max: 36.7 (29 Mar 2021 22:11)  T(F): 97.5 (30 Mar 2021 07:35), Max: 98 (29 Mar 2021 22:11)  HR: 68 (30 Mar 2021 07:35) (65 - 68)  BP: 145/80 (30 Mar 2021 07:35) (106/62 - 145/80)  BP(mean): --  RR: 15 (30 Mar 2021 07:35) (15 - 15)  SpO2: 98% (30 Mar 2021 07:35) (94% - 98%)            Physical Exam: General: NAD                            HEENT: NC/AT, EOM I, PERRLA, Normal Conjunctivae  Cardio: RRR, Normal S1-S2, No M/G/R                              Pulm: No Respiratory Distress,  Lungs CTAB                        Abdomen: ND/NT, Soft, BS+                                                MSK: No joint swelling                                   Ext: +Edema bilat LEs, Pulses 2+ throughout, No calf tenderness    Skin:  no skin breakdown noted, healing areas (from rash)-hyperpigmented skin noted to arms, scalp, chest.                                                            Neurological Examination    Cognitive: AAO x 3                                                                         Attention: Intact   Judgment: Good evidence of judgement   Attention: intact                              Mood/Affect: wnl                                                                           Communication:  Fluent,  No dysarthria                                                          Sensory:                                                                                          Motor    5/5 right shoulder and elbow strengths, right hand   4/5 left shoulder and elbow strengths secondary to pain. Left wrist and hand strength   4/5 right HF and KE. 5/5 right ankle strengths.   3+/5 left HF, 4/+/5 KE. 5/5 left ankle strengths.    TEAM MEETING 3/29  SW:  lives with  + 2 grown sons (1 disable, 1 works part time)- apt + elevator access; 1 TAMMY  OT:  mod I eating and grooming, min A dressing, CG shower transfer  PT: Min A with transfer, ambulation 30 ft RW  barriers: pain and endurance  EDOD: 4/6 Home

## 2021-03-31 LAB
GLUCOSE BLDC GLUCOMTR-MCNC: 174 MG/DL — HIGH (ref 70–99)
GLUCOSE BLDC GLUCOMTR-MCNC: 181 MG/DL — HIGH (ref 70–99)
GLUCOSE BLDC GLUCOMTR-MCNC: 258 MG/DL — HIGH (ref 70–99)
GLUCOSE BLDC GLUCOMTR-MCNC: 344 MG/DL — HIGH (ref 70–99)
HCT VFR BLD CALC: 32.5 % — LOW (ref 34.5–45)
HGB BLD-MCNC: 10.9 G/DL — LOW (ref 11.5–15.5)
MCHC RBC-ENTMCNC: 28.8 PG — SIGNIFICANT CHANGE UP (ref 27–34)
MCHC RBC-ENTMCNC: 33.5 GM/DL — SIGNIFICANT CHANGE UP (ref 32–36)
MCV RBC AUTO: 85.8 FL — SIGNIFICANT CHANGE UP (ref 80–100)
NRBC # BLD: 0 /100 WBCS — SIGNIFICANT CHANGE UP (ref 0–0)
PLATELET # BLD AUTO: 408 K/UL — HIGH (ref 150–400)
RBC # BLD: 3.79 M/UL — LOW (ref 3.8–5.2)
RBC # FLD: 16.8 % — HIGH (ref 10.3–14.5)
WBC # BLD: 13.61 K/UL — HIGH (ref 3.8–10.5)
WBC # FLD AUTO: 13.61 K/UL — HIGH (ref 3.8–10.5)

## 2021-03-31 PROCEDURE — 99232 SBSQ HOSP IP/OBS MODERATE 35: CPT

## 2021-03-31 PROCEDURE — 99232 SBSQ HOSP IP/OBS MODERATE 35: CPT | Mod: GC

## 2021-03-31 RX ORDER — METFORMIN HYDROCHLORIDE 850 MG/1
500 TABLET ORAL
Refills: 0 | Status: DISCONTINUED | OUTPATIENT
Start: 2021-03-31 | End: 2021-04-06

## 2021-03-31 RX ORDER — INSULIN LISPRO 100/ML
13 VIAL (ML) SUBCUTANEOUS
Refills: 0 | Status: DISCONTINUED | OUTPATIENT
Start: 2021-03-31 | End: 2021-04-01

## 2021-03-31 RX ORDER — LIDOCAINE 4 G/100G
1 CREAM TOPICAL DAILY
Refills: 0 | Status: DISCONTINUED | OUTPATIENT
Start: 2021-03-31 | End: 2021-04-06

## 2021-03-31 RX ORDER — INSULIN GLARGINE 100 [IU]/ML
30 INJECTION, SOLUTION SUBCUTANEOUS AT BEDTIME
Refills: 0 | Status: DISCONTINUED | OUTPATIENT
Start: 2021-03-31 | End: 2021-04-01

## 2021-03-31 RX ADMIN — GABAPENTIN 300 MILLIGRAM(S): 400 CAPSULE ORAL at 21:53

## 2021-03-31 RX ADMIN — Medication 13 UNIT(S): at 17:16

## 2021-03-31 RX ADMIN — INSULIN GLARGINE 30 UNIT(S): 100 INJECTION, SOLUTION SUBCUTANEOUS at 21:53

## 2021-03-31 RX ADMIN — POLYETHYLENE GLYCOL 3350 17 GRAM(S): 17 POWDER, FOR SOLUTION ORAL at 11:19

## 2021-03-31 RX ADMIN — SENNA PLUS 2 TABLET(S): 8.6 TABLET ORAL at 21:54

## 2021-03-31 RX ADMIN — LATANOPROST 1 DROP(S): 0.05 SOLUTION/ DROPS OPHTHALMIC; TOPICAL at 21:54

## 2021-03-31 RX ADMIN — Medication 11 UNIT(S): at 07:36

## 2021-03-31 RX ADMIN — Medication 500000 UNIT(S): at 17:17

## 2021-03-31 RX ADMIN — KETOTIFEN FUMARATE 1 DROP(S): 0.34 SOLUTION OPHTHALMIC at 05:20

## 2021-03-31 RX ADMIN — Medication 13 UNIT(S): at 12:09

## 2021-03-31 RX ADMIN — Medication 1 APPLICATION(S): at 17:17

## 2021-03-31 RX ADMIN — Medication 8: at 17:16

## 2021-03-31 RX ADMIN — LIDOCAINE 1 PATCH: 4 CREAM TOPICAL at 19:11

## 2021-03-31 RX ADMIN — Medication 1 APPLICATION(S): at 05:21

## 2021-03-31 RX ADMIN — Medication 500000 UNIT(S): at 23:00

## 2021-03-31 RX ADMIN — LIDOCAINE 1 PATCH: 4 CREAM TOPICAL at 17:46

## 2021-03-31 RX ADMIN — PANTOPRAZOLE SODIUM 40 MILLIGRAM(S): 20 TABLET, DELAYED RELEASE ORAL at 05:19

## 2021-03-31 RX ADMIN — Medication 2000 UNIT(S): at 11:19

## 2021-03-31 RX ADMIN — GABAPENTIN 300 MILLIGRAM(S): 400 CAPSULE ORAL at 05:19

## 2021-03-31 RX ADMIN — METFORMIN HYDROCHLORIDE 500 MILLIGRAM(S): 850 TABLET ORAL at 17:17

## 2021-03-31 RX ADMIN — Medication 300 MILLIGRAM(S): at 05:19

## 2021-03-31 RX ADMIN — ATORVASTATIN CALCIUM 40 MILLIGRAM(S): 80 TABLET, FILM COATED ORAL at 21:53

## 2021-03-31 RX ADMIN — ENOXAPARIN SODIUM 40 MILLIGRAM(S): 100 INJECTION SUBCUTANEOUS at 11:19

## 2021-03-31 RX ADMIN — KETOTIFEN FUMARATE 1 DROP(S): 0.34 SOLUTION OPHTHALMIC at 17:17

## 2021-03-31 RX ADMIN — Medication 500000 UNIT(S): at 05:21

## 2021-03-31 RX ADMIN — Medication 6: at 12:09

## 2021-03-31 RX ADMIN — Medication 1 APPLICATION(S): at 12:06

## 2021-03-31 RX ADMIN — Medication 30 MILLIGRAM(S): at 05:19

## 2021-03-31 RX ADMIN — Medication 25 MILLIGRAM(S): at 21:54

## 2021-03-31 RX ADMIN — Medication 500000 UNIT(S): at 11:19

## 2021-03-31 RX ADMIN — Medication 1 TABLET(S): at 08:22

## 2021-03-31 RX ADMIN — Medication 2: at 07:35

## 2021-03-31 RX ADMIN — GABAPENTIN 300 MILLIGRAM(S): 400 CAPSULE ORAL at 14:04

## 2021-03-31 NOTE — PROGRESS NOTE ADULT - SUBJECTIVE AND OBJECTIVE BOX
Patient is a 65y old  Female who presents with a chief complaint of 06.9 other arthritis (30 Mar 2021 19:31)      24 HOUR EVENTS:  No overnight events reported.     SUBJECTIVE:  Patient seen and examined at bedside.     ALLERGIES:  azithromycin (Hives; Swelling)  enalapril (Other (Mild to Mod))  penicillin (Swelling; Rash)    MEDICATIONS  (STANDING):  atorvastatin 40 milliGRAM(s) Oral at bedtime  cholecalciferol 2000 Unit(s) Oral daily  dextrose 40% Gel 15 Gram(s) Oral once  dextrose 5%. 1000 milliLiter(s) (50 mL/Hr) IV Continuous <Continuous>  dextrose 5%. 1000 milliLiter(s) (100 mL/Hr) IV Continuous <Continuous>  dextrose 50% Injectable 25 Gram(s) IV Push once  dextrose 50% Injectable 12.5 Gram(s) IV Push once  dextrose 50% Injectable 25 Gram(s) IV Push once  diltiazem    milliGRAM(s) Oral daily  enoxaparin Injectable 40 milliGRAM(s) SubCutaneous daily  fluocinonide 0.05% Cream 1 Application(s) Topical two times a day  gabapentin 300 milliGRAM(s) Oral every 8 hours  glucagon  Injectable 1 milliGRAM(s) IntraMuscular once  insulin glargine Injectable (LANTUS) 28 Unit(s) SubCutaneous at bedtime  insulin lispro (ADMELOG) corrective regimen sliding scale   SubCutaneous three times a day before meals  insulin lispro (ADMELOG) corrective regimen sliding scale   SubCutaneous at bedtime  insulin lispro Injectable (ADMELOG) 11 Unit(s) SubCutaneous three times a day before meals  ketotifen 0.025% Ophthalmic Solution 1 Drop(s) Both EYES two times a day  latanoprost 0.005% Ophthalmic Solution 1 Drop(s) Both EYES at bedtime  nystatin    Suspension 637569 Unit(s) Swish and Swallow every 6 hours  pantoprazole    Tablet 40 milliGRAM(s) Oral before breakfast  petrolatum white Ointment 1 Application(s) Topical daily  polyethylene glycol 3350 17 Gram(s) Oral daily  predniSONE   Tablet 30 milliGRAM(s) Oral daily  predniSONE   Tablet 40  Oral   senna 2 Tablet(s) Oral at bedtime  triamcinolone 0.1% Ointment 1 Application(s) Topical two times a day  trimethoprim  160 mG/sulfamethoxazole 800 mG 1 Tablet(s) Oral <User Schedule>    MEDICATIONS  (PRN):  acetaminophen   Tablet .. 650 milliGRAM(s) Oral every 6 hours PRN Temp greater or equal to 38C (100.4F), Mild Pain (1 - 3), Moderate Pain (4 - 6)  hydrOXYzine hydrochloride 25 milliGRAM(s) Oral every 6 hours PRN Itching    Vital Signs Last 24 Hrs  T(F): 97.6 (31 Mar 2021 08:04), Max: 97.7 (30 Mar 2021 20:53)  HR: 70 (31 Mar 2021 08:04) (64 - 70)  BP: 146/70 (31 Mar 2021 08:04) (122/68 - 146/77)  RR: 14 (31 Mar 2021 08:04) (14 - 15)  SpO2: 98% (31 Mar 2021 08:04) (98% - 98%)  I&O's Summary    PHYSICAL EXAM:  General: NAD, A/O x 3  ENT: Moist mucous membranes, no thrush  Neck: Supple, No JVD  Lungs: Clear to auscultation bilaterally, good air entry, non-labored breathing  Cardio: RRR, S1/S2, No murmur  Abdomen: Soft, Nontender, Nondistended; Bowel sounds present  Extremities: No calf tenderness, No pitting edema    LABS:                        10.9   13.61 )-----------( 408      ( 31 Mar 2021 05:00 )             32.5     03-29    136  |  101  |  20  ----------------------------<  221  4.5   |  30  |  0.83    Ca    8.7      29 Mar 2021 07:15    TPro  6.6  /  Alb  2.5  /  TBili  0.3  /  DBili  x   /  AST  26  /  ALT  75  /  AlkPhos  112  03-29        eGFR if Non African American: 75 mL/min/1.73M2 (03-29-21 @ 07:15)  eGFR if African American: 87 mL/min/1.73M2 (03-29-21 @ 07:15)              03-17 Chol 88 mg/dL LDL -- HDL 29 mg/dL Trig 129 mg/dL              POCT Blood Glucose.: 174 mg/dL (31 Mar 2021 07:33)  POCT Blood Glucose.: 239 mg/dL (30 Mar 2021 20:56)  POCT Blood Glucose.: 316 mg/dL (30 Mar 2021 16:49)  POCT Blood Glucose.: 291 mg/dL (30 Mar 2021 12:00)          RADIOLOGY & ADDITIONAL TESTS:    Care Discussed with Consultants/Other Providers:    Patient is a 65y old  Female who presents with a chief complaint of 06.9 other arthritis (30 Mar 2021 19:31)      24 HOUR EVENTS:  No overnight events reported.     SUBJECTIVE:  Patient seen and examined at bedside.   She continues to have itching - encouraged to take hydroxyzine.  Told her she needs to take topical steroid holidays after two weeks.     [facility]  services: [id number]    ALLERGIES:  azithromycin (Hives; Swelling)  enalapril (Other (Mild to Mod))  penicillin (Swelling; Rash)    MEDICATIONS  (STANDING):  atorvastatin 40 milliGRAM(s) Oral at bedtime  cholecalciferol 2000 Unit(s) Oral daily  dextrose 40% Gel 15 Gram(s) Oral once  dextrose 5%. 1000 milliLiter(s) (50 mL/Hr) IV Continuous <Continuous>  dextrose 5%. 1000 milliLiter(s) (100 mL/Hr) IV Continuous <Continuous>  dextrose 50% Injectable 25 Gram(s) IV Push once  dextrose 50% Injectable 12.5 Gram(s) IV Push once  dextrose 50% Injectable 25 Gram(s) IV Push once  diltiazem    milliGRAM(s) Oral daily  enoxaparin Injectable 40 milliGRAM(s) SubCutaneous daily  fluocinonide 0.05% Cream 1 Application(s) Topical two times a day  gabapentin 300 milliGRAM(s) Oral every 8 hours  glucagon  Injectable 1 milliGRAM(s) IntraMuscular once  insulin glargine Injectable (LANTUS) 28 Unit(s) SubCutaneous at bedtime  insulin lispro (ADMELOG) corrective regimen sliding scale   SubCutaneous three times a day before meals  insulin lispro (ADMELOG) corrective regimen sliding scale   SubCutaneous at bedtime  insulin lispro Injectable (ADMELOG) 11 Unit(s) SubCutaneous three times a day before meals  ketotifen 0.025% Ophthalmic Solution 1 Drop(s) Both EYES two times a day  latanoprost 0.005% Ophthalmic Solution 1 Drop(s) Both EYES at bedtime  nystatin    Suspension 336404 Unit(s) Swish and Swallow every 6 hours  pantoprazole    Tablet 40 milliGRAM(s) Oral before breakfast  petrolatum white Ointment 1 Application(s) Topical daily  polyethylene glycol 3350 17 Gram(s) Oral daily  predniSONE   Tablet 30 milliGRAM(s) Oral daily  predniSONE   Tablet 40  Oral   senna 2 Tablet(s) Oral at bedtime  triamcinolone 0.1% Ointment 1 Application(s) Topical two times a day  trimethoprim  160 mG/sulfamethoxazole 800 mG 1 Tablet(s) Oral <User Schedule>    MEDICATIONS  (PRN):  acetaminophen   Tablet .. 650 milliGRAM(s) Oral every 6 hours PRN Temp greater or equal to 38C (100.4F), Mild Pain (1 - 3), Moderate Pain (4 - 6)  hydrOXYzine hydrochloride 25 milliGRAM(s) Oral every 6 hours PRN Itching    Vital Signs Last 24 Hrs  T(F): 97.6 (31 Mar 2021 08:04), Max: 97.7 (30 Mar 2021 20:53)  HR: 70 (31 Mar 2021 08:04) (64 - 70)  BP: 146/70 (31 Mar 2021 08:04) (122/68 - 146/77)  RR: 14 (31 Mar 2021 08:04) (14 - 15)  SpO2: 98% (31 Mar 2021 08:04) (98% - 98%)  I&O's Summary    PHYSICAL EXAM:  General: NAD, A/O x 3  ENT: Moist mucous membranes, no thrush  Neck: Supple, No JVD  Lungs: Clear to auscultation bilaterally, good air entry, non-labored breathing  Cardio: RRR, S1/S2, No murmur  Abdomen: Soft, Nontender, Nondistended; Bowel sounds present  Extremities: No calf tenderness, No pitting edema    LABS:                        10.9   13.61 )-----------( 408      ( 31 Mar 2021 05:00 )             32.5     03-29    136  |  101  |  20  ----------------------------<  221  4.5   |  30  |  0.83    Ca    8.7      29 Mar 2021 07:15    TPro  6.6  /  Alb  2.5  /  TBili  0.3  /  DBili  x   /  AST  26  /  ALT  75  /  AlkPhos  112  03-29        eGFR if Non African American: 75 mL/min/1.73M2 (03-29-21 @ 07:15)  eGFR if African American: 87 mL/min/1.73M2 (03-29-21 @ 07:15)              03-17 Chol 88 mg/dL LDL -- HDL 29 mg/dL Trig 129 mg/dL              POCT Blood Glucose.: 174 mg/dL (31 Mar 2021 07:33)  POCT Blood Glucose.: 239 mg/dL (30 Mar 2021 20:56)  POCT Blood Glucose.: 316 mg/dL (30 Mar 2021 16:49)  POCT Blood Glucose.: 291 mg/dL (30 Mar 2021 12:00)          RADIOLOGY & ADDITIONAL TESTS:    Care Discussed with Consultants/Other Providers:   PMR, Dr. Perez.

## 2021-03-31 NOTE — ADVANCED PRACTICE NURSE CONSULT - RECOMMEDATIONS
1. Start Metformin 500 mg po 2 Xs/day  2. Increase Lantus from 28 units to 30 units.   3. Increase pre-meal Admelog from 11 units to 13 units AC  
1. Monitor BG readings and adjust Insulin as needed.

## 2021-03-31 NOTE — PROGRESS NOTE ADULT - ASSESSMENT
66 y/o Frisian speaking Female with PMHx of DM Type 2 (on metformin and insulin), HTN on diltiazem , HLD on statin, COVID (2020), and glaucoma, presented to [facility] 3/17/2021 with joint and muscle pain, rash, pruritis. 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 Skin biopsy not consistent with this diagnosis and suggestive of a viral rash or drug-related rash. She continues on steroids.     Now admitted to [facility] after for initiation of a multidisciplinary rehab program consisting focused on functional mobility, transfers and ADLs - PT/OT/DVT PPX PAIN MEDS    # Functional and gait instability:  - C/w PT/OT per primary team     #Rash, unclear etiology, viral vs. drug related. Possible Amyopathic Dermatomyositis  # associated pruritis  - pt is on a prednisone taper  - bactrim 3x/week for PJP prophylaxis while on steroids.  - gabapentin for neuropathy.   - agree with hydroxyzine PRN itching, monitor for anti-cholinergic side effects.   - nursing to f/u groin - will order nystatin if fungal rash.    # HTN  - cont diltiazem 300 mg CD  - will monitor BP closely.     # HLD  - cont statin    # DM2 w/ steroid induced hyperglycemia  Uncontrolled w/ hgba1c of 10.6%  - increased lantus to 28 units, lispro 11 units wm, moderate ISS  - appreciate the assistance of diabetes nurse, Zehra Lira  - fasting sugar within normal range.     # GERD  - cont protonix    DVT ppx:   - lovenox.    66 y/o Croatian speaking Female with PMHx of DM Type 2 (on metformin and insulin), HTN on diltiazem , HLD on statin, COVID (2020), and glaucoma, presented to [facility] 3/17/2021 with joint and muscle pain, rash, pruritis. 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 Skin biopsy not consistent with this diagnosis and suggestive of a viral rash or drug-related rash. She continues on steroids.     Now admitted to [facility] after for initiation of a multidisciplinary rehab program consisting focused on functional mobility, transfers and ADLs - PT/OT/DVT PPX PAIN MEDS    # Functional and gait instability:  - C/w PT/OT per primary team     #Rash, unclear etiology, viral vs. drug related. Possible Amyopathic Dermatomyositis  # associated pruritis  - pt is on a prednisone taper  - bactrim 3x/week for PJP prophylaxis while on steroids.  - gabapentin for neuropathy.   - agree with hydroxyzine PRN itching, monitor for anti-cholinergic side effects.     # HTN  - cont diltiazem 300 mg CD  - will monitor BP closely.     # HLD  - cont statin    # DM2 w/ steroid induced hyperglycemia  Uncontrolled w/ hgba1c of 10.6%  - increased lantus to 30 units, lispro 13 units wm, moderate ISS  - start metformin 500 mg BID (home dose 1000 mg BID)  - appreciate the assistance of diabetes nurse, Zehra Lira    # GERD  - cont protonix    DVT ppx:   - lovenox.

## 2021-03-31 NOTE — PROGRESS NOTE ADULT - ASSESSMENT
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 Hospital course complicated by elevated glucoses secondary to steroids. Now admitted to [facility] after for initiation of a multidisciplinary rehab program consisting focused on functional mobility, transfers and ADLs (activities of daily living).    Comprehensive Multidisciplinary Rehab Program:  - comprehensive rehab program, 3 hours a day, 5 days a week.  - PT 2hr/day: Focused on improving strength, endurance, coordination, balance, functional mobility, and transfers  - OT 1hr/day: Focused on improving strength, fine motor skills, coordination, posture and ADLs.    - Activity Limitations: Decreased social, vocational and leisure activities, decreased self care and ADLs, decreased mobility, decreased ability to manage household and finances.     Participation Restrictions/Precautions:  - Weight bearing status: WBAT   - ROM restrictions: none  - Precautions:    [x ] Falls   [ ] Spinal   [ ] Hip (Anterior or Posterior)       [ ] Seizure  [ ] Cardiac   [ ] Sternal    Pruritis   - hydroxyzine 25mg Q6h PRN    Rehab Diagnosis/Management  Sleep:   - Maintain quiet hours and low stim environment.    Pain Management:  -Tylenol PRN  -Gabapentin    GI/Bowel:  -At risk for constipation due to neurologic diagnosis, immobility and/or medication use  -Miralax /senna daily  -GI ppx: protonix     /Bladder:   - At risk for incontinence and retention due to neurologic diagnosis and limited mobility  - Currently patient voids:    [x ] independent     [ ] external collection device (condom cath)    [ ] Indwelling colin catheter     [ ] Intermittent catheterization  - Baseline PVR 20cc  - Encourage timed voids every 4 hours while awake for independence and to promote continence during therapy.    Skin/Pressure Injury:   - At risk for pressure injury due to neurologic diagnosis and relative immobility.  - Skin assessment on admission admission: no pressure injury   - Turn every 2 hours while in bed, air mattress  - Skin barrier cream as needed  - Nursing to monitor skin Qshift    Diet/Dysphagia:  - Diet Consistency/Modifications: CC    DVT ppx:  -lovenox  - SCDs    -------------  Comorbid Medical Management:    HTN  -c/w diltiazem    Type 2 Diabetes  -Lantus 28U   -Premeal insulin 11U   -Continue CC diet   -Continue steriod taper and adjust insulin as needed   -HA1C 10.6%    Covid Status   -Covid + last year  -Received the Priya vaccine on 3/23     ---------------  Code Status/Emergency Contact: full code     Outpatient Follow-up (Specialty/Name of physician):    Shanique Chacon (MD)  Internal Medicine; Rheumatology  [address], [address]  [place] [address]  Phone: [phone]  Fax: [phone]  Established Patient  Follow Up Time: 2 weeks    [facility] - [place]  Dermatology  [address], [address]  [place] [address]  Phone: [phone]  Fax: [phone]  Follow Up Time: 2 weeks      --------------  Goals: Safe discharge to home   Estimated Length of Stay: 10-14 days  Rehab Potential: Good  Medical Prognosis: Good  Estimated Disposition: Home with Home Care  ---------------    PRESCREEN COMPARISON:  I have reviewed the prescreen information and I have found no relevant changes between the preadmission screening and my post admission evaluation.    RATIONALE FOR INPATIENT ADMISSION: Patient demonstrates the following:  [X] Medically appropriate for rehabilitation admission  [X] Has attainable rehab goals with an appropriate initial discharge plan  [X]Has rehabilitation potential (expected to make a significant improvement within a reasonable period of time)  [X] Requires close medical management by a rehab physician, rehab nursing care, Hospitalist and comprehensive interdisciplinary team (including PT, OT and/or SLP, Prosthetics and Orthotics)     LIONEL ERAZO is a 65y with a history of DM Type 2 (on insulin), HTN, HLD, COVID (2020), and glaucoma  who presented to [facility] on 3/17 with complaint of joint pain, rash, weakness and 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 Hospital course complicated by elevated glucoses secondary to steroids. Now admitted to [facility] after for initiation of a multidisciplinary rehab program consisting focused on functional mobility, transfers and ADLs (activities of daily living).    Comprehensive Multidisciplinary Rehab Program:  - comprehensive rehab program, 3 hours a day, 5 days a week.  - PT 2hr/day: Focused on improving strength, endurance, coordination, balance, functional mobility, and transfers  - OT 1hr/day: Focused on improving strength, fine motor skills, coordination, posture and ADLs.    - Activity Limitations: Decreased social, vocational and leisure activities, decreased self care and ADLs, decreased mobility, decreased ability to manage household and finances.     Participation Restrictions/Precautions:  - Weight bearing status: WBAT   - ROM restrictions: none  - Precautions:    [x ] Falls   [ ] Spinal   [ ] Hip (Anterior or Posterior)       [ ] Seizure  [ ] Cardiac   [ ] Sternal    Pruritis   - hydroxyzine 25mg Q6h PRN    Rehab Diagnosis/Management  Sleep:   - Maintain quiet hours and low stim environment.    Pain Management:  -Tylenol PRN  -Gabapentin  - Lidocaine patch  - Moist heat to left shoulder    GI/Bowel:  -At risk for constipation due to neurologic diagnosis, immobility and/or medication use  -Miralax /senna daily  -GI ppx: protonix     /Bladder:   - At risk for incontinence and retention due to neurologic diagnosis and limited mobility  - Currently patient voids:    [x ] independent     [ ] external collection device (condom cath)    [ ] Indwelling colin catheter     [ ] Intermittent catheterization  - Baseline PVR 20cc  - Encourage timed voids every 4 hours while awake for independence and to promote continence during therapy.    Skin/Pressure Injury:   - At risk for pressure injury due to neurologic diagnosis and relative immobility.  - Skin assessment on admission admission: no pressure injury   - Turn every 2 hours while in bed, air mattress  - Skin barrier cream as needed  - Nursing to monitor skin Qshift    Diet/Dysphagia:  - Diet Consistency/Modifications: CC    DVT ppx:  -lovenox  - SCDs    -------------  Comorbid Medical Management:    HTN  -c/w diltiazem    Type 2 Diabetes  -Lantus 30U   -Premeal insulin 13U  - restart metformin 500 BID   -Continue CC diet   -Continue steriod taper and adjust insulin as needed   -HA1C 10.6%    Covid Status   -Covid + last year  -Received the Priya vaccine on 3/23     ---------------  Code Status/Emergency Contact: full code     Outpatient Follow-up (Specialty/Name of physician):    Shanique Chacon)  Internal Medicine; Rheumatology  [address]  [place] [address]  Phone: [phone]  Fax: [phone]  Established Patient  Follow Up Time: 2 weeks    [facility] - [place]  Dermatology  [address], [address]  [place] [address]  Phone: [phone]  Fax: [phone]  Follow Up Time: 2 weeks      --------------  Goals: Safe discharge to home   Estimated Length of Stay: 10-14 days  Rehab Potential: Good  Medical Prognosis: Good  Estimated Disposition: Home with Home Care  ---------------    PRESCREEN COMPARISON:  I have reviewed the prescreen information and I have found no relevant changes between the preadmission screening and my post admission evaluation.    RATIONALE FOR INPATIENT ADMISSION: Patient demonstrates the following:  [X] Medically appropriate for rehabilitation admission  [X] Has attainable rehab goals with an appropriate initial discharge plan  [X]Has rehabilitation potential (expected to make a significant improvement within a reasonable period of time)  [X] Requires close medical management by a rehab physician, rehab nursing care, Hospitalist and comprehensive interdisciplinary team (including PT, OT and/or SLP, Prosthetics and Orthotics)

## 2021-03-31 NOTE — PROGRESS NOTE ADULT - SUBJECTIVE AND OBJECTIVE BOX
This is a 64 y/o Maori speaking Female with PMHx of DM Type 2 (on insulin), HTN, HLD, COVID (2020), and glaucoma presented with generalized weakness, joint pain, and rash. The patient states that she was discharged from OSH on 3/12 after being evaluated for similar symptoms. They suspected that she may have Lyme disease and started her on doxycycline and sent her home. They also suspected that this might be post-COVID related. The patient has multiple complaints. Reports rash in the neck area and b/l arms, along with whole body itching. States that she has had a rash since 2/2021 and has seen various doctors and tried different things (steroid creams) without relief. The rash initially started on her face. Also complains of joint pain for one week. States she has joint pain and stiffness in the b/l shoulders, elbows, hands, and knees. States that the pain is currently worst in the L shoulder and R elbow. States that the pain is so significant that she is unable to walk now. Tries Tylenol with minimal relief. Also reports that she has been unable to do her ADLs - clean herself, walk. Never had joint pain like this before. Also reports subjective fever and chills, along with dry mouth.   The rash did not get worse in the sun. Joint pain is not worse in the AM and present at rest but worse on movement. No BM changes recently; had mild rectal bleeding not in feces but on wiping. She attributes 2/2 itching and rash, denies vaginal bleeding or ulcers, weight loss. Had subjective fever but not over 99F. Endorse dry mouth, and itching in her eyes, no mouth ulcers. No FHx of autoimmune/rheumatologist/joint diseases. Was admitted at [facility] 3/8-3/12 with fevers, joint pain, rash, treated with Levaquin for five days and three doses of Vanco, 2x covid negative.    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 The pt was started on prednisone and steroid ointments. She underwent extensive infectious and inflammatory workup that included hepatitis panel - neg, HIV - neg, syphilis - neg, EBV - past infection, CMV - neg, Lyme panel-neg, blood cultures - NGTD, C3 slightly elevated, C4 - wnl. VIPIN-neg, dsDNA - neg, CCP -neg, anti SSA/B - neg, Mallory-1 - neg. Myoglobin - low, RF - neg, anti-Smith - neg. Flow cytometry - neg for abnormality. X-rays L shoulder and R elbow - wnl. CT chest and pelvis - wnl, no signs of malignancy. Myoglobin was low. Still pending Myomarker panel 3, Jak2 and calreticulin mutations. The patient underwent a skin biopsy, which resulted in "Skin with focal dyskeratosis with mild perivascular inflammation. The findings raise the differential diagnosis of a viral exanthem or a drug eruption."     Although the working diagnosis did not match the biopsy results, the patient clinically improved under the steroidal treatment. Her upper and lower extremity joint pain has resolved, her rash has started to clear, and he was able to ambulate. During the prednisone treatment, the patient's glucose level increased. She needed high doses of insulin to maintain euglycemia (60 Lantus and 35 lispro pre-meals). PM&R evaluated the patient and concluded that the patient would benefit from acute rehab. She was started on a weekly prednisone taper before discharge. Prior to d/c, pt was given the J&J covid vaccine on 3/23.  Patient deemed medically stable for admission to acute inpatient rehab on 3/25.      ROS:   +B/L LE shakes with therapy/standing- likely d/t weakness  c/o axillary itch - made aware that she has atarax as needed  no urinary symptoms, had BM yesterday (3/30)  No c/o HA, dizziness, CP, SOB  no nausea,no vomiting  glucoses remain elevated on increased insulin     MEDICATIONS  (STANDING):  atorvastatin 40 milliGRAM(s) Oral at bedtime  cholecalciferol 2000 Unit(s) Oral daily  dextrose 40% Gel 15 Gram(s) Oral once  dextrose 5%. 1000 milliLiter(s) (50 mL/Hr) IV Continuous <Continuous>  dextrose 5%. 1000 milliLiter(s) (100 mL/Hr) IV Continuous <Continuous>  dextrose 50% Injectable 25 Gram(s) IV Push once  dextrose 50% Injectable 12.5 Gram(s) IV Push once  dextrose 50% Injectable 25 Gram(s) IV Push once  diltiazem    milliGRAM(s) Oral daily  enoxaparin Injectable 40 milliGRAM(s) SubCutaneous daily  fluocinonide 0.05% Cream 1 Application(s) Topical two times a day  gabapentin 300 milliGRAM(s) Oral every 8 hours  glucagon  Injectable 1 milliGRAM(s) IntraMuscular once  insulin lispro (ADMELOG) corrective regimen sliding scale   SubCutaneous three times a day before meals  insulin lispro (ADMELOG) corrective regimen sliding scale   SubCutaneous at bedtime  ketotifen 0.025% Ophthalmic Solution 1 Drop(s) Both EYES two times a day  latanoprost 0.005% Ophthalmic Solution 1 Drop(s) Both EYES at bedtime  nystatin    Suspension 499746 Unit(s) Swish and Swallow every 6 hours  pantoprazole    Tablet 40 milliGRAM(s) Oral before breakfast  petrolatum white Ointment 1 Application(s) Topical daily  polyethylene glycol 3350 17 Gram(s) Oral daily  predniSONE   Tablet 30 milliGRAM(s) Oral daily  predniSONE   Tablet 40  Oral   senna 2 Tablet(s) Oral at bedtime  triamcinolone 0.1% Ointment 1 Application(s) Topical two times a day  trimethoprim  160 mG/sulfamethoxazole 800 mG 1 Tablet(s) Oral <User Schedule>    MEDICATIONS  (PRN):  acetaminophen   Tablet .. 650 milliGRAM(s) Oral every 6 hours PRN Temp greater or equal to 38C (100.4F), Mild Pain (1 - 3), Moderate Pain (4 - 6)  hydrOXYzine hydrochloride 25 milliGRAM(s) Oral every 6 hours PRN Itching                          10.5   15.41 )-----------( 488      ( 29 Mar 2021 07:15 )             31.9     03-29    136  |  101  |  20  ----------------------------<  221<H>  4.5   |  30  |  0.83    Ca    8.7      29 Mar 2021 07:15    TPro  6.6  /  Alb  2.5<L>  /  TBili  0.3  /  DBili  x   /  AST  26  /  ALT  75<H>  /  AlkPhos  112  03-29      CAPILLARY BLOOD GLUCOSE      POCT Blood Glucose.: 258 mg/dL (31 Mar 2021 11:28)  POCT Blood Glucose.: 174 mg/dL (31 Mar 2021 07:33)  POCT Blood Glucose.: 239 mg/dL (30 Mar 2021 20:56)  POCT Blood Glucose.: 316 mg/dL (30 Mar 2021 16:49)  POCT Blood Glucose.: 291 mg/dL (30 Mar 2021 12:00)    Vital Signs Last 24 Hrs  T(C): 36.4 (31 Mar 2021 08:04), Max: 36.5 (30 Mar 2021 20:53)  T(F): 97.6 (31 Mar 2021 08:04), Max: 97.7 (30 Mar 2021 20:53)  HR: 70 (31 Mar 2021 08:04) (64 - 70)  BP: 146/70 (31 Mar 2021 08:04) (122/68 - 146/77)  BP(mean): --  RR: 14 (31 Mar 2021 08:04) (14 - 15)  SpO2: 98% (31 Mar 2021 08:04) (98% - 98%)      Physical Exam: General: NAD                            HEENT: NC/AT, EOM I, PERRLA, Normal Conjunctivae  Cardio: RRR, Normal S1-S2, No M/G/R                              Pulm: No Respiratory Distress,  Lungs CTAB                        Abdomen: ND/NT, Soft, BS+                                                MSK: No joint swelling                                   Ext: +Edema bilat LEs, Pulses 2+ throughout, No calf tenderness    Skin:  no skin breakdown noted, healing areas (from rash)-hyperpigmented skin noted to arms, scalp, chest.                                                            Neurological Examination    Cognitive: AAO x 3                                                                         Attention: Intact   Judgment: Good evidence of judgement   Attention: intact                              Mood/Affect: wnl                                                                           Communication:  Fluent,  No dysarthria                                                          Sensory:                                                                                          Motor    5/5 right shoulder and elbow strengths, right hand   4/5 left shoulder and elbow strengths secondary to pain. Left wrist and hand strength   4/5 right HF and KE. 5/5 right ankle strengths.   3+/5 left HF, 4/+/5 KE. 5/5 left ankle strengths.    TEAM MEETING 3/29  SW:  lives with  + 2 grown sons (1 disable, 1 works part time)- apt + elevator access; 1 TAMMY  OT:  mod I eating and grooming, min A dressing, CG shower transfer  PT: Min A with transfer, ambulation 30 ft RW  barriers: pain and endurance  EDOD: 4/6 Home

## 2021-03-31 NOTE — CHART NOTE - NSCHARTNOTEFT_GEN_A_CORE
Nutrition Follow Up Note  Hospital Course   (Per Electronic Medical Record)    Source:  Patient [X]  Medical Record [X]      Patient Speaks Honduran, RD is Fluent in [place]uran     Diet:   Consistent Carbohydrate Diet w/ Thin Liquids   Tolerates Diet Consistency Well  No Chewing/Swallowing Difficulties  No Recent Nausea, Vomiting, Diarrhea & Some Recent Constipation (as Per Patient)  Education Provided on Proper Nutrition & Blood Glucose Management   Intake Improving (Per Patient)   Obtained Food Preferences from Patient     Enteral/Parenteral Nutrition: Not Applicable    Current Weight: 207.6lb on 3/28  Weights Currently Stable @This Time  Obtain Weights Weekly     Pertinent Medications: MEDICATIONS  (STANDING):  atorvastatin 40 milliGRAM(s) Oral at bedtime  cholecalciferol 2000 Unit(s) Oral daily  dextrose 40% Gel 15 Gram(s) Oral once  dextrose 5%. 1000 milliLiter(s) (50 mL/Hr) IV Continuous <Continuous>  dextrose 5%. 1000 milliLiter(s) (100 mL/Hr) IV Continuous <Continuous>  dextrose 50% Injectable 25 Gram(s) IV Push once  dextrose 50% Injectable 12.5 Gram(s) IV Push once  dextrose 50% Injectable 25 Gram(s) IV Push once  diltiazem    milliGRAM(s) Oral daily  enoxaparin Injectable 40 milliGRAM(s) SubCutaneous daily  fluocinonide 0.05% Cream 1 Application(s) Topical two times a day  gabapentin 300 milliGRAM(s) Oral every 8 hours  glucagon  Injectable 1 milliGRAM(s) IntraMuscular once  insulin lispro (ADMELOG) corrective regimen sliding scale   SubCutaneous three times a day before meals  insulin lispro (ADMELOG) corrective regimen sliding scale   SubCutaneous at bedtime  ketotifen 0.025% Ophthalmic Solution 1 Drop(s) Both EYES two times a day  latanoprost 0.005% Ophthalmic Solution 1 Drop(s) Both EYES at bedtime  nystatin    Suspension 320243 Unit(s) Swish and Swallow every 6 hours  pantoprazole    Tablet 40 milliGRAM(s) Oral before breakfast  petrolatum white Ointment 1 Application(s) Topical daily  polyethylene glycol 3350 17 Gram(s) Oral daily  predniSONE   Tablet 30 milliGRAM(s) Oral daily  predniSONE   Tablet 40  Oral   senna 2 Tablet(s) Oral at bedtime  triamcinolone 0.1% Ointment 1 Application(s) Topical two times a day  trimethoprim  160 mG/sulfamethoxazole 800 mG 1 Tablet(s) Oral <User Schedule>    MEDICATIONS  (PRN):  acetaminophen   Tablet .. 650 milliGRAM(s) Oral every 6 hours PRN Temp greater or equal to 38C (100.4F), Mild Pain (1 - 3), Moderate Pain (4 - 6)  hydrOXYzine hydrochloride 25 milliGRAM(s) Oral every 6 hours PRN Itching    Pertinent Labs:  03-29 Na136 mmol/L Glu 221 mg/dL<H> K+ 4.5 mmol/L Cr  0.83 mg/dL BUN 20 mg/dL 03-29 Alb 2.5 g/dL<L> 03-17 Chol 88 mg/dL LDL --    HDL 29 mg/dL<L> Trig 129 mg/dL    POCT (over Last 3 Days) - Ranging from 170-360    Skin: No Pressure Ulcers     Edema: None Noted     Last Bowel Movement: on 3/30    Estimated Needs:   [X] No Change Since Previous Assessment    Previous Nutrition Diagnosis:   Morbidly Obese  Altered Nutrition Related Labwork     Nutrition Diagnosis is [X] Ongoing - Education Provided on Proper Nutrition & Blood Glucose Management     New Nutrition Diagnosis: [X] Not Applicable    Interventions:   1. Education Provided on Proper Nutrition & Blood Glucose Management    2. Recommend Continue Nutrition Plan of Care     Monitoring & Evaluation:   [X] Weights   [X] PO Intake   [X] Skin Integrity   [X] Follow Up (Per Protocol)  [X] Tolerance to Diet Prescription   [X] Other: Labs     Registered Dietitian/Nutritionist Remains Available.  Marco Barba RDN    Pager # [phone]  Phone# [phone]

## 2021-03-31 NOTE — ADVANCED PRACTICE NURSE CONSULT - ASSESSMENT
History per H & P: This is a 66 y/o Puerto Rican speaking Female with PMHx of DM Type 2 (on insulin), HTN, HLD, COVID (2020), and glaucoma presented with generalized weakness, joint pain, and rash. The patient states that she was discharged from OSH on 3/12 after being evaluated for similar symptoms. They suspected that she may have Lyme disease and started her on doxycycline and sent her home. They also suspected that this might be post-COVID related. The patient has multiple complaints. Reports rash in the neck area and b/l arms, along with whole body itching. States that she has had a rash since 2/2021 and has seen various doctors and tried different things (steroid creams) without relief. The rash initially started on her face. Also complains of joint pain for one week. States she has joint pain and stiffness in the b/l shoulders, elbows, hands, and knees. States that the pain is currently worst in the L shoulder and R elbow. States that the pain is so significant that she is unable to walk now. Tries Tylenol with minimal relief. Also reports that she has been unable to do her ADLs - clean herself, walk. Never had joint pain like this before. Also reports subjective fever and chills, along with dry mouth.   The rash did not get worse in the sun. Joint pain is not worse in the AM and present at rest but worse on movement. No BM changes recently; had mild rectal bleeding not in feces but on wiping. She attributes 2/2 itching and rash, denies vaginal bleeding or ulcers, weight loss. Had subjective fever but not over 99F. Endorse dry mouth, and itching in her eyes, no mouth ulcers. No FHx of autoimmune/rheumatologist/joint diseases. Was admitted at [facility] 3/8-3/12 with fevers, joint pain, rash, treated with Levaquin for five days and three doses of Vanco, 2x covid negative.    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 The pt was started on prednisone and steroid ointments. She underwent extensive infectious and inflammatory workup that included hepatitis panel - neg, HIV - neg, syphilis - neg, EBV - past infection, CMV - neg, Lyme panel-neg, blood cultures - NGTD, C3 slightly elevated, C4 - wnl. VIPIN-neg, dsDNA - neg, CCP -neg, anti SSA/B - neg, Mallory-1 - neg. Myoglobin - low, RF - neg, anti-Smith - neg. Flow cytometry - neg for abnormality. X-rays L shoulder and R elbow - wnl. CT chest and pelvis - wnl, no signs of malignancy. Myoglobin was low. Still pending Myomarker panel 3, Jak2 and calreticulin mutations. The patient underwent a skin biopsy, which resulted in "Skin with focal dyskeratosis with mild perivascular inflammation. The findings raise the differential diagnosis of a viral exanthem or a drug eruption."     Although the working diagnosis did not match the biopsy results, the patient clinically improved under the steroidal treatment. Her upper and lower extremity joint pain has resolved, her rash has started to clear, and he was able to ambulate. During the prednisone treatment, the patient's glucose level increased. She needed high doses of insulin to maintain euglycemia (60 Lantus and 35 lispro pre-meals). PM&R evaluated the patient and concluded that the patient would benefit from acute rehab. She was started on a weekly prednisone taper before discharge. Prior to d/c, pt was given the J&J covid vaccine on 3/23.   Patient deemed medically stable for admission to acute inpatient rehab on 3/25.   Current Insulin regime- Lantus 28 units daily, Admelog 11 units AC, moderate correction scale AC and HS.  Assessment -  - Wesley ID # [id number] translated. Pt states she has a 28 yr H/O T2DM, managed by PCP,  and was taking 70/30 insulin- 35 units twice a day before breakfast and dinner and metformin 1000 mg 2X/Day PTA. Pt states she was testing her BG 3 times a day at home but was not able to state her BG readings when asked. Pt had basic understand of what the A1C value meant and understood that he  current A1C of 10.6 would was too high and would cause complications. Pt was able to correctly state S/S of Hypoglycemia but not proper tx per 15/15 rule- pt drinks juice, has an almond cookie and sometimes sugar in water without rechecking and BG ends up over 200.  Pt was able to demonstrate proper insulin pen use with exception of omitting test shot. Pt states she uses front of arms for insulin injection often. Educated pt on performing 2 units test shot before each injection, proper insulin injection sites and rotation, sharps disposal, and tx of hypoglycemia per 15/15 rule.   Feet dry but intact- sensation WNL.       
BG remain above goal range. Total Insulin needs in last 24 hrs = 75 units. Discussed case with Dr. BELEN Palm and agreed on following Recommendations:  1. Start Metformin 500 mg po 2 Xs/day  2. Increase Lantus from 28 units to 30 units.   3. Increase pre-meal Admelog from 11 units to 13 units AC

## 2021-04-01 LAB
GLUCOSE BLDC GLUCOMTR-MCNC: 202 MG/DL — HIGH (ref 70–99)
GLUCOSE BLDC GLUCOMTR-MCNC: 206 MG/DL — HIGH (ref 70–99)
GLUCOSE BLDC GLUCOMTR-MCNC: 253 MG/DL — HIGH (ref 70–99)
HCT VFR BLD CALC: 30.6 % — LOW (ref 34.5–45)
HGB BLD-MCNC: 10.3 G/DL — LOW (ref 11.5–15.5)
MCHC RBC-ENTMCNC: 28.5 PG — SIGNIFICANT CHANGE UP (ref 27–34)
MCHC RBC-ENTMCNC: 33.7 GM/DL — SIGNIFICANT CHANGE UP (ref 32–36)
MCV RBC AUTO: 84.8 FL — SIGNIFICANT CHANGE UP (ref 80–100)
NRBC # BLD: 0 /100 WBCS — SIGNIFICANT CHANGE UP (ref 0–0)
PLATELET # BLD AUTO: 375 K/UL — SIGNIFICANT CHANGE UP (ref 150–400)
RBC # BLD: 3.61 M/UL — LOW (ref 3.8–5.2)
RBC # FLD: 17.1 % — HIGH (ref 10.3–14.5)
SARS-COV-2 RNA SPEC QL NAA+PROBE: SIGNIFICANT CHANGE UP
WBC # BLD: 12.49 K/UL — HIGH (ref 3.8–10.5)
WBC # FLD AUTO: 12.49 K/UL — HIGH (ref 3.8–10.5)

## 2021-04-01 PROCEDURE — 99232 SBSQ HOSP IP/OBS MODERATE 35: CPT | Mod: GC

## 2021-04-01 RX ORDER — INSULIN GLARGINE 100 [IU]/ML
36 INJECTION, SOLUTION SUBCUTANEOUS AT BEDTIME
Refills: 0 | Status: DISCONTINUED | OUTPATIENT
Start: 2021-04-01 | End: 2021-04-06

## 2021-04-01 RX ORDER — INSULIN LISPRO 100/ML
16 VIAL (ML) SUBCUTANEOUS
Refills: 0 | Status: DISCONTINUED | OUTPATIENT
Start: 2021-04-01 | End: 2021-04-06

## 2021-04-01 RX ADMIN — Medication 4: at 08:07

## 2021-04-01 RX ADMIN — LATANOPROST 1 DROP(S): 0.05 SOLUTION/ DROPS OPHTHALMIC; TOPICAL at 22:16

## 2021-04-01 RX ADMIN — Medication 13 UNIT(S): at 12:09

## 2021-04-01 RX ADMIN — Medication 1 APPLICATION(S): at 22:14

## 2021-04-01 RX ADMIN — Medication 13 UNIT(S): at 08:07

## 2021-04-01 RX ADMIN — LIDOCAINE 1 PATCH: 4 CREAM TOPICAL at 20:00

## 2021-04-01 RX ADMIN — METFORMIN HYDROCHLORIDE 500 MILLIGRAM(S): 850 TABLET ORAL at 17:09

## 2021-04-01 RX ADMIN — ENOXAPARIN SODIUM 40 MILLIGRAM(S): 100 INJECTION SUBCUTANEOUS at 12:08

## 2021-04-01 RX ADMIN — Medication 6: at 17:11

## 2021-04-01 RX ADMIN — Medication 500000 UNIT(S): at 12:08

## 2021-04-01 RX ADMIN — SENNA PLUS 2 TABLET(S): 8.6 TABLET ORAL at 22:15

## 2021-04-01 RX ADMIN — INSULIN GLARGINE 36 UNIT(S): 100 INJECTION, SOLUTION SUBCUTANEOUS at 22:15

## 2021-04-01 RX ADMIN — Medication 25 MILLIGRAM(S): at 14:44

## 2021-04-01 RX ADMIN — Medication 4: at 12:08

## 2021-04-01 RX ADMIN — GABAPENTIN 300 MILLIGRAM(S): 400 CAPSULE ORAL at 22:16

## 2021-04-01 RX ADMIN — Medication 16 UNIT(S): at 17:11

## 2021-04-01 RX ADMIN — Medication 1 APPLICATION(S): at 08:06

## 2021-04-01 RX ADMIN — Medication 650 MILLIGRAM(S): at 10:00

## 2021-04-01 RX ADMIN — Medication 25 MILLIGRAM(S): at 22:17

## 2021-04-01 RX ADMIN — POLYETHYLENE GLYCOL 3350 17 GRAM(S): 17 POWDER, FOR SOLUTION ORAL at 12:07

## 2021-04-01 RX ADMIN — LIDOCAINE 1 PATCH: 4 CREAM TOPICAL at 12:07

## 2021-04-01 RX ADMIN — ATORVASTATIN CALCIUM 40 MILLIGRAM(S): 80 TABLET, FILM COATED ORAL at 22:15

## 2021-04-01 RX ADMIN — Medication 1 APPLICATION(S): at 08:07

## 2021-04-01 RX ADMIN — Medication 2000 UNIT(S): at 12:07

## 2021-04-01 RX ADMIN — Medication 500000 UNIT(S): at 22:22

## 2021-04-01 RX ADMIN — KETOTIFEN FUMARATE 1 DROP(S): 0.34 SOLUTION OPHTHALMIC at 17:09

## 2021-04-01 RX ADMIN — LIDOCAINE 1 PATCH: 4 CREAM TOPICAL at 05:05

## 2021-04-01 RX ADMIN — Medication 1 APPLICATION(S): at 12:12

## 2021-04-01 RX ADMIN — GABAPENTIN 300 MILLIGRAM(S): 400 CAPSULE ORAL at 14:43

## 2021-04-01 RX ADMIN — LIDOCAINE 1 PATCH: 4 CREAM TOPICAL at 23:04

## 2021-04-01 RX ADMIN — Medication 500000 UNIT(S): at 17:09

## 2021-04-01 NOTE — PROGRESS NOTE ADULT - SUBJECTIVE AND OBJECTIVE BOX
This is a 64 y/o Arabic speaking Female with PMHx of DM Type 2 (on insulin), HTN, HLD, COVID (2020), and glaucoma presented with generalized weakness, joint pain, and rash. The patient states that she was discharged from OSH on 3/12 after being evaluated for similar symptoms. They suspected that she may have Lyme disease and started her on doxycycline and sent her home. They also suspected that this might be post-COVID related. The patient has multiple complaints. Reports rash in the neck area and b/l arms, along with whole body itching. States that she has had a rash since 2/2021 and has seen various doctors and tried different things (steroid creams) without relief. The rash initially started on her face. Also complains of joint pain for one week. States she has joint pain and stiffness in the b/l shoulders, elbows, hands, and knees. States that the pain is currently worst in the L shoulder and R elbow. States that the pain is so significant that she is unable to walk now. Tries Tylenol with minimal relief. Also reports that she has been unable to do her ADLs - clean herself, walk. Never had joint pain like this before. Also reports subjective fever and chills, along with dry mouth.   The rash did not get worse in the sun. Joint pain is not worse in the AM and present at rest but worse on movement. No BM changes recently; had mild rectal bleeding not in feces but on wiping. She attributes 2/2 itching and rash, denies vaginal bleeding or ulcers, weight loss. Had subjective fever but not over 99F. Endorse dry mouth, and itching in her eyes, no mouth ulcers. No FHx of autoimmune/rheumatologist/joint diseases. Was admitted at [facility] 3/8-3/12 with fevers, joint pain, rash, treated with Levaquin for five days and three doses of Vanco, 2x covid negative.    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 The pt was started on prednisone and steroid ointments. She underwent extensive infectious and inflammatory workup that included hepatitis panel - neg, HIV - neg, syphilis - neg, EBV - past infection, CMV - neg, Lyme panel-neg, blood cultures - NGTD, C3 slightly elevated, C4 - wnl. VIPIN-neg, dsDNA - neg, CCP -neg, anti SSA/B - neg, Mallory-1 - neg. Myoglobin - low, RF - neg, anti-Smith - neg. Flow cytometry - neg for abnormality. X-rays L shoulder and R elbow - wnl. CT chest and pelvis - wnl, no signs of malignancy. Myoglobin was low. Still pending Myomarker panel 3, Jak2 and calreticulin mutations. The patient underwent a skin biopsy, which resulted in "Skin with focal dyskeratosis with mild perivascular inflammation. The findings raise the differential diagnosis of a viral exanthem or a drug eruption."     Although the working diagnosis did not match the biopsy results, the patient clinically improved under the steroidal treatment. Her upper and lower extremity joint pain has resolved, her rash has started to clear, and he was able to ambulate. During the prednisone treatment, the patient's glucose level increased. She needed high doses of insulin to maintain euglycemia (60 Lantus and 35 lispro pre-meals). PM&R evaluated the patient and concluded that the patient would benefit from acute rehab. She was started on a weekly prednisone taper before discharge. Prior to d/c, pt was given the J&J covid vaccine on 3/23.  Patient deemed medically stable for admission to acute inpatient rehab on 3/25.      ROS:   [place], [place] [id number]   Tolerating therapy - working on bathroom transfers  left shoulder pain rated at 2-3/10 - controlled with heat, tylenol, and lidocaine  +B/L LE shakes still present - slightly improved - likely d/t weakness  axillary itch not bad during the day when she's occupied, more prominent during the night.  Took atarax before bed  no urinary symptoms, had BM 3/30, 3/31, 4/1  No c/o HA, dizziness, CP, SOB  no abd pain, nausea, vomiting     MEDICATIONS  (STANDING):  atorvastatin 40 milliGRAM(s) Oral at bedtime  cholecalciferol 2000 Unit(s) Oral daily  dextrose 40% Gel 15 Gram(s) Oral once  dextrose 5%. 1000 milliLiter(s) (50 mL/Hr) IV Continuous <Continuous>  dextrose 5%. 1000 milliLiter(s) (100 mL/Hr) IV Continuous <Continuous>  dextrose 50% Injectable 25 Gram(s) IV Push once  dextrose 50% Injectable 12.5 Gram(s) IV Push once  dextrose 50% Injectable 25 Gram(s) IV Push once  diltiazem    milliGRAM(s) Oral daily  enoxaparin Injectable 40 milliGRAM(s) SubCutaneous daily  fluocinonide 0.05% Cream 1 Application(s) Topical two times a day  gabapentin 300 milliGRAM(s) Oral every 8 hours  glucagon  Injectable 1 milliGRAM(s) IntraMuscular once  insulin glargine Injectable (LANTUS) 30 Unit(s) SubCutaneous at bedtime  insulin lispro (ADMELOG) corrective regimen sliding scale   SubCutaneous three times a day before meals  insulin lispro (ADMELOG) corrective regimen sliding scale   SubCutaneous at bedtime  insulin lispro Injectable (ADMELOG) 13 Unit(s) SubCutaneous three times a day before meals  ketotifen 0.025% Ophthalmic Solution 1 Drop(s) Both EYES two times a day  latanoprost 0.005% Ophthalmic Solution 1 Drop(s) Both EYES at bedtime  lidocaine   Patch 1 Patch Transdermal daily  metFORMIN 500 milliGRAM(s) Oral two times a day  nystatin    Suspension 389627 Unit(s) Swish and Swallow every 6 hours  pantoprazole    Tablet 40 milliGRAM(s) Oral before breakfast  petrolatum white Ointment 1 Application(s) Topical daily  polyethylene glycol 3350 17 Gram(s) Oral daily  predniSONE   Tablet 30 milliGRAM(s) Oral daily  predniSONE   Tablet 40  Oral   senna 2 Tablet(s) Oral at bedtime  triamcinolone 0.1% Ointment 1 Application(s) Topical two times a day  trimethoprim  160 mG/sulfamethoxazole 800 mG 1 Tablet(s) Oral <User Schedule>    MEDICATIONS  (PRN):  acetaminophen   Tablet .. 650 milliGRAM(s) Oral every 6 hours PRN Temp greater or equal to 38C (100.4F), Mild Pain (1 - 3), Moderate Pain (4 - 6)  hydrOXYzine hydrochloride 25 milliGRAM(s) Oral every 6 hours PRN Itching      LAB                        10.3   12.49 )-----------( 375      ( 01 Apr 2021 06:00 )             30.6     04-01    138  |  103  |  22  ----------------------------<  253<H>  4.4   |  28  |  0.96    Ca    8.9      01 Apr 2021 06:00    TPro  6.2  /  Alb  2.5<L>  /  TBili  0.2  /  DBili  x   /  AST  14  /  ALT  50<H>  /  AlkPhos  100  04-01    CAPILLARY BLOOD GLUCOSE      POCT Blood Glucose.: 202 mg/dL (01 Apr 2021 12:04)  POCT Blood Glucose.: 205 mg/dL (01 Apr 2021 07:48)  POCT Blood Glucose.: 181 mg/dL (31 Mar 2021 21:24)  POCT Blood Glucose.: 344 mg/dL (31 Mar 2021 16:25)    Vital Signs Last 24 Hrs  T(C): 36.8 (01 Apr 2021 07:35), Max: 36.8 (31 Mar 2021 20:50)  T(F): 98.2 (01 Apr 2021 07:35), Max: 98.2 (31 Mar 2021 20:50)  HR: 73 (01 Apr 2021 07:35) (61 - 82)  BP: 127/74 (01 Apr 2021 07:35) (115/58 - 132/68)  BP(mean): 89 (01 Apr 2021 06:31) (89 - 89)  RR: 15 (01 Apr 2021 07:35) (15 - 15)  SpO2: 98% (01 Apr 2021 07:35) (98% - 98%)      Physical Exam: General: NAD                            HEENT: NC/AT, EOM I, PERRLA, Normal Conjunctivae  Cardio: RRR, Normal S1-S2, No M/G/R                              Pulm: No Respiratory Distress,  Lungs CTAB                        Abdomen: ND/NT, Soft, BS+                                                MSK: No joint swelling                                   Ext: +Edema bilat LEs, Pulses 2+ throughout, No calf tenderness    Skin:  no skin breakdown noted, healing areas (from rash)-hyperpigmented skin noted to arms, scalp, chest.                                                            Neurological Examination    Cognitive: AAO x 3                                                                         Attention: Intact   Judgment: Good evidence of judgement   Attention: intact                              Mood/Affect: wnl                                                                           Communication:  Fluent,  No dysarthria                                                          Sensory:                                                                                          Motor    5/5 right shoulder and elbow strengths, right hand   4/5 left shoulder and elbow strengths secondary to pain. Left wrist and hand strength   4/5 right HF and KE. 5/5 right ankle strengths.   3+/5 left HF, 4/+/5 KE. 5/5 left ankle strengths.    TEAM MEETING 3/29  SW:  lives with  + 2 grown sons (1 disable, 1 works part time)- apt + elevator access; 1 TAMMY  OT:  mod I eating and grooming, min A dressing, CG shower transfer  PT: Min A with transfer, ambulation 30 ft RW  barriers: pain and endurance  EDOD: 4/6 Home This is a 66 y/o Welsh speaking Female with PMHx of DM Type 2 (on insulin), HTN, HLD, COVID (2020), and glaucoma presented with generalized weakness, joint pain, and rash. The patient states that she was discharged from OSH on 3/12 after being evaluated for similar symptoms. They suspected that she may have Lyme disease and started her on doxycycline and sent her home. They also suspected that this might be post-COVID related. The patient has multiple complaints. Reports rash in the neck area and b/l arms, along with whole body itching. States that she has had a rash since 2/2021 and has seen various doctors and tried different things (steroid creams) without relief. The rash initially started on her face. Also complains of joint pain for one week. States she has joint pain and stiffness in the b/l shoulders, elbows, hands, and knees. States that the pain is currently worst in the L shoulder and R elbow. States that the pain is so significant that she is unable to walk now. Tries Tylenol with minimal relief. Also reports that she has been unable to do her ADLs - clean herself, walk. Never had joint pain like this before. Also reports subjective fever and chills, along with dry mouth.   The rash did not get worse in the sun. Joint pain is not worse in the AM and present at rest but worse on movement. No BM changes recently; had mild rectal bleeding not in feces but on wiping. She attributes 2/2 itching and rash, denies vaginal bleeding or ulcers, weight loss. Had subjective fever but not over 99F. Endorse dry mouth, and itching in her eyes, no mouth ulcers. No FHx of autoimmune/rheumatologist/joint diseases. Was admitted at [facility] 3/8-3/12 with fevers, joint pain, rash, treated with Levaquin for five days and three doses of Vanco, 2x covid negative.    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 The pt was started on prednisone and steroid ointments. She underwent extensive infectious and inflammatory workup that included hepatitis panel - neg, HIV - neg, syphilis - neg, EBV - past infection, CMV - neg, Lyme panel-neg, blood cultures - NGTD, C3 slightly elevated, C4 - wnl. VIPIN-neg, dsDNA - neg, CCP -neg, anti SSA/B - neg, Mallory-1 - neg. Myoglobin - low, RF - neg, anti-Smith - neg. Flow cytometry - neg for abnormality. X-rays L shoulder and R elbow - wnl. CT chest and pelvis - wnl, no signs of malignancy. Myoglobin was low. Still pending Myomarker panel 3, Jak2 and calreticulin mutations. The patient underwent a skin biopsy, which resulted in "Skin with focal dyskeratosis with mild perivascular inflammation. The findings raise the differential diagnosis of a viral exanthem or a drug eruption."     Although the working diagnosis did not match the biopsy results, the patient clinically improved under the steroidal treatment. Her upper and lower extremity joint pain has resolved, her rash has started to clear, and he was able to ambulate. During the prednisone treatment, the patient's glucose level increased. She needed high doses of insulin to maintain euglycemia (60 Lantus and 35 lispro pre-meals). PM&R evaluated the patient and concluded that the patient would benefit from acute rehab. She was started on a weekly prednisone taper before discharge. Prior to d/c, pt was given the J&J covid vaccine on 3/23.  Patient deemed medically stable for admission to acute inpatient rehab on 3/25.      ROS:   [place]ash, ID [id number]   Tolerating therapy - working on bathroom transfers  left shoulder pain rated at 2-3/10 - controlled with heat, tylenol, and lidocaine  +B/L LE shakes still present - slightly improved - likely d/t weakness  axillary itch not bad during the day when she's occupied, more prominent during the night.  Took atarax before bed  no urinary symptoms, had BM 3/30, 3/31, 4/1  No c/o HA, dizziness, CP, SOB  no abd pain, nausea, vomiting     MEDICATIONS  (STANDING):  atorvastatin 40 milliGRAM(s) Oral at bedtime  cholecalciferol 2000 Unit(s) Oral daily  dextrose 40% Gel 15 Gram(s) Oral once  dextrose 5%. 1000 milliLiter(s) (50 mL/Hr) IV Continuous <Continuous>  dextrose 5%. 1000 milliLiter(s) (100 mL/Hr) IV Continuous <Continuous>  dextrose 50% Injectable 25 Gram(s) IV Push once  dextrose 50% Injectable 12.5 Gram(s) IV Push once  dextrose 50% Injectable 25 Gram(s) IV Push once  diltiazem    milliGRAM(s) Oral daily  enoxaparin Injectable 40 milliGRAM(s) SubCutaneous daily  fluocinonide 0.05% Cream 1 Application(s) Topical two times a day  gabapentin 300 milliGRAM(s) Oral every 8 hours  glucagon  Injectable 1 milliGRAM(s) IntraMuscular once  insulin glargine Injectable (LANTUS) 36 Unit(s) SubCutaneous at bedtime  insulin lispro (ADMELOG) corrective regimen sliding scale   SubCutaneous three times a day before meals  insulin lispro (ADMELOG) corrective regimen sliding scale   SubCutaneous at bedtime  insulin lispro Injectable (ADMELOG) 16 Unit(s) SubCutaneous three times a day before meals  ketotifen 0.025% Ophthalmic Solution 1 Drop(s) Both EYES two times a day  latanoprost 0.005% Ophthalmic Solution 1 Drop(s) Both EYES at bedtime  lidocaine   Patch 1 Patch Transdermal daily  metFORMIN 500 milliGRAM(s) Oral two times a day  nystatin    Suspension 470677 Unit(s) Swish and Swallow every 6 hours  pantoprazole    Tablet 40 milliGRAM(s) Oral before breakfast  petrolatum white Ointment 1 Application(s) Topical daily  polyethylene glycol 3350 17 Gram(s) Oral daily  predniSONE   Tablet 30 milliGRAM(s) Oral daily  predniSONE   Tablet 40  Oral   senna 2 Tablet(s) Oral at bedtime  triamcinolone 0.1% Ointment 1 Application(s) Topical two times a day  trimethoprim  160 mG/sulfamethoxazole 800 mG 1 Tablet(s) Oral <User Schedule>    MEDICATIONS  (PRN):  acetaminophen   Tablet .. 650 milliGRAM(s) Oral every 6 hours PRN Temp greater or equal to 38C (100.4F), Mild Pain (1 - 3), Moderate Pain (4 - 6)  hydrOXYzine hydrochloride 25 milliGRAM(s) Oral every 6 hours PRN Itching        LAB                        10.3   12.49 )-----------( 375      ( 01 Apr 2021 06:00 )             30.6     04-01    138  |  103  |  22  ----------------------------<  253<H>  4.4   |  28  |  0.96    Ca    8.9      01 Apr 2021 06:00    TPro  6.2  /  Alb  2.5<L>  /  TBili  0.2  /  DBili  x   /  AST  14  /  ALT  50<H>  /  AlkPhos  100  04-01    CAPILLARY BLOOD GLUCOSE      POCT Blood Glucose.: 202 mg/dL (01 Apr 2021 12:04)  POCT Blood Glucose.: 205 mg/dL (01 Apr 2021 07:48)  POCT Blood Glucose.: 181 mg/dL (31 Mar 2021 21:24)  POCT Blood Glucose.: 344 mg/dL (31 Mar 2021 16:25)    Vital Signs Last 24 Hrs  T(C): 36.8 (01 Apr 2021 07:35), Max: 36.8 (31 Mar 2021 20:50)  T(F): 98.2 (01 Apr 2021 07:35), Max: 98.2 (31 Mar 2021 20:50)  HR: 73 (01 Apr 2021 07:35) (61 - 82)  BP: 127/74 (01 Apr 2021 07:35) (115/58 - 132/68)  BP(mean): 89 (01 Apr 2021 06:31) (89 - 89)  RR: 15 (01 Apr 2021 07:35) (15 - 15)  SpO2: 98% (01 Apr 2021 07:35) (98% - 98%)      Physical Exam: General: NAD                            HEENT: NC/AT, EOM I, PERRLA, Normal Conjunctivae  Cardio: RRR, Normal S1-S2, No M/G/R                              Pulm: No Respiratory Distress,  Lungs CTAB                        Abdomen: ND/NT, Soft, BS+                                                MSK: No joint swelling                                   Ext: +Edema bilat LEs, Pulses 2+ throughout, No calf tenderness    Skin:  no skin breakdown noted, healing areas (from rash)-hyperpigmented skin noted to arms, scalp, chest.                                                            Neurological Examination    Cognitive: AAO x 3                                                                         Attention: Intact   Judgment: Good evidence of judgement   Attention: intact                              Mood/Affect: wnl                                                                           Communication:  Fluent,  No dysarthria                                                          Sensory:                                                                                          Motor    5/5 right shoulder and elbow strengths, right hand   4/5 left shoulder and elbow strengths secondary to pain. Left wrist and hand strength   4/5 right HF and KE. 5/5 right ankle strengths.   3+/5 left HF, 4/+/5 KE. 5/5 left ankle strengths.    TEAM MEETING 3/29  SW:  lives with  + 2 grown sons (1 disable, 1 works part time)- apt + elevator access; 1 TAMMY  OT:  mod I eating and grooming, min A dressing, CG shower transfer  PT: Min A with transfer, ambulation 30 ft RW  barriers: pain and endurance  EDOD: 4/6 Home

## 2021-04-01 NOTE — PROGRESS NOTE ADULT - ASSESSMENT
65F with DM2, HTN, HLD, COVID19 (2020), and glaucoma, presented to [facility] 3/17/2021 with joint and muscle pain, rash, pruritis, initially diagnosed with amyopathic Dermatomyositis and placed on steroids, however, skin biopsy not consistent with this diagnosis and suggestive of a viral rash or drug-related rash. She is now at acute rehab for her functional deficits.     #Debility: c/w PT/OT  #Rash and Pruritis: etiologies includes viral rash, versus drug-induced, versus Dermatomyositis, c/w Prednisone taper (PCP ppx while on steroids), neuropathic pain control, Hydroxyzine prn, triamcinolone cream / Lidex to scalp as previously outlined by derm  #HTN: c/w diltiazem  #HLD: c/w lipitor  #DM2: steroids are exacerbating the sugars, patient had 22 U sliding scale coverage in last 24 hours; will increase Lantus to 36U qHS and increase pre-meal to 16U tid (A1C 10.6%), c/w Metformin  #GERD: c/w PPI   #DVT ppx: lovenox

## 2021-04-01 NOTE — PROGRESS NOTE ADULT - SUBJECTIVE AND OBJECTIVE BOX
Patient is a 65y old  Female who presents with a chief complaint of 06.9 other arthritis (01 Apr 2021 12:12)     phone ID # [phone] [id number]    Patient seen and examined at bedside. Complains of itching throughout - this is not new, but has gotten worse since her "cream" has been discontinued.     ALLERGIES:  azithromycin (Hives; Swelling)  enalapril (Other (Mild to Mod))  penicillin (Swelling; Rash)    MEDICATIONS  (STANDING):  atorvastatin 40 milliGRAM(s) Oral at bedtime  cholecalciferol 2000 Unit(s) Oral daily  dextrose 40% Gel 15 Gram(s) Oral once  dextrose 5%. 1000 milliLiter(s) (50 mL/Hr) IV Continuous <Continuous>  dextrose 5%. 1000 milliLiter(s) (100 mL/Hr) IV Continuous <Continuous>  dextrose 50% Injectable 25 Gram(s) IV Push once  dextrose 50% Injectable 12.5 Gram(s) IV Push once  dextrose 50% Injectable 25 Gram(s) IV Push once  diltiazem    milliGRAM(s) Oral daily  enoxaparin Injectable 40 milliGRAM(s) SubCutaneous daily  fluocinonide 0.05% Cream 1 Application(s) Topical two times a day  gabapentin 300 milliGRAM(s) Oral every 8 hours  glucagon  Injectable 1 milliGRAM(s) IntraMuscular once  insulin glargine Injectable (LANTUS) 30 Unit(s) SubCutaneous at bedtime  insulin lispro (ADMELOG) corrective regimen sliding scale   SubCutaneous three times a day before meals  insulin lispro (ADMELOG) corrective regimen sliding scale   SubCutaneous at bedtime  insulin lispro Injectable (ADMELOG) 13 Unit(s) SubCutaneous three times a day before meals  ketotifen 0.025% Ophthalmic Solution 1 Drop(s) Both EYES two times a day  latanoprost 0.005% Ophthalmic Solution 1 Drop(s) Both EYES at bedtime  lidocaine   Patch 1 Patch Transdermal daily  metFORMIN 500 milliGRAM(s) Oral two times a day  nystatin    Suspension 790650 Unit(s) Swish and Swallow every 6 hours  pantoprazole    Tablet 40 milliGRAM(s) Oral before breakfast  petrolatum white Ointment 1 Application(s) Topical daily  polyethylene glycol 3350 17 Gram(s) Oral daily  predniSONE   Tablet 30 milliGRAM(s) Oral daily  predniSONE   Tablet 40  Oral   senna 2 Tablet(s) Oral at bedtime  triamcinolone 0.1% Ointment 1 Application(s) Topical two times a day  trimethoprim  160 mG/sulfamethoxazole 800 mG 1 Tablet(s) Oral <User Schedule>    MEDICATIONS  (PRN):  acetaminophen   Tablet .. 650 milliGRAM(s) Oral every 6 hours PRN Temp greater or equal to 38C (100.4F), Mild Pain (1 - 3), Moderate Pain (4 - 6)  hydrOXYzine hydrochloride 25 milliGRAM(s) Oral every 6 hours PRN Itching    Vital Signs Last 24 Hrs  T(F): 98.2 (01 Apr 2021 07:35), Max: 98.2 (31 Mar 2021 20:50)  HR: 73 (01 Apr 2021 07:35) (61 - 82)  BP: 127/74 (01 Apr 2021 07:35) (115/58 - 132/68)  RR: 15 (01 Apr 2021 07:35) (15 - 15)  SpO2: 98% (01 Apr 2021 07:35) (98% - 98%)  I&O's Summary    PHYSICAL EXAM:  General: NAD, A/O x 3  ENT: Moist mucous membranes, no thrush  Neck: Supple, No JVD  Lungs: Clear to auscultation bilaterally, good air entry, non-labored breathing  Cardio: RRR, S1/S2, No murmur  Abdomen: Soft, Nontender, Nondistended; Bowel sounds present; obese  Extremities: No calf tenderness, No pitting edema  Skin: No hives, no erythema, no rashes noted     LABS:                        10.3   12.49 )-----------( 375      ( 01 Apr 2021 06:00 )             30.6     04-01    138  |  103  |  22  ----------------------------<  253  4.4   |  28  |  0.96    Ca    8.9      01 Apr 2021 06:00    TPro  6.2  /  Alb  2.5  /  TBili  0.2  /  DBili  x   /  AST  14  /  ALT  50  /  AlkPhos  100  04-01        eGFR if African American: 73 mL/min/1.73M2 (04-01-21 @ 06:00)  eGFR if Non African American: 63 mL/min/1.73M2 (04-01-21 @ 06:00)      03-17 Chol 88 mg/dL LDL -- HDL 29 mg/dL Trig 129 mg/dL    POCT Blood Glucose.: 202 mg/dL (01 Apr 2021 12:04)  POCT Blood Glucose.: 205 mg/dL (01 Apr 2021 07:48)  POCT Blood Glucose.: 181 mg/dL (31 Mar 2021 21:24)  POCT Blood Glucose.: 344 mg/dL (31 Mar 2021 16:25)

## 2021-04-01 NOTE — PROGRESS NOTE ADULT - ASSESSMENT
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 Hospital course complicated by elevated glucoses secondary to steroids. Now admitted to [facility] after for initiation of a multidisciplinary rehab program consisting focused on functional mobility, transfers and ADLs (activities of daily living).  Comprehensive Multidisciplinary Rehab Program:  - comprehensive rehab program, 3 hours a day, 5 days a week.  - PT 2hr/day: Focused on improving strength, endurance, coordination, balance, functional mobility, and transfers  - OT 1hr/day: Focused on improving strength, fine motor skills, coordination, posture and ADLs.    - Activity Limitations: Decreased social, vocational and leisure activities, decreased self care and ADLs, decreased mobility, decreased ability to manage household and finances.     Participation Restrictions/Precautions:  - Weight bearing status: WBAT   - ROM restrictions: none  - Precautions:    [x ] Falls   [ ] Spinal   [ ] Hip (Anterior or Posterior)       [ ] Seizure  [ ] Cardiac   [ ] Sternal    Pruritis   - hydroxyzine 25mg Q6h PRN    Rehab Diagnosis/Management  Sleep:   - Maintain quiet hours and low stim environment.    Pain Management:  -Tylenol PRN  -Gabapentin  - Lidocaine patch  - Moist heat to left shoulder    GI/Bowel:  -At risk for constipation due to neurologic diagnosis, immobility and/or medication use  -Miralax /senna daily  -GI ppx: protonix     /Bladder:   - At risk for incontinence and retention due to neurologic diagnosis and limited mobility  - Currently patient voids:    [x ] independent     [ ] external collection device (condom cath)    [ ] Indwelling colin catheter     [ ] Intermittent catheterization  - Baseline PVR 20cc  - Encourage timed voids every 4 hours while awake for independence and to promote continence during therapy.    Skin/Pressure Injury:   - At risk for pressure injury due to neurologic diagnosis and relative immobility.  - Skin assessment on admission admission: no pressure injury   - Turn every 2 hours while in bed, air mattress  - Skin barrier cream as needed  - Nursing to monitor skin Qshift    Diet/Dysphagia:  - Diet Consistency/Modifications: CC    DVT ppx:  -lovenox  - SCDs    -------------  Comorbid Medical Management:    HTN  -c/w diltiazem    Type 2 Diabetes  -Lantus 30U   -Premeal insulin 13U  - restart metformin 500 BID   -Continue CC diet   -Continue steriod taper and adjust insulin as needed   -HA1C 10.6%    Covid Status   -Covid + last year  -Received the Priya vaccine on 3/23     ---------------  Code Status/Emergency Contact: full code     Outpatient Follow-up (Specialty/Name of physician):    Shanique Chacon)  Internal Medicine; Rheumatology  [address]  [place] [address]  Phone: [phone]  Fax: [phone]  Established Patient  Follow Up Time: 2 weeks    [facility] - [place]  Dermatology  [address]  [place] [address]  Phone: [phone]  Fax: [phone]  Follow Up Time: 2 weeks      --------------  Goals: Safe discharge to home   Estimated Length of Stay: 10-14 days  Rehab Potential: Good  Medical Prognosis: Good  Estimated Disposition: Home with Home Care  ---------------    PRESCREEN COMPARISON:  I have reviewed the prescreen information and I have found no relevant changes between the preadmission screening and my post admission evaluation.    RATIONALE FOR INPATIENT ADMISSION: Patient demonstrates the following:  [X] Medically appropriate for rehabilitation admission  [X] Has attainable rehab goals with an appropriate initial discharge plan  [X]Has rehabilitation potential (expected to make a significant improvement within a reasonable period of time)  [X] Requires close medical management by a rehab physician, rehab nursing care, Hospitalist and comprehensive interdisciplinary team (including PT, OT and/or SLP, Prosthetics and Orthotics)     LIONEL ERAZO is a 65y with a history of DM Type 2 (on insulin), HTN, HLD, COVID (2020), and glaucoma  who presented to [facility] on 3/17 with complaint of joint pain, rash, weakness and 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 Hospital course complicated by elevated glucoses secondary to steroids. Now admitted to [facility] after for initiation of a multidisciplinary rehab program consisting focused on functional mobility, transfers and ADLs (activities of daily living).  Comprehensive Multidisciplinary Rehab Program:  - comprehensive rehab program, 3 hours a day, 5 days a week.  - PT 2hr/day: Focused on improving strength, endurance, coordination, balance, functional mobility, and transfers  - OT 1hr/day: Focused on improving strength, fine motor skills, coordination, posture and ADLs.    - Activity Limitations: Decreased social, vocational and leisure activities, decreased self care and ADLs, decreased mobility, decreased ability to manage household and finances.     Participation Restrictions/Precautions:  - Weight bearing status: WBAT   - ROM restrictions: none  - Precautions:    [x ] Falls   [ ] Spinal   [ ] Hip (Anterior or Posterior)       [ ] Seizure  [ ] Cardiac   [ ] Sternal    Pruritis   - hydroxyzine 25mg Q6h PRN    Rehab Diagnosis/Management  Sleep:   - Maintain quiet hours and low stim environment.    Pain Management:  -Tylenol PRN  -Gabapentin  - Lidocaine patch  - Moist heat to left shoulder    GI/Bowel:  -At risk for constipation due to neurologic diagnosis, immobility and/or medication use  -Miralax /senna daily  -GI ppx: protonix     /Bladder:   - At risk for incontinence and retention due to neurologic diagnosis and limited mobility  - Currently patient voids:    [x ] independent     [ ] external collection device (condom cath)    [ ] Indwelling colin catheter     [ ] Intermittent catheterization  - Baseline PVR 20cc  - Encourage timed voids every 4 hours while awake for independence and to promote continence during therapy.    Skin/Pressure Injury:   - At risk for pressure injury due to neurologic diagnosis and relative immobility.  - Skin assessment on admission admission: no pressure injury   - Turn every 2 hours while in bed, air mattress  - Skin barrier cream as needed  - Nursing to monitor skin Qshift    Diet/Dysphagia:  - Diet Consistency/Modifications: CC    DVT ppx:  -lovenox  - SCDs    -------------  Comorbid Medical Management:    HTN  -c/w diltiazem    Type 2 Diabetes  -Lantus 36U   -Premeal insulin 16U  - restart metformin 500 BID   -Continue CC diet   -Continue steriod taper and adjust insulin as needed   -HA1C 10.6%    Covid Status   -Covid + last year  -Received the Priya vaccine on 3/23     ---------------  Code Status/Emergency Contact: full code     Outpatient Follow-up (Specialty/Name of physician):    Shanique Chacon)  Internal Medicine; Rheumatology  [address]  [place] [address]  Phone: [phone]  Fax: [phone]  Established Patient  Follow Up Time: 2 weeks    [facility] Dermatology - [place]  Dermatology  [address]  [place] [address]  Phone: [phone]  Fax: [phone]  Follow Up Time: 2 weeks      --------------  Goals: Safe discharge to home   Estimated Length of Stay: 10-14 days  Rehab Potential: Good  Medical Prognosis: Good  Estimated Disposition: Home with Home Care  ---------------    PRESCREEN COMPARISON:  I have reviewed the prescreen information and I have found no relevant changes between the preadmission screening and my post admission evaluation.    RATIONALE FOR INPATIENT ADMISSION: Patient demonstrates the following:  [X] Medically appropriate for rehabilitation admission  [X] Has attainable rehab goals with an appropriate initial discharge plan  [X]Has rehabilitation potential (expected to make a significant improvement within a reasonable period of time)  [X] Requires close medical management by a rehab physician, rehab nursing care, Hospitalist and comprehensive interdisciplinary team (including PT, OT and/or SLP, Prosthetics and Orthotics)

## 2021-04-02 LAB
GLUCOSE BLDC GLUCOMTR-MCNC: 140 MG/DL — HIGH (ref 70–99)
GLUCOSE BLDC GLUCOMTR-MCNC: 161 MG/DL — HIGH (ref 70–99)
GLUCOSE BLDC GLUCOMTR-MCNC: 244 MG/DL — HIGH (ref 70–99)
GLUCOSE BLDC GLUCOMTR-MCNC: 304 MG/DL — HIGH (ref 70–99)

## 2021-04-02 PROCEDURE — 99232 SBSQ HOSP IP/OBS MODERATE 35: CPT | Mod: GC

## 2021-04-02 RX ADMIN — INSULIN GLARGINE 36 UNIT(S): 100 INJECTION, SOLUTION SUBCUTANEOUS at 21:43

## 2021-04-02 RX ADMIN — Medication 8: at 12:20

## 2021-04-02 RX ADMIN — LIDOCAINE 1 PATCH: 4 CREAM TOPICAL at 19:20

## 2021-04-02 RX ADMIN — PANTOPRAZOLE SODIUM 40 MILLIGRAM(S): 20 TABLET, DELAYED RELEASE ORAL at 06:17

## 2021-04-02 RX ADMIN — Medication 25 MILLIGRAM(S): at 06:22

## 2021-04-02 RX ADMIN — Medication 25 MILLIGRAM(S): at 12:25

## 2021-04-02 RX ADMIN — Medication 16 UNIT(S): at 17:30

## 2021-04-02 RX ADMIN — Medication 30 MILLIGRAM(S): at 06:17

## 2021-04-02 RX ADMIN — METFORMIN HYDROCHLORIDE 500 MILLIGRAM(S): 850 TABLET ORAL at 07:58

## 2021-04-02 RX ADMIN — KETOTIFEN FUMARATE 1 DROP(S): 0.34 SOLUTION OPHTHALMIC at 17:30

## 2021-04-02 RX ADMIN — GABAPENTIN 300 MILLIGRAM(S): 400 CAPSULE ORAL at 21:43

## 2021-04-02 RX ADMIN — Medication 25 MILLIGRAM(S): at 21:43

## 2021-04-02 RX ADMIN — GABAPENTIN 300 MILLIGRAM(S): 400 CAPSULE ORAL at 06:17

## 2021-04-02 RX ADMIN — Medication 16 UNIT(S): at 12:22

## 2021-04-02 RX ADMIN — Medication 500000 UNIT(S): at 17:30

## 2021-04-02 RX ADMIN — Medication 1 TABLET(S): at 08:02

## 2021-04-02 RX ADMIN — METFORMIN HYDROCHLORIDE 500 MILLIGRAM(S): 850 TABLET ORAL at 17:30

## 2021-04-02 RX ADMIN — Medication 500000 UNIT(S): at 12:19

## 2021-04-02 RX ADMIN — Medication 2: at 07:58

## 2021-04-02 RX ADMIN — POLYETHYLENE GLYCOL 3350 17 GRAM(S): 17 POWDER, FOR SOLUTION ORAL at 12:20

## 2021-04-02 RX ADMIN — Medication 500000 UNIT(S): at 06:18

## 2021-04-02 RX ADMIN — LATANOPROST 1 DROP(S): 0.05 SOLUTION/ DROPS OPHTHALMIC; TOPICAL at 21:43

## 2021-04-02 RX ADMIN — Medication 16 UNIT(S): at 07:59

## 2021-04-02 RX ADMIN — ENOXAPARIN SODIUM 40 MILLIGRAM(S): 100 INJECTION SUBCUTANEOUS at 12:19

## 2021-04-02 RX ADMIN — SENNA PLUS 2 TABLET(S): 8.6 TABLET ORAL at 21:43

## 2021-04-02 RX ADMIN — KETOTIFEN FUMARATE 1 DROP(S): 0.34 SOLUTION OPHTHALMIC at 06:18

## 2021-04-02 RX ADMIN — ATORVASTATIN CALCIUM 40 MILLIGRAM(S): 80 TABLET, FILM COATED ORAL at 21:43

## 2021-04-02 RX ADMIN — Medication 300 MILLIGRAM(S): at 06:17

## 2021-04-02 RX ADMIN — Medication 1 APPLICATION(S): at 17:31

## 2021-04-02 RX ADMIN — LIDOCAINE 1 PATCH: 4 CREAM TOPICAL at 12:20

## 2021-04-02 RX ADMIN — GABAPENTIN 300 MILLIGRAM(S): 400 CAPSULE ORAL at 14:17

## 2021-04-02 RX ADMIN — Medication 1 APPLICATION(S): at 06:19

## 2021-04-02 RX ADMIN — Medication 4: at 17:30

## 2021-04-02 RX ADMIN — Medication 2000 UNIT(S): at 12:19

## 2021-04-02 NOTE — PROGRESS NOTE ADULT - SUBJECTIVE AND OBJECTIVE BOX
This is a 66 y/o Tamazight speaking Female with PMHx of DM Type 2 (on insulin), HTN, HLD, COVID (2020), and glaucoma presented with generalized weakness, joint pain, and rash. The patient states that she was discharged from OSH on 3/12 after being evaluated for similar symptoms. They suspected that she may have Lyme disease and started her on doxycycline and sent her home. They also suspected that this might be post-COVID related. The patient has multiple complaints. Reports rash in the neck area and b/l arms, along with whole body itching. States that she has had a rash since 2/2021 and has seen various doctors and tried different things (steroid creams) without relief. The rash initially started on her face. Also complains of joint pain for one week. States she has joint pain and stiffness in the b/l shoulders, elbows, hands, and knees. States that the pain is currently worst in the L shoulder and R elbow. States that the pain is so significant that she is unable to walk now. Tries Tylenol with minimal relief. Also reports that she has been unable to do her ADLs - clean herself, walk. Never had joint pain like this before. Also reports subjective fever and chills, along with dry mouth.   The rash did not get worse in the sun. Joint pain is not worse in the AM and present at rest but worse on movement. No BM changes recently; had mild rectal bleeding not in feces but on wiping. She attributes 2/2 itching and rash, denies vaginal bleeding or ulcers, weight loss. Had subjective fever but not over 99F. Endorse dry mouth, and itching in her eyes, no mouth ulcers. No FHx of autoimmune/rheumatologist/joint diseases. Was admitted at [facility] 3/8-3/12 with fevers, joint pain, rash, treated with Levaquin for five days and three doses of Vanco, 2x covid negative.    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 The pt was started on prednisone and steroid ointments. She underwent extensive infectious and inflammatory workup that included hepatitis panel - neg, HIV - neg, syphilis - neg, EBV - past infection, CMV - neg, Lyme panel-neg, blood cultures - NGTD, C3 slightly elevated, C4 - wnl. VIPIN-neg, dsDNA - neg, CCP -neg, anti SSA/B - neg, Mallory-1 - neg. Myoglobin - low, RF - neg, anti-Smith - neg. Flow cytometry - neg for abnormality. X-rays L shoulder and R elbow - wnl. CT chest and pelvis - wnl, no signs of malignancy. Myoglobin was low. Still pending Myomarker panel 3, Jak2 and calreticulin mutations. The patient underwent a skin biopsy, which resulted in "Skin with focal dyskeratosis with mild perivascular inflammation. The findings raise the differential diagnosis of a viral exanthem or a drug eruption."     Although the working diagnosis did not match the biopsy results, the patient clinically improved under the steroidal treatment. Her upper and lower extremity joint pain has resolved, her rash has started to clear, and he was able to ambulate. During the prednisone treatment, the patient's glucose level increased. She needed high doses of insulin to maintain euglycemia (60 Lantus and 35 lispro pre-meals). PM&R evaluated the patient and concluded that the patient would benefit from acute rehab. She was started on a weekly prednisone taper before discharge. Prior to d/c, pt was given the J&J covid vaccine on 3/23.  Patient deemed medically stable for admission to acute inpatient rehab on 3/25.      ROS:   ,[id number]  Tolerating therapy - working on bathroom transfers- to receive transfer tub bench on DC  left shoulder pain rated at 2-2.5/10 - controlled with heat, tylenol, and lidocaine  axillary itch better with atarax  no urinary symptoms, BM x2 today  No c/o HA, dizziness, CP, SOB  no abd pain, nausea, vomiting     MEDICATIONS  (STANDING):  atorvastatin 40 milliGRAM(s) Oral at bedtime  cholecalciferol 2000 Unit(s) Oral daily  dextrose 40% Gel 15 Gram(s) Oral once  dextrose 5%. 1000 milliLiter(s) (50 mL/Hr) IV Continuous <Continuous>  dextrose 5%. 1000 milliLiter(s) (100 mL/Hr) IV Continuous <Continuous>  dextrose 50% Injectable 25 Gram(s) IV Push once  dextrose 50% Injectable 12.5 Gram(s) IV Push once  dextrose 50% Injectable 25 Gram(s) IV Push once  diltiazem    milliGRAM(s) Oral daily  enoxaparin Injectable 40 milliGRAM(s) SubCutaneous daily  gabapentin 300 milliGRAM(s) Oral every 8 hours  glucagon  Injectable 1 milliGRAM(s) IntraMuscular once  insulin glargine Injectable (LANTUS) 36 Unit(s) SubCutaneous at bedtime  insulin lispro (ADMELOG) corrective regimen sliding scale   SubCutaneous three times a day before meals  insulin lispro (ADMELOG) corrective regimen sliding scale   SubCutaneous at bedtime  insulin lispro Injectable (ADMELOG) 16 Unit(s) SubCutaneous three times a day before meals  ketotifen 0.025% Ophthalmic Solution 1 Drop(s) Both EYES two times a day  latanoprost 0.005% Ophthalmic Solution 1 Drop(s) Both EYES at bedtime  lidocaine   Patch 1 Patch Transdermal daily  metFORMIN 500 milliGRAM(s) Oral two times a day  nystatin    Suspension 252523 Unit(s) Swish and Swallow every 6 hours  pantoprazole    Tablet 40 milliGRAM(s) Oral before breakfast  petrolatum white Ointment 1 Application(s) Topical daily  polyethylene glycol 3350 17 Gram(s) Oral daily  predniSONE   Tablet 30 milliGRAM(s) Oral daily  predniSONE   Tablet 40  Oral   senna 2 Tablet(s) Oral at bedtime  triamcinolone 0.1% Ointment 1 Application(s) Topical two times a day  trimethoprim  160 mG/sulfamethoxazole 800 mG 1 Tablet(s) Oral <User Schedule>    MEDICATIONS  (PRN):  acetaminophen   Tablet .. 650 milliGRAM(s) Oral every 6 hours PRN Temp greater or equal to 38C (100.4F), Mild Pain (1 - 3), Moderate Pain (4 - 6)  hydrOXYzine hydrochloride 25 milliGRAM(s) Oral every 6 hours PRN Itching                            10.3   12.49 )-----------( 375      ( 01 Apr 2021 06:00 )             30.6     04-01    138  |  103  |  22  ----------------------------<  253<H>  4.4   |  28  |  0.96    Ca    8.9      01 Apr 2021 06:00    TPro  6.2  /  Alb  2.5<L>  /  TBili  0.2  /  DBili  x   /  AST  14  /  ALT  50<H>  /  AlkPhos  100  04-01        CAPILLARY BLOOD GLUCOSE      POCT Blood Glucose.: 304 mg/dL (02 Apr 2021 11:55)  POCT Blood Glucose.: 161 mg/dL (02 Apr 2021 07:51)  POCT Blood Glucose.: 206 mg/dL (01 Apr 2021 22:13)  POCT Blood Glucose.: 253 mg/dL (01 Apr 2021 16:55)  POCT Blood Glucose.: 202 mg/dL (01 Apr 2021 12:04)      Vital Signs Last 24 Hrs  T(C): 36.8 (02 Apr 2021 07:45), Max: 36.8 (02 Apr 2021 07:45)  T(F): 98.3 (02 Apr 2021 07:45), Max: 98.3 (02 Apr 2021 07:45)  HR: 64 (02 Apr 2021 07:45) (64 - 70)  BP: 123/66 (02 Apr 2021 07:45) (121/63 - 131/66)  BP(mean): --  RR: 16 (02 Apr 2021 07:45) (15 - 16)  SpO2: 97% (02 Apr 2021 07:45) (96% - 97%)            Physical Exam: General: NAD                            HEENT: NC/AT, EOM I, PERRLA, Normal Conjunctivae  Cardio: RRR, Normal S1-S2, No M/G/R                              Pulm: No Respiratory Distress,  Lungs CTAB                        Abdomen: ND/NT, Soft, BS+                                                MSK: No joint swelling                                   Ext: +Edema bilat LEs, Pulses 2+ throughout, No calf tenderness    Skin:  no skin breakdown noted, healing areas (from rash)-hyperpigmented skin noted to arms, scalp, chest.                                                            Neurological Examination    Cognitive: AAO x 3                                                                         Attention: Intact   Judgment: Good evidence of judgement   Attention: intact                              Mood/Affect: wnl                                                                           Communication:  Fluent,  No dysarthria                                                          Sensory:                                                                                          Motor    5/5 right shoulder and elbow strengths, right hand   4/5 left shoulder and elbow strengths secondary to pain. Left wrist and hand strength   4/5 right HF and KE. 5/5 right ankle strengths.   3+/5 left HF, 4/+/5 KE. 5/5 left ankle strengths.    TEAM MEETING 3/29  SW:  lives with  + 2 grown sons (1 disable, 1 works part time)- apt + elevator access; 1 TAMMY  OT:  mod I eating and grooming, min A dressing, CG shower transfer  PT: Min A with transfer, ambulation 30 ft RW  barriers: pain and endurance  EDOD: 4/6 Home

## 2021-04-02 NOTE — PROGRESS NOTE ADULT - ASSESSMENT
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 Hospital course complicated by elevated glucoses secondary to steroids. Now admitted to [facility] after for initiation of a multidisciplinary rehab program consisting focused on functional mobility, transfers and ADLs (activities of daily living).  Comprehensive Multidisciplinary Rehab Program:  - comprehensive rehab program, 3 hours a day, 5 days a week.  - PT 2hr/day: Focused on improving strength, endurance, coordination, balance, functional mobility, and transfers  - OT 1hr/day: Focused on improving strength, fine motor skills, coordination, posture and ADLs.    - Activity Limitations: Decreased social, vocational and leisure activities, decreased self care and ADLs, decreased mobility, decreased ability to manage household and finances.     Participation Restrictions/Precautions:  - Weight bearing status: WBAT   - ROM restrictions: none  - Precautions:    [x ] Falls   [ ] Spinal   [ ] Hip (Anterior or Posterior)       [ ] Seizure  [ ] Cardiac   [ ] Sternal    Pruritis   - hydroxyzine 25mg Q6h PRN    Rehab Diagnosis/Management  Sleep:   - Maintain quiet hours and low stim environment.    Pain Management:  -Tylenol PRN  -Gabapentin  - Lidocaine patch  - Moist heat to left shoulder    GI/Bowel:  -At risk for constipation due to neurologic diagnosis, immobility and/or medication use  -Miralax /senna daily  -GI ppx: protonix     /Bladder:   - At risk for incontinence and retention due to neurologic diagnosis and limited mobility  - Currently patient voids:    [x ] independent     [ ] external collection device (condom cath)    [ ] Indwelling colin catheter     [ ] Intermittent catheterization  - Baseline PVR 20cc  - Encourage timed voids every 4 hours while awake for independence and to promote continence during therapy.    Skin/Pressure Injury:   - At risk for pressure injury due to neurologic diagnosis and relative immobility.  - Skin assessment on admission admission: no pressure injury   - Turn every 2 hours while in bed, air mattress  - Skin barrier cream as needed  - Nursing to monitor skin Qshift    Diet/Dysphagia:  - Diet Consistency/Modifications: CC    DVT ppx:  -lovenox  - SCDs    -------------  Comorbid Medical Management:    HTN  -c/w diltiazem    Type 2 Diabetes  -Lantus 36U   -Premeal insulin 16U  - restart metformin 500 BID   -Continue CC diet   -Continue steriod taper and adjust insulin as needed   -HA1C 10.6%    Covid Status   -Covid + last year  -Received the Priya vaccine on 3/23     Dermatomyositis  Off steroid creams- steroid vacation  use atarax for pruritis  on tapering prednisone    ---------------  Code Status/Emergency Contact: full code     Outpatient Follow-up (Specialty/Name of physician):    Shanique Chacon)  Internal Medicine; Rheumatology  [address], [address]  [place] [address]  Phone: [phone]  Fax: [phone]  Established Patient  Follow Up Time: 2 weeks    [facility]  Dermatology  [address], [address]  [place] [address]  Phone: [phone]  Fax: [phone]  Follow Up Time: 2 weeks      --------------  Goals: Safe discharge to home   Estimated Length of Stay: 10-14 days  Rehab Potential: Good  Medical Prognosis: Good  Estimated Disposition: Home with Home Care  ---------------    PRESCREEN COMPARISON:  I have reviewed the prescreen information and I have found no relevant changes between the preadmission screening and my post admission evaluation.    RATIONALE FOR INPATIENT ADMISSION: Patient demonstrates the following:  [X] Medically appropriate for rehabilitation admission  [X] Has attainable rehab goals with an appropriate initial discharge plan  [X]Has rehabilitation potential (expected to make a significant improvement within a reasonable period of time)  [X] Requires close medical management by a rehab physician, rehab nursing care, Hospitalist and comprehensive interdisciplinary team (including PT, OT and/or SLP, Prosthetics and Orthotics)

## 2021-04-03 LAB
EJ: NEGATIVE — SIGNIFICANT CHANGE UP
ENA JO1 AB SER IA-ACNC: <20 UNITS — SIGNIFICANT CHANGE UP
ENA PM/SCL AB SER-ACNC: <20 UNITS — SIGNIFICANT CHANGE UP
ENA SM+RNP AB SER IA-ACNC: <20 UNITS — SIGNIFICANT CHANGE UP
ENA SS-A IGG SER QL: 24 UNITS — HIGH
FIBRILLARIN AB SER QL: NEGATIVE — SIGNIFICANT CHANGE UP
GLUCOSE BLDC GLUCOMTR-MCNC: 178 MG/DL — HIGH (ref 70–99)
GLUCOSE BLDC GLUCOMTR-MCNC: 229 MG/DL — HIGH (ref 70–99)
GLUCOSE BLDC GLUCOMTR-MCNC: 235 MG/DL — HIGH (ref 70–99)
GLUCOSE BLDC GLUCOMTR-MCNC: 287 MG/DL — HIGH (ref 70–99)
KU AB SER QL: NEGATIVE — SIGNIFICANT CHANGE UP
MDA-5 (P140)(CADM-140): <20 UNITS — SIGNIFICANT CHANGE UP
MI2 AB SER QL: NEGATIVE — SIGNIFICANT CHANGE UP
NXP-2 (P140): <20 UNITS — SIGNIFICANT CHANGE UP
OJ AB SER QL: NEGATIVE — SIGNIFICANT CHANGE UP
PL12 AB SER QL: NEGATIVE — SIGNIFICANT CHANGE UP
PL7 AB SER QL: NEGATIVE — SIGNIFICANT CHANGE UP
SRP AB SERPL QL: NEGATIVE — SIGNIFICANT CHANGE UP
TIF GAMMA (P155/140): <20 UNITS — SIGNIFICANT CHANGE UP
U2 SNRNP AB SER QL: NEGATIVE — SIGNIFICANT CHANGE UP

## 2021-04-03 PROCEDURE — 99232 SBSQ HOSP IP/OBS MODERATE 35: CPT

## 2021-04-03 RX ADMIN — INSULIN GLARGINE 36 UNIT(S): 100 INJECTION, SOLUTION SUBCUTANEOUS at 21:32

## 2021-04-03 RX ADMIN — Medication 16 UNIT(S): at 11:51

## 2021-04-03 RX ADMIN — Medication 500000 UNIT(S): at 00:47

## 2021-04-03 RX ADMIN — LIDOCAINE 1 PATCH: 4 CREAM TOPICAL at 11:52

## 2021-04-03 RX ADMIN — Medication 25 MILLIGRAM(S): at 18:53

## 2021-04-03 RX ADMIN — GABAPENTIN 300 MILLIGRAM(S): 400 CAPSULE ORAL at 14:12

## 2021-04-03 RX ADMIN — POLYETHYLENE GLYCOL 3350 17 GRAM(S): 17 POWDER, FOR SOLUTION ORAL at 11:52

## 2021-04-03 RX ADMIN — GABAPENTIN 300 MILLIGRAM(S): 400 CAPSULE ORAL at 21:32

## 2021-04-03 RX ADMIN — Medication 1 APPLICATION(S): at 17:16

## 2021-04-03 RX ADMIN — Medication 300 MILLIGRAM(S): at 06:14

## 2021-04-03 RX ADMIN — Medication 500000 UNIT(S): at 17:16

## 2021-04-03 RX ADMIN — LIDOCAINE 1 PATCH: 4 CREAM TOPICAL at 19:35

## 2021-04-03 RX ADMIN — ATORVASTATIN CALCIUM 40 MILLIGRAM(S): 80 TABLET, FILM COATED ORAL at 21:32

## 2021-04-03 RX ADMIN — KETOTIFEN FUMARATE 1 DROP(S): 0.34 SOLUTION OPHTHALMIC at 06:14

## 2021-04-03 RX ADMIN — Medication 16 UNIT(S): at 17:16

## 2021-04-03 RX ADMIN — LIDOCAINE 1 PATCH: 4 CREAM TOPICAL at 23:05

## 2021-04-03 RX ADMIN — Medication 30 MILLIGRAM(S): at 06:14

## 2021-04-03 RX ADMIN — Medication 2000 UNIT(S): at 11:52

## 2021-04-03 RX ADMIN — METFORMIN HYDROCHLORIDE 500 MILLIGRAM(S): 850 TABLET ORAL at 07:37

## 2021-04-03 RX ADMIN — Medication 4: at 11:51

## 2021-04-03 RX ADMIN — Medication 16 UNIT(S): at 07:37

## 2021-04-03 RX ADMIN — SENNA PLUS 2 TABLET(S): 8.6 TABLET ORAL at 21:32

## 2021-04-03 RX ADMIN — Medication 4: at 07:36

## 2021-04-03 RX ADMIN — ENOXAPARIN SODIUM 40 MILLIGRAM(S): 100 INJECTION SUBCUTANEOUS at 11:51

## 2021-04-03 RX ADMIN — Medication 25 MILLIGRAM(S): at 06:17

## 2021-04-03 RX ADMIN — Medication 6: at 17:16

## 2021-04-03 RX ADMIN — LATANOPROST 1 DROP(S): 0.05 SOLUTION/ DROPS OPHTHALMIC; TOPICAL at 21:32

## 2021-04-03 RX ADMIN — Medication 500000 UNIT(S): at 11:51

## 2021-04-03 RX ADMIN — Medication 1 APPLICATION(S): at 06:15

## 2021-04-03 RX ADMIN — METFORMIN HYDROCHLORIDE 500 MILLIGRAM(S): 850 TABLET ORAL at 17:16

## 2021-04-03 RX ADMIN — Medication 500000 UNIT(S): at 06:15

## 2021-04-03 RX ADMIN — Medication 25 MILLIGRAM(S): at 12:53

## 2021-04-03 RX ADMIN — KETOTIFEN FUMARATE 1 DROP(S): 0.34 SOLUTION OPHTHALMIC at 17:17

## 2021-04-03 RX ADMIN — LIDOCAINE 1 PATCH: 4 CREAM TOPICAL at 00:49

## 2021-04-03 RX ADMIN — PANTOPRAZOLE SODIUM 40 MILLIGRAM(S): 20 TABLET, DELAYED RELEASE ORAL at 06:13

## 2021-04-03 RX ADMIN — GABAPENTIN 300 MILLIGRAM(S): 400 CAPSULE ORAL at 06:14

## 2021-04-03 NOTE — PROGRESS NOTE ADULT - SUBJECTIVE AND OBJECTIVE BOX
No overnight events.      REVIEW OF SYSTEMS  Constitutional - No fever,  No fatigue  Neurological - No headaches, No loss of strength  Musculoskeletal - No joint pain, No joint swelling, No muscle pain    VITALS  T(C): 36.8 (04-03-21 @ 08:09), Max: 36.8 (04-03-21 @ 08:09)  HR: 67 (04-03-21 @ 08:09) (67 - 77)  BP: 108/66 (04-03-21 @ 08:09) (108/66 - 127/65)  RR: 15 (04-03-21 @ 08:09) (15 - 16)  SpO2: 95% (04-03-21 @ 08:09) (94% - 95%)  Wt(kg): --       MEDICATIONS   acetaminophen   Tablet .. 650 milliGRAM(s) every 6 hours PRN  atorvastatin 40 milliGRAM(s) at bedtime  cholecalciferol 2000 Unit(s) daily  dextrose 40% Gel 15 Gram(s) once  dextrose 5%. 1000 milliLiter(s) <Continuous>  dextrose 5%. 1000 milliLiter(s) <Continuous>  dextrose 50% Injectable 25 Gram(s) once  dextrose 50% Injectable 12.5 Gram(s) once  dextrose 50% Injectable 25 Gram(s) once  diltiazem    milliGRAM(s) daily  enoxaparin Injectable 40 milliGRAM(s) daily  gabapentin 300 milliGRAM(s) every 8 hours  glucagon  Injectable 1 milliGRAM(s) once  hydrOXYzine hydrochloride 25 milliGRAM(s) every 6 hours PRN  insulin glargine Injectable (LANTUS) 36 Unit(s) at bedtime  insulin lispro (ADMELOG) corrective regimen sliding scale   three times a day before meals  insulin lispro (ADMELOG) corrective regimen sliding scale   at bedtime  insulin lispro Injectable (ADMELOG) 16 Unit(s) three times a day before meals  ketotifen 0.025% Ophthalmic Solution 1 Drop(s) two times a day  latanoprost 0.005% Ophthalmic Solution 1 Drop(s) at bedtime  lidocaine   Patch 1 Patch daily  metFORMIN 500 milliGRAM(s) two times a day  nystatin    Suspension 735230 Unit(s) every 6 hours  pantoprazole    Tablet 40 milliGRAM(s) before breakfast  petrolatum white Ointment 1 Application(s) daily  polyethylene glycol 3350 17 Gram(s) daily  predniSONE   Tablet 30 milliGRAM(s) daily  predniSONE   Tablet 40    senna 2 Tablet(s) at bedtime  triamcinolone 0.1% Ointment 1 Application(s) two times a day  trimethoprim  160 mG/sulfamethoxazole 800 mG 1 Tablet(s) <User Schedule>      RECENT LABS/IMAGING      POCT Blood Glucose.: 235 mg/dL (04-03-21 @ 07:35)  POCT Blood Glucose.: 140 mg/dL (04-02-21 @ 21:35)  POCT Blood Glucose.: 244 mg/dL (04-02-21 @ 16:58)  POCT Blood Glucose.: 304 mg/dL (04-02-21 @ 11:55)    ---------  PHYSICAL EXAM  Constitutional - NAD, Comfortable, in bed   Pulm - Breathing comfortably, No wheezing  Abd - Soft, NTND  Extremities - No edema, No calf tenderness  Neurologic Exam -                    Cognitive - Awake, Alert     Communication - Fluent     Motor - hip flexion weakness      Sensory - Intact to LT  Psychiatric - Mood WNL, Affect WNL    ASSESSMENT/PLAN  65 year old woman h/o DM, HTN, HLD, COVID 2020 glaucoma with 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

## 2021-04-03 NOTE — PROGRESS NOTE ADULT - SUBJECTIVE AND OBJECTIVE BOX
HPI:  This is a 64 y/o Bahraini speaking Female with PMHx of DM Type 2 (on insulin), HTN, HLD, COVID (), and glaucoma presented with generalized weakness, joint pain, and rash. The patient states that she was discharged from OSH on 3/12 after being evaluated for similar symptoms. They suspected that she may have Lyme disease and started her on doxycycline and sent her home. They also suspected that this might be post-COVID related. The patient has multiple complaints. Reports rash in the neck area and b/l arms, along with whole body itching. States that she has had a rash since 2021 and has seen various doctors and tried different things (steroid creams) without relief. The rash initially started on her face. Also complains of joint pain for one week. States she has joint pain and stiffness in the b/l shoulders, elbows, hands, and knees. States that the pain is currently worst in the L shoulder and R elbow. States that the pain is so significant that she is unable to walk now. Tries Tylenol with minimal relief. Also reports that she has been unable to do her ADLs - clean herself, walk. Never had joint pain like this before. Also reports subjective fever and chills, along with dry mouth.   The rash did not get worse in the sun. Joint pain is not worse in the AM and present at rest but worse on movement. No BM changes recently; had mild rectal bleeding not in feces but on wiping. She attributes 2/2 itching and rash, denies vaginal bleeding or ulcers, weight loss. Had subjective fever but not over 99F. Endorse dry mouth, and itching in her eyes, no mouth ulcers. No FHx of autoimmune/rheumatologist/joint diseases. Was admitted at [place] 3/8-3/12 with fevers, joint pain, rash, treated with Levaquin for five days and three doses of Vanco, 2x covid negative.    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 The pt was started on prednisone and steroid ointments. She underwent extensive infectious and inflammatory workup that included hepatitis panel - neg, HIV - neg, syphilis - neg, EBV - past infection, CMV - neg, Lyme panel-neg, blood cultures - NGTD, C3 slightly elevated, C4 - wnl. VIPIN-neg, dsDNA - neg, CCP -neg, anti SSA/B - neg, Mallory-1 - neg. Myoglobin - low, RF - neg, anti-Smith - neg. Flow cytometry - neg for abnormality. X-rays L shoulder and R elbow - wnl. CT chest and pelvis - wnl, no signs of malignancy. Myoglobin was low. Still pending Myomarker panel 3, Jak2 and calreticulin mutations. The patient underwent a skin biopsy, which resulted in "Skin with focal dyskeratosis with mild perivascular inflammation. The findings raise the differential diagnosis of a viral exanthem or a drug eruption."     Although the working diagnosis did not match the biopsy results, the patient clinically improved under the steroidal treatment. Her upper and lower extremity joint pain has resolved, her rash has started to clear, and he was able to ambulate. During the prednisone treatment, the patient's glucose level increased. She needed high doses of insulin to maintain euglycemia (60 Lantus and 35 lispro pre-meals). PM&R evaluated the patient and concluded that the patient would benefit from acute rehab. She was started on a weekly prednisone taper before discharge. Prior to d/c, pt was given the J&J covid vaccine on 3/23.  Patient deemed medically stable for admission to acute inpatient rehab on 3/25.     (25 Mar 2021 14:56)      Subjective    no new complaints        PAST MEDICAL & SURGICAL HISTORY:  Diabetes mellitus    Hypertension    Hyperlipidemia    Glaucoma    Vitamin D deficiency    COVID-19      S/P     History of elbow surgery    History of throat surgery        MedsMEDICATIONS  (STANDING):  atorvastatin 40 milliGRAM(s) Oral at bedtime  cholecalciferol 2000 Unit(s) Oral daily  dextrose 40% Gel 15 Gram(s) Oral once  dextrose 5%. 1000 milliLiter(s) (50 mL/Hr) IV Continuous <Continuous>  dextrose 5%. 1000 milliLiter(s) (100 mL/Hr) IV Continuous <Continuous>  dextrose 50% Injectable 25 Gram(s) IV Push once  dextrose 50% Injectable 12.5 Gram(s) IV Push once  dextrose 50% Injectable 25 Gram(s) IV Push once  diltiazem    milliGRAM(s) Oral daily  enoxaparin Injectable 40 milliGRAM(s) SubCutaneous daily  gabapentin 300 milliGRAM(s) Oral every 8 hours  glucagon  Injectable 1 milliGRAM(s) IntraMuscular once  insulin glargine Injectable (LANTUS) 36 Unit(s) SubCutaneous at bedtime  insulin lispro (ADMELOG) corrective regimen sliding scale   SubCutaneous three times a day before meals  insulin lispro (ADMELOG) corrective regimen sliding scale   SubCutaneous at bedtime  insulin lispro Injectable (ADMELOG) 16 Unit(s) SubCutaneous three times a day before meals  ketotifen 0.025% Ophthalmic Solution 1 Drop(s) Both EYES two times a day  latanoprost 0.005% Ophthalmic Solution 1 Drop(s) Both EYES at bedtime  lidocaine   Patch 1 Patch Transdermal daily  metFORMIN 500 milliGRAM(s) Oral two times a day  nystatin    Suspension 176827 Unit(s) Swish and Swallow every 6 hours  pantoprazole    Tablet 40 milliGRAM(s) Oral before breakfast  petrolatum white Ointment 1 Application(s) Topical daily  polyethylene glycol 3350 17 Gram(s) Oral daily  predniSONE   Tablet 30 milliGRAM(s) Oral daily  predniSONE   Tablet 40  Oral   senna 2 Tablet(s) Oral at bedtime  triamcinolone 0.1% Ointment 1 Application(s) Topical two times a day  trimethoprim  160 mG/sulfamethoxazole 800 mG 1 Tablet(s) Oral <User Schedule>    MEDICATIONS  (PRN):  acetaminophen   Tablet .. 650 milliGRAM(s) Oral every 6 hours PRN Temp greater or equal to 38C (100.4F), Mild Pain (1 - 3), Moderate Pain (4 - 6)  hydrOXYzine hydrochloride 25 milliGRAM(s) Oral every 6 hours PRN Itching      Vital Signs Last 24 Hrs  T(C): 36.8 (2021 08:09), Max: 36.8 (2021 08:09)  T(F): 98.2 (2021 08:09), Max: 98.2 (2021 08:09)  HR: 67 (2021 08:09) (67 - 77)  BP: 108/66 (2021 08:09) (108/66 - 127/65)  BP(mean): --  RR: 15 (2021 08:09) (15 - 16)  SpO2: 95% (2021 08:09) (94% - 95%)  I&O's Summary      PHYSICAL EXAM:  GENERAL: NAD  NECK: Supple  NERVOUS SYSTEM:  awake and alert  HEART: S1s2 NL , RRR  CHEST/LUNG: Clear to percussion bilaterally  ABDOMEN: Soft, Nontender, Nondistended; Bowel sounds present  EXTREMITIES:  No edema      LABS:              RVP:          Tox:           CAPILLARY BLOOD GLUCOSE      POCT Blood Glucose.: 235 mg/dL (2021 07:35)  POCT Blood Glucose.: 140 mg/dL (2021 21:35)  POCT Blood Glucose.: 244 mg/dL (2021 16:58)  POCT Blood Glucose.: 304 mg/dL (2021 11:55)      Imaging Personally Reviewed:  [ ] YES  [ ] NO        Care Discussed with Consultants/Other Providers [ x] YES  [ ] NO

## 2021-04-03 NOTE — PROGRESS NOTE ADULT - ASSESSMENT
Deconditioning  PT/OT per rehab    Rash  Prednisone taper    HTN  Cardizem    DM2, a1c 10.6 with current steroid use  Lantus/Lispro/Metformin    HLD  Statin    Elev LFt's  Improving monitor for now    DVT ppx  Lovenox

## 2021-04-04 LAB
GLUCOSE BLDC GLUCOMTR-MCNC: 113 MG/DL — HIGH (ref 70–99)
GLUCOSE BLDC GLUCOMTR-MCNC: 137 MG/DL — HIGH (ref 70–99)
GLUCOSE BLDC GLUCOMTR-MCNC: 200 MG/DL — HIGH (ref 70–99)
GLUCOSE BLDC GLUCOMTR-MCNC: 263 MG/DL — HIGH (ref 70–99)

## 2021-04-04 PROCEDURE — 99232 SBSQ HOSP IP/OBS MODERATE 35: CPT

## 2021-04-04 RX ORDER — INSULIN GLARGINE 100 [IU]/ML
18 INJECTION, SOLUTION SUBCUTANEOUS ONCE
Refills: 0 | Status: COMPLETED | OUTPATIENT
Start: 2021-04-04 | End: 2021-04-04

## 2021-04-04 RX ADMIN — Medication 2000 UNIT(S): at 11:41

## 2021-04-04 RX ADMIN — LIDOCAINE 1 PATCH: 4 CREAM TOPICAL at 19:51

## 2021-04-04 RX ADMIN — Medication 500000 UNIT(S): at 05:55

## 2021-04-04 RX ADMIN — LIDOCAINE 1 PATCH: 4 CREAM TOPICAL at 11:41

## 2021-04-04 RX ADMIN — Medication 500000 UNIT(S): at 11:41

## 2021-04-04 RX ADMIN — GABAPENTIN 300 MILLIGRAM(S): 400 CAPSULE ORAL at 21:35

## 2021-04-04 RX ADMIN — Medication 25 MILLIGRAM(S): at 07:49

## 2021-04-04 RX ADMIN — GABAPENTIN 300 MILLIGRAM(S): 400 CAPSULE ORAL at 05:55

## 2021-04-04 RX ADMIN — INSULIN GLARGINE 18 UNIT(S): 100 INJECTION, SOLUTION SUBCUTANEOUS at 21:35

## 2021-04-04 RX ADMIN — SENNA PLUS 2 TABLET(S): 8.6 TABLET ORAL at 21:35

## 2021-04-04 RX ADMIN — ATORVASTATIN CALCIUM 40 MILLIGRAM(S): 80 TABLET, FILM COATED ORAL at 21:35

## 2021-04-04 RX ADMIN — Medication 2: at 17:08

## 2021-04-04 RX ADMIN — Medication 16 UNIT(S): at 11:40

## 2021-04-04 RX ADMIN — Medication 500000 UNIT(S): at 23:13

## 2021-04-04 RX ADMIN — LATANOPROST 1 DROP(S): 0.05 SOLUTION/ DROPS OPHTHALMIC; TOPICAL at 21:35

## 2021-04-04 RX ADMIN — Medication 1 APPLICATION(S): at 17:09

## 2021-04-04 RX ADMIN — POLYETHYLENE GLYCOL 3350 17 GRAM(S): 17 POWDER, FOR SOLUTION ORAL at 11:42

## 2021-04-04 RX ADMIN — Medication 16 UNIT(S): at 07:49

## 2021-04-04 RX ADMIN — Medication 500000 UNIT(S): at 00:54

## 2021-04-04 RX ADMIN — Medication 500000 UNIT(S): at 17:07

## 2021-04-04 RX ADMIN — Medication 16 UNIT(S): at 17:08

## 2021-04-04 RX ADMIN — Medication 25 MILLIGRAM(S): at 14:40

## 2021-04-04 RX ADMIN — KETOTIFEN FUMARATE 1 DROP(S): 0.34 SOLUTION OPHTHALMIC at 17:08

## 2021-04-04 RX ADMIN — KETOTIFEN FUMARATE 1 DROP(S): 0.34 SOLUTION OPHTHALMIC at 05:55

## 2021-04-04 RX ADMIN — Medication 300 MILLIGRAM(S): at 05:55

## 2021-04-04 RX ADMIN — METFORMIN HYDROCHLORIDE 500 MILLIGRAM(S): 850 TABLET ORAL at 17:08

## 2021-04-04 RX ADMIN — LIDOCAINE 1 PATCH: 4 CREAM TOPICAL at 23:13

## 2021-04-04 RX ADMIN — Medication 1 APPLICATION(S): at 05:55

## 2021-04-04 RX ADMIN — GABAPENTIN 300 MILLIGRAM(S): 400 CAPSULE ORAL at 13:14

## 2021-04-04 RX ADMIN — Medication 25 MILLIGRAM(S): at 00:54

## 2021-04-04 RX ADMIN — PANTOPRAZOLE SODIUM 40 MILLIGRAM(S): 20 TABLET, DELAYED RELEASE ORAL at 05:55

## 2021-04-04 RX ADMIN — Medication 25 MILLIGRAM(S): at 21:34

## 2021-04-04 RX ADMIN — METFORMIN HYDROCHLORIDE 500 MILLIGRAM(S): 850 TABLET ORAL at 08:36

## 2021-04-04 RX ADMIN — Medication 6: at 11:41

## 2021-04-04 RX ADMIN — Medication 30 MILLIGRAM(S): at 05:55

## 2021-04-04 RX ADMIN — ENOXAPARIN SODIUM 40 MILLIGRAM(S): 100 INJECTION SUBCUTANEOUS at 11:41

## 2021-04-04 NOTE — PROGRESS NOTE ADULT - SUBJECTIVE AND OBJECTIVE BOX
No overnight events.      REVIEW OF SYSTEMS  Constitutional - No fever,  No fatigue  Neurological - No headaches, No loss of strength  Musculoskeletal - No joint pain, No joint swelling, No muscle pain    VITALS  T(C): 36.3 (04-04-21 @ 08:15), Max: 36.4 (04-03-21 @ 20:34)  HR: 66 (04-04-21 @ 08:15) (66 - 68)  BP: 122/70 (04-04-21 @ 08:15) (117/69 - 127/64)  RR: 15 (04-04-21 @ 08:15) (15 - 16)  SpO2: 97% (04-04-21 @ 08:15) (94% - 97%)  Wt(kg): --       MEDICATIONS   acetaminophen   Tablet .. 650 milliGRAM(s) every 6 hours PRN  atorvastatin 40 milliGRAM(s) at bedtime  cholecalciferol 2000 Unit(s) daily  dextrose 40% Gel 15 Gram(s) once  dextrose 5%. 1000 milliLiter(s) <Continuous>  dextrose 5%. 1000 milliLiter(s) <Continuous>  dextrose 50% Injectable 25 Gram(s) once  dextrose 50% Injectable 12.5 Gram(s) once  dextrose 50% Injectable 25 Gram(s) once  diltiazem    milliGRAM(s) daily  enoxaparin Injectable 40 milliGRAM(s) daily  gabapentin 300 milliGRAM(s) every 8 hours  glucagon  Injectable 1 milliGRAM(s) once  hydrOXYzine hydrochloride 25 milliGRAM(s) every 6 hours PRN  insulin glargine Injectable (LANTUS) 36 Unit(s) at bedtime  insulin lispro (ADMELOG) corrective regimen sliding scale   three times a day before meals  insulin lispro (ADMELOG) corrective regimen sliding scale   at bedtime  insulin lispro Injectable (ADMELOG) 16 Unit(s) three times a day before meals  ketotifen 0.025% Ophthalmic Solution 1 Drop(s) two times a day  latanoprost 0.005% Ophthalmic Solution 1 Drop(s) at bedtime  lidocaine   Patch 1 Patch daily  metFORMIN 500 milliGRAM(s) two times a day  nystatin    Suspension 735523 Unit(s) every 6 hours  pantoprazole    Tablet 40 milliGRAM(s) before breakfast  petrolatum white Ointment 1 Application(s) daily  polyethylene glycol 3350 17 Gram(s) daily  predniSONE   Tablet 30 milliGRAM(s) daily  predniSONE   Tablet 40    senna 2 Tablet(s) at bedtime  triamcinolone 0.1% Ointment 1 Application(s) two times a day  trimethoprim  160 mG/sulfamethoxazole 800 mG 1 Tablet(s) <User Schedule>      RECENT LABS/IMAGING                      POCT Blood Glucose.: 137 mg/dL (04-04-21 @ 07:44)  POCT Blood Glucose.: 178 mg/dL (04-03-21 @ 21:29)  POCT Blood Glucose.: 287 mg/dL (04-03-21 @ 16:36)  POCT Blood Glucose.: 229 mg/dL (04-03-21 @ 11:49)    ---------    ------  PHYSICAL EXAM  Constitutional - NAD, Comfortable, in bed   Pulm - Breathing comfortably, No wheezing  Abd - Soft, NTND  Extremities - No edema, No calf tenderness  Neurologic Exam -                    Cognitive - Awake, Alert     Communication - Fluent     Motor - hip flexion weakness      Sensory - Intact to LT  Psychiatric - Mood WNL, Affect WNL    ASSESSMENT/PLAN  65 year old woman h/o DM, HTN, HLD, COVID 2020 glaucoma with 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

## 2021-04-04 NOTE — PROGRESS NOTE ADULT - SUBJECTIVE AND OBJECTIVE BOX
HPI:  This is a 66 y/o Tongan speaking Female with PMHx of DM Type 2 (on insulin), HTN, HLD, COVID (), and glaucoma presented with generalized weakness, joint pain, and rash. The patient states that she was discharged from OSH on 3/12 after being evaluated for similar symptoms. They suspected that she may have Lyme disease and started her on doxycycline and sent her home. They also suspected that this might be post-COVID related. The patient has multiple complaints. Reports rash in the neck area and b/l arms, along with whole body itching. States that she has had a rash since 2021 and has seen various doctors and tried different things (steroid creams) without relief. The rash initially started on her face. Also complains of joint pain for one week. States she has joint pain and stiffness in the b/l shoulders, elbows, hands, and knees. States that the pain is currently worst in the L shoulder and R elbow. States that the pain is so significant that she is unable to walk now. Tries Tylenol with minimal relief. Also reports that she has been unable to do her ADLs - clean herself, walk. Never had joint pain like this before. Also reports subjective fever and chills, along with dry mouth.   The rash did not get worse in the sun. Joint pain is not worse in the AM and present at rest but worse on movement. No BM changes recently; had mild rectal bleeding not in feces but on wiping. She attributes 2/2 itching and rash, denies vaginal bleeding or ulcers, weight loss. Had subjective fever but not over 99F. Endorse dry mouth, and itching in her eyes, no mouth ulcers. No FHx of autoimmune/rheumatologist/joint diseases. Was admitted at [facility] 3/8-3/12 with fevers, joint pain, rash, treated with Levaquin for five days and three doses of Vanco, 2x covid negative.    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 The pt was started on prednisone and steroid ointments. She underwent extensive infectious and inflammatory workup that included hepatitis panel - neg, HIV - neg, syphilis - neg, EBV - past infection, CMV - neg, Lyme panel-neg, blood cultures - NGTD, C3 slightly elevated, C4 - wnl. VIPIN-neg, dsDNA - neg, CCP -neg, anti SSA/B - neg, Mallory-1 - neg. Myoglobin - low, RF - neg, anti-Smith - neg. Flow cytometry - neg for abnormality. X-rays L shoulder and R elbow - wnl. CT chest and pelvis - wnl, no signs of malignancy. Myoglobin was low. Still pending Myomarker panel 3, Jak2 and calreticulin mutations. The patient underwent a skin biopsy, which resulted in "Skin with focal dyskeratosis with mild perivascular inflammation. The findings raise the differential diagnosis of a viral exanthem or a drug eruption."     Although the working diagnosis did not match the biopsy results, the patient clinically improved under the steroidal treatment. Her upper and lower extremity joint pain has resolved, her rash has started to clear, and he was able to ambulate. During the prednisone treatment, the patient's glucose level increased. She needed high doses of insulin to maintain euglycemia (60 Lantus and 35 lispro pre-meals). PM&R evaluated the patient and concluded that the patient would benefit from acute rehab. She was started on a weekly prednisone taper before discharge. Prior to d/c, pt was given the J&J covid vaccine on 3/23.  Patient deemed medically stable for admission to acute inpatient rehab on 3/25.     (25 Mar 2021 14:56)      Subjective    No new complaints.       PAST MEDICAL & SURGICAL HISTORY:  Diabetes mellitus    Hypertension    Hyperlipidemia    Glaucoma    Vitamin D deficiency    COVID-19      S/P     History of elbow surgery    History of throat surgery        MedsMEDICATIONS  (STANDING):  atorvastatin 40 milliGRAM(s) Oral at bedtime  cholecalciferol 2000 Unit(s) Oral daily  dextrose 40% Gel 15 Gram(s) Oral once  dextrose 5%. 1000 milliLiter(s) (50 mL/Hr) IV Continuous <Continuous>  dextrose 5%. 1000 milliLiter(s) (100 mL/Hr) IV Continuous <Continuous>  dextrose 50% Injectable 25 Gram(s) IV Push once  dextrose 50% Injectable 12.5 Gram(s) IV Push once  dextrose 50% Injectable 25 Gram(s) IV Push once  diltiazem    milliGRAM(s) Oral daily  enoxaparin Injectable 40 milliGRAM(s) SubCutaneous daily  gabapentin 300 milliGRAM(s) Oral every 8 hours  glucagon  Injectable 1 milliGRAM(s) IntraMuscular once  insulin glargine Injectable (LANTUS) 36 Unit(s) SubCutaneous at bedtime  insulin lispro (ADMELOG) corrective regimen sliding scale   SubCutaneous three times a day before meals  insulin lispro (ADMELOG) corrective regimen sliding scale   SubCutaneous at bedtime  insulin lispro Injectable (ADMELOG) 16 Unit(s) SubCutaneous three times a day before meals  ketotifen 0.025% Ophthalmic Solution 1 Drop(s) Both EYES two times a day  latanoprost 0.005% Ophthalmic Solution 1 Drop(s) Both EYES at bedtime  lidocaine   Patch 1 Patch Transdermal daily  metFORMIN 500 milliGRAM(s) Oral two times a day  nystatin    Suspension 171480 Unit(s) Swish and Swallow every 6 hours  pantoprazole    Tablet 40 milliGRAM(s) Oral before breakfast  petrolatum white Ointment 1 Application(s) Topical daily  polyethylene glycol 3350 17 Gram(s) Oral daily  predniSONE   Tablet 30 milliGRAM(s) Oral daily  predniSONE   Tablet 40  Oral   senna 2 Tablet(s) Oral at bedtime  triamcinolone 0.1% Ointment 1 Application(s) Topical two times a day  trimethoprim  160 mG/sulfamethoxazole 800 mG 1 Tablet(s) Oral <User Schedule>    MEDICATIONS  (PRN):  acetaminophen   Tablet .. 650 milliGRAM(s) Oral every 6 hours PRN Temp greater or equal to 38C (100.4F), Mild Pain (1 - 3), Moderate Pain (4 - 6)  hydrOXYzine hydrochloride 25 milliGRAM(s) Oral every 6 hours PRN Itching      Vital Signs Last 24 Hrs  T(C): 36.3 (2021 08:15), Max: 36.4 (2021 20:34)  T(F): 97.4 (2021 08:15), Max: 97.6 (2021 20:34)  HR: 66 (2021 08:15) (66 - 68)  BP: 122/70 (2021 08:15) (117/69 - 127/64)  BP(mean): --  RR: 15 (2021 08:15) (15 - 16)  SpO2: 97% (2021 08:15) (94% - 97%)  I&O's Summary      PHYSICAL EXAM:  GENERAL: NAD  NECK: Supple  NERVOUS SYSTEM:  awake and alert  HEART: S1s2 NL , RRR  CHEST/LUNG: Clear to percussion bilaterally  ABDOMEN: Soft, Nontender, Nondistended; Bowel sounds present  EXTREMITIES:  No edema        CAPILLARY BLOOD GLUCOSE      POCT Blood Glucose.: 263 mg/dL (2021 11:39)  POCT Blood Glucose.: 137 mg/dL (2021 07:44)  POCT Blood Glucose.: 178 mg/dL (2021 21:29)  POCT Blood Glucose.: 287 mg/dL (2021 16:36)      Imaging Personally Reviewed:  [ ] YES  [ ] NO        Care Discussed with Consultants/Other Providers [ x] YES  [ ] NO

## 2021-04-05 ENCOUNTER — TRANSCRIPTION ENCOUNTER (OUTPATIENT)
Age: 66
End: 2021-04-05

## 2021-04-05 LAB
ALBUMIN SERPL ELPH-MCNC: 2.7 G/DL — LOW (ref 3.3–5)
ALP SERPL-CCNC: 93 U/L — SIGNIFICANT CHANGE UP (ref 40–120)
ALT FLD-CCNC: 43 U/L — SIGNIFICANT CHANGE UP (ref 10–45)
ANION GAP SERPL CALC-SCNC: 5 MMOL/L — SIGNIFICANT CHANGE UP (ref 5–17)
AST SERPL-CCNC: 21 U/L — SIGNIFICANT CHANGE UP (ref 10–40)
BILIRUB SERPL-MCNC: 0.3 MG/DL — SIGNIFICANT CHANGE UP (ref 0.2–1.2)
BUN SERPL-MCNC: 20 MG/DL — SIGNIFICANT CHANGE UP (ref 7–23)
CALCIUM SERPL-MCNC: 9.6 MG/DL — SIGNIFICANT CHANGE UP (ref 8.4–10.5)
CHLORIDE SERPL-SCNC: 104 MMOL/L — SIGNIFICANT CHANGE UP (ref 96–108)
CO2 SERPL-SCNC: 32 MMOL/L — HIGH (ref 22–31)
CREAT SERPL-MCNC: 0.86 MG/DL — SIGNIFICANT CHANGE UP (ref 0.5–1.3)
GLUCOSE BLDC GLUCOMTR-MCNC: 140 MG/DL — HIGH (ref 70–99)
GLUCOSE BLDC GLUCOMTR-MCNC: 142 MG/DL — HIGH (ref 70–99)
GLUCOSE BLDC GLUCOMTR-MCNC: 185 MG/DL — HIGH (ref 70–99)
GLUCOSE BLDC GLUCOMTR-MCNC: 185 MG/DL — HIGH (ref 70–99)
GLUCOSE SERPL-MCNC: 136 MG/DL — HIGH (ref 70–99)
HCT VFR BLD CALC: 33 % — LOW (ref 34.5–45)
HGB BLD-MCNC: 10.8 G/DL — LOW (ref 11.5–15.5)
MCHC RBC-ENTMCNC: 28.6 PG — SIGNIFICANT CHANGE UP (ref 27–34)
MCHC RBC-ENTMCNC: 32.7 GM/DL — SIGNIFICANT CHANGE UP (ref 32–36)
MCV RBC AUTO: 87.5 FL — SIGNIFICANT CHANGE UP (ref 80–100)
NRBC # BLD: 0 /100 WBCS — SIGNIFICANT CHANGE UP (ref 0–0)
PLATELET # BLD AUTO: 321 K/UL — SIGNIFICANT CHANGE UP (ref 150–400)
POTASSIUM SERPL-MCNC: 4.6 MMOL/L — SIGNIFICANT CHANGE UP (ref 3.5–5.3)
POTASSIUM SERPL-SCNC: 4.6 MMOL/L — SIGNIFICANT CHANGE UP (ref 3.5–5.3)
PROT SERPL-MCNC: 6.5 G/DL — SIGNIFICANT CHANGE UP (ref 6–8.3)
RBC # BLD: 3.77 M/UL — LOW (ref 3.8–5.2)
RBC # FLD: 17.9 % — HIGH (ref 10.3–14.5)
SODIUM SERPL-SCNC: 141 MMOL/L — SIGNIFICANT CHANGE UP (ref 135–145)
WBC # BLD: 10.81 K/UL — HIGH (ref 3.8–10.5)
WBC # FLD AUTO: 10.81 K/UL — HIGH (ref 3.8–10.5)

## 2021-04-05 PROCEDURE — 99232 SBSQ HOSP IP/OBS MODERATE 35: CPT | Mod: GC

## 2021-04-05 PROCEDURE — 99232 SBSQ HOSP IP/OBS MODERATE 35: CPT

## 2021-04-05 RX ORDER — PANTOPRAZOLE SODIUM 20 MG/1
1 TABLET, DELAYED RELEASE ORAL
Qty: 30 | Refills: 0
Start: 2021-04-05 | End: 2021-05-04

## 2021-04-05 RX ORDER — LATANOPROST 0.05 MG/ML
1 SOLUTION/ DROPS OPHTHALMIC; TOPICAL
Qty: 1 | Refills: 0
Start: 2021-04-05

## 2021-04-05 RX ORDER — KETOTIFEN FUMARATE 0.34 MG/ML
1 SOLUTION OPHTHALMIC
Qty: 1 | Refills: 0
Start: 2021-04-05

## 2021-04-05 RX ORDER — HYDROXYZINE HCL 10 MG
1 TABLET ORAL
Qty: 60 | Refills: 0
Start: 2021-04-05 | End: 2021-04-19

## 2021-04-05 RX ORDER — NYSTATIN CREAM 100000 [USP'U]/G
1 CREAM TOPICAL THREE TIMES A DAY
Refills: 0 | Status: DISCONTINUED | OUTPATIENT
Start: 2021-04-05 | End: 2021-04-06

## 2021-04-05 RX ORDER — ATORVASTATIN CALCIUM 80 MG/1
1 TABLET, FILM COATED ORAL
Qty: 0 | Refills: 0 | DISCHARGE

## 2021-04-05 RX ORDER — ACETAMINOPHEN 500 MG
2 TABLET ORAL
Qty: 0 | Refills: 0 | DISCHARGE
Start: 2021-04-05

## 2021-04-05 RX ORDER — METFORMIN HYDROCHLORIDE 850 MG/1
1 TABLET ORAL
Qty: 60 | Refills: 0
Start: 2021-04-05 | End: 2021-05-04

## 2021-04-05 RX ORDER — ATORVASTATIN CALCIUM 80 MG/1
1 TABLET, FILM COATED ORAL
Qty: 30 | Refills: 0
Start: 2021-04-05 | End: 2021-05-04

## 2021-04-05 RX ORDER — DILTIAZEM HCL 120 MG
1 CAPSULE, EXT RELEASE 24 HR ORAL
Qty: 30 | Refills: 0
Start: 2021-04-05 | End: 2021-05-04

## 2021-04-05 RX ORDER — LATANOPROST 0.05 MG/ML
1 SOLUTION/ DROPS OPHTHALMIC; TOPICAL
Qty: 0 | Refills: 0 | DISCHARGE

## 2021-04-05 RX ORDER — DILTIAZEM HCL 120 MG
1 CAPSULE, EXT RELEASE 24 HR ORAL
Qty: 0 | Refills: 0 | DISCHARGE

## 2021-04-05 RX ORDER — GABAPENTIN 400 MG/1
1 CAPSULE ORAL
Qty: 90 | Refills: 0
Start: 2021-04-05 | End: 2021-05-04

## 2021-04-05 RX ADMIN — GABAPENTIN 300 MILLIGRAM(S): 400 CAPSULE ORAL at 06:03

## 2021-04-05 RX ADMIN — Medication 2: at 16:51

## 2021-04-05 RX ADMIN — INSULIN GLARGINE 36 UNIT(S): 100 INJECTION, SOLUTION SUBCUTANEOUS at 22:03

## 2021-04-05 RX ADMIN — Medication 25 MILLIGRAM(S): at 22:03

## 2021-04-05 RX ADMIN — LIDOCAINE 1 PATCH: 4 CREAM TOPICAL at 19:24

## 2021-04-05 RX ADMIN — ATORVASTATIN CALCIUM 40 MILLIGRAM(S): 80 TABLET, FILM COATED ORAL at 22:02

## 2021-04-05 RX ADMIN — KETOTIFEN FUMARATE 1 DROP(S): 0.34 SOLUTION OPHTHALMIC at 06:03

## 2021-04-05 RX ADMIN — Medication 1 APPLICATION(S): at 07:22

## 2021-04-05 RX ADMIN — NYSTATIN CREAM 1 APPLICATION(S): 100000 CREAM TOPICAL at 17:13

## 2021-04-05 RX ADMIN — Medication 16 UNIT(S): at 07:22

## 2021-04-05 RX ADMIN — SENNA PLUS 2 TABLET(S): 8.6 TABLET ORAL at 22:02

## 2021-04-05 RX ADMIN — Medication 500000 UNIT(S): at 06:03

## 2021-04-05 RX ADMIN — NYSTATIN CREAM 1 APPLICATION(S): 100000 CREAM TOPICAL at 22:08

## 2021-04-05 RX ADMIN — Medication 25 MILLIGRAM(S): at 14:14

## 2021-04-05 RX ADMIN — METFORMIN HYDROCHLORIDE 500 MILLIGRAM(S): 850 TABLET ORAL at 17:13

## 2021-04-05 RX ADMIN — GABAPENTIN 300 MILLIGRAM(S): 400 CAPSULE ORAL at 22:02

## 2021-04-05 RX ADMIN — Medication 16 UNIT(S): at 11:36

## 2021-04-05 RX ADMIN — Medication 16 UNIT(S): at 16:51

## 2021-04-05 RX ADMIN — Medication 30 MILLIGRAM(S): at 06:03

## 2021-04-05 RX ADMIN — Medication 25 MILLIGRAM(S): at 06:03

## 2021-04-05 RX ADMIN — POLYETHYLENE GLYCOL 3350 17 GRAM(S): 17 POWDER, FOR SOLUTION ORAL at 11:37

## 2021-04-05 RX ADMIN — ENOXAPARIN SODIUM 40 MILLIGRAM(S): 100 INJECTION SUBCUTANEOUS at 11:37

## 2021-04-05 RX ADMIN — Medication 300 MILLIGRAM(S): at 06:03

## 2021-04-05 RX ADMIN — PANTOPRAZOLE SODIUM 40 MILLIGRAM(S): 20 TABLET, DELAYED RELEASE ORAL at 06:03

## 2021-04-05 RX ADMIN — LIDOCAINE 1 PATCH: 4 CREAM TOPICAL at 11:37

## 2021-04-05 RX ADMIN — Medication 2: at 11:36

## 2021-04-05 RX ADMIN — Medication 1 TABLET(S): at 11:37

## 2021-04-05 RX ADMIN — METFORMIN HYDROCHLORIDE 500 MILLIGRAM(S): 850 TABLET ORAL at 07:22

## 2021-04-05 RX ADMIN — Medication 500000 UNIT(S): at 11:36

## 2021-04-05 RX ADMIN — Medication 2000 UNIT(S): at 11:36

## 2021-04-05 RX ADMIN — LIDOCAINE 1 PATCH: 4 CREAM TOPICAL at 23:31

## 2021-04-05 RX ADMIN — KETOTIFEN FUMARATE 1 DROP(S): 0.34 SOLUTION OPHTHALMIC at 17:12

## 2021-04-05 RX ADMIN — GABAPENTIN 300 MILLIGRAM(S): 400 CAPSULE ORAL at 14:13

## 2021-04-05 RX ADMIN — LATANOPROST 1 DROP(S): 0.05 SOLUTION/ DROPS OPHTHALMIC; TOPICAL at 22:02

## 2021-04-05 NOTE — DISCHARGE NOTE PROVIDER - NSDCFUADDAPPT_GEN_ALL_CORE_FT
Follow up with dermatology to have creams restarted. Make a follow up appointment within 1-2 weeks.     Follow up with rheumatology within 1 week.     Follow up with Primary care doctor within 1 week.     You will receive a call to follow up with a rehab doctor closer to your home. This appointment will be made 4 to 6 weeks from now.     If you have any questions or concerns, feel free to contact Lacy Tracey NP at [phone] or [phone].  Follow up with dermatology and rheumatology to have creams restarted and discuss prednisone taper. Make a follow up appointment within 1 week.     Follow up with Primary care doctor, Dr Sandoval, on 4/22 as scheduled.     Follow up with endocrinology at [facility] or in [place].  The endocrinology clinic in [place] is [phone].  The endocrinology clinic in [place] is [phone].  Make an appointment within the next week to follow up at one of these clinics for blood sugar management.       You will receive a call to follow up with a rehab doctor closer to your home. This appointment will be made 4 to 6 weeks from now.     If you have any questions or concerns, feel free to contact Lacy Tracey NP at [phone] or [phone].

## 2021-04-05 NOTE — PROVIDER CONTACT NOTE (OTHER) - SITUATION
FS- 113, scheduled to Lantus 36 units at bedtime
Patient alert oriented x4, vital signs stable, accucheck result 78mg/dl, day 1 post new admission, taken po snack and fluids well.

## 2021-04-05 NOTE — PROVIDER CONTACT NOTE (OTHER) - ACTION/TREATMENT ORDERED:
Hold 36 units Lantus for tonight, instead give only 18 units as per Dr. Shaquille Bailey
MONITORED VITAL SIGNS, CHECK ALERTNESS. MAINTAIN SAFETY, ADMINISTERED LANTUS 25 UNITS AS MD ORDERED. BEDTIME SNACK GIVEN, WILL CONTINUE TO MONITOR CLOSELY.

## 2021-04-05 NOTE — CHART NOTE - NSCHARTNOTEFT_GEN_A_CORE
IDT meeting 4/5     OT: mod I with all ADLs     PT: mod I with ambulation 150ft with RW, transfers, and 12 stairs     Tentative dc home with family on 4/6 with home care services

## 2021-04-05 NOTE — PROVIDER CONTACT NOTE (OTHER) - ASSESSMENT
OOB with one assist via w/c , voided well, PVR 20,  COVID  SWAB DONE, TOLERATED PROCEDURES WELL.
FS- 113, scheduled to Lantus 36 units at bedtime. NAD, LAITHS

## 2021-04-05 NOTE — PROGRESS NOTE ADULT - ASSESSMENT
Deconditioning  PT/OT  multidisciplinary rehab    Rash  Prednisone taper  Nystatin powder    HTN  Cardizem    DM2  HbA1c 10.6  with current steroid use  Lantus/Lispro/Metformin    HLD  Statin    Elevated LFT's  Improving monitor for now    DVT ppx  Lovenox

## 2021-04-05 NOTE — PROGRESS NOTE ADULT - SUBJECTIVE AND OBJECTIVE BOX
Patient is a 65y old  Female who presents with a chief complaint of 06.9 other arthritis (04 Apr 2021 12:14)      Patient seen and examined at bedside.    ALLERGIES:  azithromycin (Hives; Swelling)  enalapril (Other (Mild to Mod))  penicillin (Swelling; Rash)    MEDICATIONS  (STANDING):  atorvastatin 40 milliGRAM(s) Oral at bedtime  cholecalciferol 2000 Unit(s) Oral daily  dextrose 40% Gel 15 Gram(s) Oral once  dextrose 5%. 1000 milliLiter(s) (50 mL/Hr) IV Continuous <Continuous>  dextrose 5%. 1000 milliLiter(s) (100 mL/Hr) IV Continuous <Continuous>  dextrose 50% Injectable 25 Gram(s) IV Push once  dextrose 50% Injectable 12.5 Gram(s) IV Push once  dextrose 50% Injectable 25 Gram(s) IV Push once  diltiazem    milliGRAM(s) Oral daily  enoxaparin Injectable 40 milliGRAM(s) SubCutaneous daily  gabapentin 300 milliGRAM(s) Oral every 8 hours  glucagon  Injectable 1 milliGRAM(s) IntraMuscular once  insulin glargine Injectable (LANTUS) 36 Unit(s) SubCutaneous at bedtime  insulin lispro (ADMELOG) corrective regimen sliding scale   SubCutaneous three times a day before meals  insulin lispro (ADMELOG) corrective regimen sliding scale   SubCutaneous at bedtime  insulin lispro Injectable (ADMELOG) 16 Unit(s) SubCutaneous three times a day before meals  ketotifen 0.025% Ophthalmic Solution 1 Drop(s) Both EYES two times a day  latanoprost 0.005% Ophthalmic Solution 1 Drop(s) Both EYES at bedtime  lidocaine   Patch 1 Patch Transdermal daily  metFORMIN 500 milliGRAM(s) Oral two times a day  nystatin    Suspension 481638 Unit(s) Swish and Swallow every 6 hours  nystatin Powder 1 Application(s) Topical three times a day  pantoprazole    Tablet 40 milliGRAM(s) Oral before breakfast  petrolatum white Ointment 1 Application(s) Topical daily  polyethylene glycol 3350 17 Gram(s) Oral daily  predniSONE   Tablet 30 milliGRAM(s) Oral daily  predniSONE   Tablet 40  Oral   senna 2 Tablet(s) Oral at bedtime  triamcinolone 0.1% Ointment 1 Application(s) Topical two times a day  trimethoprim  160 mG/sulfamethoxazole 800 mG 1 Tablet(s) Oral <User Schedule>    MEDICATIONS  (PRN):  acetaminophen   Tablet .. 650 milliGRAM(s) Oral every 6 hours PRN Temp greater or equal to 38C (100.4F), Mild Pain (1 - 3), Moderate Pain (4 - 6)  hydrOXYzine hydrochloride 25 milliGRAM(s) Oral every 6 hours PRN Itching    Vital Signs Last 24 Hrs  T(F): 97.2 (05 Apr 2021 08:34), Max: 97.6 (04 Apr 2021 20:15)  HR: 71 (05 Apr 2021 08:34) (66 - 71)  BP: 111/67 (05 Apr 2021 08:34) (111/67 - 144/72)  RR: 15 (05 Apr 2021 08:34) (15 - 16)  SpO2: 98% (05 Apr 2021 08:34) (95% - 98%)  I&O's Summary      PHYSICAL EXAM:  General: NAD, A/O x 3  ENT: MMM  Neck: Supple, No JVD  Lungs: Clear to auscultation bilaterally  Cardio: RRR, S1/S2, No murmurs  Abdomen: Soft, Nontender, Nondistended; Bowel sounds present  Extremities: No calf tenderness, No pitting edema    LABS:                        10.8   10.81 )-----------( 321      ( 05 Apr 2021 05:00 )             33.0       04-05    141  |  104  |  20  ----------------------------<  136  4.6   |  32  |  0.86    Ca    9.6      05 Apr 2021 05:00    TPro  6.5  /  Alb  2.7  /  TBili  0.3  /  DBili  x   /  AST  21  /  ALT  43  /  AlkPhos  93  04-05     eGFR if Non African American: 72 mL/min/1.73M2 (04-05-21 @ 05:00)  eGFR if African American: 84 mL/min/1.73M2 (04-05-21 @ 05:00)             03-17 Chol 88 mg/dL LDL -- HDL 29 mg/dL Trig 129 mg/dL              POCT Blood Glucose.: 185 mg/dL (05 Apr 2021 11:28)  POCT Blood Glucose.: 140 mg/dL (05 Apr 2021 07:16)  POCT Blood Glucose.: 113 mg/dL (04 Apr 2021 21:21)  POCT Blood Glucose.: 200 mg/dL (04 Apr 2021 16:41)            RADIOLOGY & ADDITIONAL TESTS:    Care Discussed with Consultants/Other Providers:

## 2021-04-05 NOTE — PROVIDER CONTACT NOTE (OTHER) - BACKGROUND
HX GLAUCOMA, VITAMIN D DEFICIENCY, DM. HTN, HYPERLIPIDEMIA. DERMATOMYOSITIS, WITHOUT MYOPATHY.
Amyopathic dermatomyositis

## 2021-04-05 NOTE — PROVIDER CONTACT NOTE (OTHER) - RECOMMENDATIONS
Hold 36 units Lantus for tonight, instead give only 18 units as per Dr. Shaquille Bailey
DR GRECO ORDERED LANTUS 25 UNITS TONIGHT, BEDTIME SNACK GIVEN. ALERT IN GOOD SPIRIT.

## 2021-04-05 NOTE — DISCHARGE NOTE PROVIDER - PROVIDER TOKENS
PROVIDER:[TOKEN:[[id number]]],PROVIDER:[TOKEN:[[id number]]],PROVIDER:[TOKEN:[[id number]]] PROVIDER:[TOKEN:[[id number]]],PROVIDER:[TOKEN:[[id number]]]

## 2021-04-05 NOTE — DISCHARGE NOTE PROVIDER - CARE PROVIDERS DIRECT ADDRESSES
[email or web address]

## 2021-04-05 NOTE — PROGRESS NOTE ADULT - ASSESSMENT
LIONEL ERAZO is a 65y with a history of DM Type 2 (on insulin), HTN, HLD, COVID (2020), and glaucoma  who [facility] with complaint of joint pain, rash, weakness and 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 Hospital course complicated by elevated glucoses secondary to steroids. Now admitted to [facility] after for initiation of a multidisciplinary rehab program consisting focused on functional mobility, transfers and ADLs (activities of daily living).  Comprehensive Multidisciplinary Rehab Program:  - comprehensive rehab program, 3 hours a day, 5 days a week.  - PT 2hr/day: Focused on improving strength, endurance, coordination, balance, functional mobility, and transfers  - OT 1hr/day: Focused on improving strength, fine motor skills, coordination, posture and ADLs.    - Activity Limitations: Decreased social, vocational and leisure activities, decreased self care and ADLs, decreased mobility, decreased ability to manage household and finances.   ANTICIPATE DC IN AM    Participation Restrictions/Precautions:  - Weight bearing status: WBAT   - ROM restrictions: none  - Precautions:    [x ] Falls   [ ] Spinal   [ ] Hip (Anterior or Posterior)       [ ] Seizure  [ ] Cardiac   [ ] Sternal    Pruritis   - hydroxyzine 25mg Q6h PRN    Rehab Diagnosis/Management  Sleep:   - Maintain quiet hours and low stim environment.    Pain Management:  -Tylenol PRN  -Gabapentin  - Lidocaine patch  - Moist heat to left shoulder    GI/Bowel:  -At risk for constipation due to neurologic diagnosis, immobility and/or medication use  -Miralax /senna daily  -GI ppx: protonix     /Bladder:   - At risk for incontinence and retention due to neurologic diagnosis and limited mobility  - Currently patient voids:    [x ] independent     [ ] external collection device (condom cath)    [ ] Indwelling colin catheter     [ ] Intermittent catheterization  - Baseline PVR 20cc  - Encourage timed voids every 4 hours while awake for independence and to promote continence during therapy.    Skin/Pressure Injury:   - At risk for pressure injury due to neurologic diagnosis and relative immobility.  - Skin assessment on admission admission: no pressure injury   - Turn every 2 hours while in bed, air mattress  - Skin barrier cream as needed  - Nursing to monitor skin Qshift    Diet/Dysphagia:  - Diet Consistency/Modifications: CC    DVT ppx:  -lovenox  - SCDs    -------------  Comorbid Medical Management:    HTN  -c/w diltiazem    Type 2 Diabetes  -Lantus 36U   -Premeal insulin 16U  - restart metformin 500 BID   -Continue CC diet   -Continue steriod taper and adjust insulin as needed   -HA1C 10.6%  -PM Lantus last night- given only 18 units since PM glucose 113  Diabetic nursing to recommend Insulin prescription for Dc    Covid Status   -Covid + last year  -Received the Priya vaccine on 3/23     Dermatomyositis  Off steroid creams- steroid vacation  use atarax for pruritis  on tapering prednisone    ---------------  Code Status/Emergency Contact: full code     Outpatient Follow-up (Specialty/Name of physician):    Shanique Chacon (MD)  Internal Medicine; Rheumatology  [address]  [place] [address]  Phone: [phone]  Fax: [phone]  Established Patient  Follow Up Time: 2 weeks    [facility]  Dermatology  [address]  [place] [address]  Phone: [phone]  Fax: [phone]  Follow Up Time: 2 weeks      --------------  Goals: Safe discharge to home   Estimated Length of Stay: 10-14 days  Rehab Potential: Good  Medical Prognosis: Good  Estimated Disposition: Home with Home Care  ---------------    PRESCREEN COMPARISON:  I have reviewed the prescreen information and I have found no relevant changes between the preadmission screening and my post admission evaluation.    RATIONALE FOR INPATIENT ADMISSION: Patient demonstrates the following:  [X] Medically appropriate for rehabilitation admission  [X] Has attainable rehab goals with an appropriate initial discharge plan  [X]Has rehabilitation potential (expected to make a significant improvement within a reasonable period of time)  [X] Requires close medical management by a rehab physician, rehab nursing care, Hospitalist and comprehensive interdisciplinary team (including PT, OT and/or SLP, Prosthetics and Orthotics)

## 2021-04-05 NOTE — DISCHARGE NOTE PROVIDER - CARE PROVIDER_API CALL
Shanique Chacon)  Internal Medicine; Rheumatology  [address], [address]  [place] [address]  Phone: [phone]  Fax: [phone]  Follow Up Time:     Rupa Mittal)  Dermatology; Pediatric Dermatology  [address] [facility], [address] [place] [address]  Phone: [phone]  Fax: [phone]  Follow Up Time:     Joaquin Perez)  PhysicalRehab Medicine  [address] [place] [address]  Phone: [phone]  Fax: [phone]  Follow Up Time:    Shanique Chacon)  Internal Medicine; Rheumatology  [address] [facility] [facility] [facility] [address]  [place] [address]  Phone: [phone]  Fax: [phone]  Follow Up Time:     Rupa Mittal)  Dermatology; Pediatric Dermatology  [address] [facility], [address] [place] [address]  Phone: [phone]  Fax: [phone]  Follow Up Time:

## 2021-04-05 NOTE — DISCHARGE NOTE PROVIDER - HOSPITAL COURSE
This is a 66 y/o Irish speaking Female with PMHx of DM Type 2 (on insulin), HTN, HLD, COVID (2020), and glaucoma presented with generalized weakness, joint pain, and rash. The patient states that she was discharged from OSH on 3/12 after being evaluated for similar symptoms. They suspected that she may have Lyme disease and started her on doxycycline and sent her home. They also suspected that this might be post-COVID related. The patient has multiple complaints. Reports rash in the neck area and b/l arms, along with whole body itching. States that she has had a rash since 2/2021 and has seen various doctors and tried different things (steroid creams) without relief. The rash initially started on her face. Also complains of joint pain for one week. States she has joint pain and stiffness in the b/l shoulders, elbows, hands, and knees. States that the pain is currently worst in the L shoulder and R elbow. States that the pain is so significant that she is unable to walk now. Tries Tylenol with minimal relief. Also reports that she has been unable to do her ADLs - clean herself, walk. Never had joint pain like this before. Also reports subjective fever and chills, along with dry mouth.   The rash did not get worse in the sun. Joint pain is not worse in the AM and present at rest but worse on movement. No BM changes recently; had mild rectal bleeding not in feces but on wiping. She attributes 2/2 itching and rash, denies vaginal bleeding or ulcers, weight loss. Had subjective fever but not over 99F. Endorse dry mouth, and itching in her eyes, no mouth ulcers. No FHx of autoimmune/rheumatologist/joint diseases. Was admitted at [facility] 3/8-3/12 with fevers, joint pain, rash, treated with Levaquin for five days and three doses of Vanco, 2x covid negative.    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 The pt was started on prednisone and steroid ointments. She underwent extensive infectious and inflammatory workup that included hepatitis panel - neg, HIV - neg, syphilis - neg, EBV - past infection, CMV - neg, Lyme panel-neg, blood cultures - NGTD, C3 slightly elevated, C4 - wnl. VIPIN-neg, dsDNA - neg, CCP -neg, anti SSA/B - neg, Mallory-1 - neg. Myoglobin - low, RF - neg, anti-Smith - neg. Flow cytometry - neg for abnormality. X-rays L shoulder and R elbow - wnl. CT chest and pelvis - wnl, no signs of malignancy. Myoglobin was low. Still pending Myomarker panel 3, Jak2 and calreticulin mutations. The patient underwent a skin biopsy, which resulted in "Skin with focal dyskeratosis with mild perivascular inflammation. The findings raise the differential diagnosis of a viral exanthem or a drug eruption."     Although the working diagnosis did not match the biopsy results, the patient clinically improved under the steroidal treatment. Her upper and lower extremity joint pain has resolved, her rash has started to clear, and he was able to ambulate. During the prednisone treatment, the patient's glucose level increased. She needed high doses of insulin to maintain euglycemia (60 Lantus and 35 lispro pre-meals). PM&R evaluated the patient and concluded that the patient would benefit from acute rehab. She was started on a weekly prednisone taper before discharge. Prior to d/c, pt was given the J&J covid vaccine on 3/23.  Patient deemed medically stable for admission to acute inpatient rehab on 3/25.    During rehab stay, patient made great functional gains.   As far as diabetes management, patient's long and short acting insulin were decreased, however needed to be readjusted and increased.   Patient was seen by the diabetes educator. Recommendations for metformin 1000mg BID and xxxxxxxxxxxxxx were made.   Patient is medically stable for discharge home with family and home care services.      This is a 66 y/o Vietnamese speaking Female with PMHx of DM Type 2 (on insulin), HTN, HLD, COVID (2020), and glaucoma presented with generalized weakness, joint pain, and rash. The patient states that she was discharged from OSH on 3/12 after being evaluated for similar symptoms. They suspected that she may have Lyme disease and started her on doxycycline and sent her home. They also suspected that this might be post-COVID related. The patient has multiple complaints. Reports rash in the neck area and b/l arms, along with whole body itching. States that she has had a rash since 2/2021 and has seen various doctors and tried different things (steroid creams) without relief. The rash initially started on her face. Also complains of joint pain for one week. States she has joint pain and stiffness in the b/l shoulders, elbows, hands, and knees. States that the pain is currently worst in the L shoulder and R elbow. States that the pain is so significant that she is unable to walk now. Tries Tylenol with minimal relief. Also reports that she has been unable to do her ADLs - clean herself, walk. Never had joint pain like this before. Also reports subjective fever and chills, along with dry mouth.   The rash did not get worse in the sun. Joint pain is not worse in the AM and present at rest but worse on movement. No BM changes recently; had mild rectal bleeding not in feces but on wiping. She attributes 2/2 itching and rash, denies vaginal bleeding or ulcers, weight loss. Had subjective fever but not over 99F. Endorse dry mouth, and itching in her eyes, no mouth ulcers. No FHx of autoimmune/rheumatologist/joint diseases. Was admitted at [facility] 3/8-3/12 with fevers, joint pain, rash, treated with Levaquin for five days and three doses of Vanco, 2x covid negative.    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 The pt was started on prednisone and steroid ointments. She underwent extensive infectious and inflammatory workup that included hepatitis panel - neg, HIV - neg, syphilis - neg, EBV - past infection, CMV - neg, Lyme panel-neg, blood cultures - NGTD, C3 slightly elevated, C4 - wnl. VIPIN-neg, dsDNA - neg, CCP -neg, anti SSA/B - neg, Mallory-1 - neg. Myoglobin - low, RF - neg, anti-Smith - neg. Flow cytometry - neg for abnormality. X-rays L shoulder and R elbow - wnl. CT chest and pelvis - wnl, no signs of malignancy. Myoglobin was low. Still pending Myomarker panel 3, Jak2 and calreticulin mutations. The patient underwent a skin biopsy, which resulted in "Skin with focal dyskeratosis with mild perivascular inflammation. The findings raise the differential diagnosis of a viral exanthem or a drug eruption."     Although the working diagnosis did not match the biopsy results, the patient clinically improved under the steroidal treatment. Her upper and lower extremity joint pain has resolved, her rash has started to clear, and he was able to ambulate. During the prednisone treatment, the patient's glucose level increased. She needed high doses of insulin to maintain euglycemia (60 Lantus and 35 lispro pre-meals). PM&R evaluated the patient and concluded that the patient would benefit from acute rehab. She was started on a weekly prednisone taper before discharge. Prior to d/c, pt was given the J&J covid vaccine on 3/23.  Patient deemed medically stable for admission to acute inpatient rehab on 3/25.    During rehab stay, patient made great functional gains.   As far as diabetes management, patient's long and short acting insulin were decreased, however needed to be readjusted and increased.   Patient was seen by the diabetes educator. Recommendations for metformin 1000mg BID and to resume novalog 70/30 35 units twice a day were made.   With use of  phone (ID# [id number]), patient was given instructions regarding medications and follow up appointments. All questions were answered to patient's satisfaction.   Patient is medically stable for discharge home with family and home care services.

## 2021-04-05 NOTE — DISCHARGE NOTE PROVIDER - DETAILS OF MALNUTRITION DIAGNOSIS/DIAGNOSES
This patient has been assessed with a concern for Malnutrition and was treated during this hospitalization for the following Nutrition diagnosis/diagnoses:     -  03/26/2021: Morbid obesity (BMI > 40)

## 2021-04-05 NOTE — DISCHARGE NOTE PROVIDER - NSDCMRMEDTOKEN_GEN_ALL_CORE_FT
acetaminophen 325 mg oral tablet: 2 tab(s) orally every 6 hours, As needed, for pain  enoxaparin: 40 milligram(s)  once a day  fluocinonide 0.05% topical cream: 1 application topically 2 times a day until 4/2/21. She needs to stop for one week in order to use again.  insulin glargine: 35 unit(s) subcutaneous once a day (at bedtime)  insulin lispro 100 units/mL injectable solution: 32 unit(s) injectable 3 times a day  sulfamethoxazole-trimethoprim 800 mg-160 mg oral tablet: 1 tab(s) orally 3 times a week M/W/F while on prednisone.  triamcinolone 0.1% topical ointment: 1 application topically 2 times a day until 3/31. She needs to stop for one week in order to use again.  Vitamin D3 1000 intl units (25 mcg) oral capsule: 2 cap(s) orally once a day  Zaditor 0.025% ophthalmic solution: 1 drop(s) to each affected eye 2 times a day   acetaminophen 325 mg oral tablet: 2 tab(s) orally every 6 hours, As needed, for pain  alcohol swabs : Apply topically to affected area 4 times a day   DilTIAZem (Eqv-Cardizem CD) 300 mg/24 hours oral capsule, extended release: 1 cap(s) orally once a day  gabapentin 300 mg oral capsule: 1 cap(s) orally every 8 hours  glucose tablets: Follow instructions on bottle when sugar is low.  hydrOXYzine hydrochloride 25 mg oral tablet: 1 tab(s) orally every 6 hours, As needed for Itching  Insulin Pen Needles, 4mm: 1 application subcutaneously 3 times a day . ** Use with insulin pen **   ketotifen 0.025% ophthalmic solution: 1 drop(s) to each affected eye 2 times a day  lancets: 1 application subcutaneously 4 times a day   latanoprost 0.005% ophthalmic solution: 1 drop(s) to each affected eye once a day (in the evening)  Lipitor 40 mg oral tablet: 1 tab(s) orally once a day  metFORMIN 1000 mg oral tablet: 1 tab(s) orally 2 times a day   NovoLIN 70/30 subcutaneous suspension: 35 unit(s) subcutaneous 2 times a day (before meals)   nystatin 100,000 units/g topical powder: 1 application topically 3 times a day to underarm and groin  pantoprazole 40 mg oral delayed release tablet: 1 tab(s) orally once a day (before a meal) while on prednisone.  predniSONE 5 mg oral tablet: 4 tablets orally once a day for 7 days,   then take 3 tablets once a day for 7 days,   then take 2 tablets once a day for 7 days,   then take 1 tablet once a day for 7 days.   sulfamethoxazole-trimethoprim 800 mg-160 mg oral tablet: 1 tab(s) orally 3 times a week (Mon, Wed, and Fri)   test strips (per patient&#x27;s insurance): 1 application subcutaneously 4 times a day. ** Compatible with patient&#x27;s glucometer **  Vitamin D3 1000 intl units (25 mcg) oral capsule: 2 cap(s) orally once a day

## 2021-04-05 NOTE — DISCHARGE NOTE PROVIDER - NSDCCPCAREPLAN_GEN_ALL_CORE_FT
PRINCIPAL DISCHARGE DIAGNOSIS  Diagnosis: Dermatomyositis  Assessment and Plan of Treatment: You were admitted with joint/muscle pain, rash, and pruritus. 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 Soon after, your symptoms have improved. However, a skin biopsy from your rash was not consistent with that diagnosis and suggested it was a viral rash or a drug-related rash. We continued the steroidal treatment as your symptoms were improving on it. As steroidal medications cause high glucose levels - we needed to administer increased levels of insulin.   You should continue taking 20 mg prednisone daily for 1 week, continuing with 15mg for a week, 10 mg for a week, and finishing with 5 mg daily for a week. You should follow up with Dr. Mckee (Rheumatology) at [address] [place] [address], [address], in [place], in 2 weeks. The office number is [phone] (they will call to schedule an appointment). Also, you should follow up with dermatology; they will also call to schedule your visit.  To avoid steroidal use adverse effects, please continue taking Bactrim three times a week (on M/W/F) and Protonix 40mg daily while on prednisone.   In case you experience recurrence of your symptoms, please seek medical attention.  You should also continue having yearly cancer screening and undergo a colonoscopy within six months of discharge.        SECONDARY DISCHARGE DIAGNOSES  Diagnosis: Type 2 diabetes mellitus  Assessment and Plan of Treatment: Continue metformin 1000mg twice a day with meals. Continue xxxx     PRINCIPAL DISCHARGE DIAGNOSIS  Diagnosis: Dermatomyositis  Assessment and Plan of Treatment: You were admitted with joint/muscle pain, rash, and pruritus. 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 Soon after, your symptoms have improved. However, a skin biopsy from your rash was not consistent with that diagnosis and suggested it was a viral rash or a drug-related rash. We continued the steroidal treatment as your symptoms were improving on it. As steroidal medications cause high glucose levels - we needed to administer increased levels of insulin.   You should continue taking 20 mg prednisone daily for 1 week, continuing with 15mg for a week, 10 mg for a week, and finishing with 5 mg daily for a week. You should follow up with Dr. Mckee (Rheumatology) at [address] [place] [address], [address], in [place], in 2 weeks. The office number is [phone] (they will call to schedule an appointment). Also, you should follow up with dermatology; they will also call to schedule your visit.  To avoid steroidal use adverse effects, please continue taking Bactrim three times a week (on M/W/F) and Protonix 40mg daily while on prednisone.   In case you experience recurrence of your symptoms, please seek medical attention.  You should also continue having yearly cancer screening and undergo a colonoscopy within six months of discharge.        SECONDARY DISCHARGE DIAGNOSES  Diagnosis: Type 2 diabetes mellitus  Assessment and Plan of Treatment: Continue metformin 1000mg twice a day with meals. Continue novalog 70/30 insulin 35 units twice a day. Continue a low carbohydrate diet.   Follow up with endocrinology at [facility] or in [place].   The endocrinology clinic in [facility] is [phone].   The endocrinology clinic in [place] is [phone].   Make an appointment within the next week to follow up at one of these clinics for blood sugar management.     PRINCIPAL DISCHARGE DIAGNOSIS  Diagnosis: Dermatomyositis  Assessment and Plan of Treatment: You were admitted with joint/muscle pain, rash, and pruritus. 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 Soon after, your symptoms have improved. However, a skin biopsy from your rash was not consistent with that diagnosis and suggested it was a viral rash or a drug-related rash. We continued the steroidal treatment as your symptoms were improving on it. As steroidal medications cause high glucose levels - we needed to administer increased levels of insulin.   You should continue taking 20 mg prednisone daily for 1 week, continuing with 15mg for a week, 10 mg for a week, and finishing with 5 mg daily for a week. You should follow up with Dr. Mckee (Rheumatology) at [address] [place] [address], [address], in [place], in 2 weeks. The office number is [phone] (they will call to schedule an appointment). Also, you should follow up with dermatology; they will also call to schedule your visit.  To avoid steroidal use adverse effects, please continue taking Bactrim three times a week (on M/W/F) and Protonix 40mg daily while on prednisone.   In case you experience recurrence of your symptoms, please seek medical attention.  You should also continue having yearly cancer screening and undergo a colonoscopy within six months of discharge.        SECONDARY DISCHARGE DIAGNOSES  Diagnosis: Type 2 diabetes mellitus  Assessment and Plan of Treatment: Continue metformin 1000mg twice a day with meals. Continue novalog 70/30 insulin 35 units twice a day. Continue a low carbohydrate diet.   Follow up with endocrinology at [facility] or in [place].   The endocrinology clinic in [facility] is [phone].   The endocrinology clinic in [place] is [phone].   Make an appointment within the next week to follow up at one of these clinics for blood sugar management.    Diagnosis: Rash  Assessment and Plan of Treatment: fungal rash under left arm and groin, continue nystatin podwer to the rash three times a day    Diagnosis: HTN (hypertension)  Assessment and Plan of Treatment: continue diltiazem as prescribed

## 2021-04-06 ENCOUNTER — TRANSCRIPTION ENCOUNTER (OUTPATIENT)
Age: 66
End: 2021-04-06

## 2021-04-06 VITALS
SYSTOLIC BLOOD PRESSURE: 130 MMHG | DIASTOLIC BLOOD PRESSURE: 70 MMHG | HEART RATE: 78 BPM | TEMPERATURE: 98 F | OXYGEN SATURATION: 98 %

## 2021-04-06 LAB
GLUCOSE BLDC GLUCOMTR-MCNC: 139 MG/DL — HIGH (ref 70–99)
GLUCOSE BLDC GLUCOMTR-MCNC: 206 MG/DL — HIGH (ref 70–99)
SARS-COV-2 RNA SPEC QL NAA+PROBE: SIGNIFICANT CHANGE UP

## 2021-04-06 PROCEDURE — 99232 SBSQ HOSP IP/OBS MODERATE 35: CPT

## 2021-04-06 PROCEDURE — 99232 SBSQ HOSP IP/OBS MODERATE 35: CPT | Mod: GC

## 2021-04-06 RX ORDER — ISOPROPYL ALCOHOL, BENZOCAINE .7; .06 ML/ML; ML/ML
1 SWAB TOPICAL
Qty: 100 | Refills: 1
Start: 2021-04-06 | End: 2021-05-25

## 2021-04-06 RX ORDER — NYSTATIN CREAM 100000 [USP'U]/G
1 CREAM TOPICAL
Qty: 30 | Refills: 2
Start: 2021-04-06

## 2021-04-06 RX ORDER — INSULIN NPH HUM/REG INSULIN HM 70-30/ML
35 VIAL (ML) SUBCUTANEOUS
Qty: 10 | Refills: 0
Start: 2021-04-06 | End: 2021-05-05

## 2021-04-06 RX ORDER — KETOTIFEN FUMARATE 0.34 MG/ML
1 SOLUTION OPHTHALMIC
Qty: 0 | Refills: 0 | DISCHARGE

## 2021-04-06 RX ADMIN — Medication 16 UNIT(S): at 12:09

## 2021-04-06 RX ADMIN — Medication 2000 UNIT(S): at 12:08

## 2021-04-06 RX ADMIN — KETOTIFEN FUMARATE 1 DROP(S): 0.34 SOLUTION OPHTHALMIC at 05:37

## 2021-04-06 RX ADMIN — NYSTATIN CREAM 1 APPLICATION(S): 100000 CREAM TOPICAL at 05:37

## 2021-04-06 RX ADMIN — METFORMIN HYDROCHLORIDE 500 MILLIGRAM(S): 850 TABLET ORAL at 05:37

## 2021-04-06 RX ADMIN — Medication 30 MILLIGRAM(S): at 05:37

## 2021-04-06 RX ADMIN — Medication 300 MILLIGRAM(S): at 05:37

## 2021-04-06 RX ADMIN — GABAPENTIN 300 MILLIGRAM(S): 400 CAPSULE ORAL at 05:37

## 2021-04-06 RX ADMIN — ENOXAPARIN SODIUM 40 MILLIGRAM(S): 100 INJECTION SUBCUTANEOUS at 12:08

## 2021-04-06 RX ADMIN — GABAPENTIN 300 MILLIGRAM(S): 400 CAPSULE ORAL at 13:10

## 2021-04-06 RX ADMIN — Medication 4: at 12:09

## 2021-04-06 RX ADMIN — PANTOPRAZOLE SODIUM 40 MILLIGRAM(S): 20 TABLET, DELAYED RELEASE ORAL at 05:37

## 2021-04-06 RX ADMIN — LIDOCAINE 1 PATCH: 4 CREAM TOPICAL at 12:07

## 2021-04-06 RX ADMIN — Medication 1 APPLICATION(S): at 12:06

## 2021-04-06 RX ADMIN — Medication 16 UNIT(S): at 07:49

## 2021-04-06 NOTE — PROGRESS NOTE ADULT - ASSESSMENT
Deconditioning  PT/OT  multidisciplinary rehab    Rash  Prednisone taper  Nystatin powder    HTN  Cardizem    DM2  HbA1c 10.6  with current steroid use  Lantus/Lispro/Metformin  Return back to home dosing of 70/30 insulin and metformin    HLD  Statin    Elevated LFT's  Improving monitor for now    DVT ppx  Lovenox

## 2021-04-06 NOTE — PROGRESS NOTE ADULT - ASSESSMENT
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 Hospital course complicated by elevated glucoses secondary to steroids. Now admitted to [facility] after for initiation of a multidisciplinary rehab program consisting focused on functional mobility, transfers and ADLs (activities of daily living).  Comprehensive Multidisciplinary Rehab Program:  - Completed comprehensive rehab program, 3 hours a day, 5 days a week.  - PT 2hr/day: Focused on improving strength, endurance, coordination, balance, functional mobility, and transfers  - OT 1hr/day: Focused on improving strength, fine motor skills, coordination, posture and ADLs.    - Activity Limitations: Decreased social, vocational and leisure activities, decreased self care and ADLs, decreased mobility, decreased ability to manage household and finances.   Patient is medically stable for dc home today     Participation Restrictions/Precautions:  - Weight bearing status: WBAT   - ROM restrictions: none  - Precautions:    [x ] Falls   [ ] Spinal   [ ] Hip (Anterior or Posterior)       [ ] Seizure  [ ] Cardiac   [ ] Sternal    Pruritis   - hydroxyzine 25mg Q6h PRN    Rehab Diagnosis/Management  Sleep:   - Maintain quiet hours and low stim environment.    Pain Management:  -Tylenol PRN  -Gabapentin  - Lidocaine patch  - Moist heat to left shoulder    GI/Bowel:  -At risk for constipation due to neurologic diagnosis, immobility and/or medication use  -Miralax /senna daily  -GI ppx: protonix     /Bladder:   - At risk for incontinence and retention due to neurologic diagnosis and limited mobility  - Currently patient voids:    [x ] independent     [ ] external collection device (condom cath)    [ ] Indwelling colin catheter     [ ] Intermittent catheterization  - Baseline PVR 20cc  - Encourage timed voids every 4 hours while awake for independence and to promote continence during therapy.    Skin/Pressure Injury:   - At risk for pressure injury due to neurologic diagnosis and relative immobility.  - Skin assessment on admission admission: no pressure injury   - Turn every 2 hours while in bed, air mattress  - Skin barrier cream as needed  - Nursing to monitor skin Qshift    Diet/Dysphagia:  - Diet Consistency/Modifications: CC    DVT ppx:  -lovenox  - SCDs    -------------  Comorbid Medical Management:    HTN  -c/w diltiazem    Type 2 Diabetes  -Lantus 36U   -Premeal insulin 16U  - restart metformin 500 BID   -Continue CC diet   -Continue steriod taper and adjust insulin as needed   -HA1C 10.6%  -Patient discharged on metformin 1000mg BID and 70/30 insulin 35 units BID   -Patient to follow up with endocrinologist in [place]. If unable to make this appointment given number for endocrinology clinic in [place].     Covid Status   -Covid + last year  -Received the Priya vaccine on 3/23     Dermatomyositis  Off steroid creams- steroid vacation  use atarax for pruritis  on tapering prednisone  patient to follow up with dermatology and rheumatology next week to discuss resuming creams    ---------------  Code Status/Emergency Contact: full code     Outpatient Follow-up (Specialty/Name of physician):    Shanique Chacon (MD)  Internal Medicine; Rheumatology  [address] [facility], [facility] [address]  [place] [address]  Phone: [phone]  Fax: [phone]  Established Patient  Follow Up Time: 2 weeks    Health system Dermatology - [place]  Dermatology  [address] [facility] [address] [facility], [address]  [place] [address]  Phone: [phone]  Fax: [phone]  Follow Up Time: 2 weeks      --------------  Goals: Safe discharge to home   Estimated Length of Stay: 10-14 days  Rehab Potential: Good  Medical Prognosis: Good  Estimated Disposition: Home with Home Care  ---------------    PRESCREEN COMPARISON:  I have reviewed the prescreen information and I have found no relevant changes between the preadmission screening and my post admission evaluation.    RATIONALE FOR INPATIENT ADMISSION: Patient demonstrates the following:  [X] Medically appropriate for rehabilitation admission  [X] Has attainable rehab goals with an appropriate initial discharge plan  [X]Has rehabilitation potential (expected to make a significant improvement within a reasonable period of time)  [X] Requires close medical management by a rehab physician, rehab nursing care, Hospitalist and comprehensive interdisciplinary team (including PT, OT and/or SLP, Prosthetics and Orthotics)

## 2021-04-06 NOTE — DISCHARGE NOTE NURSING/CASE MANAGEMENT/SOCIAL WORK - PATIENT PORTAL LINK FT
You can access the FollowMyHealth Patient Portal offered by [facility] by registering at the following website: [email or web address]. By joining [facility]’s FollowMyHealth portal, you will also be able to view your health information using other applications (apps) compatible with our system.

## 2021-04-06 NOTE — PROGRESS NOTE ADULT - REASON FOR ADMISSION
03.8 neuromuscular disorder
06.9 other arthritis

## 2021-04-06 NOTE — PROGRESS NOTE ADULT - SUBJECTIVE AND OBJECTIVE BOX
Patient is a 65y old  Female who presents with a chief complaint of 06.9 other arthritis (05 Apr 2021 14:21)      Patient seen and examined at bedside.    ALLERGIES:  azithromycin (Hives; Swelling)  enalapril (Other (Mild to Mod))  penicillin (Swelling; Rash)    MEDICATIONS  (STANDING):  atorvastatin 40 milliGRAM(s) Oral at bedtime  cholecalciferol 2000 Unit(s) Oral daily  dextrose 40% Gel 15 Gram(s) Oral once  dextrose 5%. 1000 milliLiter(s) (50 mL/Hr) IV Continuous <Continuous>  dextrose 5%. 1000 milliLiter(s) (100 mL/Hr) IV Continuous <Continuous>  dextrose 50% Injectable 25 Gram(s) IV Push once  dextrose 50% Injectable 12.5 Gram(s) IV Push once  dextrose 50% Injectable 25 Gram(s) IV Push once  diltiazem    milliGRAM(s) Oral daily  enoxaparin Injectable 40 milliGRAM(s) SubCutaneous daily  gabapentin 300 milliGRAM(s) Oral every 8 hours  glucagon  Injectable 1 milliGRAM(s) IntraMuscular once  insulin glargine Injectable (LANTUS) 36 Unit(s) SubCutaneous at bedtime  insulin lispro (ADMELOG) corrective regimen sliding scale   SubCutaneous three times a day before meals  insulin lispro (ADMELOG) corrective regimen sliding scale   SubCutaneous at bedtime  insulin lispro Injectable (ADMELOG) 16 Unit(s) SubCutaneous three times a day before meals  ketotifen 0.025% Ophthalmic Solution 1 Drop(s) Both EYES two times a day  latanoprost 0.005% Ophthalmic Solution 1 Drop(s) Both EYES at bedtime  lidocaine   Patch 1 Patch Transdermal daily  metFORMIN 500 milliGRAM(s) Oral two times a day  nystatin Powder 1 Application(s) Topical three times a day  pantoprazole    Tablet 40 milliGRAM(s) Oral before breakfast  petrolatum white Ointment 1 Application(s) Topical daily  polyethylene glycol 3350 17 Gram(s) Oral daily  predniSONE   Tablet 40  Oral   senna 2 Tablet(s) Oral at bedtime  trimethoprim  160 mG/sulfamethoxazole 800 mG 1 Tablet(s) Oral <User Schedule>    MEDICATIONS  (PRN):  acetaminophen   Tablet .. 650 milliGRAM(s) Oral every 6 hours PRN Temp greater or equal to 38C (100.4F), Mild Pain (1 - 3), Moderate Pain (4 - 6)  hydrOXYzine hydrochloride 25 milliGRAM(s) Oral every 6 hours PRN Itching    Vital Signs Last 24 Hrs  T(F): 97.6 (06 Apr 2021 08:56), Max: 97.6 (05 Apr 2021 21:59)  HR: 78 (06 Apr 2021 08:56) (72 - 78)  BP: 117/71 (06 Apr 2021 08:56) (109/64 - 136/72)  RR: 16 (06 Apr 2021 08:56) (16 - 16)  SpO2: 98% (06 Apr 2021 08:56) (94% - 98%)  I&O's Summary      PHYSICAL EXAM:  General: NAD, A/O x 3  ENT: MMM  Neck: Supple, No JVD  Lungs: Clear to auscultation bilaterally  Cardio: RRR, S1/S2, No murmurs  Abdomen: Soft, Nontender, Nondistended; Bowel sounds present  Extremities: No calf tenderness, No pitting edema    LABS:                        10.8   10.81 )-----------( 321      ( 05 Apr 2021 05:00 )             33.0       04-05    141  |  104  |  20  ----------------------------<  136  4.6   |  32  |  0.86    Ca    9.6      05 Apr 2021 05:00    TPro  6.5  /  Alb  2.7  /  TBili  0.3  /  DBili  x   /  AST  21  /  ALT  43  /  AlkPhos  93  04-05     eGFR if Non African American: 72 mL/min/1.73M2 (04-05-21 @ 05:00)  eGFR if African American: 84 mL/min/1.73M2 (04-05-21 @ 05:00)             03-17 Chol 88 mg/dL LDL -- HDL 29 mg/dL Trig 129 mg/dL              POCT Blood Glucose.: 139 mg/dL (06 Apr 2021 07:48)  POCT Blood Glucose.: 142 mg/dL (05 Apr 2021 22:00)  POCT Blood Glucose.: 185 mg/dL (05 Apr 2021 16:50)  POCT Blood Glucose.: 185 mg/dL (05 Apr 2021 11:28)            RADIOLOGY & ADDITIONAL TESTS:    Care Discussed with Consultants/Other Providers:

## 2021-04-06 NOTE — DISCHARGE NOTE NURSING/CASE MANAGEMENT/SOCIAL WORK - NSDCVIVACCINE_GEN_ALL_CORE_FT
Severe acute respiratory syndrome coronavirus 2 (SARS-CoV-2) (Coronavirus disease [COVID-19]) vaccine , 2021/3/24 15:27 , Linda Blanco (RN)

## 2021-04-06 NOTE — PROGRESS NOTE ADULT - PROVIDER SPECIALTY LIST ADULT
Physiatry
Hospitalist
Physiatry
Physiatry
Rehab Medicine
Hospitalist
Physiatry
Rehab Medicine
Rehab Medicine
Hospitalist

## 2021-04-06 NOTE — DISCHARGE NOTE NURSING/CASE MANAGEMENT/SOCIAL WORK - NSDCFUADDAPPT_GEN_ALL_CORE_FT
Follow up with dermatology to have creams restarted. Make a follow up appointment within 1-2 weeks.     Follow up with rheumatology within 1 week.     Follow up with Primary care doctor within 1 week.     You will receive a call to follow up with a rehab doctor closer to your home. This appointment will be made 4 to 6 weeks from now.     If you have any questions or concerns, feel free to contact Lacy Tracey NP at [phone] or [phone].

## 2021-04-06 NOTE — PROGRESS NOTE ADULT - SUBJECTIVE AND OBJECTIVE BOX
This is a 66 y/o Welsh speaking Female with PMHx of DM Type 2 (on insulin), HTN, HLD, COVID (2020), and glaucoma presented with generalized weakness, joint pain, and rash. The patient states that she was discharged from OSH on 3/12 after being evaluated for similar symptoms. They suspected that she may have Lyme disease and started her on doxycycline and sent her home. They also suspected that this might be post-COVID related. The patient has multiple complaints. Reports rash in the neck area and b/l arms, along with whole body itching. States that she has had a rash since 2/2021 and has seen various doctors and tried different things (steroid creams) without relief. The rash initially started on her face. Also complains of joint pain for one week. States she has joint pain and stiffness in the b/l shoulders, elbows, hands, and knees. States that the pain is currently worst in the L shoulder and R elbow. States that the pain is so significant that she is unable to walk now. Tries Tylenol with minimal relief. Also reports that she has been unable to do her ADLs - clean herself, walk. Never had joint pain like this before. Also reports subjective fever and chills, along with dry mouth.   The rash did not get worse in the sun. Joint pain is not worse in the AM and present at rest but worse on movement. No BM changes recently; had mild rectal bleeding not in feces but on wiping. She attributes 2/2 itching and rash, denies vaginal bleeding or ulcers, weight loss. Had subjective fever but not over 99F. Endorse dry mouth, and itching in her eyes, no mouth ulcers. No FHx of autoimmune/rheumatologist/joint diseases. Was admitted at [facility] 3/8-3/12 with fevers, joint pain, rash, treated with Levaquin for five days and three doses of Vanco, 2x covid negative.    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 The pt was started on prednisone and steroid ointments. She underwent extensive infectious and inflammatory workup that included hepatitis panel - neg, HIV - neg, syphilis - neg, EBV - past infection, CMV - neg, Lyme panel-neg, blood cultures - NGTD, C3 slightly elevated, C4 - wnl. VIPIN-neg, dsDNA - neg, CCP -neg, anti SSA/B - neg, Mallory-1 - neg. Myoglobin - low, RF - neg, anti-Smith - neg. Flow cytometry - neg for abnormality. X-rays L shoulder and R elbow - wnl. CT chest and pelvis - wnl, no signs of malignancy. Myoglobin was low. Still pending Myomarker panel 3, Jak2 and calreticulin mutations. The patient underwent a skin biopsy, which resulted in "Skin with focal dyskeratosis with mild perivascular inflammation. The findings raise the differential diagnosis of a viral exanthem or a drug eruption."     Although the working diagnosis did not match the biopsy results, the patient clinically improved under the steroidal treatment. Her upper and lower extremity joint pain has resolved, her rash has started to clear, and he was able to ambulate. During the prednisone treatment, the patient's glucose level increased. She needed high doses of insulin to maintain euglycemia (60 Lantus and 35 lispro pre-meals). PM&R evaluated the patient and concluded that the patient would benefit from acute rehab. She was started on a weekly prednisone taper before discharge. Prior to d/c, pt was given the J&J covid vaccine on 3/23.  Patient deemed medically stable for admission to acute inpatient rehab on 3/25.      ROS:    used, ID number [id number]  No urinary, bowel, cardio or pulmonary complaints.   Medications and follow up doctors appointments reviewed with patient. All questioned answered to patient's satisfction.       MEDICATIONS  (STANDING):  atorvastatin 40 milliGRAM(s) Oral at bedtime  cholecalciferol 2000 Unit(s) Oral daily  dextrose 40% Gel 15 Gram(s) Oral once  dextrose 5%. 1000 milliLiter(s) (50 mL/Hr) IV Continuous <Continuous>  dextrose 5%. 1000 milliLiter(s) (100 mL/Hr) IV Continuous <Continuous>  dextrose 50% Injectable 25 Gram(s) IV Push once  dextrose 50% Injectable 12.5 Gram(s) IV Push once  dextrose 50% Injectable 25 Gram(s) IV Push once  diltiazem    milliGRAM(s) Oral daily  enoxaparin Injectable 40 milliGRAM(s) SubCutaneous daily  gabapentin 300 milliGRAM(s) Oral every 8 hours  glucagon  Injectable 1 milliGRAM(s) IntraMuscular once  insulin glargine Injectable (LANTUS) 36 Unit(s) SubCutaneous at bedtime  insulin lispro (ADMELOG) corrective regimen sliding scale   SubCutaneous three times a day before meals  insulin lispro (ADMELOG) corrective regimen sliding scale   SubCutaneous at bedtime  insulin lispro Injectable (ADMELOG) 16 Unit(s) SubCutaneous three times a day before meals  ketotifen 0.025% Ophthalmic Solution 1 Drop(s) Both EYES two times a day  latanoprost 0.005% Ophthalmic Solution 1 Drop(s) Both EYES at bedtime  lidocaine   Patch 1 Patch Transdermal daily  metFORMIN 500 milliGRAM(s) Oral two times a day  nystatin Powder 1 Application(s) Topical three times a day  pantoprazole    Tablet 40 milliGRAM(s) Oral before breakfast  petrolatum white Ointment 1 Application(s) Topical daily  polyethylene glycol 3350 17 Gram(s) Oral daily  predniSONE   Tablet 40  Oral   senna 2 Tablet(s) Oral at bedtime  trimethoprim  160 mG/sulfamethoxazole 800 mG 1 Tablet(s) Oral <User Schedule>    MEDICATIONS  (PRN):  acetaminophen   Tablet .. 650 milliGRAM(s) Oral every 6 hours PRN Temp greater or equal to 38C (100.4F), Mild Pain (1 - 3), Moderate Pain (4 - 6)  hydrOXYzine hydrochloride 25 milliGRAM(s) Oral every 6 hours PRN Itching                        10.8   10.81 )-----------( 321      ( 05 Apr 2021 05:00 )             33.0     04-05    141  |  104  |  20  ----------------------------<  136<H>  4.6   |  32<H>  |  0.86    Ca    9.6      05 Apr 2021 05:00    TPro  6.5  /  Alb  2.7<L>  /  TBili  0.3  /  DBili  x   /  AST  21  /  ALT  43  /  AlkPhos  93  04-05        CAPILLARY BLOOD GLUCOSE      POCT Blood Glucose.: 206 mg/dL (06 Apr 2021 12:03)  POCT Blood Glucose.: 139 mg/dL (06 Apr 2021 07:48)  POCT Blood Glucose.: 142 mg/dL (05 Apr 2021 22:00)  POCT Blood Glucose.: 185 mg/dL (05 Apr 2021 16:50)        Vital Signs Last 24 Hrs  T(C): 36.4 (06 Apr 2021 08:56), Max: 36.4 (05 Apr 2021 21:59)  T(F): 97.6 (06 Apr 2021 08:56), Max: 97.6 (05 Apr 2021 21:59)  HR: 78 (06 Apr 2021 08:56) (72 - 78)  BP: 117/71 (06 Apr 2021 08:56) (109/64 - 136/72)  RR: 16 (06 Apr 2021 08:56) (16 - 16)  SpO2: 98% (06 Apr 2021 08:56) (94% - 98%)          Physical Exam: General: NAD                            HEENT: NC/AT, EOM I, PERRLA, Normal Conjunctivae  Cardio: RRR, Normal S1-S2, No M/G/R                              Pulm: No Respiratory Distress,  Lungs CTAB                        Abdomen: ND/NT, Soft, BS+                                                MSK: No joint swelling                                   Ext: +Edema bilat LEs, Pulses 2+ throughout, No calf tenderness    Skin:  no skin breakdown noted, -hyperpigmented skin noted to arms, scalp, chest.   and left axilla rash with satellite lesions                                                    Neurological Examination    Cognitive: AAO x 3                                                                         Attention: Intact   Judgment: Good evidence of judgement   Attention: intact                              Mood/Affect: wnl                                                                           Communication:  Fluent,  No dysarthria                                                          Sensory:                                                                                          Motor    5/5 right shoulder and elbow strengths, right hand   4/5 left shoulder and elbow strengths secondary to pain. Left wrist and hand strength   4/5 right HF and KE. 5/5 right ankle strengths.   4/5 left HF, 4/+/5 KE. 5/5 left ankle strengths.    IDT meeting 4/5     OT: mod I with all ADLs     PT: mod I with ambulation 150ft with RW, transfers, and 12 stairs     Tentative dc home with family on 4/6 with home care services.

## 2021-04-06 NOTE — CHART NOTE - NSCHARTNOTEFT_GEN_A_CORE
Nutrition Follow Up Note  Hospital Course   (Per Electronic Medical Record)    Source:  Patient [X]  Medical Record [X]      Patient Speaks Grenadian, RD is Fluent in Grenadian     Diet:   Consistent Carbohydrate Diet w/ Thin Liquids  Tolerates Diet Consistency Well  No Chewing/Swallowing Difficulties  No Recent Nausea, Vomiting, Diarrhea or Constipation (as Per Patient)  States Good PO Intake/Appetite     Enteral/Parenteral Nutrition: Not Applicable    Current Weight: 207.6lb on 3/26  Obtain New Weight  Obtain Weights Weekly  Weights Currently Stable @This Time     Pertinent Medications: MEDICATIONS  (STANDING):  atorvastatin 40 milliGRAM(s) Oral at bedtime  cholecalciferol 2000 Unit(s) Oral daily  dextrose 40% Gel 15 Gram(s) Oral once  dextrose 5%. 1000 milliLiter(s) (50 mL/Hr) IV Continuous <Continuous>  dextrose 5%. 1000 milliLiter(s) (100 mL/Hr) IV Continuous <Continuous>  dextrose 50% Injectable 25 Gram(s) IV Push once  dextrose 50% Injectable 12.5 Gram(s) IV Push once  dextrose 50% Injectable 25 Gram(s) IV Push once  diltiazem    milliGRAM(s) Oral daily  enoxaparin Injectable 40 milliGRAM(s) SubCutaneous daily  gabapentin 300 milliGRAM(s) Oral every 8 hours  glucagon  Injectable 1 milliGRAM(s) IntraMuscular once  insulin glargine Injectable (LANTUS) 36 Unit(s) SubCutaneous at bedtime  insulin lispro (ADMELOG) corrective regimen sliding scale   SubCutaneous three times a day before meals  insulin lispro (ADMELOG) corrective regimen sliding scale   SubCutaneous at bedtime  insulin lispro Injectable (ADMELOG) 16 Unit(s) SubCutaneous three times a day before meals  ketotifen 0.025% Ophthalmic Solution 1 Drop(s) Both EYES two times a day  latanoprost 0.005% Ophthalmic Solution 1 Drop(s) Both EYES at bedtime  lidocaine   Patch 1 Patch Transdermal daily  metFORMIN 500 milliGRAM(s) Oral two times a day  nystatin Powder 1 Application(s) Topical three times a day  pantoprazole    Tablet 40 milliGRAM(s) Oral before breakfast  petrolatum white Ointment 1 Application(s) Topical daily  polyethylene glycol 3350 17 Gram(s) Oral daily  predniSONE   Tablet 40  Oral   senna 2 Tablet(s) Oral at bedtime  trimethoprim  160 mG/sulfamethoxazole 800 mG 1 Tablet(s) Oral <User Schedule>    MEDICATIONS  (PRN):  acetaminophen   Tablet .. 650 milliGRAM(s) Oral every 6 hours PRN Temp greater or equal to 38C (100.4F), Mild Pain (1 - 3), Moderate Pain (4 - 6)  hydrOXYzine hydrochloride 25 milliGRAM(s) Oral every 6 hours PRN Itching    Pertinent Labs:  04-05 Na141 mmol/L Glu 136 mg/dL<H> K+ 4.6 mmol/L Cr  0.86 mg/dL BUN 20 mg/dL 04-05 Alb 2.7 g/dL<L> 03-17 Chol 88 mg/dL LDL --    HDL 29 mg/dL<L> Trig 129 mg/dL    POCT (over Last 3 Days) - Ranging from 113-263    Skin: No Pressure Ulcers     Edema: None Noted     Last Bowel Movement: on 4/4    Estimated Needs:   [X] No Change Since Previous Assessment    Previous Nutrition Diagnosis:   Morbidly Obese  Altered Nutrition Related Labwork     Nutrition Diagnosis is [X] Ongoing - Continue Nutrition Plan of Care     New Nutrition Diagnosis: [X] Not Applicable    Interventions:   1. Recommend Continue Nutrition Plan of Care     Monitoring & Evaluation:   [X] Weights   [X] PO Intake   [X] Skin Integrity   [X] Follow Up (Per Protocol)  [X] Tolerance to Diet Prescription   [X] Other: Labs & POCT    Registered Dietitian/Nutritionist Remains Available.  Marco Barba RDN    Pager # [phone]  Phone# [phone]

## 2021-04-06 NOTE — PROGRESS NOTE ADULT - NSICDXPILOT_GEN_ALL_CORE
[place]

## 2021-04-06 NOTE — PROGRESS NOTE ADULT - ATTENDING COMMENTS
Rehab Attending- Patient seen and examined with NP Alexy- Case discussed, above note reviewed by me with modifications made    Doing well  rash in Left axilla and groin felt to be fungal- using nystatin powder   to Dc on 70/30 insulin 35 units 2x/day with metformin 1000mg 2x/day  DC with [facility] services  FU PMD Dr Sandoval 4/22  FU Rheumatology Dr PalmerMorryanely Rheum 1 week  FU dermatology Dr Mittal 1 week  FU PMR Roney 4-6 weeks
Rehab Attending- Patient seen and examined with NP Alexy- Case discussed, above note reviewed by me with modifications made    Doing well  still with PM itch - using Atarax  needs steroid vacation X 1 week from creams  Left shoulder pain better with Lidoderm, moist Heat in rehab  Labs reviewed- OK  hospitalist increased Lantus and pre meal insulin  to continue intensive rehab program
Rehab Attending- Patient seen and examined with ZI Soto- Case discussed, above note reviewed by me with modifications made    Doing well  left shoulder pain- to use lidoderm HS, Moist heat in rehab  Itching in axilla left- needs steroid vacation for one week  to use atarax PRN for itch  To continue intensive rehab program
Rehab Attending- Patient seen and examined with NP Alexy- Case discussed, above note reviewed by me with modifications made    doing well   +BM, +void  excited about beginning rehab  insulin to be adjusted by hospitalist  On tapering steroids  To continue intensive rehab program
Rehab Attending- Patient seen and examined with NP Alexy- Case discussed, above note reviewed by me with modifications made    CO constipation   pain Left shoulder, Posterior Left knee  labs reviewed  glucoses poorly controlled  premeal increased to 11units, lantus HS increased to 28 units  as recommended by diabetic nurse  other labs OK  to continue intensive rehab program

## 2021-04-06 NOTE — PROGRESS NOTE ADULT - NUTRITIONAL ASSESSMENT
This patient has been assessed with a concern for Malnutrition and has been determined to have a diagnosis/diagnoses of Morbid obesity (BMI > 40).    This patient is being managed with:   Diet Consistent Carbohydrate w/Evening Snack-  Entered: Mar 25 2021  1:35PM    

## 2021-04-06 NOTE — DISCHARGE NOTE NURSING/CASE MANAGEMENT/SOCIAL WORK - NSSCTYPOFSERV_GEN_ALL_CORE
PT/OT. Note: If you do not hear from home care within 24 hours after your discharge, please contact them at number above

## 2021-04-13 PROBLEM — Z00.00 ENCOUNTER FOR PREVENTIVE HEALTH EXAMINATION: Status: ACTIVE | Noted: 2021-04-13

## 2021-04-15 ENCOUNTER — APPOINTMENT (OUTPATIENT)
Dept: DERMATOLOGY | Facility: CLINIC | Age: 66
End: 2021-04-15
Payer: MEDICAID

## 2021-04-15 VITALS — HEIGHT: 59 IN | BODY MASS INDEX: 39.92 KG/M2 | WEIGHT: 198 LBS

## 2021-04-15 PROCEDURE — 99072 ADDL SUPL MATRL&STAF TM PHE: CPT

## 2021-04-15 PROCEDURE — 99214 OFFICE O/P EST MOD 30 MIN: CPT

## 2021-04-20 ENCOUNTER — LABORATORY RESULT (OUTPATIENT)
Age: 66
End: 2021-04-20

## 2021-04-20 ENCOUNTER — RESULT REVIEW (OUTPATIENT)
Age: 66
End: 2021-04-20

## 2021-04-20 ENCOUNTER — APPOINTMENT (OUTPATIENT)
Dept: DERMATOLOGY | Facility: CLINIC | Age: 66
End: 2021-04-20
Payer: MEDICAID

## 2021-04-20 PROCEDURE — 11104 PUNCH BX SKIN SINGLE LESION: CPT

## 2021-04-20 PROCEDURE — 99214 OFFICE O/P EST MOD 30 MIN: CPT | Mod: 25

## 2021-04-20 PROCEDURE — 99072 ADDL SUPL MATRL&STAF TM PHE: CPT

## 2021-04-20 RX ORDER — HYDROXYZINE HYDROCHLORIDE 25 MG/1
25 TABLET ORAL
Qty: 60 | Refills: 1 | Status: ACTIVE | COMMUNITY
Start: 2021-04-15 | End: 1900-01-01

## 2021-04-27 ENCOUNTER — APPOINTMENT (OUTPATIENT)
Dept: DERMATOLOGY | Facility: CLINIC | Age: 66
End: 2021-04-27
Payer: MEDICAID

## 2021-04-27 DIAGNOSIS — L30.4 ERYTHEMA INTERTRIGO: ICD-10-CM

## 2021-04-27 PROCEDURE — 99072 ADDL SUPL MATRL&STAF TM PHE: CPT

## 2021-04-27 PROCEDURE — 99214 OFFICE O/P EST MOD 30 MIN: CPT

## 2021-04-27 RX ORDER — KETOCONAZOLE 20 MG/G
2 CREAM TOPICAL
Qty: 1 | Refills: 0 | Status: ACTIVE | COMMUNITY
Start: 2021-04-15 | End: 1900-01-01

## 2021-04-27 RX ORDER — TRIAMCINOLONE ACETONIDE 1 MG/G
0.1 OINTMENT TOPICAL
Qty: 1 | Refills: 1 | Status: ACTIVE | COMMUNITY
Start: 2021-04-15 | End: 1900-01-01

## 2021-04-27 RX ORDER — FLUOCINONIDE 0.5 MG/ML
0.05 SOLUTION TOPICAL
Qty: 1 | Refills: 2 | Status: ACTIVE | COMMUNITY
Start: 2021-04-27 | End: 1900-01-01

## 2021-04-29 LAB
ALBUMIN MFR SERPL ELPH: 52.4 %
ALBUMIN SERPL ELPH-MCNC: 4 G/DL
ALBUMIN SERPL-MCNC: 3.4 G/DL
ALBUMIN/GLOB SERPL: 1.1 RATIO
ALBUPE: 15.5 %
ALP BLD-CCNC: 92 U/L
ALPHA1 GLOB MFR SERPL ELPH: 5.2 %
ALPHA1 GLOB SERPL ELPH-MCNC: 0.3 G/DL
ALPHA1UPE: 41.9 %
ALPHA2 GLOB MFR SERPL ELPH: 14.8 %
ALPHA2 GLOB SERPL ELPH-MCNC: 1 G/DL
ALPHA2UPE: 21.4 %
ALT SERPL-CCNC: 25 U/L
ANION GAP SERPL CALC-SCNC: 11 MMOL/L
AST SERPL-CCNC: 24 U/L
B-GLOBULIN MFR SERPL ELPH: 13.7 %
B-GLOBULIN SERPL ELPH-MCNC: 0.9 G/DL
BASOPHILS # BLD AUTO: 0.03 K/UL
BASOPHILS NFR BLD AUTO: 0.4 %
BETAUPE: 12.3 %
BILIRUB SERPL-MCNC: 0.2 MG/DL
BUN SERPL-MCNC: 12 MG/DL
CALCIUM SERPL-MCNC: 9.2 MG/DL
CHLORIDE SERPL-SCNC: 97 MMOL/L
CO2 SERPL-SCNC: 25 MMOL/L
CREAT 24H UR-MCNC: NORMAL G/24 H
CREAT SERPL-MCNC: 0.82 MG/DL
CREATININE UR (MAYO): 14 MG/DL
EOSINOPHIL # BLD AUTO: 0.04 K/UL
EOSINOPHIL NFR BLD AUTO: 0.6 %
GAMMA GLOB FLD ELPH-MCNC: 0.9 G/DL
GAMMA GLOB MFR SERPL ELPH: 13.9 %
GAMMAUPE: 8.9 %
GLUCOSE SERPL-MCNC: 328 MG/DL
HCT VFR BLD CALC: 35.3 %
HGB BLD-MCNC: 11.1 G/DL
IGA 24H UR QL IFE: NORMAL
IMM GRANULOCYTES NFR BLD AUTO: 0.6 %
INTERPRETATION SERPL IEP-IMP: NORMAL
KAPPA LC 24H UR QL: NORMAL
LYMPHOCYTES # BLD AUTO: 0.55 K/UL
LYMPHOCYTES NFR BLD AUTO: 7.8 %
M PROTEIN SPEC IFE-MCNC: NORMAL
MAN DIFF?: NORMAL
MCHC RBC-ENTMCNC: 27.8 PG
MCHC RBC-ENTMCNC: 31.4 GM/DL
MCV RBC AUTO: 88.5 FL
MONOCYTES # BLD AUTO: 0.18 K/UL
MONOCYTES NFR BLD AUTO: 2.6 %
NEUTROPHILS # BLD AUTO: 6.18 K/UL
NEUTROPHILS NFR BLD AUTO: 88 %
PLATELET # BLD AUTO: 287 K/UL
POTASSIUM SERPL-SCNC: 4.8 MMOL/L
PROT PATTERN 24H UR ELPH-IMP: NORMAL
PROT SERPL-MCNC: 6.5 G/DL
PROT UR-MCNC: 4 MG/DL
PROT UR-MCNC: <4 MG/DL
RBC # BLD: 3.99 M/UL
RBC # FLD: 17.6 %
SODIUM SERPL-SCNC: 133 MMOL/L
SPECIMEN VOL 24H UR: NORMAL ML
WBC # FLD AUTO: 7.02 K/UL

## 2021-05-03 ENCOUNTER — NON-APPOINTMENT (OUTPATIENT)
Age: 66
End: 2021-05-03

## 2021-05-20 ENCOUNTER — APPOINTMENT (OUTPATIENT)
Dept: DERMATOLOGY | Facility: CLINIC | Age: 66
End: 2021-05-20
Payer: MEDICAID

## 2021-05-20 DIAGNOSIS — L85.3 XEROSIS CUTIS: ICD-10-CM

## 2021-05-20 PROCEDURE — 99214 OFFICE O/P EST MOD 30 MIN: CPT

## 2021-05-27 ENCOUNTER — LABORATORY RESULT (OUTPATIENT)
Age: 66
End: 2021-05-27

## 2021-05-27 ENCOUNTER — APPOINTMENT (OUTPATIENT)
Dept: RHEUMATOLOGY | Facility: CLINIC | Age: 66
End: 2021-05-27
Payer: MEDICAID

## 2021-05-27 VITALS
HEART RATE: 87 BPM | WEIGHT: 198 LBS | OXYGEN SATURATION: 98 % | TEMPERATURE: 97.9 F | SYSTOLIC BLOOD PRESSURE: 151 MMHG | HEIGHT: 59 IN | DIASTOLIC BLOOD PRESSURE: 81 MMHG | BODY MASS INDEX: 39.92 KG/M2

## 2021-05-27 DIAGNOSIS — R23.4 CHANGES IN SKIN TEXTURE: ICD-10-CM

## 2021-05-27 DIAGNOSIS — M79.602 PAIN IN LEFT ARM: ICD-10-CM

## 2021-05-27 PROCEDURE — 99214 OFFICE O/P EST MOD 30 MIN: CPT

## 2021-05-28 LAB
ALBUMIN SERPL ELPH-MCNC: 4.2 G/DL
ALDOLASE SERPL-CCNC: 3.6 U/L
ALP BLD-CCNC: 119 U/L
ALT SERPL-CCNC: 13 U/L
ANION GAP SERPL CALC-SCNC: 13 MMOL/L
APPEARANCE: CLEAR
AST SERPL-CCNC: 15 U/L
BASOPHILS # BLD AUTO: 0.02 K/UL
BASOPHILS NFR BLD AUTO: 0.2 %
BILIRUB SERPL-MCNC: 0.3 MG/DL
BILIRUBIN URINE: NEGATIVE
BLOOD URINE: NEGATIVE
BUN SERPL-MCNC: 9 MG/DL
C3 SERPL-MCNC: 162 MG/DL
C4 SERPL-MCNC: 34 MG/DL
CALCIUM SERPL-MCNC: 9.6 MG/DL
CHLORIDE SERPL-SCNC: 101 MMOL/L
CK SERPL-CCNC: 58 U/L
CO2 SERPL-SCNC: 25 MMOL/L
COLOR: NORMAL
CREAT SERPL-MCNC: 0.67 MG/DL
CREAT SPEC-SCNC: 61 MG/DL
CREAT/PROT UR: 0.2 RATIO
CRP SERPL-MCNC: 6 MG/L
DEPRECATED KAPPA LC FREE/LAMBDA SER: 1.06 RATIO
ENA JO1 AB SER IA-ACNC: <0.2 AL
EOSINOPHIL # BLD AUTO: 0 K/UL
EOSINOPHIL NFR BLD AUTO: 0 %
ERYTHROCYTE [SEDIMENTATION RATE] IN BLOOD BY WESTERGREN METHOD: 45 MM/HR
GLUCOSE QUALITATIVE U: NEGATIVE
GLUCOSE SERPL-MCNC: 190 MG/DL
HCT VFR BLD CALC: 37.7 %
HGB BLD-MCNC: 11.5 G/DL
IGA SER QL IEP: 174 MG/DL
IGG SER QL IEP: 1257 MG/DL
IGM SER QL IEP: 48 MG/DL
IMM GRANULOCYTES NFR BLD AUTO: 0.4 %
KAPPA LC CSF-MCNC: 1.92 MG/DL
KAPPA LC SERPL-MCNC: 2.03 MG/DL
KETONES URINE: NEGATIVE
LEUKOCYTE ESTERASE URINE: ABNORMAL
LYMPHOCYTES # BLD AUTO: 1.9 K/UL
LYMPHOCYTES NFR BLD AUTO: 22.6 %
MAN DIFF?: NORMAL
MCHC RBC-ENTMCNC: 27.1 PG
MCHC RBC-ENTMCNC: 30.5 GM/DL
MCV RBC AUTO: 88.7 FL
MONOCYTES # BLD AUTO: 0.53 K/UL
MONOCYTES NFR BLD AUTO: 6.3 %
MYOGLOBIN SERPL-MCNC: <21 NG/ML
NEUTROPHILS # BLD AUTO: 5.93 K/UL
NEUTROPHILS NFR BLD AUTO: 70.5 %
NITRITE URINE: NEGATIVE
PH URINE: 6
PLATELET # BLD AUTO: 375 K/UL
POTASSIUM SERPL-SCNC: 4.7 MMOL/L
PROT SERPL-MCNC: 6.8 G/DL
PROT UR-MCNC: 11 MG/DL
PROTEIN URINE: NEGATIVE
RBC # BLD: 4.25 M/UL
RBC # FLD: 15.9 %
SODIUM SERPL-SCNC: 139 MMOL/L
SPECIFIC GRAVITY URINE: 1.01
TSH SERPL-ACNC: 2.48 UIU/ML
UROBILINOGEN URINE: NORMAL
WBC # FLD AUTO: 8.41 K/UL

## 2021-06-01 LAB
ANA PAT FLD IF-IMP: ABNORMAL
ANA SER IF-ACNC: ABNORMAL
DSDNA AB SER-ACNC: 15 IU/ML
IGE SER-MCNC: 42 KU/L

## 2021-06-11 ENCOUNTER — OUTPATIENT (OUTPATIENT)
Dept: OUTPATIENT SERVICES | Facility: HOSPITAL | Age: 66
LOS: 1 days | End: 2021-06-11
Payer: MEDICAID

## 2021-06-11 ENCOUNTER — APPOINTMENT (OUTPATIENT)
Dept: PEDIATRIC ALLERGY IMMUNOLOGY | Facility: CLINIC | Age: 66
End: 2021-06-11

## 2021-06-11 ENCOUNTER — APPOINTMENT (OUTPATIENT)
Dept: PEDIATRIC ALLERGY IMMUNOLOGY | Facility: CLINIC | Age: 66
End: 2021-06-11
Payer: MEDICAID

## 2021-06-11 VITALS
WEIGHT: 210 LBS | SYSTOLIC BLOOD PRESSURE: 151 MMHG | HEIGHT: 59 IN | DIASTOLIC BLOOD PRESSURE: 75 MMHG | BODY MASS INDEX: 42.33 KG/M2 | TEMPERATURE: 98.6 F | HEART RATE: 94 BPM | OXYGEN SATURATION: 95 %

## 2021-06-11 DIAGNOSIS — Z88.0 ALLERGY STATUS TO PENICILLIN: ICD-10-CM

## 2021-06-11 DIAGNOSIS — L30.9 DERMATITIS, UNSPECIFIED: ICD-10-CM

## 2021-06-11 DIAGNOSIS — Z88.9 ALLERGY STATUS TO UNSPECIFIED DRUGS, MEDICAMENTS AND BIOLOGICAL SUBSTANCES: ICD-10-CM

## 2021-06-11 DIAGNOSIS — L29.9 PRURITUS, UNSPECIFIED: ICD-10-CM

## 2021-06-11 DIAGNOSIS — Z98.891 HISTORY OF UTERINE SCAR FROM PREVIOUS SURGERY: Chronic | ICD-10-CM

## 2021-06-11 DIAGNOSIS — Z98.890 OTHER SPECIFIED POSTPROCEDURAL STATES: Chronic | ICD-10-CM

## 2021-06-11 PROCEDURE — 99204 OFFICE O/P NEW MOD 45 MIN: CPT

## 2021-06-11 PROCEDURE — G0463: CPT

## 2021-06-11 RX ORDER — METFORMIN HYDROCHLORIDE 1000 MG/1
1000 TABLET, COATED ORAL
Refills: 0 | Status: ACTIVE | COMMUNITY

## 2021-06-11 RX ORDER — ATORVASTATIN CALCIUM 40 MG/1
40 TABLET, FILM COATED ORAL
Refills: 0 | Status: ACTIVE | COMMUNITY

## 2021-06-11 RX ORDER — CHROMIUM 200 MCG
25 MCG TABLET ORAL
Refills: 0 | Status: ACTIVE | COMMUNITY

## 2021-06-11 RX ORDER — INSULIN ASPART 100 [IU]/ML
(70-30) 100 INJECTION, SUSPENSION SUBCUTANEOUS
Refills: 0 | Status: ACTIVE | COMMUNITY

## 2021-06-11 RX ORDER — DILTIAZEM HCL 300 MG
300 CAPSULE, EXT RELEASE 24 HR ORAL
Refills: 0 | Status: ACTIVE | COMMUNITY

## 2021-06-11 NOTE — CONSULT LETTER
[Dear  ___] : Dear  [unfilled], [Consult Letter:] : I had the pleasure of evaluating your patient, [unfilled]. [Please see my note below.] : Please see my note below. [Consult Closing:] : Thank you very much for allowing me to participate in the care of this patient.  If you have any questions, please do not hesitate to contact me. [Sincerely,] : Sincerely, [FreeTextEntry2] : Shanique Mckee MD [FreeTextEntry3] : Nuris Haile MD\par Fellow, Division of Allergy/Immunology \par [facility]\par \par Clinton Del Angel III  MPH, MD, PhD, FACP, FACAAI, FAAAAI \par , Departments of Medicine and Pediatrics \par [facility] \par , [facility] \par Attending Physician, Division of Allergy & Immunology [facility]\par \par \par

## 2021-06-11 NOTE — PHYSICAL EXAM
[Alert] : alert [No Acute Distress] : no acute distress [No Discharge] : no discharge [Sclera Not Icteric] : sclera not icteric [Normal Lips/Tongue] : the lips and tongue were normal [No Nasal Discharge] : no nasal discharge [No Thrush] : no thrush [Supple] : the neck was supple [Normal Rate and Effort] : normal respiratory rhythm and effort [No Crackles] : no crackles [Bilateral Audible Breath Sounds] : bilateral audible breath sounds [Normal Rate] : heart rate was normal  [Regular Rhythm] : with a regular rhythm [Soft] : abdomen soft [Not Tender] : non-tender [Not Distended] : not distended [No Joint Swelling or Erythema] : no joint swelling or erythema [No Motor Deficits] : the motor exam was normal [Normal Affect] : affect was normal [Alert, Awake, Oriented as Age-Appropriate] : alert, awake, oriented as age appropriate [Conjunctival Erythema] : no conjunctival erythema [Posterior Pharyngeal Cobblestoning] : no posterior pharyngeal cobblestoning [Wheezing] : no wheezing was heard [Normal Cervical Lymph Nodes] : cervical [de-identified] : obese [de-identified] : dry skin over eyebrows, dandruff noted on scalp, well-dermarcated erythematous patch on inner right forearm (burn from cooking, per patient).

## 2021-06-11 NOTE — REASON FOR VISIT
[Initial Consultation] : an initial consultation for [[employer] ] : provided by [employer]   [FreeTextEntry2] : Soraida [TWNoteComboBox1] : [place]

## 2021-06-11 NOTE — REVIEW OF SYSTEMS
[Joint Pains] : arthralgias [Nl] : Psychiatric [Difficulty Breathing] : no dyspnea [Cough] : no cough [Wheezing] : no wheezing [Vomiting] : no vomiting [FreeTextEntry2] : see HPI [FreeTextEntry5] : hyperlipidemia [de-identified] : see HPI [de-identified] : see HPI

## 2021-06-11 NOTE — DATA REVIEWED
[FreeTextEntry1] : [facility] records reviewed- notable for negative VIPIN, C3 and C4 not low, skin biopsy results as above. No marked eosinophilia (but was on recent steroids)

## 2021-06-11 NOTE — SOCIAL HISTORY
[Apartment] : [unfilled] lives in an apartment  [Radiator/Baseboard] : heating provided by radiator(s)/baseboard(s) [Window Units] : air conditioning provided by window units [Humidifier] : does not use a humidifier [Dehumidifier] : does not use a dehumidifier [Dust Mite Covers] : does not have dust mite covers [Feather Pillows] : does not have feather pillows [Feather Comforter] : does not have a feather comforter [None] : none [Smokers in Household] : there are no smokers in the home

## 2021-06-11 NOTE — HISTORY OF PRESENT ILLNESS
[de-identified] : Patient is a 66 year old female with hyperlipidemia, DM2, obesity, presenting for recent rash, itchiness, joint pain. Referred by Rheumatology Dr. Mckee.\par \par She went to [facility] in early March 2021 due to itchy rash on arms and legs that had started 1 month prior. She was given Doxycycline for possible Lyme disease as well as prednisone. She then presented to [facility] 3/17/21 for worsening painful rash, joint pain, and subjective fever. She was admitted and was seen by Dermatology and Rheumatology for work up of possible dermatomyositis. Skin biopsy 3/17/21 suggestive of viral vs drug exanthem (focal dyskeratosis with mild perivascular inflammation). Rheumatology work up was unrevealing and labs were not consistent with dermatomyositis. CT chest negative for malignancy. During the admission she was given steroids which improved her rash and joint pain. She was also given Bactrim prophylaxis given her high dose of steroids as well as Gabapentin for the itching (she had taken it before for joint pain) and pantoprazole. \par \par She was discharged from subacute rehab 4/5/21 on a 1 month prednisone taper. However, after about 2 weeks she got recurrence of rash, itchy, with diffuse joint pain. She saw Dermatology on 4/20/21 who felt rash was morbilliform in nature and repeated the biopsy, which was suggestive of drug eruption vs infection per notes. She was told to stop Bactrim, pantoprazole, and gabapentin for concern for delayed drug rash.\par \par Rash has now resolved. When asked if she had taken anything new prior to her initial visit to [facility], she states that she had COVID-19 3/2020 and since then has had diarrhea frequently, for which she had been taking Imodium every 2 months or so. Last took it about 1 month prior to initial rash onset.\par \par She has penicillin allergy - itchiness on legs in 1985, no trouble breathing. Azithromycin- got hives in 1997, no trouble breathing. No food allergies, no seasonal allergies. \par \par Got the [employer] COVID-19 vaccine after hospital discharge, prior to admission to sub-acute rehab.

## 2021-06-22 PROCEDURE — 97010 HOT OR COLD PACKS THERAPY: CPT

## 2021-06-22 PROCEDURE — 36415 COLL VENOUS BLD VENIPUNCTURE: CPT

## 2021-06-22 PROCEDURE — 97167 OT EVAL HIGH COMPLEX 60 MIN: CPT

## 2021-06-22 PROCEDURE — 97110 THERAPEUTIC EXERCISES: CPT

## 2021-06-22 PROCEDURE — U0003: CPT

## 2021-06-22 PROCEDURE — 82962 GLUCOSE BLOOD TEST: CPT

## 2021-06-22 PROCEDURE — 97116 GAIT TRAINING THERAPY: CPT

## 2021-06-22 PROCEDURE — 97530 THERAPEUTIC ACTIVITIES: CPT

## 2021-06-22 PROCEDURE — 97163 PT EVAL HIGH COMPLEX 45 MIN: CPT

## 2021-06-22 PROCEDURE — 80053 COMPREHEN METABOLIC PANEL: CPT

## 2021-06-22 PROCEDURE — 97535 SELF CARE MNGMENT TRAINING: CPT

## 2021-06-22 PROCEDURE — 85027 COMPLETE CBC AUTOMATED: CPT

## 2021-06-22 PROCEDURE — 86803 HEPATITIS C AB TEST: CPT

## 2021-06-22 PROCEDURE — 85025 COMPLETE CBC W/AUTO DIFF WBC: CPT

## 2021-06-22 PROCEDURE — U0005: CPT

## 2021-06-28 LAB
EJ AB SER QL: NEGATIVE
ENA JO1 AB SER IA-ACNC: <20 UNITS
ENA PM/SCL AB SER-ACNC: <20 UNITS
ENA SM+RNP AB SER IA-ACNC: <20 UNITS
ENA SS-A IGG SER QL: 23 UNITS
FIBRILLARIN AB SER QL: NEGATIVE
KU AB SER QL: NEGATIVE
MDA-5 (P140)(CADM-140): <20 UNITS
MI2 AB SER QL: NEGATIVE
NXP-2 (P140): <20 UNITS
OJ AB SER QL: NEGATIVE
PL12 AB SER QL: NEGATIVE
PL7 AB SER QL: NEGATIVE
SRP AB SERPL QL: NEGATIVE
TIF GAMMA (P155/140): <20 UNITS
U2 SNRNP AB SER QL: NEGATIVE

## 2021-07-16 ENCOUNTER — APPOINTMENT (OUTPATIENT)
Dept: PEDIATRIC ALLERGY IMMUNOLOGY | Facility: CLINIC | Age: 66
End: 2021-07-16

## 2021-07-22 ENCOUNTER — APPOINTMENT (OUTPATIENT)
Dept: DERMATOLOGY | Facility: CLINIC | Age: 66
End: 2021-07-22

## 2021-10-16 DIAGNOSIS — J30.9 ALLERGIC RHINITIS, UNSPECIFIED: ICD-10-CM

## 2022-10-27 NOTE — DIETITIAN INITIAL EVALUATION ADULT. - DOB: +DATEOFBIRTH
Cardiac CTA with morphology  Imaging done 10/26/22  Reason for the exam: chest pain    Calcium score is 0 Agatston units.  This is between the 0-25 percentile for men between the ages of 0-40 years old.    Heart rate 62 bpm.  Left ventricular end-diastolic volume 225 mL.  Left ventricular end-systolic volume 123 mL.  Ejection fraction 55%.  Stroke volume 102 mL.  Cardiac output 6315 mL/m    The right atrium is normal in size.  The right ventricle is normal in size.  There is grossly normal right ventricular systolic function.  The pulmonary artery is normal in size.  There are 3 pulmonary veins which enter the left atrium in their expected location, 2 on the right and 1 on the left (the left main PA trifurcates into 3 large branches).  The left atrial appendage was visualized and is without thrombus.  The intra-atrial septum appeared to be intact.  The left ventricle is normal in size with normal systolic function.  There was no evidence of a left ventricular thrombus.  The intraventricular septum appeared to be intact.  The mitral valve appeared structurally normal.  The aortic valve was trileaflet and appears structurally and functionally normal.  The pulmonic valve appeared structurally normal.  The tricuspid valve appeared structurally normal.  There was no pericardial effusion.  The pericardium appeared normal.    The left main coronary artery came off the left coronary cusp in its anticipated location.  It bifurcated into the left anterior descending artery and the circumflex coronary artery.  There was no evidence of atherosclerotic disease of the left main coronary artery.  Left anterior descending artery wraps around the apex of the heart.  There is no evidence of atherosclerotic disease.  The circumflex artery is the nondominant vessel with no evidence of atherosclerotic disease.  The right coronary artery is the dominant vessel with no evidence of atherosclerotic disease.    Conclusions:  1.  Normal  coronary artery anatomy without evidence of atherosclerotic disease.  Calcium score is 0 Agatston units.  2.  Structurally normal heart.      Richa Gunter MD  10/27/22     Statement Selected

## 2023-02-20 NOTE — ED PROVIDER NOTE - ATTENDING CONTRIBUTION TO CARE
Calm/Appropriate
Pt with ~2 months of polyarthralgia, low grade fever and rashes, just d/c from [facility] on 3/12 without definitive diagnosis, was treated empirically for lyme and started on steroids for possible rheum disease. Pt significantly impaired  by her pain and unable to walk or perform ADLs. WIll admit to medicine for further management, derm and rheum consult.

## 2023-06-02 NOTE — H&P ADULT - NSICDXPASTSURGICALHX_GEN_ALL_CORE_FT
ABDOMINAL PAIN/VOMITING PAST SURGICAL HISTORY:  History of elbow surgery     History of throat surgery     S/P

## 2025-06-19 NOTE — PROGRESS NOTE ADULT - PROVIDER SPECIALTY LIST ADULT
Rehab Medicine
Rehab Medicine
Rheumatology
Rheumatology
Internal Medicine
19-Jun-2025 03:32

## 2025-07-08 NOTE — DIETITIAN INITIAL EVALUATION ADULT. - WEIGHT FOR BMI (KG)
Pt being admitted to  18.  Awaiting admit orders.    
Update given to JAYLEN Vyas on the floor & NH, JAYLEN Sol updated.    
95.7